# Patient Record
Sex: FEMALE | Race: WHITE
[De-identification: names, ages, dates, MRNs, and addresses within clinical notes are randomized per-mention and may not be internally consistent; named-entity substitution may affect disease eponyms.]

---

## 2020-02-04 ENCOUNTER — HOSPITAL ENCOUNTER (EMERGENCY)
Dept: HOSPITAL 61 - ER | Age: 75
Discharge: HOME | End: 2020-02-04
Payer: MEDICARE

## 2020-02-04 VITALS — BODY MASS INDEX: 47.91 KG/M2 | HEIGHT: 62 IN | WEIGHT: 260.37 LBS

## 2020-02-04 VITALS — DIASTOLIC BLOOD PRESSURE: 100 MMHG | SYSTOLIC BLOOD PRESSURE: 152 MMHG

## 2020-02-04 DIAGNOSIS — E11.9: ICD-10-CM
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
## 2020-02-04 DIAGNOSIS — I10: ICD-10-CM
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
## 2020-02-04 DIAGNOSIS — G89.11: ICD-10-CM
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
## 2020-02-04 DIAGNOSIS — Z90.49: ICD-10-CM

## 2020-02-04 DIAGNOSIS — M25.561: Primary | ICD-10-CM

## 2020-02-04 DIAGNOSIS — M25.511: ICD-10-CM
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
## 2020-02-04 PROCEDURE — 73564 X-RAY EXAM KNEE 4 OR MORE: CPT

## 2020-02-04 PROCEDURE — 99284 EMERGENCY DEPT VISIT MOD MDM: CPT

## 2020-02-04 PROCEDURE — 73030 X-RAY EXAM OF SHOULDER: CPT

## 2020-02-04 NOTE — PHYS DOC
Past Medical History


Past Medical History:  Arthritis, Diabetes-Type II, Hypertension


Past Surgical History:  Cholecystectomy, Tonsillectomy


Additional Past Surgical Histo:  BREAST CYSTS, CARPAL TUNNEL


Alcohol Use:  None


Drug Use:  None





Adult General


Chief Complaint


Chief Complaint:  KNEE INJURY





HPI


HPI





Patient is a 74  year old female who presents with states she is walking with 

her walker when her right knee gave out and she fell to the carpeted floor. She 

states she fell on her right knee and then her right shoulder went into the 

corner of the wall. She denies hitting her head, syncope, dizziness, visual 

changes, weakness in her extremities, fever, dysuria, back pain, neck pain, 

numbness or tingling, shortness of breath, chest pain. Patient does wear 3 L of 

oxygen normally at home due to COPD. Patient is on 3 L of oxygen here in the ED 

and she is 98%.





Review of Systems


Review of Systems








Musculoskeletal: Denies back pain. right knee and right shoulder joint pain []








All other systems were reviewed and found to be within normal limits, except as 

documented in this note.





Current Medications


Current Medications





Current Medications








 Medications


  (Trade)  Dose


 Ordered  Sig/Eva  Start Time


 Stop Time Status Last Admin


Dose Admin


 


 Acetaminophen/


 Hydrocodone Bitart


  (Lortab 5/325)  1 tab  1X  ONCE  20 17:15


 20 17:16 DC 20 17:55


1 TAB











Allergies


Allergies





Allergies








Coded Allergies Type Severity Reaction Last Updated Verified


 


  No Known Drug Allergies    17 No











Physical Exam


Physical Exam





Constitutional: Well developed, well nourished, no acute distress, non-toxic 

appearance. []


HENT: Normocephalic, atraumatic, bilateral external ears normal, oropharynx 

moist, no oral exudates, nose normal. []


Eyes: PERRLA, EOMI, conjunctiva normal, no discharge. [] 


Neck: Normal range of motion, no tenderness, supple, no stridor. [] 


Cardiovascular:Heart rate regular rhythm, no murmur []


Lungs & Thorax:  Bilateral breath sounds clear to auscultation []


Abdomen: Bowel sounds normal, soft, no tenderness, no masses, no pulsatile 

masses. [] 


Skin: Warm, dry, no erythema, no rash. [] 


Back: No tenderness, no CVA tenderness. [] 


Extremities: Right anterior knee and right lateral shoulder tenderness, no 

cyanosis, no clubbing, ROM intact, no edema. [] 


Neurologic: Alert and oriented X 3, normal motor function, normal sensory 

function, no focal deficits noted. []


Psychologic: Affect normal, judgement normal, mood normal. []





Current Patient Data


Vital Signs





                                   Vital Signs








  Date Time  Temp Pulse Resp B/P (MAP) Pulse Ox O2 Delivery O2 Flow Rate FiO2


 


20 17:55   22     


 


20 17:04 97.5 84  198/95 (129) 96 Room Air  





 97.5       











EKG


EKG


[]





Radiology/Procedures


Radiology/Procedures


[]


Impressions:


[facility]


                    [address] [place] [address]


                                 [phone]


                                        


                                 IMAGING REPORT





                                     Signed





PATIENT: NEENA DUTTON   ACCOUNT: [id number]     MRN#: [id number]


: 1945           LOCATION: ER              AGE: 74


SEX: F                    EXAM DT: 20         ACCESSION#: [id number]


STATUS: REG ER            ORD. PHYSICIAN: MADONNA GEORGE


REASON: Fall


PROCEDURE: SHOULDER 2+V RIGHT





Exam: Right shoulder 3 views


 


INDICATION: Fall


 


TECHNIQUE: Frontal view of the right shoulder with internal and external 


rotation and transscapular Y view.


 


Comparisons: 2018


 


FINDINGS:


Right shoulder arthroplasty changes are again noted no acute or healed 


fractures. Soft tissues are unremarkable. Joint spaces are 


well-maintained.


 


IMPRESSION:


Right shoulder arthroplasty without evidence of acute fracture.


 


Electronically signed by: Michael Payton MD (2020 5:52 PM) [facility]














DICTATED and SIGNED BY:     MICHAEL PAYTON MD


DATE:     20 1752





                            [facility]


                    [address] [place] [address]


                                 [phone]


                                        


                                 IMAGING REPORT





                                     Signed





PATIENT: NEENA DUTTON   ACCOUNT: [id number]     MRN#: [id number]


: 1945           LOCATION: ER              AGE: 74


SEX: F                    EXAM DT: 20         ACCESSION#: [id number]


STATUS: REG ER            ORD. PHYSICIAN: MADONNA GEORGE


REASON: Fall


PROCEDURE: KNEE RIGHT 4V





Exam: Right knee 3 views


 


INDICATION: Fall


 


TECHNIQUE: Frontal, lateral, oblique and sunrise views of the right knee


 


Comparisons: None


 


FINDINGS:


Right total knee arthroplasty changes are noted. No acute or healed 


fractures. Soft tissue swelling overlying the patella. No suprapatellar 


effusion.


 


IMPRESSION:


Right knee arthroplasty without evidence of acute osseous abnormality.


 


Electronically signed by: Michael Payton MD (2020 5:50 PM) [facility]














DICTATED and SIGNED BY:     MICHAEL PAYTON MD


DATE:     20 2330








Course & Med Decision Making


Course & Med Decision Making


She states she has no other complaints besides her knee hurting and her shoulder

 hurting. She's had a previous right knee replacement 2 years ago at [facility].

 She had a right shoulder surgery done by Dr. Garcia and 2018. Patient has full

 range of motion of her right knee. Tenderness to the anterior knee only. No 

bruising or deformity. Skin is pink warm and dry. No swelling in the knee. Pedal

 pulses strong and present. Cap refill less than 3 seconds. Lateral right 

shoulder tenderness with palpation. Patient has full range of motion of the 

right shoulder. Radial pulses are present. Skin pink warm and dry. There is no 

bruising, deformity or swelling to the right shoulder.





Patient is up and ambulating with a steady gait in the emergency room. She is 

discharged home.





Dragon Disclaimer


Dragon Disclaimer


This electronic medical record was generated, in whole or in part, using a voice

 recognition dictation system.





Departure


Departure


Impression:  


   Primary Impression:  


   Fall


   Additional Impressions:  


   Knee pain, right


   Shoulder pain, right


Disposition:  01 HOME, SELF-CARE


Condition:  STABLE


Referrals:  


JESÚS LYLES DO (PCP)








LOY GARCIA MD


Patient Instructions:  Fall Prevention and Home Safety





Additional Instructions:  


Follow up with Dr Garcia or your primary care provider. Take pain medication as

 prescribed.


Scripts


Hydrocodone/Apap 5-325 (NORCO 5-325 TABLET) 1 Each Tablet


1 TAB PO PRN Q6HRS PRN for PAIN, #8 TAB 0 Refills


   Prov: TYLERMADONNA M APRNADJA         20





Problem Qualifiers








   Primary Impression:  


   Fall


   Encounter type:  initial encounter  Qualified Codes:  W19.XXXA - Unspecified 

   fall, initial encounter


   Additional Impressions:  


   Knee pain, right


   Chronicity:  acute  Qualified Codes:  M25.561 - Pain in right knee


   Shoulder pain, right


   Chronicity:  acute  Qualified Codes:  M25.511 - Pain in right shoulder








MADONNA GEORGE             2020 17:17

## 2020-02-04 NOTE — RAD
Exam: Right shoulder 3 views

 

INDICATION: Fall

 

TECHNIQUE: Frontal view of the right shoulder with internal and external 

rotation and transscapular Y view.

 

Comparisons: 2/14/2018

 

FINDINGS:

Right shoulder arthroplasty changes are again noted no acute or healed 

fractures. Soft tissues are unremarkable. Joint spaces are 

well-maintained.

 

IMPRESSION:

Right shoulder arthroplasty without evidence of acute fracture.

 

Electronically signed by: Michael Cox MD (2/4/2020 5:52 PM) [facility]

## 2020-02-04 NOTE — RAD
Exam: Right knee 3 views

 

INDICATION: Fall

 

TECHNIQUE: Frontal, lateral, oblique and sunrise views of the right knee

 

Comparisons: None

 

FINDINGS:

Right total knee arthroplasty changes are noted. No acute or healed 

fractures. Soft tissue swelling overlying the patella. No suprapatellar 

effusion.

 

IMPRESSION:

Right knee arthroplasty without evidence of acute osseous abnormality.

 

Electronically signed by: Michael Cox MD (2/4/2020 5:50 PM) [facility]

## 2020-03-28 ENCOUNTER — HOSPITAL ENCOUNTER (INPATIENT)
Dept: HOSPITAL 61 - ER | Age: 75
LOS: 11 days | Discharge: NURSING, REHAB OR LONG-TERM CARE | DRG: 673 | End: 2020-04-08
Attending: FAMILY MEDICINE | Admitting: FAMILY MEDICINE
Payer: MEDICARE

## 2020-03-28 VITALS — SYSTOLIC BLOOD PRESSURE: 123 MMHG | DIASTOLIC BLOOD PRESSURE: 68 MMHG

## 2020-03-28 VITALS — SYSTOLIC BLOOD PRESSURE: 146 MMHG | DIASTOLIC BLOOD PRESSURE: 65 MMHG

## 2020-03-28 VITALS — HEIGHT: 63 IN | WEIGHT: 246.04 LBS | BODY MASS INDEX: 43.59 KG/M2

## 2020-03-28 DIAGNOSIS — J96.20: ICD-10-CM

## 2020-03-28 DIAGNOSIS — L97.819: ICD-10-CM

## 2020-03-28 DIAGNOSIS — E87.1: ICD-10-CM

## 2020-03-28 DIAGNOSIS — E11.22: ICD-10-CM

## 2020-03-28 DIAGNOSIS — I50.43: ICD-10-CM

## 2020-03-28 DIAGNOSIS — L97.829: ICD-10-CM

## 2020-03-28 DIAGNOSIS — D64.9: ICD-10-CM

## 2020-03-28 DIAGNOSIS — Z03.818: ICD-10-CM

## 2020-03-28 DIAGNOSIS — Z82.49: ICD-10-CM

## 2020-03-28 DIAGNOSIS — Z99.2: ICD-10-CM

## 2020-03-28 DIAGNOSIS — Z99.81: ICD-10-CM

## 2020-03-28 DIAGNOSIS — E78.5: ICD-10-CM

## 2020-03-28 DIAGNOSIS — I87.2: ICD-10-CM

## 2020-03-28 DIAGNOSIS — Z87.891: ICD-10-CM

## 2020-03-28 DIAGNOSIS — L03.116: ICD-10-CM

## 2020-03-28 DIAGNOSIS — M19.90: ICD-10-CM

## 2020-03-28 DIAGNOSIS — E66.01: ICD-10-CM

## 2020-03-28 DIAGNOSIS — E87.5: ICD-10-CM

## 2020-03-28 DIAGNOSIS — Z90.49: ICD-10-CM

## 2020-03-28 DIAGNOSIS — L03.115: ICD-10-CM

## 2020-03-28 DIAGNOSIS — N18.6: ICD-10-CM

## 2020-03-28 DIAGNOSIS — E11.51: ICD-10-CM

## 2020-03-28 DIAGNOSIS — N17.0: Primary | ICD-10-CM

## 2020-03-28 DIAGNOSIS — I87.8: ICD-10-CM

## 2020-03-28 DIAGNOSIS — Z96.659: ICD-10-CM

## 2020-03-28 DIAGNOSIS — E61.1: ICD-10-CM

## 2020-03-28 DIAGNOSIS — I95.9: ICD-10-CM

## 2020-03-28 DIAGNOSIS — I13.2: ICD-10-CM

## 2020-03-28 DIAGNOSIS — Z79.899: ICD-10-CM

## 2020-03-28 DIAGNOSIS — J44.1: ICD-10-CM

## 2020-03-28 DIAGNOSIS — Z87.81: ICD-10-CM

## 2020-03-28 LAB
ALBUMIN SERPL-MCNC: 2 G/DL (ref 3.4–5)
ALBUMIN/GLOB SERPL: 0.4 {RATIO} (ref 1–1.7)
ALP SERPL-CCNC: 354 U/L (ref 46–116)
ALT SERPL-CCNC: 54 U/L (ref 14–59)
ANION GAP SERPL CALC-SCNC: 7 MMOL/L (ref 6–14)
APTT BLD: 30 SEC (ref 24–38)
AST SERPL-CCNC: 48 U/L (ref 15–37)
BASOPHILS # BLD AUTO: 0.1 X10^3/UL (ref 0–0.2)
BASOPHILS NFR BLD: 1 % (ref 0–3)
BILIRUB SERPL-MCNC: 0.3 MG/DL (ref 0.2–1)
BUN SERPL-MCNC: 63 MG/DL (ref 7–20)
BUN/CREAT SERPL: 19 (ref 6–20)
CALCIUM SERPL-MCNC: 9.2 MG/DL (ref 8.5–10.1)
CHLORIDE SERPL-SCNC: 95 MMOL/L (ref 98–107)
CO2 SERPL-SCNC: 31 MMOL/L (ref 21–32)
CREAT SERPL-MCNC: 3.4 MG/DL (ref 0.6–1)
EOSINOPHIL NFR BLD: 0.1 X10^3/UL (ref 0–0.7)
EOSINOPHIL NFR BLD: 1 % (ref 0–3)
ERYTHROCYTE [DISTWIDTH] IN BLOOD BY AUTOMATED COUNT: 17.1 % (ref 11.5–14.5)
GFR SERPLBLD BASED ON 1.73 SQ M-ARVRAT: 13.1 ML/MIN
GLOBULIN SER-MCNC: 4.6 G/DL (ref 2.2–3.8)
GLUCOSE SERPL-MCNC: 97 MG/DL (ref 70–99)
HCT VFR BLD CALC: 33.6 % (ref 36–47)
HGB BLD-MCNC: 10.6 G/DL (ref 12–15.5)
LYMPHOCYTES # BLD: 0.9 X10^3/UL (ref 1–4.8)
LYMPHOCYTES NFR BLD AUTO: 11 % (ref 24–48)
MCH RBC QN AUTO: 27 PG (ref 25–35)
MCHC RBC AUTO-ENTMCNC: 31 G/DL (ref 31–37)
MCV RBC AUTO: 85 FL (ref 79–100)
MONO #: 0.5 X10^3/UL (ref 0–1.1)
MONOCYTES NFR BLD: 7 % (ref 0–9)
NEUT #: 6.8 X10^3/UL (ref 1.8–7.7)
NEUTROPHILS NFR BLD AUTO: 81 % (ref 31–73)
PLATELET # BLD AUTO: 372 X10^3/UL (ref 140–400)
POTASSIUM SERPL-SCNC: 5.8 MMOL/L (ref 3.5–5.1)
PROT SERPL-MCNC: 6.6 G/DL (ref 6.4–8.2)
PROTHROMBIN TIME: 13.8 SEC (ref 11.7–14)
RBC # BLD AUTO: 3.96 X10^6/UL (ref 3.5–5.4)
SODIUM SERPL-SCNC: 133 MMOL/L (ref 136–145)
WBC # BLD AUTO: 8.3 X10^3/UL (ref 4–11)

## 2020-03-28 PROCEDURE — 80053 COMPREHEN METABOLIC PANEL: CPT

## 2020-03-28 PROCEDURE — 71045 X-RAY EXAM CHEST 1 VIEW: CPT

## 2020-03-28 PROCEDURE — 81001 URINALYSIS AUTO W/SCOPE: CPT

## 2020-03-28 PROCEDURE — 85027 COMPLETE CBC AUTOMATED: CPT

## 2020-03-28 PROCEDURE — 96375 TX/PRO/DX INJ NEW DRUG ADDON: CPT

## 2020-03-28 PROCEDURE — 73630 X-RAY EXAM OF FOOT: CPT

## 2020-03-28 PROCEDURE — 84132 ASSAY OF SERUM POTASSIUM: CPT

## 2020-03-28 PROCEDURE — 76770 US EXAM ABDO BACK WALL COMP: CPT

## 2020-03-28 PROCEDURE — 84100 ASSAY OF PHOSPHORUS: CPT

## 2020-03-28 PROCEDURE — 86706 HEP B SURFACE ANTIBODY: CPT

## 2020-03-28 PROCEDURE — 96372 THER/PROPH/DIAG INJ SC/IM: CPT

## 2020-03-28 PROCEDURE — G0378 HOSPITAL OBSERVATION PER HR: HCPCS

## 2020-03-28 PROCEDURE — 86704 HEP B CORE ANTIBODY TOTAL: CPT

## 2020-03-28 PROCEDURE — 36581 REPLACE TUNNELED CV CATH: CPT

## 2020-03-28 PROCEDURE — 36556 INSERT NON-TUNNEL CV CATH: CPT

## 2020-03-28 PROCEDURE — 83880 ASSAY OF NATRIURETIC PEPTIDE: CPT

## 2020-03-28 PROCEDURE — 80048 BASIC METABOLIC PNL TOTAL CA: CPT

## 2020-03-28 PROCEDURE — 85730 THROMBOPLASTIN TIME PARTIAL: CPT

## 2020-03-28 PROCEDURE — 80069 RENAL FUNCTION PANEL: CPT

## 2020-03-28 PROCEDURE — 87340 HEPATITIS B SURFACE AG IA: CPT

## 2020-03-28 PROCEDURE — 99152 MOD SED SAME PHYS/QHP 5/>YRS: CPT

## 2020-03-28 PROCEDURE — P9046 ALBUMIN (HUMAN), 25%, 20 ML: HCPCS

## 2020-03-28 PROCEDURE — 82550 ASSAY OF CK (CPK): CPT

## 2020-03-28 PROCEDURE — 93005 ELECTROCARDIOGRAM TRACING: CPT

## 2020-03-28 PROCEDURE — 94640 AIRWAY INHALATION TREATMENT: CPT

## 2020-03-28 PROCEDURE — 85025 COMPLETE CBC W/AUTO DIFF WBC: CPT

## 2020-03-28 PROCEDURE — C1769 GUIDE WIRE: HCPCS

## 2020-03-28 PROCEDURE — 77001 FLUOROGUIDE FOR VEIN DEVICE: CPT

## 2020-03-28 PROCEDURE — 36415 COLL VENOUS BLD VENIPUNCTURE: CPT

## 2020-03-28 PROCEDURE — 94760 N-INVAS EAR/PLS OXIMETRY 1: CPT

## 2020-03-28 PROCEDURE — 83550 IRON BINDING TEST: CPT

## 2020-03-28 PROCEDURE — 76937 US GUIDE VASCULAR ACCESS: CPT

## 2020-03-28 PROCEDURE — C1750 CATH, HEMODIALYSIS,LONG-TERM: HCPCS

## 2020-03-28 PROCEDURE — 80061 LIPID PANEL: CPT

## 2020-03-28 PROCEDURE — C1892 INTRO/SHEATH,FIXED,PEEL-AWAY: HCPCS

## 2020-03-28 PROCEDURE — 94644 CONT INHLJ TX 1ST HOUR: CPT

## 2020-03-28 PROCEDURE — 83540 ASSAY OF IRON: CPT

## 2020-03-28 PROCEDURE — 83735 ASSAY OF MAGNESIUM: CPT

## 2020-03-28 PROCEDURE — 93306 TTE W/DOPPLER COMPLETE: CPT

## 2020-03-28 PROCEDURE — 82962 GLUCOSE BLOOD TEST: CPT

## 2020-03-28 PROCEDURE — 84484 ASSAY OF TROPONIN QUANT: CPT

## 2020-03-28 PROCEDURE — 96365 THER/PROPH/DIAG IV INF INIT: CPT

## 2020-03-28 PROCEDURE — 85610 PROTHROMBIN TIME: CPT

## 2020-03-28 NOTE — PHYS DOC
Past Medical History


Past Medical History:  Arthritis, COPD, Diabetes-Type II, Hypertension


Past Surgical History:  Cholecystectomy, Knee Replacement, Tonsillectomy


Additional Past Surgical Histo:  BREAST CYSTS, CARPAL TUNNEL


Smoking Status:  Former Smoker


Alcohol Use:  None


Drug Use:  None





Adult General


Chief Complaint


Chief Complaint:  WEAKNESS/GENERALIZED





HPI


HPI





74-year-old female past medical history of COPD on 2 L nasal cannula presents 

with the chief complaint of shortness of breath.  Patient states she has had 

shortness of breath for the last 3 weeks.  Patient states today shortness of 

breath had noticeably increased.  Shortness of breath is exacerbated with 

movement.  Patient denies any associated chest pain, new cough, and/or fevers or

chills.





On exam patient has bilateral lower extremity edema with weeping wounds.





Patient denies any chest pain.





Review of Systems


Review of Systems





Constitutional: Denies fever or chills []


Eyes: Denies change in visual acuity, redness, or eye pain []


HENT: Denies nasal congestion or sore throat []


Respiratory: Denies cough or shortness of breath []


Cardiovascular: No additional information not addressed in HPI []


GI: Denies abdominal pain, nausea, vomiting, bloody stools or diarrhea []


: Denies dysuria or hematuria []


Musculoskeletal: Denies back pain or joint pain []


Integument: Denies rash or skin lesions []


Neurologic: Denies headache, focal weakness or sensory changes []


Endocrine: Denies polyuria or polydipsia []





All other systems were reviewed and found to be within normal limits, except as 

documented in this note.





Current Medications


Current Medications





Current Medications








 Medications


  (Trade)  Dose


 Ordered  Sig/Eva  Start Time


 Stop Time Status Last Admin


Dose Admin


 


 Albuterol Sulfate


  (Ventolin Neb


 Soln)  10 mg  1X  ONCE  3/28/20 18:00


 3/28/20 18:01 DC  





 


 Calcium Gluconate


  (Calcium


 Gluconate)  1,000 mg  1X  ONCE  3/28/20 18:00


 3/28/20 18:01 DC  





 


 Furosemide


  (Lasix)  40 mg  1X  ONCE  3/28/20 18:30


 3/28/20 18:31 UNV  





 


 Sodium Chloride  1,000 ml @ 


 1,000 mls/hr  1X  ONCE  3/28/20 18:00


 3/28/20 18:59   





 


 Vancomycin HCl  250 ml @ 


 166.667


 mls/hr  1X  ONCE  3/28/20 17:45


 3/28/20 19:14   














Allergies


Allergies





Allergies








Coded Allergies Type Severity Reaction Last Updated Verified


 


  No Known Drug Allergies    11/28/17 No











Physical Exam


Physical Exam





Constitutional: Well developed, well nourished, no acute distress, non-toxic 

appearance. []


HENT: Normocephalic, atraumatic, bilateral external ears normal, oropharynx 

moist, no oral exudates, nose normal. []


Eyes: PERRLA, EOMI, conjunctiva normal, no discharge. [] 


Neck: Normal range of motion, no tenderness, supple, no stridor. [] 


Cardiovascular:Heart rate regular rhythm, no murmur []


Lungs & Thorax:  Bilateral breath sounds clear to auscultation []


Abdomen: Bowel sounds normal, soft, no tenderness, no masses, no pulsatile 

masses. [] 


Skin: Warm, dry, no erythema, no rash. [] 


Back: No tenderness, no CVA tenderness. [] 


Extremities: No tenderness, no cyanosis, no clubbing, ROM intact, no edema. [] 


Neurologic: Alert and oriented X 3, normal motor function, normal sensory 

function, no focal deficits noted. []


Psychologic: Affect normal, judgement normal, mood normal. []





Current Patient Data


Lab Values





                                Laboratory Tests








Test


 3/28/20


16:13


 


White Blood Count


 8.3 x10^3/uL


(4.0-11.0)


 


Red Blood Count


 3.96 x10^6/uL


(3.50-5.40)


 


Hemoglobin


 10.6 g/dL


(12.0-15.5)  L


 


Hematocrit


 33.6 %


(36.0-47.0)  L


 


Mean Corpuscular Volume


 85 fL ()





 


Mean Corpuscular Hemoglobin 27 pg (25-35)  


 


Mean Corpuscular Hemoglobin


Concent 31 g/dL


(31-37)


 


Red Cell Distribution Width


 17.1 %


(11.5-14.5)  H


 


Platelet Count


 372 x10^3/uL


(140-400)


 


Neutrophils (%) (Auto) 81 % (31-73)  H


 


Lymphocytes (%) (Auto) 11 % (24-48)  L


 


Monocytes (%) (Auto) 7 % (0-9)  


 


Eosinophils (%) (Auto) 1 % (0-3)  


 


Basophils (%) (Auto) 1 % (0-3)  


 


Neutrophils # (Auto)


 6.8 x10^3/uL


(1.8-7.7)


 


Lymphocytes # (Auto)


 0.9 x10^3/uL


(1.0-4.8)  L


 


Monocytes # (Auto)


 0.5 x10^3/uL


(0.0-1.1)


 


Eosinophils # (Auto)


 0.1 x10^3/uL


(0.0-0.7)


 


Basophils # (Auto)


 0.1 x10^3/uL


(0.0-0.2)


 


Prothrombin Time


 13.8 SEC


(11.7-14.0)


 


Prothrombin Time INR 1.1 (0.8-1.1)  


 


Activated Partial


Thromboplast Time 30 SEC (24-38)





 


Sodium Level


 133 mmol/L


(136-145)  L


 


Potassium Level


 5.8 mmol/L


(3.5-5.1)  H


 


Chloride Level


 95 mmol/L


()  L


 


Carbon Dioxide Level


 31 mmol/L


(21-32)


 


Anion Gap 7 (6-14)  


 


Blood Urea Nitrogen


 63 mg/dL


(7-20)  H


 


Creatinine


 3.4 mg/dL


(0.6-1.0)  H


 


Estimated GFR


(Cockcroft-Gault) 13.1  





 


BUN/Creatinine Ratio 19 (6-20)  


 


Glucose Level


 97 mg/dL


(70-99)


 


Calcium Level


 9.2 mg/dL


(8.5-10.1)


 


Total Bilirubin


 0.3 mg/dL


(0.2-1.0)


 


Aspartate Amino Transferase


(AST) 48 U/L (15-37)


H


 


Alanine Aminotransferase (ALT)


 54 U/L (14-59)





 


Alkaline Phosphatase


 354 U/L


()  H


 


Troponin I Quantitative


 0.324 ng/mL


(0.000-0.055)


 


NT-Pro-B-Type Natriuretic


Peptide 43298 pg/mL


(0-449)  H


 


Total Protein


 6.6 g/dL


(6.4-8.2)


 


Albumin


 2.0 g/dL


(3.4-5.0)  L


 


Albumin/Globulin Ratio


 0.4 (1.0-1.7)


L





                                Laboratory Tests


3/28/20 16:13








                                Laboratory Tests


3/28/20 16:13











EKG


EKG


1552hrs


Sinus rhythm rate of 77 no ST elevation no ST depression no acute MI []





Radiology/Procedures


Radiology/Procedures


[]





Course & Med Decision Making


Course & Med Decision Making


Pertinent Labs and Imaging studies reviewed. (See chart for details)





[]





Dragon Disclaimer


Dragon Disclaimer


This electronic medical record was generated, in whole or in part, using a voice

 recognition dictation system.





Departure


Departure


Impression:  


   Primary Impression:  


   Shortness of breath


   Additional Impressions:  


   Cellulitis


   CHF (congestive heart failure)


   Elevated troponin


[facility]


Referrals:  


JESÚS LYLES DO (PCP)





Problem Qualifiers











WILDER PATE DO            Mar 28, 2020 16:52

## 2020-03-28 NOTE — RAD
AP portable chest  radiograph 3/28/2020

 

Clinical History: Shortness of breath.

 

An AP erect portable digital radiograph of the chest was obtained.

 

Comparison study is dated 11/28/2017.

 

The patient is post right shoulder joint replacement. The cardiac 

silhouette is mildly enlarged. Atherosclerotic calcification thoracic 

aorta is seen. No area of consolidation is noted. No pneumothorax or 

pleural effusion is seen. Slight prominence of pulmonary vasculature is 

seen which could reflect mild CHF. Clinical correlation is recommended. 

Degenerative changes are seen involving the thoracic spine along with the 

left shoulder.

 

Impression: Question mild CHF.

 

Electronically signed by: Ruslan Jason MD (3/28/2020 8:21 PM) [id number]

## 2020-03-29 VITALS — SYSTOLIC BLOOD PRESSURE: 140 MMHG | DIASTOLIC BLOOD PRESSURE: 55 MMHG

## 2020-03-29 VITALS — SYSTOLIC BLOOD PRESSURE: 118 MMHG | DIASTOLIC BLOOD PRESSURE: 67 MMHG

## 2020-03-29 VITALS — SYSTOLIC BLOOD PRESSURE: 120 MMHG | DIASTOLIC BLOOD PRESSURE: 58 MMHG

## 2020-03-29 VITALS — DIASTOLIC BLOOD PRESSURE: 60 MMHG | SYSTOLIC BLOOD PRESSURE: 106 MMHG

## 2020-03-29 VITALS — SYSTOLIC BLOOD PRESSURE: 106 MMHG | DIASTOLIC BLOOD PRESSURE: 63 MMHG

## 2020-03-29 VITALS — DIASTOLIC BLOOD PRESSURE: 49 MMHG | SYSTOLIC BLOOD PRESSURE: 107 MMHG

## 2020-03-29 LAB
ANION GAP SERPL CALC-SCNC: 4 MMOL/L (ref 6–14)
BUN SERPL-MCNC: 68 MG/DL (ref 7–20)
CALCIUM SERPL-MCNC: 8.6 MG/DL (ref 8.5–10.1)
CHLORIDE SERPL-SCNC: 99 MMOL/L (ref 98–107)
CO2 SERPL-SCNC: 32 MMOL/L (ref 21–32)
CREAT SERPL-MCNC: 3.7 MG/DL (ref 0.6–1)
GFR SERPLBLD BASED ON 1.73 SQ M-ARVRAT: 11.9 ML/MIN
GLUCOSE SERPL-MCNC: 35 MG/DL (ref 70–99)
POTASSIUM SERPL-SCNC: 6.2 MMOL/L (ref 3.5–5.1)
SODIUM SERPL-SCNC: 135 MMOL/L (ref 136–145)

## 2020-03-29 RX ADMIN — DEXTROSE MONOHYDRATE PRN GM: 25 INJECTION, SOLUTION INTRAVENOUS at 21:24

## 2020-03-29 RX ADMIN — PIPERACILLIN SODIUM AND TAZOBACTAM SODIUM SCH MLS/HR: 2; .25 INJECTION, POWDER, LYOPHILIZED, FOR SOLUTION INTRAVENOUS at 23:18

## 2020-03-29 RX ADMIN — PIPERACILLIN SODIUM AND TAZOBACTAM SODIUM SCH MLS/HR: 2; .25 INJECTION, POWDER, LYOPHILIZED, FOR SOLUTION INTRAVENOUS at 16:59

## 2020-03-29 NOTE — NUR
notified of hypoglycemic episode tonight for the second time today. Pt was given 
an amp of Dextrose and her sugar improved from 43 to 100 afterwards. Pt was slightly sleepy, 
but still easy to wake during hypoglycemic episode. More alert post dextrose admin. 
Physician states continue to monitor sugar and administer more dextrose prn hypoglycemia in 
the future.

## 2020-03-29 NOTE — HP
ADMIT DATE:  03/29/2020



CHIEF COMPLAINT:  Weakness.



HISTORY OF PRESENT ILLNESS:  The patient is a pleasant 74-year-old female who

has multiple comorbidities.  She is quite large.  She has chronic COPD.  She is

on 2 liters at home.  Basically, she presented with shortness of breath.  She

also has chronic cellulitis on both lower extremities.  She is quite edematous. 

While in the ER, we also noted that she has acute kidney injury and her

creatinine is up to 2.  I believe she was started on Lasix and ACE inhibitor

within the past few weeks.  She also appears to be in acute on chronic systolic

and diastolic heart failure.  We are going to admit the patient and consult

Cardiology and Nephrology.



PAST MEDICAL HISTORY:  COPD, arthritis, diabetes, hypertension, obesity,

cholecystectomy, knee replacement, tonsillectomy, peripheral vascular disease,

chronic lower extremity wounds, breast cyst, carpal tunnel surgery, previous

tobacco abuse, probable CHF.



ALLERGIES:  None.



FAMILY HISTORY:  Coronary artery disease.



SOCIAL HISTORY:  She quit smoking, no drinking or drugs.



MEDICATIONS:  Reviewed, please refer to the MRAD.



REVIEW OF SYSTEMS:

GENERAL:  She complains of weakness.

SKIN:  No bruising, hair changes or rashes.

EYES:  No blurred, double or loss of vision.

NOSE AND THROAT:  No history of nosebleeds, hoarseness or sore throat.

HEART:  No history of palpitations, chest pain or shortness of breath on

exertion.

PULMONARY:  She complains of shortness of breath.

GASTROINTESTINAL:  Denies changes in appetite, nausea, vomiting, diarrhea or

constipation.

GENITOURINARY:  No history of frequency, urgency, hesitancy or nocturia.

NEUROLOGIC:  Denies history of numbness, tingling, tremor or weakness.

PSYCHIATRIC:  No history of panic, anxiety or depression.

ENDOCRINE:  No history of heat or cold intolerance, polyuria or polydipsia.

EXTREMITIES:  She complains of lower extremity swelling and pain and erythema.



PHYSICAL EXAMINATION:

VITALS:  Within normal limits and are stable.

GENERAL:  She is very large.

HEENT:  Normal cephalic atraumatic, external auditory canals are patent.  Eyes: 

Extraocular muscles are intact, pupils are equally round and reactive to light

and accommodation.

MUSKULOSKELETAL:  Well developed, well nourished, good range of motion.

ENDOCRINE:  No thyromegaly was palpated.

LYMPHATICS:  No cervical chain or axillary nodes were noted.

HEMATOPOIETIC:  No bruising.

NECK:  Supple, no JVD, no thyromegaly was noted.

PULMONARY:  She has decreased breath sounds.

HEART:  RRR, S1, S2 present.  Peripheral pulses intact, no obvious murmurs were

noted.

ABDOMEN:  Soft, nontender.  Positive bowel sounds no organomegaly, normal bowel

sounds.

EXTREMITIES:  She has bilateral lower extremity cellulitis with wounds that have

been wrapped with clean, dry and intact dressing.

NEUROLOGIC:  Normal speech, normal tone.  A & O x 3, moves all extremities, no

obvious focal deficits.

PSYCHIATRIC:  She is anxious.

SKIN:  No ulcerations or rashes, good skin turgor, no jaundice.

VASCULAR:  Good capillary refill, neurovascular bundle appears to be intact.



LABORATORY DATA:  Potassium is 6.2, BUN 68, creatinine 3.7, hemoglobin is 10.6,

INR is 1.1.  BNP is 30,161.  Troponin 0.27.



ASSESSMENT AND PLAN:  Acute on chronic kidney injury, hyperkalemia, hyponatremia

at 135, azotemia, acute on chronic systolic and diastolic heart failure,

elevated troponin which I suspect is secondary to her elevated creatinine,

anemia and lower extremity wounds.  The patient is being admitted.  We will

consult Nephrology and Cardiology.  Hold her ACE inhibitor.  Hold her diuretic. 

IV antibiotics.  PT, OT.  Wound care nurse to evaluate and treat.  We will

continue other home meds, DVT prophylaxis.  Full code.  Prognosis in long-term

is extremely guarded.



TOTAL TIME:  33 minutes.

 



______________________________

ELMO STEARNS DO



DR:  YAMINI/uvaldo  JOB#:  [id number] / [id number]

DD:  03/29/2020 12:53  DT:  03/29/2020 13:23

## 2020-03-29 NOTE — PDOC2
CONSULT


Date of Consult


Date of Consult


DATE: 3/29/20 


TIME: 10:37





Reason for Consult


Reason for Consult:


Shortness of breath.





Referring Physician


Referring Physician:


Dr. Santos





Identification/Chief Complaint


Chief Complaint


Shortness of breath





Source


Source:  Chart review, Patient





History of Present Illness


Reason for Visit:


The patient is a 74-year-old female who presented to the emergency room with 2-3

weeks of increasing shortness of breath. Her shortness of breath had 

significantly increased the day of admission. Initial evaluation the emergency 

room showed a blood potassium elevated at 5.8 with a creatinine of 3.4, chest x-

ray with probable mild heart failure and an EKG with no acute ischemic changes. 

Patient has a history of COPD, diabetes as well as hypertension but no 

documented history of coronary disease. She was treated with pulmonary 

medications and initially was some Lasix although this has been held at this t

arlin due to her elevated creatinine. She is feeling better in the morning but 

still reports shortness of breath. Additionally she has bilateral lower 

extremity edema with findings of probable cellulitis.





Past Medical History


Cardiovascular:  HTN, Hyperlipidemia


Pulmonary:  COPD


Musculoskeletal:  Osteoarthritis


Endocrine:  Diabetes





Past Surgical History


Past Surgical History:  Cholecystectomy, Total knee replacement, Tonsillectomy





Family History


Family History:  Hypertension





Social History


Quit


ALCOHOL:  none


Drugs:  None





Current Problem List


Problem List


Problems


Medical Problems:


(1) Cellulitis


Status: Acute  





(2) CHF (congestive heart failure)


Status: Acute  





(3) Elevated troponin


Status: Acute  





(4) Shortness of breath


Status: Acute  











Current Medications


Current Medications





Current Medications


Vancomycin HCl 250 ml @  166.667 mls/hr 1X  ONCE IV  Last administered on 

3/28/20at 18:24;  Start 3/28/20 at 17:45;  Stop 3/28/20 at 19:14;  Status DC


Sodium Chloride 1,000 ml @  1,000 mls/hr 1X  ONCE IV  Last administered on 

3/28/20at 18:23;  Start 3/28/20 at 18:00;  Stop 3/28/20 at 18:59;  Status DC


Calcium Gluconate (Calcium Gluconate) 1,000 mg 1X  ONCE IVP  Last administered 

on 3/28/20at 18:24;  Start 3/28/20 at 18:00;  Stop 3/28/20 at 18:01;  Status DC


Albuterol Sulfate (Ventolin Neb Soln) 10 mg 1X  ONCE CONT NEB  Last administered

on 3/28/20at 18:00;  Start 3/28/20 at 18:00;  Stop 3/28/20 at 18:01;  Status DC


Furosemide (Lasix) 40 mg 1X  ONCE PO ;  Start 3/28/20 at 18:30;  Stop 3/28/20 at

18:31;  Status DC


Enoxaparin Sodium (Lovenox 120mg Syringe) 120 mg 1X  ONCE SQ  Last administered 

on 3/28/20at 20:05;  Start 3/28/20 at 19:30;  Stop 3/28/20 at 19:31;  Status DC


Furosemide (Lasix) 40 mg 1X  ONCE IVP  Last administered on 3/28/20at 20:05;  

Start 3/28/20 at 19:30;  Stop 3/28/20 at 19:31;  Status DC


Dextrose (Dextrose 50%-Water Syringe) 25 gm STK-MED ONCE IV ;  Start 3/29/20 at 

08:11;  Stop 3/29/20 at 08:11;  Status DC


Sodium Polystyrene Sulfonate (Kayexalate) 30 gm 1X  ONCE RC ;  Start 3/29/20 at 

09:00;  Stop 3/29/20 at 08:38;  Status DC


Sodium Polystyrene Sulfonate (Kayexalate) 15 gm 1X  ONCE PO  Last administered 

on 3/29/20at 09:24;  Start 3/29/20 at 09:00;  Stop 3/29/20 at 09:01;  Status DC





Active Scripts


Active


Norco 5-325 Tablet (Acetaminophen/Hydrocodone Bitart) 1 Each Tablet 1 Tab PO PRN

Q6HRS PRN


Oxycodone-Acetaminophen 5-325 (Oxycodone Hcl/Acetaminophen) 1 Each Tablet 1 Tab 

PO PRN Q4HRS PRN


Reported


Triamcinolone Acetonide 0.1% Cream (Triamcinolone Acetonide) 15 Gm Cream..g. 1 

Ashley TP BID


Tramadol Hcl 50 Mg Tablet 50 Mg PO DAILY PRN


Flonase Allergy Relief (Fluticasone Propionate) 9.9 Ml Spray.susp 2 Sprays NS 

DAILY


Metformin Hcl 1,000 Mg Tablet 1,000 Mg PO BIDWMEALS


Lisinopril 40 Mg Tablet 1 Tab PO DAILY


Novolin N (Nph, Human Insulin Isophane) 100 Unit/1 Ml Vial 40 Unit SQ DAILY


Carvedilol ** (Carvedilol) 6.25 Mg Tablet 6.25 Mg PO BIDWMEALS


Escitalopram Oxalate 10 Mg Tablet 1 Tab PO DAILY


Simvastatin 40 Mg Tablet 1 Tab PO QHS


Glipizide 10 Mg Tablet 10 Mg PO BID


Furosemide 20 Mg Tablet 1 Tab PO DAILY





Allergies


Allergies:  


Coded Allergies:  


     No Known Drug Allergies (Unverified , 11/28/17)





ROS


General:  YES: Fatigue


Respiratory:  YES: Shortness of breath, SOB with excertion





Physical Exam


General:  mild distress


Lungs:  Other (decreased breath sounds bilaterally)


Heart:  Regular rate


Abdomen:  Normal bowel sounds





Vitals


VITALS





Vital Signs








  Date Time  Temp Pulse Resp B/P (MAP) Pulse Ox O2 Delivery O2 Flow Rate FiO2


 


3/29/20 07:00 97.7 63 20 107/49 (68) 95 Nasal Cannula 3.0 





 97.7       











Labs


Labs





Laboratory Tests








Test


 3/28/20


16:13 3/29/20


00:30 3/29/20


06:30 3/29/20


08:38


 


White Blood Count


 8.3 x10^3/uL


(4.0-11.0) 


 


 





 


Red Blood Count


 3.96 x10^6/uL


(3.50-5.40) 


 


 





 


Hemoglobin


 10.6 g/dL


(12.0-15.5) 


 


 





 


Hematocrit


 33.6 %


(36.0-47.0) 


 


 





 


Mean Corpuscular Volume 85 fL ()    


 


Mean Corpuscular Hemoglobin 27 pg (25-35)    


 


Mean Corpuscular Hemoglobin


Concent 31 g/dL


(31-37) 


 


 





 


Red Cell Distribution Width


 17.1 %


(11.5-14.5) 


 


 





 


Platelet Count


 372 x10^3/uL


(140-400) 


 


 





 


Neutrophils (%) (Auto) 81 % (31-73)    


 


Lymphocytes (%) (Auto) 11 % (24-48)    


 


Monocytes (%) (Auto) 7 % (0-9)    


 


Eosinophils (%) (Auto) 1 % (0-3)    


 


Basophils (%) (Auto) 1 % (0-3)    


 


Neutrophils # (Auto)


 6.8 x10^3/uL


(1.8-7.7) 


 


 





 


Lymphocytes # (Auto)


 0.9 x10^3/uL


(1.0-4.8) 


 


 





 


Monocytes # (Auto)


 0.5 x10^3/uL


(0.0-1.1) 


 


 





 


Eosinophils # (Auto)


 0.1 x10^3/uL


(0.0-0.7) 


 


 





 


Basophils # (Auto)


 0.1 x10^3/uL


(0.0-0.2) 


 


 





 


Prothrombin Time


 13.8 SEC


(11.7-14.0) 


 


 





 


Prothromb Time International


Ratio 1.1 (0.8-1.1) 


 


 


 





 


Activated Partial


Thromboplast Time 30 SEC (24-38) 


 


 


 





 


Sodium Level


 133 mmol/L


(136-145) 


 135 mmol/L


(136-145) 





 


Potassium Level


 5.8 mmol/L


(3.5-5.1) 


 6.2 mmol/L


(3.5-5.1) 





 


Chloride Level


 95 mmol/L


() 


 99 mmol/L


() 





 


Carbon Dioxide Level


 31 mmol/L


(21-32) 


 32 mmol/L


(21-32) 





 


Anion Gap 7 (6-14)   4 (6-14)  


 


Blood Urea Nitrogen


 63 mg/dL


(7-20) 


 68 mg/dL


(7-20) 





 


Creatinine


 3.4 mg/dL


(0.6-1.0) 


 3.7 mg/dL


(0.6-1.0) 





 


Estimated GFR


(Cockcroft-Gault) 13.1 


 


 11.9 


 





 


BUN/Creatinine Ratio 19 (6-20)    


 


Glucose Level


 97 mg/dL


(70-99) 


 35 mg/dL


(70-99) 





 


Calcium Level


 9.2 mg/dL


(8.5-10.1) 


 8.6 mg/dL


(8.5-10.1) 





 


Total Bilirubin


 0.3 mg/dL


(0.2-1.0) 


 


 





 


Aspartate Amino Transf


(AST/SGOT) 48 U/L (15-37) 


 


 


 





 


Alanine Aminotransferase


(ALT/SGPT) 54 U/L (14-59) 


 


 


 





 


Alkaline Phosphatase


 354 U/L


() 


 


 





 


Troponin I Quantitative


 0.324 ng/mL


(0.000-0.055) 0.278 ng/mL


(0.000-0.055) 0.273 ng/mL


(0.000-0.055) 





 


NT-Pro-B-Type Natriuretic


Peptide 12168 pg/mL


(0-449) 


 


 





 


Total Protein


 6.6 g/dL


(6.4-8.2) 


 


 





 


Albumin


 2.0 g/dL


(3.4-5.0) 


 


 





 


Albumin/Globulin Ratio 0.4 (1.0-1.7)    


 


Glucose (Fingerstick)


 


 


 


 69 mg/dL


(70-99)








Laboratory Tests








Test


 3/28/20


16:13 3/29/20


00:30 3/29/20


06:30 3/29/20


08:38


 


White Blood Count


 8.3 x10^3/uL


(4.0-11.0) 


 


 





 


Red Blood Count


 3.96 x10^6/uL


(3.50-5.40) 


 


 





 


Hemoglobin


 10.6 g/dL


(12.0-15.5) 


 


 





 


Hematocrit


 33.6 %


(36.0-47.0) 


 


 





 


Mean Corpuscular Volume 85 fL ()    


 


Mean Corpuscular Hemoglobin 27 pg (25-35)    


 


Mean Corpuscular Hemoglobin


Concent 31 g/dL


(31-37) 


 


 





 


Red Cell Distribution Width


 17.1 %


(11.5-14.5) 


 


 





 


Platelet Count


 372 x10^3/uL


(140-400) 


 


 





 


Neutrophils (%) (Auto) 81 % (31-73)    


 


Lymphocytes (%) (Auto) 11 % (24-48)    


 


Monocytes (%) (Auto) 7 % (0-9)    


 


Eosinophils (%) (Auto) 1 % (0-3)    


 


Basophils (%) (Auto) 1 % (0-3)    


 


Neutrophils # (Auto)


 6.8 x10^3/uL


(1.8-7.7) 


 


 





 


Lymphocytes # (Auto)


 0.9 x10^3/uL


(1.0-4.8) 


 


 





 


Monocytes # (Auto)


 0.5 x10^3/uL


(0.0-1.1) 


 


 





 


Eosinophils # (Auto)


 0.1 x10^3/uL


(0.0-0.7) 


 


 





 


Basophils # (Auto)


 0.1 x10^3/uL


(0.0-0.2) 


 


 





 


Prothrombin Time


 13.8 SEC


(11.7-14.0) 


 


 





 


Prothromb Time International


Ratio 1.1 (0.8-1.1) 


 


 


 





 


Activated Partial


Thromboplast Time 30 SEC (24-38) 


 


 


 





 


Sodium Level


 133 mmol/L


(136-145) 


 135 mmol/L


(136-145) 





 


Potassium Level


 5.8 mmol/L


(3.5-5.1) 


 6.2 mmol/L


(3.5-5.1) 





 


Chloride Level


 95 mmol/L


() 


 99 mmol/L


() 





 


Carbon Dioxide Level


 31 mmol/L


(21-32) 


 32 mmol/L


(21-32) 





 


Anion Gap 7 (6-14)   4 (6-14)  


 


Blood Urea Nitrogen


 63 mg/dL


(7-20) 


 68 mg/dL


(7-20) 





 


Creatinine


 3.4 mg/dL


(0.6-1.0) 


 3.7 mg/dL


(0.6-1.0) 





 


Estimated GFR


(Cockcroft-Gault) 13.1 


 


 11.9 


 





 


BUN/Creatinine Ratio 19 (6-20)    


 


Glucose Level


 97 mg/dL


(70-99) 


 35 mg/dL


(70-99) 





 


Calcium Level


 9.2 mg/dL


(8.5-10.1) 


 8.6 mg/dL


(8.5-10.1) 





 


Total Bilirubin


 0.3 mg/dL


(0.2-1.0) 


 


 





 


Aspartate Amino Transf


(AST/SGOT) 48 U/L (15-37) 


 


 


 





 


Alanine Aminotransferase


(ALT/SGPT) 54 U/L (14-59) 


 


 


 





 


Alkaline Phosphatase


 354 U/L


() 


 


 





 


Troponin I Quantitative


 0.324 ng/mL


(0.000-0.055) 0.278 ng/mL


(0.000-0.055) 0.273 ng/mL


(0.000-0.055) 





 


NT-Pro-B-Type Natriuretic


Peptide 12127 pg/mL


(0-449) 


 


 





 


Total Protein


 6.6 g/dL


(6.4-8.2) 


 


 





 


Albumin


 2.0 g/dL


(3.4-5.0) 


 


 





 


Albumin/Globulin Ratio 0.4 (1.0-1.7)    


 


Glucose (Fingerstick)


 


 


 


 69 mg/dL


(70-99)











Images


Images


Chest x-ray with findings of mild heart failure





Assessment/Plan


Assessment/Plan


1. Respiratory failure with elements of COPD, heart failure and DIETER. Patient is 

feeling better this morning. Would continue present treatments. Will hold Lasix 

at this time until the patient is evaluated by the renal service for her morning

creatinine of 3.7. We'll check an echocardiogram to evaluate LV function.





2. Probable acute heart failure. Patient denies a history of heart failure. 

Holding diuresis until evaluated by the renal service. We'll check an 

echocardiogram for LV function as above





3. DANNY. Elevated potassium and creatinine of 3.7. Would suggest a renal 

evaluation.





4. Lower extremity cellulitis. Antibiotics as per the primary service.





5. Hypertension. Reasonable control. Will continue to monitor. Continue present 

medications.





6. Diabetes mellitus. As per the primary service.





Thank you for allowing us to participate in the care of your patient.











STACEY GRAVES MD            Mar 29, 2020 10:43

## 2020-03-29 NOTE — RAD
RENAL COMPLETE BILATERAL

 

History: Acute kidney injury.

 

Comparison: None.

 

Procedure: Transabdominal ultrasound images are obtained of the kidneys 

and bladder.

 

Findings:

Right kidney: measures 11.4 x 5.8 x 5.7 cm. Degraded evaluation due to 

patient body habitus. No hydronephrosis.

 

Left kidney: measures 11.1 x 5.9 x 5.8 cm. Degraded evaluation due to 

patient body habitus. No hydronephrosis.

 

Urinary bladder: Decompressed with Venegas catheter in place. 

 

Aorta and IVC not well seen due to patient body habitus and patient's 

decreased mobility.

 

IMPRESSION: 

1.  Degraded evaluation due to patient body habitus and decreased 

mobility.

2.  No hydronephrosis.

 

Electronically signed by: Chaz Duke DO (3/29/2020 2:59 PM) [id number]

## 2020-03-29 NOTE — PDOC2
CONSULT


Date of Consult


Date of Consult


DATE: 3/29/20 


TIME: 12:28





Reason for Consult


Reason for Consult:


DANNY





Source


Source:  Chart review, Patient





History of Present Illness


Reason for Visit:


 patient is a 74-year-old female  history of COPD, diabetes , hypertension   who

presented to the emergency room with 2-3 weeks of increasing shortness of 

breath. Her shortness of breath had significantly increased the day of 

admission. Initial evaluation the emergency room showed a blood potassium el

evated at 5.8 with a creatinine of 3.4, chest x-ray with probable mild heart 

failure and an EKG with no acute ischemic changes. 


  She is feeling better in the morning but still reports shortness of breath. 

Additionally she has bilateral lower extremity edema with findings of probable 

cellulitis.


Denies any urinary complaints  .No UA or imaging done in ER





Past Medical History


Cardiovascular:  HTN, Hyperlipidemia


Pulmonary:  COPD


Musculoskeletal:  Osteoarthritis


Endocrine:  Diabetes





Past Surgical History


Past Surgical History:  Cholecystectomy, Total knee replacement, Tonsillectomy





Family History


Family History:  Hypertension





Social History


Quit


ALCOHOL:  none


Drugs:  None





Current Problem List


Problem List


Problems


Medical Problems:


(1) Cellulitis


Status: Acute  





(2) CHF (congestive heart failure)


Status: Acute  





(3) Elevated troponin


Status: Acute  





(4) Shortness of breath


Status: Acute  











Current Medications


Current Medications





Current Medications


Vancomycin HCl 250 ml @  166.667 mls/hr 1X  ONCE IV  Last administered on 

3/28/20at 18:24;  Start 3/28/20 at 17:45;  Stop 3/28/20 at 19:14;  Status DC


Sodium Chloride 1,000 ml @  1,000 mls/hr 1X  ONCE IV  Last administered on 

3/28/20at 18:23;  Start 3/28/20 at 18:00;  Stop 3/28/20 at 18:59;  Status DC


Calcium Gluconate (Calcium Gluconate) 1,000 mg 1X  ONCE IVP  Last administered 

on 3/28/20at 18:24;  Start 3/28/20 at 18:00;  Stop 3/28/20 at 18:01;  Status DC


Albuterol Sulfate (Ventolin Neb Soln) 10 mg 1X  ONCE CONT NEB  Last administered

on 3/28/20at 18:00;  Start 3/28/20 at 18:00;  Stop 3/28/20 at 18:01;  Status DC


Furosemide (Lasix) 40 mg 1X  ONCE PO ;  Start 3/28/20 at 18:30;  Stop 3/28/20 at

18:31;  Status DC


Enoxaparin Sodium (Lovenox 120mg Syringe) 120 mg 1X  ONCE SQ  Last administered 

on 3/28/20at 20:05;  Start 3/28/20 at 19:30;  Stop 3/28/20 at 19:31;  Status DC


Furosemide (Lasix) 40 mg 1X  ONCE IVP  Last administered on 3/28/20at 20:05;  

Start 3/28/20 at 19:30;  Stop 3/28/20 at 19:31;  Status DC


Dextrose (Dextrose 50%-Water Syringe) 25 gm STK-MED ONCE IV ;  Start 3/29/20 at 

08:11;  Stop 3/29/20 at 08:11;  Status DC


Sodium Polystyrene Sulfonate (Kayexalate) 30 gm 1X  ONCE RC ;  Start 3/29/20 at 

09:00;  Stop 3/29/20 at 08:38;  Status DC


Sodium Polystyrene Sulfonate (Kayexalate) 15 gm 1X  ONCE PO  Last administered 

on 3/29/20at 09:24;  Start 3/29/20 at 09:00;  Stop 3/29/20 at 09:01;  Status DC





Active Scripts


Active


Norco 5-325 Tablet (Acetaminophen/Hydrocodone Bitart) 1 Each Tablet 1 Tab PO PRN

Q6HRS PRN


Oxycodone-Acetaminophen 5-325 (Oxycodone Hcl/Acetaminophen) 1 Each Tablet 1 Tab 

PO PRN Q4HRS PRN


Reported


Triamcinolone Acetonide 0.1% Cream (Triamcinolone Acetonide) 15 Gm Cream..g. 1 

Ashley TP BID


Tramadol Hcl 50 Mg Tablet 50 Mg PO DAILY PRN


Flonase Allergy Relief (Fluticasone Propionate) 9.9 Ml Spray.susp 2 Sprays NS 

DAILY


Metformin Hcl 1,000 Mg Tablet 1,000 Mg PO BIDWMEALS


Lisinopril 40 Mg Tablet 1 Tab PO DAILY


Novolin N (Nph, Human Insulin Isophane) 100 Unit/1 Ml Vial 40 Unit SQ DAILY


Carvedilol ** (Carvedilol) 6.25 Mg Tablet 6.25 Mg PO BIDWMEALS


Escitalopram Oxalate 10 Mg Tablet 1 Tab PO DAILY


Simvastatin 40 Mg Tablet 1 Tab PO QHS


Glipizide 10 Mg Tablet 10 Mg PO BID


Furosemide 20 Mg Tablet 1 Tab PO DAILY





Allergies


Allergies:  


Coded Allergies:  


     No Known Drug Allergies (Unverified , 11/28/17)





ROS


Review of System


   Per HPI





Physical Exam


Physical Exam


GEN:   NAD  


HEEN:  om moist  


NECK: supple  


CVS:    S1S2,  RRR


RESP: CTA, Non labored  


GI:        BS + ve, NO Bruit, Non Tender, Non Distended


:      No  CVA tenderness, No Suprapubic Tenderness


NEURO- Grossly normal


DERM- no Rash  


EXT


Vital Signs





Vital Signs








  Date Time  Temp Pulse Resp B/P (MAP) Pulse Ox O2 Delivery O2 Flow Rate FiO2


 


3/29/20 11:00 98.0 64 22 118/67 (84) 95 Nasal Cannula 3.0 





 98.0       








Assessment & Plan


DANNY - Pre-renal  / 


ACE-I and  lasix both started approx 3 weeks back per Pt by APN  


She takes Ibuprofen 1 Tab QD- 2-3 weeks  


Check UA, Renal US with PVR  


Supportive care , strict I/O , avoid nephrotoxins, daily standing wt,  Daily BMP

 





Hyperkalemia at presentation- temporizing measures in ER 


 Repeat K ordered, Pending  . No EKG changes per ER report 


Suspect sec to DANNY and ACE-I  





HypoNatremia- mild  





HTN- On Coreg,Lisinopril and Lasix at home  


Holding, BP low  





Respiratory failure - On Home O2 2-3 Lts 


Per primary/cardiology  





 Lower extremity cellulitis. Antibiotics as per the primary service.





Diabetes mellitus. As per the primary service.





Labs


Labs





Laboratory Tests








Test


 3/28/20


16:13 3/29/20


00:30 3/29/20


06:30 3/29/20


08:38


 


White Blood Count


 8.3 x10^3/uL


(4.0-11.0) 


 


 





 


Red Blood Count


 3.96 x10^6/uL


(3.50-5.40) 


 


 





 


Hemoglobin


 10.6 g/dL


(12.0-15.5) 


 


 





 


Hematocrit


 33.6 %


(36.0-47.0) 


 


 





 


Mean Corpuscular Volume 85 fL ()    


 


Mean Corpuscular Hemoglobin 27 pg (25-35)    


 


Mean Corpuscular Hemoglobin


Concent 31 g/dL


(31-37) 


 


 





 


Red Cell Distribution Width


 17.1 %


(11.5-14.5) 


 


 





 


Platelet Count


 372 x10^3/uL


(140-400) 


 


 





 


Neutrophils (%) (Auto) 81 % (31-73)    


 


Lymphocytes (%) (Auto) 11 % (24-48)    


 


Monocytes (%) (Auto) 7 % (0-9)    


 


Eosinophils (%) (Auto) 1 % (0-3)    


 


Basophils (%) (Auto) 1 % (0-3)    


 


Neutrophils # (Auto)


 6.8 x10^3/uL


(1.8-7.7) 


 


 





 


Lymphocytes # (Auto)


 0.9 x10^3/uL


(1.0-4.8) 


 


 





 


Monocytes # (Auto)


 0.5 x10^3/uL


(0.0-1.1) 


 


 





 


Eosinophils # (Auto)


 0.1 x10^3/uL


(0.0-0.7) 


 


 





 


Basophils # (Auto)


 0.1 x10^3/uL


(0.0-0.2) 


 


 





 


Prothrombin Time


 13.8 SEC


(11.7-14.0) 


 


 





 


Prothromb Time International


Ratio 1.1 (0.8-1.1) 


 


 


 





 


Activated Partial


Thromboplast Time 30 SEC (24-38) 


 


 


 





 


Sodium Level


 133 mmol/L


(136-145) 


 135 mmol/L


(136-145) 





 


Potassium Level


 5.8 mmol/L


(3.5-5.1) 


 6.2 mmol/L


(3.5-5.1) 





 


Chloride Level


 95 mmol/L


() 


 99 mmol/L


() 





 


Carbon Dioxide Level


 31 mmol/L


(21-32) 


 32 mmol/L


(21-32) 





 


Anion Gap 7 (6-14)   4 (6-14)  


 


Blood Urea Nitrogen


 63 mg/dL


(7-20) 


 68 mg/dL


(7-20) 





 


Creatinine


 3.4 mg/dL


(0.6-1.0) 


 3.7 mg/dL


(0.6-1.0) 





 


Estimated GFR


(Cockcroft-Gault) 13.1 


 


 11.9 


 





 


BUN/Creatinine Ratio 19 (6-20)    


 


Glucose Level


 97 mg/dL


(70-99) 


 35 mg/dL


(70-99) 





 


Calcium Level


 9.2 mg/dL


(8.5-10.1) 


 8.6 mg/dL


(8.5-10.1) 





 


Total Bilirubin


 0.3 mg/dL


(0.2-1.0) 


 


 





 


Aspartate Amino Transf


(AST/SGOT) 48 U/L (15-37) 


 


 


 





 


Alanine Aminotransferase


(ALT/SGPT) 54 U/L (14-59) 


 


 


 





 


Alkaline Phosphatase


 354 U/L


() 


 


 





 


Troponin I Quantitative


 0.324 ng/mL


(0.000-0.055) 0.278 ng/mL


(0.000-0.055) 0.273 ng/mL


(0.000-0.055) 





 


NT-Pro-B-Type Natriuretic


Peptide 71563 pg/mL


(0-449) 


 


 





 


Total Protein


 6.6 g/dL


(6.4-8.2) 


 


 





 


Albumin


 2.0 g/dL


(3.4-5.0) 


 


 





 


Albumin/Globulin Ratio 0.4 (1.0-1.7)    


 


Glucose (Fingerstick)


 


 


 


 69 mg/dL


(70-99)


 


Test


 3/29/20


11:49 


 


 





 


Glucose (Fingerstick)


 85 mg/dL


(70-99) 


 


 











Laboratory Tests








Test


 3/28/20


16:13 3/29/20


00:30 3/29/20


06:30 3/29/20


08:38


 


White Blood Count


 8.3 x10^3/uL


(4.0-11.0) 


 


 





 


Red Blood Count


 3.96 x10^6/uL


(3.50-5.40) 


 


 





 


Hemoglobin


 10.6 g/dL


(12.0-15.5) 


 


 





 


Hematocrit


 33.6 %


(36.0-47.0) 


 


 





 


Mean Corpuscular Volume 85 fL ()    


 


Mean Corpuscular Hemoglobin 27 pg (25-35)    


 


Mean Corpuscular Hemoglobin


Concent 31 g/dL


(31-37) 


 


 





 


Red Cell Distribution Width


 17.1 %


(11.5-14.5) 


 


 





 


Platelet Count


 372 x10^3/uL


(140-400) 


 


 





 


Neutrophils (%) (Auto) 81 % (31-73)    


 


Lymphocytes (%) (Auto) 11 % (24-48)    


 


Monocytes (%) (Auto) 7 % (0-9)    


 


Eosinophils (%) (Auto) 1 % (0-3)    


 


Basophils (%) (Auto) 1 % (0-3)    


 


Neutrophils # (Auto)


 6.8 x10^3/uL


(1.8-7.7) 


 


 





 


Lymphocytes # (Auto)


 0.9 x10^3/uL


(1.0-4.8) 


 


 





 


Monocytes # (Auto)


 0.5 x10^3/uL


(0.0-1.1) 


 


 





 


Eosinophils # (Auto)


 0.1 x10^3/uL


(0.0-0.7) 


 


 





 


Basophils # (Auto)


 0.1 x10^3/uL


(0.0-0.2) 


 


 





 


Prothrombin Time


 13.8 SEC


(11.7-14.0) 


 


 





 


Prothromb Time International


Ratio 1.1 (0.8-1.1) 


 


 


 





 


Activated Partial


Thromboplast Time 30 SEC (24-38) 


 


 


 





 


Sodium Level


 133 mmol/L


(136-145) 


 135 mmol/L


(136-145) 





 


Potassium Level


 5.8 mmol/L


(3.5-5.1) 


 6.2 mmol/L


(3.5-5.1) 





 


Chloride Level


 95 mmol/L


() 


 99 mmol/L


() 





 


Carbon Dioxide Level


 31 mmol/L


(21-32) 


 32 mmol/L


(21-32) 





 


Anion Gap 7 (6-14)   4 (6-14)  


 


Blood Urea Nitrogen


 63 mg/dL


(7-20) 


 68 mg/dL


(7-20) 





 


Creatinine


 3.4 mg/dL


(0.6-1.0) 


 3.7 mg/dL


(0.6-1.0) 





 


Estimated GFR


(Cockcroft-Gault) 13.1 


 


 11.9 


 





 


BUN/Creatinine Ratio 19 (6-20)    


 


Glucose Level


 97 mg/dL


(70-99) 


 35 mg/dL


(70-99) 





 


Calcium Level


 9.2 mg/dL


(8.5-10.1) 


 8.6 mg/dL


(8.5-10.1) 





 


Total Bilirubin


 0.3 mg/dL


(0.2-1.0) 


 


 





 


Aspartate Amino Transf


(AST/SGOT) 48 U/L (15-37) 


 


 


 





 


Alanine Aminotransferase


(ALT/SGPT) 54 U/L (14-59) 


 


 


 





 


Alkaline Phosphatase


 354 U/L


() 


 


 





 


Troponin I Quantitative


 0.324 ng/mL


(0.000-0.055) 0.278 ng/mL


(0.000-0.055) 0.273 ng/mL


(0.000-0.055) 





 


NT-Pro-B-Type Natriuretic


Peptide 42891 pg/mL


(0-449) 


 


 





 


Total Protein


 6.6 g/dL


(6.4-8.2) 


 


 





 


Albumin


 2.0 g/dL


(3.4-5.0) 


 


 





 


Albumin/Globulin Ratio 0.4 (1.0-1.7)    


 


Glucose (Fingerstick)


 


 


 


 69 mg/dL


(70-99)


 


Test


 3/29/20


11:49 


 


 





 


Glucose (Fingerstick)


 85 mg/dL


(70-99) 


 


 











Review


All relevant outside records, renal labs, imaging studies, telemetry/EKG's were 

reviewed.





Images


Images


CxR mild  CHF











RONIT CHARLES MD                Mar 29, 2020 12:41

## 2020-03-30 VITALS — SYSTOLIC BLOOD PRESSURE: 111 MMHG | DIASTOLIC BLOOD PRESSURE: 55 MMHG

## 2020-03-30 VITALS — SYSTOLIC BLOOD PRESSURE: 89 MMHG | DIASTOLIC BLOOD PRESSURE: 53 MMHG

## 2020-03-30 VITALS — DIASTOLIC BLOOD PRESSURE: 71 MMHG | SYSTOLIC BLOOD PRESSURE: 109 MMHG

## 2020-03-30 VITALS — SYSTOLIC BLOOD PRESSURE: 118 MMHG | DIASTOLIC BLOOD PRESSURE: 65 MMHG

## 2020-03-30 VITALS — DIASTOLIC BLOOD PRESSURE: 53 MMHG | SYSTOLIC BLOOD PRESSURE: 110 MMHG

## 2020-03-30 LAB
AMORPH SED URNS QL MICRO: PRESENT /HPF
ANION GAP SERPL CALC-SCNC: 8 MMOL/L (ref 6–14)
APTT PPP: (no result) S
BACTERIA #/AREA URNS HPF: 0 /HPF
BASOPHILS # BLD AUTO: 0 X10^3/UL (ref 0–0.2)
BASOPHILS NFR BLD: 1 % (ref 0–3)
BILIRUB UR QL STRIP: NEGATIVE
BUN SERPL-MCNC: 72 MG/DL (ref 7–20)
CALCIUM SERPL-MCNC: 8.4 MG/DL (ref 8.5–10.1)
CHLORIDE SERPL-SCNC: 98 MMOL/L (ref 98–107)
CHOLEST SERPL-MCNC: 103 MG/DL (ref 0–200)
CHOLEST/HDLC SERPL: 2.2 {RATIO}
CK SERPL-CCNC: 45 U/L (ref 26–192)
CO2 SERPL-SCNC: 30 MMOL/L (ref 21–32)
CREAT SERPL-MCNC: 4.6 MG/DL (ref 0.6–1)
EOSINOPHIL NFR BLD: 0 X10^3/UL (ref 0–0.7)
EOSINOPHIL NFR BLD: 1 % (ref 0–3)
ERYTHROCYTE [DISTWIDTH] IN BLOOD BY AUTOMATED COUNT: 17.2 % (ref 11.5–14.5)
FIBRINOGEN PPP-MCNC: (no result) MG/DL
GFR SERPLBLD BASED ON 1.73 SQ M-ARVRAT: 9.2 ML/MIN
GLUCOSE SERPL-MCNC: 43 MG/DL (ref 70–99)
HCT VFR BLD CALC: 31.4 % (ref 36–47)
HDLC SERPL-MCNC: 47 MG/DL (ref 40–60)
HGB BLD-MCNC: 9.9 G/DL (ref 12–15.5)
LDLC: 37 MG/DL (ref 0–100)
LYMPHOCYTES # BLD: 1 X10^3/UL (ref 1–4.8)
LYMPHOCYTES NFR BLD AUTO: 13 % (ref 24–48)
MAGNESIUM SERPL-MCNC: 1.6 MG/DL (ref 1.8–2.4)
MCH RBC QN AUTO: 26 PG (ref 25–35)
MCHC RBC AUTO-ENTMCNC: 31 G/DL (ref 31–37)
MCV RBC AUTO: 84 FL (ref 79–100)
MONO #: 0.7 X10^3/UL (ref 0–1.1)
MONOCYTES NFR BLD: 9 % (ref 0–9)
NEUT #: 6.1 X10^3/UL (ref 1.8–7.7)
NEUTROPHILS NFR BLD AUTO: 78 % (ref 31–73)
NITRITE UR QL STRIP: NEGATIVE
PH UR STRIP: 5 [PH]
PLATELET # BLD AUTO: 376 X10^3/UL (ref 140–400)
POTASSIUM SERPL-SCNC: 5.3 MMOL/L (ref 3.5–5.1)
PROT UR STRIP-MCNC: 100 MG/DL
RBC # BLD AUTO: 3.73 X10^6/UL (ref 3.5–5.4)
RBC #/AREA URNS HPF: 0 /HPF (ref 0–2)
SODIUM SERPL-SCNC: 136 MMOL/L (ref 136–145)
SQUAMOUS #/AREA URNS LPF: (no result) /LPF
TRIGL SERPL-MCNC: 94 MG/DL (ref 0–150)
UROBILINOGEN UR-MCNC: 0.2 MG/DL
VLDLC: 19 MG/DL (ref 0–40)
WBC # BLD AUTO: 7.9 X10^3/UL (ref 4–11)
WBC #/AREA URNS HPF: 0 /HPF (ref 0–4)

## 2020-03-30 PROCEDURE — 02H633Z INSERTION OF INFUSION DEVICE INTO RIGHT ATRIUM, PERCUTANEOUS APPROACH: ICD-10-PCS | Performed by: RADIOLOGY

## 2020-03-30 PROCEDURE — B548ZZA ULTRASONOGRAPHY OF SUPERIOR VENA CAVA, GUIDANCE: ICD-10-PCS | Performed by: RADIOLOGY

## 2020-03-30 PROCEDURE — B5181ZA FLUOROSCOPY OF SUPERIOR VENA CAVA USING LOW OSMOLAR CONTRAST, GUIDANCE: ICD-10-PCS | Performed by: RADIOLOGY

## 2020-03-30 PROCEDURE — 5A1D70Z PERFORMANCE OF URINARY FILTRATION, INTERMITTENT, LESS THAN 6 HOURS PER DAY: ICD-10-PCS | Performed by: INTERNAL MEDICINE

## 2020-03-30 RX ADMIN — HYDROCODONE BITARTRATE AND ACETAMINOPHEN PRN TAB: 5; 325 TABLET ORAL at 22:32

## 2020-03-30 RX ADMIN — FUROSEMIDE SCH MG: 10 INJECTION, SOLUTION INTRAMUSCULAR; INTRAVENOUS at 12:30

## 2020-03-30 RX ADMIN — PIPERACILLIN SODIUM AND TAZOBACTAM SODIUM SCH MLS/HR: 2; .25 INJECTION, POWDER, LYOPHILIZED, FOR SOLUTION INTRAVENOUS at 22:39

## 2020-03-30 RX ADMIN — DEXTROSE MONOHYDRATE PRN GM: 25 INJECTION, SOLUTION INTRAVENOUS at 07:56

## 2020-03-30 RX ADMIN — PIPERACILLIN SODIUM AND TAZOBACTAM SODIUM SCH MLS/HR: 2; .25 INJECTION, POWDER, LYOPHILIZED, FOR SOLUTION INTRAVENOUS at 17:00

## 2020-03-30 RX ADMIN — PIPERACILLIN SODIUM AND TAZOBACTAM SODIUM SCH MLS/HR: 2; .25 INJECTION, POWDER, LYOPHILIZED, FOR SOLUTION INTRAVENOUS at 05:32

## 2020-03-30 NOTE — PDOC
Renal-Progress Notes


Subjective Notes


Notes


SOB





History of Present Illness


Hx of present illness


NOT ANY BETTER, NOW NOT MAKING ANY URINE





Vitals


Vitals





Vital Signs








  Date Time  Temp Pulse Resp B/P (MAP) Pulse Ox O2 Delivery O2 Flow Rate FiO2


 


3/30/20 11:00 97.5 91 22 118/65 (82) 92 Nasal Cannula 4.0 





 97.5       








Weight


Weight [ ]





I.O.


Intake and Output











Intake and Output 


 


 3/30/20





 07:00


 


Intake Total 1270 ml


 


Balance 1270 ml


 


 


 


Intake Oral 1270 ml


 


# Bowel Movements 5











Labs


Labs





Laboratory Tests








Test


 3/29/20


11:49 3/29/20


12:20 3/29/20


16:51 3/29/20


21:09


 


Glucose (Fingerstick)


 85 mg/dL


(70-99) 


 73 mg/dL


(70-99) 40 mg/dL


(70-99)


 


Potassium Level


 


 5.6 mmol/L


(3.5-5.1) 


 





 


Troponin I Quantitative


 


 0.217 ng/mL


(0.000-0.055) 


 





 


Test


 3/29/20


21:23 3/29/20


22:00 3/30/20


03:40 3/30/20


07:35


 


Glucose (Fingerstick)


 43 mg/dL


(70-99) 100 mg/dL


(70-99) 


 41 mg/dL


(70-99)


 


White Blood Count


 


 


 7.9 x10^3/uL


(4.0-11.0) 





 


Red Blood Count


 


 


 3.73 x10^6/uL


(3.50-5.40) 





 


Hemoglobin


 


 


 9.9 g/dL


(12.0-15.5) 





 


Hematocrit


 


 


 31.4 %


(36.0-47.0) 





 


Mean Corpuscular Volume   84 fL ()  


 


Mean Corpuscular Hemoglobin   26 pg (25-35)  


 


Mean Corpuscular Hemoglobin


Concent 


 


 31 g/dL


(31-37) 





 


Red Cell Distribution Width


 


 


 17.2 %


(11.5-14.5) 





 


Platelet Count


 


 


 376 x10^3/uL


(140-400) 





 


Neutrophils (%) (Auto)   78 % (31-73)  


 


Lymphocytes (%) (Auto)   13 % (24-48)  


 


Monocytes (%) (Auto)   9 % (0-9)  


 


Eosinophils (%) (Auto)   1 % (0-3)  


 


Basophils (%) (Auto)   1 % (0-3)  


 


Neutrophils # (Auto)


 


 


 6.1 x10^3/uL


(1.8-7.7) 





 


Lymphocytes # (Auto)


 


 


 1.0 x10^3/uL


(1.0-4.8) 





 


Monocytes # (Auto)


 


 


 0.7 x10^3/uL


(0.0-1.1) 





 


Eosinophils # (Auto)


 


 


 0.0 x10^3/uL


(0.0-0.7) 





 


Basophils # (Auto)


 


 


 0.0 x10^3/uL


(0.0-0.2) 





 


Sodium Level


 


 


 136 mmol/L


(136-145) 





 


Potassium Level


 


 


 5.3 mmol/L


(3.5-5.1) 





 


Chloride Level


 


 


 98 mmol/L


() 





 


Carbon Dioxide Level


 


 


 30 mmol/L


(21-32) 





 


Anion Gap   8 (6-14)  


 


Blood Urea Nitrogen


 


 


 72 mg/dL


(7-20) 





 


Creatinine


 


 


 4.6 mg/dL


(0.6-1.0) 





 


Estimated GFR


(Cockcroft-Gault) 


 


 9.2 


 





 


Glucose Level


 


 


 43 mg/dL


(70-99) 





 


Calcium Level


 


 


 8.4 mg/dL


(8.5-10.1) 





 


Magnesium Level


 


 


 1.6 mg/dL


(1.8-2.4) 





 


Creatine Kinase


 


 


 45 U/L


() 





 


Triglycerides Level


 


 


 94 mg/dL


(0-150) 





 


Cholesterol Level


 


 


 103 mg/dL


(0-200) 





 


LDL Cholesterol, Calculated


 


 


 37 mg/dL


(0-100) 





 


VLDL Cholesterol, Calculated


 


 


 19 mg/dL


(0-40) 





 


Non-HDL Cholesterol Calculated


 


 


 56 mg/dL


(0-129) 





 


HDL Cholesterol


 


 


 47 mg/dL


(40-60) 





 


Cholesterol/HDL Ratio   2.2  


 


Test


 3/30/20


07:45 3/30/20


08:54 


 





 


Urine Collection Type Unknown    


 


Urine Color Anastasia    


 


Urine Clarity Cloudy    


 


Urine pH 5.0 (<5.0-8.0)    


 


Urine Specific Gravity


 1.020


(1.000-1.030) 


 


 





 


Urine Protein


 100 mg/dL


(NEG-TRACE) 


 


 





 


Urine Glucose (UA)


 Negative mg/dL


(NEG) 


 


 





 


Urine Ketones (Stick)


 Negative mg/dL


(NEG) 


 


 





 


Urine Blood Trace (NEG)    


 


Urine Nitrite Negative (NEG)    


 


Urine Bilirubin Negative (NEG)    


 


Urine Urobilinogen Dipstick


 0.2 mg/dL (0.2


mg/dL) 


 


 





 


Urine Leukocyte Esterase Negative (NEG)    


 


Urine RBC 0 /HPF (0-2)    


 


Urine WBC 0 /HPF (0-4)    


 


Urine Squamous Epithelial


Cells Few /LPF 


 


 


 





 


Urine Amorphous Sediment Present /HPF    


 


Urine Bacteria 0 /HPF (0-FEW)    


 


Glucose (Fingerstick)


 


 175 mg/dL


(70-99) 


 














Review of Systems


Constitutional:  yes: alert, oriented


Eyes:  Yes: no symptom reported


Pulmonary:  Yes dyspnea


Cardiovascular:  Yes no symptom reported


Gastrointestional:  Yes: no symptom reported


Genitourinary:  Yes: no symptom reported


Musculoskeletal:  Yes: no symptom reported


Skin:  Yes no symptom reported


Psychiatric/Neurological:  Yes: no symptom reported


Endocrine:  Yes: no symptom reported


Hematologic/Lymphatic:  Yes: no symptom reported





Physical Exam


General Appearance:  no apparent distress


Skin:  warm


Respiratory:  decreased breath sounds


Heart:  S1S2


Abdomen:  soft, bowel sounds present


Genitourinary:  bladder flat


Extremities:  pulses present


Neurology:  alert





Assessment


Assessment


IMP





DANNY - ATN - ANURIA


CKD SUSPECT NEAR END STAGE-CR OF 1.7 IN 2017 BUT NEVER FOLLOWED UP


ACUTE ON CHRONIC HYPOXIC RESP FAILURE-ON HOME O2 2-3 L


LE CELLULITIS


MILD HYPOTENSION


DM II


HTN


ANEMIA 





PLAN





ANTIBIOTICS


CHECK IRON STORES


CHECK PO4


HD RECOMMENDED TO PT TODAY


SHE DOES NOT WANT TO DO THIS


EXPLAINED TO HER THAT SHE MAY DIE


SHE WILL D/W HER FAMILY AND GET BACK TO ME


CONT TO HOLD ANTIHYPERTENSIVES


D/W STAFF


IV LASIX FOR MARCO ANTONIO SANDRA MD                 Mar 30, 2020 11:43

## 2020-03-30 NOTE — PDOC
PROGRESS NOTES


History of Present Illness


History of Present Illness





ASSESSMENT AND PLAN:  





Acute on chronic kidney injury, 


hyperkalemia, 


hyponatremia at 135, 


azotemia, 


acute on chronic systolic and diastolic heart failure,


elevated troponin 


anemia and lower extremity wounds.  // Lower extremity cellulitis. 





admitted. 





consult Nephrology


consult Cardiology.  


Hold ACE inhibitor.  


IV antibiotics.  


PT, OT.  


Wound care nurse to evaluate and treat.  


DVT prophylaxis.  


Full code.  


Prognosis //guarded.


temp dialysis cath placement  3/30








38 min pt exam, chart review, > 50% of time spent with exam, chart review, pt 

care coordination





Vitals


Vitals





Vital Signs








  Date Time  Temp Pulse Resp B/P (MAP) Pulse Ox O2 Delivery O2 Flow Rate FiO2


 


3/30/20 07:00 97.5 90 22 89/53 (65) 96 Nasal Cannula 4.0 





 97.5       











Physical Exam


Physical Exam


and accommodation.


MUSKULOSKELETAL:  Well developed, well nourished, good range of motion.


ENDOCRINE:  No thyromegaly was palpated.


LYMPHATICS:  No cervical chain or axillary nodes were noted.


HEMATOPOIETIC:  No bruising.


NECK:  Supple, no JVD, no thyromegaly was noted.


PULMONARY:  She has decreased breath sounds.


HEART:  RRR, S1, S2 present.  Peripheral pulses intact, no obvious murmurs were


noted.


ABDOMEN:  Soft, nontender.  Positive bowel sounds no organomegaly, normal bowel


sounds.


EXTREMITIES:  She has bilateral lower extremity cellulitis with wounds that have


been wrapped with clean, dry and intact dressing.


NEUROLOGIC:  Normal speech, normal tone.  A & O x 3, moves all extremities, no


obvious focal deficits.


PSYCHIATRIC:  She is anxious.


SKIN:  No ulcerations or rashes, good skin turgor, no jaundice.


VASCULAR:  Good capillary refill, neurovascular bundle appears to be intact.


General:  Alert, Cooperative, mild distress


Heart:  Regular rate


Lungs:  Clear


Abdomen:  Normal bowel sounds, No tenderness


Extremities:  No cyanosis





Labs


LABS





Mitral Valve


MV E Velocity   70.8cm/s   MV DECEL TIME   187ms


MV A Velocity   92.3cm/s   E/A  Ratio   0.8





TDI


Lateral E' P. V   7.34cm/s   Medial E' P. V   4.63cm/s


E/Lateral E'   9.6      E/Medial E'   15.3





Tricuspid Valve


TR P. Velocity   310cm/s   RAP ESTIMATE   15mmHg


TR Peak Gr.   39mmHg   RVSP      54mmHg





 LEFT VENTRICLE 


The left ventricle is normal size. There is mild concentric left ventricular 

hypertrophy. The left ventricular systolic function is low normal. EF 50% Septal

motion suggestive of prior CABG and conduction defect. Transmitral Doppler flow 

pattern is Grade I-abnormal relaxation pattern.





 RIGHT VENTRICLE 


The right ventricle is moderately dilated. RV Systolic function is mildly 

reduced.





 ATRIA 


The left atrium is mildly dilated. The right atrium is mildly dilated. The 

interatrial septum is intact with no evidence for an atrial septal defect or 

patent foramen ovale as noted on 2-D or Doppler imaging.





 AORTIC VALVE 


The aortic valve is calcified but opens well. Doppler and Color Flow revealed no

significant aortic regurgitation. There is no significant aortic valvular 

stenosis.





 MITRAL VALVE 


The mitral valve is normal in structure and function. Mitral annular 

calcification is mild. There is no evidence of mitral valve prolapse. There is 

no mitral valve stenosis. Doppler and Color Flow revealed no mitral valve 

regurgitation noted.





 TRICUSPID VALVE 


The tricuspid valve is normal in structure and function. Doppler and Color Flow 

revealed mild tricuspid regurgitation. There is moderate pulmonary hypertension.

The PA pressure was estimated at 54 mmHg. There is no tricuspid valve stenosis.





 PULMONIC VALVE 


Doppler and Color Flow revealed mild pulmonic valvular regurgitation. There is 

no pulmonic valvular stenosis.





 GREAT VESSELS 


The aortic root is normal in size. The ascending aorta is normal in size. There 

is mild pulmonary artery dilatation. The IVC is dilated and collapses <50% with 

inspiration.





 PERICARDIAL EFFUSION 


There is no evidence of significant pericardial effusion.





Critical Notification


Critical Value: No





<Conclusion>


The left ventricular systolic function is low normal. EF 50%


Septal motion suggestive of prior CABG and conduction defect.


The right ventricle is moderately dilated.


RV Systolic function is mildly reduced.


Doppler and Color Flow revealed mild tricuspid regurgitation. There is moderate 

pulmonary hypertension. The PA pressure was estimated at 54 mmHg.





Signed by : Sami Wilkinson, 


Electronically Approved : 03/30/2020 11:19:56














DICTATED and SIGNED BY:     SAMI WILKINSON MD


DATE:     03/30/20 1102





Laboratory Tests








Test


 3/29/20


11:49 3/29/20


12:20 3/29/20


16:51 3/29/20


21:09


 


Glucose (Fingerstick)


 85 mg/dL


(70-99) 


 73 mg/dL


(70-99) 40 mg/dL


(70-99)


 


Potassium Level


 


 5.6 mmol/L


(3.5-5.1) 


 





 


Troponin I Quantitative


 


 0.217 ng/mL


(0.000-0.055) 


 





 


Test


 3/29/20


21:23 3/29/20


22:00 3/30/20


03:40 3/30/20


07:35


 


Glucose (Fingerstick)


 43 mg/dL


(70-99) 100 mg/dL


(70-99) 


 41 mg/dL


(70-99)


 


White Blood Count


 


 


 7.9 x10^3/uL


(4.0-11.0) 





 


Red Blood Count


 


 


 3.73 x10^6/uL


(3.50-5.40) 





 


Hemoglobin


 


 


 9.9 g/dL


(12.0-15.5) 





 


Hematocrit


 


 


 31.4 %


(36.0-47.0) 





 


Mean Corpuscular Volume   84 fL ()  


 


Mean Corpuscular Hemoglobin   26 pg (25-35)  


 


Mean Corpuscular Hemoglobin


Concent 


 


 31 g/dL


(31-37) 





 


Red Cell Distribution Width


 


 


 17.2 %


(11.5-14.5) 





 


Platelet Count


 


 


 376 x10^3/uL


(140-400) 





 


Neutrophils (%) (Auto)   78 % (31-73)  


 


Lymphocytes (%) (Auto)   13 % (24-48)  


 


Monocytes (%) (Auto)   9 % (0-9)  


 


Eosinophils (%) (Auto)   1 % (0-3)  


 


Basophils (%) (Auto)   1 % (0-3)  


 


Neutrophils # (Auto)


 


 


 6.1 x10^3/uL


(1.8-7.7) 





 


Lymphocytes # (Auto)


 


 


 1.0 x10^3/uL


(1.0-4.8) 





 


Monocytes # (Auto)


 


 


 0.7 x10^3/uL


(0.0-1.1) 





 


Eosinophils # (Auto)


 


 


 0.0 x10^3/uL


(0.0-0.7) 





 


Basophils # (Auto)


 


 


 0.0 x10^3/uL


(0.0-0.2) 





 


Sodium Level


 


 


 136 mmol/L


(136-145) 





 


Potassium Level


 


 


 5.3 mmol/L


(3.5-5.1) 





 


Chloride Level


 


 


 98 mmol/L


() 





 


Carbon Dioxide Level


 


 


 30 mmol/L


(21-32) 





 


Anion Gap   8 (6-14)  


 


Blood Urea Nitrogen


 


 


 72 mg/dL


(7-20) 





 


Creatinine


 


 


 4.6 mg/dL


(0.6-1.0) 





 


Estimated GFR


(Cockcroft-Gault) 


 


 9.2 


 





 


Glucose Level


 


 


 43 mg/dL


(70-99) 





 


Calcium Level


 


 


 8.4 mg/dL


(8.5-10.1) 





 


Magnesium Level


 


 


 1.6 mg/dL


(1.8-2.4) 





 


Creatine Kinase


 


 


 45 U/L


() 





 


Triglycerides Level


 


 


 94 mg/dL


(0-150) 





 


Cholesterol Level


 


 


 103 mg/dL


(0-200) 





 


LDL Cholesterol, Calculated


 


 


 37 mg/dL


(0-100) 





 


VLDL Cholesterol, Calculated


 


 


 19 mg/dL


(0-40) 





 


Non-HDL Cholesterol Calculated


 


 


 56 mg/dL


(0-129) 





 


HDL Cholesterol


 


 


 47 mg/dL


(40-60) 





 


Cholesterol/HDL Ratio   2.2  


 


Test


 3/30/20


07:45 3/30/20


08:54 


 





 


Urine Collection Type Unknown    


 


Urine Color Anastasia    


 


Urine Clarity Cloudy    


 


Urine pH 5.0 (<5.0-8.0)    


 


Urine Specific Gravity


 1.020


(1.000-1.030) 


 


 





 


Urine Protein


 100 mg/dL


(NEG-TRACE) 


 


 





 


Urine Glucose (UA)


 Negative mg/dL


(NEG) 


 


 





 


Urine Ketones (Stick)


 Negative mg/dL


(NEG) 


 


 





 


Urine Blood Trace (NEG)    


 


Urine Nitrite Negative (NEG)    


 


Urine Bilirubin Negative (NEG)    


 


Urine Urobilinogen Dipstick


 0.2 mg/dL (0.2


mg/dL) 


 


 





 


Urine Leukocyte Esterase Negative (NEG)    


 


Urine RBC 0 /HPF (0-2)    


 


Urine WBC 0 /HPF (0-4)    


 


Urine Squamous Epithelial


Cells Few /LPF 


 


 


 





 


Urine Amorphous Sediment Present /HPF    


 


Urine Bacteria 0 /HPF (0-FEW)    


 


Glucose (Fingerstick)


 


 175 mg/dL


(70-99) 


 














Assessment and Plan


Assessmemt and Plan


Problems


Medical Problems:


(1) Cellulitis


Status: Acute  





(2) CHF (congestive heart failure)


Status: Acute  





(3) Elevated troponin


Status: Acute  





(4) Shortness of breath


Status: Acute  











Comment


Review of Relevant


I have reviewed the following items rizwan (where applicable) has been applied.


Labs





Laboratory Tests








Test


 3/28/20


16:13 3/29/20


00:30 3/29/20


06:30 3/29/20


08:38


 


White Blood Count


 8.3 x10^3/uL


(4.0-11.0) 


 


 





 


Red Blood Count


 3.96 x10^6/uL


(3.50-5.40) 


 


 





 


Hemoglobin


 10.6 g/dL


(12.0-15.5) 


 


 





 


Hematocrit


 33.6 %


(36.0-47.0) 


 


 





 


Mean Corpuscular Volume 85 fL ()    


 


Mean Corpuscular Hemoglobin 27 pg (25-35)    


 


Mean Corpuscular Hemoglobin


Concent 31 g/dL


(31-37) 


 


 





 


Red Cell Distribution Width


 17.1 %


(11.5-14.5) 


 


 





 


Platelet Count


 372 x10^3/uL


(140-400) 


 


 





 


Neutrophils (%) (Auto) 81 % (31-73)    


 


Lymphocytes (%) (Auto) 11 % (24-48)    


 


Monocytes (%) (Auto) 7 % (0-9)    


 


Eosinophils (%) (Auto) 1 % (0-3)    


 


Basophils (%) (Auto) 1 % (0-3)    


 


Neutrophils # (Auto)


 6.8 x10^3/uL


(1.8-7.7) 


 


 





 


Lymphocytes # (Auto)


 0.9 x10^3/uL


(1.0-4.8) 


 


 





 


Monocytes # (Auto)


 0.5 x10^3/uL


(0.0-1.1) 


 


 





 


Eosinophils # (Auto)


 0.1 x10^3/uL


(0.0-0.7) 


 


 





 


Basophils # (Auto)


 0.1 x10^3/uL


(0.0-0.2) 


 


 





 


Prothrombin Time


 13.8 SEC


(11.7-14.0) 


 


 





 


Prothromb Time International


Ratio 1.1 (0.8-1.1) 


 


 


 





 


Activated Partial


Thromboplast Time 30 SEC (24-38) 


 


 


 





 


Sodium Level


 133 mmol/L


(136-145) 


 135 mmol/L


(136-145) 





 


Potassium Level


 5.8 mmol/L


(3.5-5.1) 


 6.2 mmol/L


(3.5-5.1) 





 


Chloride Level


 95 mmol/L


() 


 99 mmol/L


() 





 


Carbon Dioxide Level


 31 mmol/L


(21-32) 


 32 mmol/L


(21-32) 





 


Anion Gap 7 (6-14)   4 (6-14)  


 


Blood Urea Nitrogen


 63 mg/dL


(7-20) 


 68 mg/dL


(7-20) 





 


Creatinine


 3.4 mg/dL


(0.6-1.0) 


 3.7 mg/dL


(0.6-1.0) 





 


Estimated GFR


(Cockcroft-Gault) 13.1 


 


 11.9 


 





 


BUN/Creatinine Ratio 19 (6-20)    


 


Glucose Level


 97 mg/dL


(70-99) 


 35 mg/dL


(70-99) 





 


Calcium Level


 9.2 mg/dL


(8.5-10.1) 


 8.6 mg/dL


(8.5-10.1) 





 


Total Bilirubin


 0.3 mg/dL


(0.2-1.0) 


 


 





 


Aspartate Amino Transf


(AST/SGOT) 48 U/L (15-37) 


 


 


 





 


Alanine Aminotransferase


(ALT/SGPT) 54 U/L (14-59) 


 


 


 





 


Alkaline Phosphatase


 354 U/L


() 


 


 





 


Troponin I Quantitative


 0.324 ng/mL


(0.000-0.055) 0.278 ng/mL


(0.000-0.055) 0.273 ng/mL


(0.000-0.055) 





 


NT-Pro-B-Type Natriuretic


Peptide 85883 pg/mL


(0-449) 


 


 





 


Total Protein


 6.6 g/dL


(6.4-8.2) 


 


 





 


Albumin


 2.0 g/dL


(3.4-5.0) 


 


 





 


Albumin/Globulin Ratio 0.4 (1.0-1.7)    


 


Glucose (Fingerstick)


 


 


 


 69 mg/dL


(70-99)


 


Test


 3/29/20


11:49 3/29/20


12:20 3/29/20


16:51 3/29/20


21:09


 


Glucose (Fingerstick)


 85 mg/dL


(70-99) 


 73 mg/dL


(70-99) 40 mg/dL


(70-99)


 


Potassium Level


 


 5.6 mmol/L


(3.5-5.1) 


 





 


Troponin I Quantitative


 


 0.217 ng/mL


(0.000-0.055) 


 





 


Test


 3/29/20


21:23 3/29/20


22:00 3/30/20


03:40 3/30/20


07:35


 


Glucose (Fingerstick)


 43 mg/dL


(70-99) 100 mg/dL


(70-99) 


 41 mg/dL


(70-99)


 


White Blood Count


 


 


 7.9 x10^3/uL


(4.0-11.0) 





 


Red Blood Count


 


 


 3.73 x10^6/uL


(3.50-5.40) 





 


Hemoglobin


 


 


 9.9 g/dL


(12.0-15.5) 





 


Hematocrit


 


 


 31.4 %


(36.0-47.0) 





 


Mean Corpuscular Volume   84 fL ()  


 


Mean Corpuscular Hemoglobin   26 pg (25-35)  


 


Mean Corpuscular Hemoglobin


Concent 


 


 31 g/dL


(31-37) 





 


Red Cell Distribution Width


 


 


 17.2 %


(11.5-14.5) 





 


Platelet Count


 


 


 376 x10^3/uL


(140-400) 





 


Neutrophils (%) (Auto)   78 % (31-73)  


 


Lymphocytes (%) (Auto)   13 % (24-48)  


 


Monocytes (%) (Auto)   9 % (0-9)  


 


Eosinophils (%) (Auto)   1 % (0-3)  


 


Basophils (%) (Auto)   1 % (0-3)  


 


Neutrophils # (Auto)


 


 


 6.1 x10^3/uL


(1.8-7.7) 





 


Lymphocytes # (Auto)


 


 


 1.0 x10^3/uL


(1.0-4.8) 





 


Monocytes # (Auto)


 


 


 0.7 x10^3/uL


(0.0-1.1) 





 


Eosinophils # (Auto)


 


 


 0.0 x10^3/uL


(0.0-0.7) 





 


Basophils # (Auto)


 


 


 0.0 x10^3/uL


(0.0-0.2) 





 


Sodium Level


 


 


 136 mmol/L


(136-145) 





 


Potassium Level


 


 


 5.3 mmol/L


(3.5-5.1) 





 


Chloride Level


 


 


 98 mmol/L


() 





 


Carbon Dioxide Level


 


 


 30 mmol/L


(21-32) 





 


Anion Gap   8 (6-14)  


 


Blood Urea Nitrogen


 


 


 72 mg/dL


(7-20) 





 


Creatinine


 


 


 4.6 mg/dL


(0.6-1.0) 





 


Estimated GFR


(Cockcroft-Gault) 


 


 9.2 


 





 


Glucose Level


 


 


 43 mg/dL


(70-99) 





 


Calcium Level


 


 


 8.4 mg/dL


(8.5-10.1) 





 


Magnesium Level


 


 


 1.6 mg/dL


(1.8-2.4) 





 


Creatine Kinase


 


 


 45 U/L


() 





 


Triglycerides Level


 


 


 94 mg/dL


(0-150) 





 


Cholesterol Level


 


 


 103 mg/dL


(0-200) 





 


LDL Cholesterol, Calculated


 


 


 37 mg/dL


(0-100) 





 


VLDL Cholesterol, Calculated


 


 


 19 mg/dL


(0-40) 





 


Non-HDL Cholesterol Calculated


 


 


 56 mg/dL


(0-129) 





 


HDL Cholesterol


 


 


 47 mg/dL


(40-60) 





 


Cholesterol/HDL Ratio   2.2  


 


Test


 3/30/20


07:45 3/30/20


08:54 


 





 


Urine Collection Type Unknown    


 


Urine Color Anastasia    


 


Urine Clarity Cloudy    


 


Urine pH 5.0 (<5.0-8.0)    


 


Urine Specific Gravity


 1.020


(1.000-1.030) 


 


 





 


Urine Protein


 100 mg/dL


(NEG-TRACE) 


 


 





 


Urine Glucose (UA)


 Negative mg/dL


(NEG) 


 


 





 


Urine Ketones (Stick)


 Negative mg/dL


(NEG) 


 


 





 


Urine Blood Trace (NEG)    


 


Urine Nitrite Negative (NEG)    


 


Urine Bilirubin Negative (NEG)    


 


Urine Urobilinogen Dipstick


 0.2 mg/dL (0.2


mg/dL) 


 


 





 


Urine Leukocyte Esterase Negative (NEG)    


 


Urine RBC 0 /HPF (0-2)    


 


Urine WBC 0 /HPF (0-4)    


 


Urine Squamous Epithelial


Cells Few /LPF 


 


 


 





 


Urine Amorphous Sediment Present /HPF    


 


Urine Bacteria 0 /HPF (0-FEW)    


 


Glucose (Fingerstick)


 


 175 mg/dL


(70-99) 


 











Laboratory Tests








Test


 3/29/20


11:49 3/29/20


12:20 3/29/20


16:51 3/29/20


21:09


 


Glucose (Fingerstick)


 85 mg/dL


(70-99) 


 73 mg/dL


(70-99) 40 mg/dL


(70-99)


 


Potassium Level


 


 5.6 mmol/L


(3.5-5.1) 


 





 


Troponin I Quantitative


 


 0.217 ng/mL


(0.000-0.055) 


 





 


Test


 3/29/20


21:23 3/29/20


22:00 3/30/20


03:40 3/30/20


07:35


 


Glucose (Fingerstick)


 43 mg/dL


(70-99) 100 mg/dL


(70-99) 


 41 mg/dL


(70-99)


 


White Blood Count


 


 


 7.9 x10^3/uL


(4.0-11.0) 





 


Red Blood Count


 


 


 3.73 x10^6/uL


(3.50-5.40) 





 


Hemoglobin


 


 


 9.9 g/dL


(12.0-15.5) 





 


Hematocrit


 


 


 31.4 %


(36.0-47.0) 





 


Mean Corpuscular Volume   84 fL ()  


 


Mean Corpuscular Hemoglobin   26 pg (25-35)  


 


Mean Corpuscular Hemoglobin


Concent 


 


 31 g/dL


(31-37) 





 


Red Cell Distribution Width


 


 


 17.2 %


(11.5-14.5) 





 


Platelet Count


 


 


 376 x10^3/uL


(140-400) 





 


Neutrophils (%) (Auto)   78 % (31-73)  


 


Lymphocytes (%) (Auto)   13 % (24-48)  


 


Monocytes (%) (Auto)   9 % (0-9)  


 


Eosinophils (%) (Auto)   1 % (0-3)  


 


Basophils (%) (Auto)   1 % (0-3)  


 


Neutrophils # (Auto)


 


 


 6.1 x10^3/uL


(1.8-7.7) 





 


Lymphocytes # (Auto)


 


 


 1.0 x10^3/uL


(1.0-4.8) 





 


Monocytes # (Auto)


 


 


 0.7 x10^3/uL


(0.0-1.1) 





 


Eosinophils # (Auto)


 


 


 0.0 x10^3/uL


(0.0-0.7) 





 


Basophils # (Auto)


 


 


 0.0 x10^3/uL


(0.0-0.2) 





 


Sodium Level


 


 


 136 mmol/L


(136-145) 





 


Potassium Level


 


 


 5.3 mmol/L


(3.5-5.1) 





 


Chloride Level


 


 


 98 mmol/L


() 





 


Carbon Dioxide Level


 


 


 30 mmol/L


(21-32) 





 


Anion Gap   8 (6-14)  


 


Blood Urea Nitrogen


 


 


 72 mg/dL


(7-20) 





 


Creatinine


 


 


 4.6 mg/dL


(0.6-1.0) 





 


Estimated GFR


(Cockcroft-Gault) 


 


 9.2 


 





 


Glucose Level


 


 


 43 mg/dL


(70-99) 





 


Calcium Level


 


 


 8.4 mg/dL


(8.5-10.1) 





 


Magnesium Level


 


 


 1.6 mg/dL


(1.8-2.4) 





 


Creatine Kinase


 


 


 45 U/L


() 





 


Triglycerides Level


 


 


 94 mg/dL


(0-150) 





 


Cholesterol Level


 


 


 103 mg/dL


(0-200) 





 


LDL Cholesterol, Calculated


 


 


 37 mg/dL


(0-100) 





 


VLDL Cholesterol, Calculated


 


 


 19 mg/dL


(0-40) 





 


Non-HDL Cholesterol Calculated


 


 


 56 mg/dL


(0-129) 





 


HDL Cholesterol


 


 


 47 mg/dL


(40-60) 





 


Cholesterol/HDL Ratio   2.2  


 


Test


 3/30/20


07:45 3/30/20


08:54 


 





 


Urine Collection Type Unknown    


 


Urine Color Anastasia    


 


Urine Clarity Cloudy    


 


Urine pH 5.0 (<5.0-8.0)    


 


Urine Specific Gravity


 1.020


(1.000-1.030) 


 


 





 


Urine Protein


 100 mg/dL


(NEG-TRACE) 


 


 





 


Urine Glucose (UA)


 Negative mg/dL


(NEG) 


 


 





 


Urine Ketones (Stick)


 Negative mg/dL


(NEG) 


 


 





 


Urine Blood Trace (NEG)    


 


Urine Nitrite Negative (NEG)    


 


Urine Bilirubin Negative (NEG)    


 


Urine Urobilinogen Dipstick


 0.2 mg/dL (0.2


mg/dL) 


 


 





 


Urine Leukocyte Esterase Negative (NEG)    


 


Urine RBC 0 /HPF (0-2)    


 


Urine WBC 0 /HPF (0-4)    


 


Urine Squamous Epithelial


Cells Few /LPF 


 


 


 





 


Urine Amorphous Sediment Present /HPF    


 


Urine Bacteria 0 /HPF (0-FEW)    


 


Glucose (Fingerstick)


 


 175 mg/dL


(70-99) 


 











Medications





Current Medications


Vancomycin HCl 250 ml @  166.667 mls/hr 1X  ONCE IV  Last administered on 

3/28/20at 18:24;  Start 3/28/20 at 17:45;  Stop 3/28/20 at 19:14;  Status DC


Sodium Chloride 1,000 ml @  1,000 mls/hr 1X  ONCE IV  Last administered on 

3/28/20at 18:23;  Start 3/28/20 at 18:00;  Stop 3/28/20 at 18:59;  Status DC


Calcium Gluconate (Calcium Gluconate) 1,000 mg 1X  ONCE IVP  Last administered 

on 3/28/20at 18:24;  Start 3/28/20 at 18:00;  Stop 3/28/20 at 18:01;  Status DC


Albuterol Sulfate (Ventolin Neb Soln) 10 mg 1X  ONCE CONT NEB  Last administered

on 3/28/20at 18:00;  Start 3/28/20 at 18:00;  Stop 3/28/20 at 18:01;  Status DC


Furosemide (Lasix) 40 mg 1X  ONCE PO ;  Start 3/28/20 at 18:30;  Stop 3/28/20 at

18:31;  Status DC


Enoxaparin Sodium (Lovenox 120mg Syringe) 120 mg 1X  ONCE SQ  Last administered 

on 3/28/20at 20:05;  Start 3/28/20 at 19:30;  Stop 3/28/20 at 19:31;  Status DC


Furosemide (Lasix) 40 mg 1X  ONCE IVP  Last administered on 3/28/20at 20:05;  

Start 3/28/20 at 19:30;  Stop 3/28/20 at 19:31;  Status DC


Dextrose (Dextrose 50%-Water Syringe) 25 gm STK-MED ONCE IV ;  Start 3/29/20 at 

08:11;  Stop 3/29/20 at 08:11;  Status DC


Sodium Polystyrene Sulfonate (Kayexalate) 30 gm 1X  ONCE RC ;  Start 3/29/20 at 

09:00;  Stop 3/29/20 at 08:38;  Status DC


Sodium Polystyrene Sulfonate (Kayexalate) 15 gm 1X  ONCE PO  Last administered 

on 3/29/20at 09:24;  Start 3/29/20 at 09:00;  Stop 3/29/20 at 09:01;  Status DC


Piperacillin Sod/ Tazobactam Sod (Zosyn Per Pharmacy) 1 each PRN DAILY  PRN [facility] 

SEE COMMENTS;  Start 3/29/20 at 13:00


Piperacillin Sod/ Tazobactam Sod 2.25 gm/Sodium Chloride 50 ml @  100 mls/hr 

Q6HRS IV  Last administered on 3/30/20at 05:32;  Start 3/29/20 at 14:00


Dextrose (Dextrose 50%-Water Syringe) 12.5 gm PRN Q15MIN  PRN IV SEE COMMENTS 

Last administered on 3/30/20at 07:56;  Start 3/29/20 at 21:15


Dextrose (Iv Dextrose 5%) 250 ml PRN Q15MIN  PRN IV SEE COMMENTS;  Start 3/29/20

at 21:15


Dextrose (Dextrose 50%-Water Syringe) 25 gm STK-MED ONCE IV ;  Start 3/29/20 at 

21:19;  Stop 3/29/20 at 21:19;  Status DC





Active Scripts


Active


Norco 5-325 Tablet (Acetaminophen/Hydrocodone Bitart) 1 Each Tablet 1 Tab PO PRN

Q6HRS PRN


Oxycodone-Acetaminophen 5-325 (Oxycodone Hcl/Acetaminophen) 1 Each Tablet 1 Tab 

PO PRN Q4HRS PRN


Reported


Triamcinolone Acetonide 0.1% Cream (Triamcinolone Acetonide) 15 Gm Cream..g. 1 

Ashley TP BID


Tramadol Hcl 50 Mg Tablet 50 Mg PO DAILY PRN


Flonase Allergy Relief (Fluticasone Propionate) 9.9 Ml Spray.susp 2 Sprays NS 

DAILY


Metformin Hcl 1,000 Mg Tablet 1,000 Mg PO BIDWMEALS


Lisinopril 40 Mg Tablet 1 Tab PO DAILY


Novolin N (Nph, Human Insulin Isophane) 100 Unit/1 Ml Vial 40 Unit SQ DAILY


Carvedilol ** (Carvedilol) 6.25 Mg Tablet 6.25 Mg PO BIDWMEALS


Escitalopram Oxalate 10 Mg Tablet 1 Tab PO DAILY


Simvastatin 40 Mg Tablet 1 Tab PO QHS


Glipizide 10 Mg Tablet 10 Mg PO BID


Furosemide 20 Mg Tablet 1 Tab PO DAILY


Vitals/I & O





Vital Sign - Last 24 Hours








 3/29/20 3/29/20 3/29/20 3/29/20





 15:00 19:00 20:00 23:00


 


Temp 98.7 97.9  98.6





 98.7 97.9  98.6


 


Pulse 75 74  58


 


Resp 20 20  20


 


B/P (MAP) 120/58 (78) 106/63 (77)  106/60 (75)


 


Pulse Ox 94 93  92


 


O2 Delivery Nasal Cannula Nasal Cannula Nasal Cannula Nasal Cannula


 


O2 Flow Rate 3.0 3.0 3.0 3.0


 


    





    





 3/30/20 3/30/20  





 03:00 07:00  


 


Temp 97.9 97.5  





 97.9 97.5  


 


Pulse 55 90  


 


Resp 20 22  


 


B/P (MAP) 110/53 (72) 89/53 (65)  


 


Pulse Ox 94 96  


 


O2 Delivery Nasal Cannula Nasal Cannula  


 


O2 Flow Rate 3.0 4.0  














Intake and Output   


 


 3/29/20 3/29/20 3/30/20





 14:59 22:59 06:59


 


Intake Total 480 ml 440 ml 350 ml


 


Balance 480 ml 440 ml 350 ml

















RUFINO CRUZ MD          Mar 30, 2020 11:23

## 2020-03-30 NOTE — PDOC
CARDIO Progress Notes


Date and Time


Date of Service


3/30/20


Time of Evaluation


1210





Subjective


Subjective:  No Chest Pain, No Palpitations, No Dizziness, Other (still SOA)





Vitals


Vitals





Vital Signs








  Date Time  Temp Pulse Resp B/P (MAP) Pulse Ox O2 Delivery O2 Flow Rate FiO2


 


3/30/20 11:00 97.5 91 22 118/65 (82) 92 Nasal Cannula 4.0 





 97.5       








Weight


Weight [ ]





Input and Output


Intake and Output











Intake and Output 


 


 3/30/20





 07:00


 


Intake Total 1270 ml


 


Balance 1270 ml


 


 


 


Intake Oral 1270 ml


 


# Bowel Movements 5











Laboratory


Labs





Laboratory Tests








Test


 3/29/20


16:51 3/29/20


21:09 3/29/20


21:23 3/29/20


22:00


 


Glucose (Fingerstick)


 73 mg/dL


(70-99) 40 mg/dL


(70-99) 43 mg/dL


(70-99) 100 mg/dL


(70-99)


 


Test


 3/30/20


03:40 3/30/20


07:35 3/30/20


07:45 3/30/20


08:54


 


White Blood Count


 7.9 x10^3/uL


(4.0-11.0) 


 


 





 


Red Blood Count


 3.73 x10^6/uL


(3.50-5.40) 


 


 





 


Hemoglobin


 9.9 g/dL


(12.0-15.5) 


 


 





 


Hematocrit


 31.4 %


(36.0-47.0) 


 


 





 


Mean Corpuscular Volume 84 fL ()    


 


Mean Corpuscular Hemoglobin 26 pg (25-35)    


 


Mean Corpuscular Hemoglobin


Concent 31 g/dL


(31-37) 


 


 





 


Red Cell Distribution Width


 17.2 %


(11.5-14.5) 


 


 





 


Platelet Count


 376 x10^3/uL


(140-400) 


 


 





 


Neutrophils (%) (Auto) 78 % (31-73)    


 


Lymphocytes (%) (Auto) 13 % (24-48)    


 


Monocytes (%) (Auto) 9 % (0-9)    


 


Eosinophils (%) (Auto) 1 % (0-3)    


 


Basophils (%) (Auto) 1 % (0-3)    


 


Neutrophils # (Auto)


 6.1 x10^3/uL


(1.8-7.7) 


 


 





 


Lymphocytes # (Auto)


 1.0 x10^3/uL


(1.0-4.8) 


 


 





 


Monocytes # (Auto)


 0.7 x10^3/uL


(0.0-1.1) 


 


 





 


Eosinophils # (Auto)


 0.0 x10^3/uL


(0.0-0.7) 


 


 





 


Basophils # (Auto)


 0.0 x10^3/uL


(0.0-0.2) 


 


 





 


Sodium Level


 136 mmol/L


(136-145) 


 


 





 


Potassium Level


 5.3 mmol/L


(3.5-5.1) 


 


 





 


Chloride Level


 98 mmol/L


() 


 


 





 


Carbon Dioxide Level


 30 mmol/L


(21-32) 


 


 





 


Anion Gap 8 (6-14)    


 


Blood Urea Nitrogen


 72 mg/dL


(7-20) 


 


 





 


Creatinine


 4.6 mg/dL


(0.6-1.0) 


 


 





 


Estimated GFR


(Cockcroft-Gault) 9.2 


 


 


 





 


Glucose Level


 43 mg/dL


(70-99) 


 


 





 


Calcium Level


 8.4 mg/dL


(8.5-10.1) 


 


 





 


Magnesium Level


 1.6 mg/dL


(1.8-2.4) 


 


 





 


Creatine Kinase


 45 U/L


() 


 


 





 


Triglycerides Level


 94 mg/dL


(0-150) 


 


 





 


Cholesterol Level


 103 mg/dL


(0-200) 


 


 





 


LDL Cholesterol, Calculated


 37 mg/dL


(0-100) 


 


 





 


VLDL Cholesterol, Calculated


 19 mg/dL


(0-40) 


 


 





 


Non-HDL Cholesterol Calculated


 56 mg/dL


(0-129) 


 


 





 


HDL Cholesterol


 47 mg/dL


(40-60) 


 


 





 


Cholesterol/HDL Ratio 2.2    


 


Glucose (Fingerstick)


 


 41 mg/dL


(70-99) 


 175 mg/dL


(70-99)


 


Urine Collection Type   Unknown  


 


Urine Color   Anastasia  


 


Urine Clarity   Cloudy  


 


Urine pH   5.0 (<5.0-8.0)  


 


Urine Specific Gravity


 


 


 1.020


(1.000-1.030) 





 


Urine Protein


 


 


 100 mg/dL


(NEG-TRACE) 





 


Urine Glucose (UA)


 


 


 Negative mg/dL


(NEG) 





 


Urine Ketones (Stick)


 


 


 Negative mg/dL


(NEG) 





 


Urine Blood   Trace (NEG)  


 


Urine Nitrite   Negative (NEG)  


 


Urine Bilirubin   Negative (NEG)  


 


Urine Urobilinogen Dipstick


 


 


 0.2 mg/dL (0.2


mg/dL) 





 


Urine Leukocyte Esterase   Negative (NEG)  


 


Urine RBC   0 /HPF (0-2)  


 


Urine WBC   0 /HPF (0-4)  


 


Urine Squamous Epithelial


Cells 


 


 Few /LPF 


 





 


Urine Amorphous Sediment   Present /HPF  


 


Urine Bacteria   0 /HPF (0-FEW)  


 


Test


 3/30/20


12:16 


 


 





 


Glucose (Fingerstick)


 76 mg/dL


(70-99) 


 


 














Review of Systems


Constitutional:  yes: alert, oriented


Eyes:  Yes: no symptom reported


Pulmonary:  Yes dyspnea


Cardiovascular:  Yes no symptom reported


Gastrointestional:  Yes: no symptom reported


Genitourinary:  Yes: no symptom reported


Musculoskeletal:  Yes: no symptom reported


Skin:  Yes no symptom reported


Psychiatric/Neurological:  Yes: no symptom reported


Endocrine:  Yes: no symptom reported


Hematologic/Lymphatic:  Yes: no symptom reported





Physical Exam


HEENT:  Neck Supple W Full Motion


Chest:  Symmetric


LUNGS:  Other (diminished )


Heart:  RRR, murmurs (2/6 systolic murmur )


Extremities:  Other (2+ bilateral LE edema, erythema )


Neurology:  alert, oriented, follow commands





Assessment


Assessment


1.  Acute on chronic respiratory failure with a/c diastolic CHF, AE COPD. Renal 

disease also contributing factor as she is not producing urine 


2.  Acute on chronic diastolic CHF; Echo with preserved LV systolic function


3.  DANNY on CKD; Cr ^ 4.6. Anuric despite IV Lasix. Hesitant to start HD, but has

agreed to proceed. As per renal


4.  Mild troponin elevation; peak 0.3. Most probably type II, demand ischemia in

setting of above. CP free. 


5.  LE cellulitis. Antibiotics as per IM.


6.  Hypertension; BP lower end 


7.  Diabetes, II


8.  Hypomagnesemia 





Recommendations





Hold coreg with hypotension


Hold lisinopril with DANNY


Add ASA


Replace Mg


Fluid offloading via HD


Ongoing antibiotics for LE cellulitis


Supportive care


Consider outpatient ischemic evaluation given risk factors











GRACE RIVERS           Mar 30, 2020 12:27

## 2020-03-30 NOTE — NUR
SS following for discharge planning. SS reviewed pt chart and discussed with pt RN and Dr. Ewing. Pt is from home and is currently requiring oxygen. Per Dr. Ewing pt needing outpatient 
dialysis set up. Pt now agreeable to temporary dialysis. SS will continue to follow for 
discharge planning.

## 2020-03-30 NOTE — NUR
SS following up with discharge planning. SS phoned and faxed referral to [facility], 
[phone]; fax [phone]. SS currently awaiting IR report for tunneled catheter and 
first flow sheets. SS will continue to follow for discharge planning.

## 2020-03-30 NOTE — EKG
[facility]

              [address] [place] [address]

Test Date:    2020               Test Time:    15:52:32

Pat Name:     NEENA DUTTON             Department:   

Patient ID:   [id number]           Room:         210 1

Gender:                                Technician:   

:          1945               Requested By: RUFINO CRUZ

Order Number: [id number]           Reading MD:   Mo Bejarano MD

                                 Measurements

Intervals                              Axis          

Rate:                                  P:            

LA:                                    QRS:          

QRSD:                                  T:            

QT:                                                  

QTc:                                                 

                           Interpretive Statements

SR

1ST DEGREE AVB



Electronically Signed On 3- 12:00:19 CDT by Mo Bejarano MD

## 2020-03-30 NOTE — CARD
MR#: [id number]

Account#: [id number]

Accession#: [id number]

Date of Study: 03/30/2020

Ordering Physician: STACEY GRAVES, 

Referring Physician: STACEY GRAVES, 

Tech: Latha Figueroa [facility]





--------------- APPROVED REPORT --------------





EXAM: Two-dimensional and M-mode echocardiogram with Doppler and color Doppler.



Other Information 

Quality : Fair

Technically limited study due to  body habitus.



INDICATION

Congestive Heart Failure 

Morbid Obesity



2D DIMENSIONS 

RVDd3.9 (2.9-3.5cm)Left Atrium(2D)4.0 (1.6-4.0cm)

IVSd1.3 (0.7-1.1cm)Aortic Root(2D)2.8 (2.0-3.7cm)

LVDd4.0 (3.9-5.9cm)LVOT Diameter2.2 (1.8-2.4cm)

PWd1.2 (0.7-1.1cm)LVDs2.8 (2.5-4.0cm)

FS (%) 29.8 %SV41.0 ml

LVEF(%)57.4 (>50%)



Aortic Valve

AoV Peak Luther.145.9cm/sAoV VTI27.2cm

AO Peak GR.8.5mmHgLVOT  VTI 22.06cm

AO Mean GR.6mmHgAVA   (VTI)3.10cm2



Mitral Valve

MV E Bvnmmwtf37.8cm/sMV DECEL [id number]

MV A Odrqwnlh15.3cm/sE/A  Ratio0.8



TDI

Lateral E' P. V7.34cm/sMedial E' P. V4.63cm/s

E/Lateral E'9.6E/Medial E'15.3



Tricuspid Valve

TR P. Racxtkgz719nm/sRAP BUQBIENV88ihPf

TR Peak Gr.36ngDmPUYE75xpZp



 LEFT VENTRICLE 

The left ventricle is normal size. There is mild concentric left ventricular hypertrophy. The left ve
ntricular systolic function is low normal. EF 50% Septal motion suggestive of prior CABG and conducti
on defect. Transmitral Doppler flow pattern is Grade I-abnormal relaxation pattern.



 RIGHT VENTRICLE 

The right ventricle is moderately dilated. RV Systolic function is mildly reduced.



 ATRIA 

The left atrium is mildly dilated. The right atrium is mildly dilated. The interatrial septum is inta
ct with no evidence for an atrial septal defect or patent foramen ovale as noted on 2-D or Doppler im
aging.



 AORTIC VALVE 

The aortic valve is calcified but opens well. Doppler and Color Flow revealed no significant aortic r
egurgitation. There is no significant aortic valvular stenosis.



 MITRAL VALVE 

The mitral valve is normal in structure and function. Mitral annular calcification is mild. There is 
no evidence of mitral valve prolapse. There is no mitral valve stenosis. Doppler and Color Flow revea
led no mitral valve regurgitation noted.



 TRICUSPID VALVE 

The tricuspid valve is normal in structure and function. Doppler and Color Flow revealed mild tricusp
id regurgitation. There is moderate pulmonary hypertension. The PA pressure was estimated at 54 mmHg.
 There is no tricuspid valve stenosis.



 PULMONIC VALVE 

Doppler and Color Flow revealed mild pulmonic valvular regurgitation. There is no pulmonic valvular s
tenosis.



 GREAT VESSELS 

The aortic root is normal in size. The ascending aorta is normal in size. There is mild pulmonary art
timoteo dilatation. The IVC is dilated and collapses <50% with inspiration.



 PERICARDIAL EFFUSION 

There is no evidence of significant pericardial effusion.



Critical Notification

Critical Value: No



<Conclusion>

The left ventricular systolic function is low normal. EF 50%

Septal motion suggestive of prior CABG and conduction defect.

The right ventricle is moderately dilated.

RV Systolic function is mildly reduced.

Doppler and Color Flow revealed mild tricuspid regurgitation. There is moderate pulmonary hypertensio
n. The PA pressure was estimated at 54 mmHg.



Signed by : Mo Bejarano, 

Electronically Approved : 03/30/2020 11:19:56

## 2020-03-30 NOTE — NUR
notified at this time that patient has not had any urine output overnight. I also 
reported that her creatinine increased to 4.6 today and she is in respiratory distress with 
oxygen desaturations into low 80% when she is laying flat for turns or other activity. 
Currently she is resting with head of bed elevated. Order for dunaway catheter and keep NPO 
for now. We discussed renal sono results and other labs as well.

## 2020-03-31 VITALS — SYSTOLIC BLOOD PRESSURE: 118 MMHG | DIASTOLIC BLOOD PRESSURE: 53 MMHG

## 2020-03-31 VITALS — SYSTOLIC BLOOD PRESSURE: 122 MMHG | DIASTOLIC BLOOD PRESSURE: 69 MMHG

## 2020-03-31 VITALS — DIASTOLIC BLOOD PRESSURE: 59 MMHG | SYSTOLIC BLOOD PRESSURE: 104 MMHG

## 2020-03-31 VITALS — SYSTOLIC BLOOD PRESSURE: 142 MMHG | DIASTOLIC BLOOD PRESSURE: 65 MMHG

## 2020-03-31 VITALS — SYSTOLIC BLOOD PRESSURE: 114 MMHG | DIASTOLIC BLOOD PRESSURE: 58 MMHG

## 2020-03-31 VITALS — DIASTOLIC BLOOD PRESSURE: 59 MMHG | SYSTOLIC BLOOD PRESSURE: 119 MMHG

## 2020-03-31 VITALS — DIASTOLIC BLOOD PRESSURE: 52 MMHG | SYSTOLIC BLOOD PRESSURE: 104 MMHG

## 2020-03-31 LAB
ANION GAP SERPL CALC-SCNC: 7 MMOL/L (ref 6–14)
BUN SERPL-MCNC: 50 MG/DL (ref 7–20)
CALCIUM SERPL-MCNC: 8.1 MG/DL (ref 8.5–10.1)
CHLORIDE SERPL-SCNC: 97 MMOL/L (ref 98–107)
CO2 SERPL-SCNC: 29 MMOL/L (ref 21–32)
CREAT SERPL-MCNC: 4.1 MG/DL (ref 0.6–1)
ERYTHROCYTE [DISTWIDTH] IN BLOOD BY AUTOMATED COUNT: 17.7 % (ref 11.5–14.5)
GFR SERPLBLD BASED ON 1.73 SQ M-ARVRAT: 10.6 ML/MIN
GLUCOSE SERPL-MCNC: 148 MG/DL (ref 70–99)
HCT VFR BLD CALC: 32.1 % (ref 36–47)
HGB BLD-MCNC: 9.9 G/DL (ref 12–15.5)
MCH RBC QN AUTO: 26 PG (ref 25–35)
MCHC RBC AUTO-ENTMCNC: 31 G/DL (ref 31–37)
MCV RBC AUTO: 85 FL (ref 79–100)
PHOSPHATE SERPL-MCNC: 5.2 MG/DL (ref 2.6–4.7)
PLATELET # BLD AUTO: 289 X10^3/UL (ref 140–400)
POTASSIUM SERPL-SCNC: 4.9 MMOL/L (ref 3.5–5.1)
RBC # BLD AUTO: 3.76 X10^6/UL (ref 3.5–5.4)
SODIUM SERPL-SCNC: 133 MMOL/L (ref 136–145)
WBC # BLD AUTO: 8.1 X10^3/UL (ref 4–11)

## 2020-03-31 PROCEDURE — B5181ZA FLUOROSCOPY OF SUPERIOR VENA CAVA USING LOW OSMOLAR CONTRAST, GUIDANCE: ICD-10-PCS | Performed by: RADIOLOGY

## 2020-03-31 PROCEDURE — 02PAX3Z REMOVAL OF INFUSION DEVICE FROM HEART, EXTERNAL APPROACH: ICD-10-PCS | Performed by: RADIOLOGY

## 2020-03-31 PROCEDURE — 0JH63XZ INSERTION OF TUNNELED VASCULAR ACCESS DEVICE INTO CHEST SUBCUTANEOUS TISSUE AND FASCIA, PERCUTANEOUS APPROACH: ICD-10-PCS | Performed by: RADIOLOGY

## 2020-03-31 PROCEDURE — 02H633Z INSERTION OF INFUSION DEVICE INTO RIGHT ATRIUM, PERCUTANEOUS APPROACH: ICD-10-PCS | Performed by: RADIOLOGY

## 2020-03-31 PROCEDURE — 5A1D70Z PERFORMANCE OF URINARY FILTRATION, INTERMITTENT, LESS THAN 6 HOURS PER DAY: ICD-10-PCS | Performed by: INTERNAL MEDICINE

## 2020-03-31 RX ADMIN — PIPERACILLIN SODIUM AND TAZOBACTAM SODIUM SCH MLS/HR: 2; .25 INJECTION, POWDER, LYOPHILIZED, FOR SOLUTION INTRAVENOUS at 17:28

## 2020-03-31 RX ADMIN — HYDROCODONE BITARTRATE AND ACETAMINOPHEN PRN TAB: 5; 325 TABLET ORAL at 05:13

## 2020-03-31 RX ADMIN — ASPIRIN SCH MG: 81 TABLET, COATED ORAL at 09:31

## 2020-03-31 RX ADMIN — PIPERACILLIN SODIUM AND TAZOBACTAM SODIUM SCH MLS/HR: 2; .25 INJECTION, POWDER, LYOPHILIZED, FOR SOLUTION INTRAVENOUS at 06:14

## 2020-03-31 RX ADMIN — FUROSEMIDE SCH MG: 10 INJECTION, SOLUTION INTRAMUSCULAR; INTRAVENOUS at 09:29

## 2020-03-31 RX ADMIN — HYDROCODONE BITARTRATE AND ACETAMINOPHEN PRN TAB: 5; 325 TABLET ORAL at 23:31

## 2020-03-31 RX ADMIN — Medication SCH CAP: at 21:30

## 2020-03-31 RX ADMIN — HYDROCODONE BITARTRATE AND ACETAMINOPHEN PRN TAB: 5; 325 TABLET ORAL at 17:35

## 2020-03-31 RX ADMIN — PIPERACILLIN SODIUM AND TAZOBACTAM SODIUM SCH MLS/HR: 2; .25 INJECTION, POWDER, LYOPHILIZED, FOR SOLUTION INTRAVENOUS at 22:45

## 2020-03-31 NOTE — NUR
VANDANA following. Discussed with RN. Discharge planner, Cinda faxed additional clinicals to 
Brenda. Brenda contacted VANDANA with various questions. VANDANA passed on questions to Cinda Landa.

## 2020-03-31 NOTE — PDOC
GRACE RIVERS APRN 3/31/20 1021:


CARDIO Progress Notes


Date and Time


Date of Service


3/31/20


Time of Evaluation


1020





Subjective


Subjective:  No Chest Pain, No Palpitations, No Dizziness, Other (still SOA, 

cough. Seen in HD)





Vitals


Vitals





Vital Signs








  Date Time  Temp Pulse Resp B/P (MAP) Pulse Ox O2 Delivery O2 Flow Rate FiO2


 


3/31/20 07:00 96.5 68 24 104/59 (74) 99 Nasal Cannula 4.0 





 96.5       








Weight


Weight [ ]





Input and Output


Intake and Output











Intake and Output 


 


 3/31/20





 07:00


 


Intake Total 1100 ml


 


Output Total 1 ml


 


Balance 1099 ml


 


 


 


Intake Oral 1100 ml


 


Output Stool Total 1 ml


 


# Voids 3


 


# Bowel Movements 1











Laboratory


Labs





Laboratory Tests








Test


 3/30/20


12:16 3/30/20


14:45 3/30/20


15:50 3/30/20


21:34


 


Glucose (Fingerstick)


 76 mg/dL


(70-99) 


 60 mg/dL


(70-99) 148 mg/dL


(70-99)


 


Hepatitis B Surface Antibody,


Quant 


 <3.1 mIU/mL


(Immunity>9.9) 


 





 


Test


 3/31/20


04:10 3/31/20


07:59 


 





 


White Blood Count


 8.1 x10^3/uL


(4.0-11.0) 


 


 





 


Red Blood Count


 3.76 x10^6/uL


(3.50-5.40) 


 


 





 


Hemoglobin


 9.9 g/dL


(12.0-15.5) 


 


 





 


Hematocrit


 32.1 %


(36.0-47.0) 


 


 





 


Mean Corpuscular Volume 85 fL ()    


 


Mean Corpuscular Hemoglobin 26 pg (25-35)    


 


Mean Corpuscular Hemoglobin


Concent 31 g/dL


(31-37) 


 


 





 


Red Cell Distribution Width


 17.7 %


(11.5-14.5) 


 


 





 


Platelet Count


 289 x10^3/uL


(140-400) 


 


 





 


Sodium Level


 133 mmol/L


(136-145) 


 


 





 


Potassium Level


 4.9 mmol/L


(3.5-5.1) 


 


 





 


Chloride Level


 97 mmol/L


() 


 


 





 


Carbon Dioxide Level


 29 mmol/L


(21-32) 


 


 





 


Anion Gap 7 (6-14)    


 


Blood Urea Nitrogen


 50 mg/dL


(7-20) 


 


 





 


Creatinine


 4.1 mg/dL


(0.6-1.0) 


 


 





 


Estimated GFR


(Cockcroft-Gault) 10.6 


 


 


 





 


Glucose Level


 148 mg/dL


(70-99) 


 


 





 


Calcium Level


 8.1 mg/dL


(8.5-10.1) 


 


 





 


Phosphorus Level


 5.2 mg/dL


(2.6-4.7) 


 


 





 


Iron Level


 17 ug/dL


() 


 


 





 


Total Iron Binding Capacity


 237 ug/dL


(250-450) 


 


 





 


Iron Saturation 7 % (15-34)    


 


Glucose (Fingerstick)


 


 129 mg/dL


(70-99) 


 














Review of Systems


Constitutional:  yes: alert, oriented


Eyes:  Yes: no symptom reported


Pulmonary:  Yes dyspnea


Cardiovascular:  Yes no symptom reported


Gastrointestional:  Yes: no symptom reported


Genitourinary:  Yes: no symptom reported


Musculoskeletal:  Yes: no symptom reported


Skin:  Yes no symptom reported


Psychiatric/Neurological:  Yes: no symptom reported


Endocrine:  Yes: no symptom reported


Hematologic/Lymphatic:  Yes: no symptom reported





Physical Exam


HEENT:  Neck Supple W Full Motion


Chest:  Symmetric


LUNGS:  Other (diminished )


Heart:  RRR, murmurs (2/6 systolic murmur )


Abdomen:  Soft N/T, Other (obese )


Extremities:  Other (1+ bilateral LE edema, erythema bilateral LE dressings 

intact)


Neurology:  alert, oriented, follow commands, other (drowsy )





Assessment


Assessment


1.  Acute on chronic respiratory failure with a/c diastolic CHF, AE COPD. Renal 

disease also contributing factor as she is not producing urine 


2.  Acute on chronic diastolic CHF; Echo with preserved LV systolic function


3.  DANNY on CKD; uremic. HD initiated 3/30. As per renal


4.  Mild troponin elevation; peak 0.3. Most probably type II, demand ischemia in

setting of above. CP free. 


5.  LE cellulitis. Antibiotics as per IM.


6.  Hypertension; BP low end 


7.  Diabetes, II








Recommendations





repeat CXR


Hold coreg with hypotension


ACE discontinued with DANNY


ASA therapy 


Fluid offloading via HD


Ongoing antibiotics for LE cellulitis


Supportive care


Consider outpatient ischemic evaluation given risk factors





SAMI WILKINSON MD 3/31/20 1525:


CARDIO Progress Notes


Plan


Plan


Patient seen and examined. Agree with above nurse practitioner note.


She's currently on dialysis. She has tolerated fluid removal.


Her echocardiogram demonstrates biventricular failure likely due to morbid obe

sity and underlying kidney disease. She will need outpatient ischemic testing.


Supportive care for now. Continue diuresis with hemodialysis.











GRACE RIVERS           Mar 31, 2020 10:21


SAMI WILKINSON MD       Mar 31, 2020 15:25

## 2020-03-31 NOTE — RAD
CHEST AP ONLY

 

History: Shortness of breath. Cough.

 

Comparison: March 28, 2020

 

Findings:

Decreased volumes compared to prior. Interstitial and patchy bibasilar 

opacities. Small left pleural effusion, unchanged. No pneumothorax. 

Interval placement right IJ central line with tip projecting over the 

right atrium, unchanged. Unchanged heart size. Postoperative changes right

reverse total shoulder arthroplasty.

 

Impression: 

1.  Diffuse interstitial thickening with patchy bibasilar opacities, 

similar compared to prior. Findings may represent pulmonary edema or 

atypical infection including viral pneumonia.

2.  Small left pleural effusion, unchanged.

 

Electronically signed by: Chaz Duke DO (3/31/2020 1:39 PM) [id number]

## 2020-03-31 NOTE — PDOC
Renal-Progress Notes


Subjective Notes


Notes


LESS SOB AFTER HD YESTERDAY





History of Present Illness


Hx of present illness


NO CHANGES





Vitals


Vitals





Vital Signs








  Date Time  Temp Pulse Resp B/P (MAP) Pulse Ox O2 Delivery O2 Flow Rate FiO2


 


3/31/20 07:00 96.5 68 24 104/59 (74) 99 Nasal Cannula 4.0 





 96.5       








Weight


Weight [ ]





I.O.


Intake and Output











Intake and Output 


 


 3/31/20





 07:00


 


Intake Total 1100 ml


 


Output Total 1 ml


 


Balance 1099 ml


 


 


 


Intake Oral 1100 ml


 


Output Stool Total 1 ml


 


# Voids 3


 


# Bowel Movements 1











Labs


Labs





Laboratory Tests








Test


 3/30/20


12:16 3/30/20


14:45 3/30/20


15:50 3/30/20


21:34


 


Glucose (Fingerstick)


 76 mg/dL


(70-99) 


 60 mg/dL


(70-99) 148 mg/dL


(70-99)


 


Hepatitis B Surface Antibody,


Quant 


 <3.1 mIU/mL


(Immunity>9.9) 


 





 


Test


 3/31/20


04:10 3/31/20


07:59 


 





 


White Blood Count


 8.1 x10^3/uL


(4.0-11.0) 


 


 





 


Red Blood Count


 3.76 x10^6/uL


(3.50-5.40) 


 


 





 


Hemoglobin


 9.9 g/dL


(12.0-15.5) 


 


 





 


Hematocrit


 32.1 %


(36.0-47.0) 


 


 





 


Mean Corpuscular Volume 85 fL ()    


 


Mean Corpuscular Hemoglobin 26 pg (25-35)    


 


Mean Corpuscular Hemoglobin


Concent 31 g/dL


(31-37) 


 


 





 


Red Cell Distribution Width


 17.7 %


(11.5-14.5) 


 


 





 


Platelet Count


 289 x10^3/uL


(140-400) 


 


 





 


Sodium Level


 133 mmol/L


(136-145) 


 


 





 


Potassium Level


 4.9 mmol/L


(3.5-5.1) 


 


 





 


Chloride Level


 97 mmol/L


() 


 


 





 


Carbon Dioxide Level


 29 mmol/L


(21-32) 


 


 





 


Anion Gap 7 (6-14)    


 


Blood Urea Nitrogen


 50 mg/dL


(7-20) 


 


 





 


Creatinine


 4.1 mg/dL


(0.6-1.0) 


 


 





 


Estimated GFR


(Cockcroft-Gault) 10.6 


 


 


 





 


Glucose Level


 148 mg/dL


(70-99) 


 


 





 


Calcium Level


 8.1 mg/dL


(8.5-10.1) 


 


 





 


Phosphorus Level


 5.2 mg/dL


(2.6-4.7) 


 


 





 


Magnesium Level


 2.0 mg/dL


(1.8-2.4) 


 


 





 


Iron Level


 17 ug/dL


() 


 


 





 


Total Iron Binding Capacity


 237 ug/dL


(250-450) 


 


 





 


Iron Saturation 7 % (15-34)    


 


Glucose (Fingerstick)


 


 129 mg/dL


(70-99) 


 














Review of Systems


Constitutional:  yes: alert, oriented


Eyes:  Yes: no symptom reported


Pulmonary:  Yes dyspnea


Cardiovascular:  Yes no symptom reported


Gastrointestional:  Yes: no symptom reported


Genitourinary:  Yes: no symptom reported


Musculoskeletal:  Yes: no symptom reported


Skin:  Yes no symptom reported


Psychiatric/Neurological:  Yes: no symptom reported


Endocrine:  Yes: no symptom reported


Hematologic/Lymphatic:  Yes: no symptom reported





Physical Exam


General Appearance:  no apparent distress


Skin:  warm


Respiratory:  decreased breath sounds


Heart:  S1S2


Abdomen:  soft, bowel sounds present


Genitourinary:  bladder flat


Extremities:  pulses present


Neurology:  alert, oriented, follow commands





Assessment


Assessment


IMP





DANNY - ATN - ANURIA-SUSPECT ESRD-RENAL SONO NEG, UA NEG


CKD SUSPECT NEAR END STAGE-CR OF 1.7 IN 2017 BUT NEVER FOLLOWED UP


ACUTE ON CHRONIC HYPOXIC RESP FAILURE-ON HOME O2 2-3 L


LE CELLULITIS


MILD HYPOTENSION


DM II


HTN


ANEMIA 


IRON DEFICIENCY





PLAN





ANTIBIOTICS


IV VENOFER


MAY NEED JIMY


PT DECIDED TO PURSURE HD


HD TODAY


UF ABOUT 3.0 LITERS AS TOLERATED


CONT TO HOLD ANTIHYPERTENSIVES


D/W STAFF


IV LASIX FOR NOW


WILL PROB NEED TDC AND OP HD SET UP


D/W MARCO ANTONIO MONGE MD                 Mar 31, 2020 12:11

## 2020-03-31 NOTE — RAD
Procedure: Temporary hemodialysis catheter placement under fluoroscopy



Clinical Indication: Renal failure

 

Total fluoroscopy time:: 0.2 min

Dose area product: 3 Gycm2



Sterility: All elements of maximal sterile barrier technique including the use

of a cap, mask, sterile gown, sterile gloves, large sterile sheet, appropriate

hand hygiene, and 2% chlorhexidine for cutaneous antisepsis (or acceptable

alternative antiseptic per current guidelines) were followed for this

procedure.



Consent: The procedure was explained in its entirety to the patient or the

patients designated representative by a member of the treatment team,

including a discussion of the risks, benefits and commonly accepted

alternatives to the procedure, as well as the expected consequences of no

therapy whatsoever.   Discussion of the risks included, but was not limited

to, those that are most frequent and those that are rare but possibly severe

or life-threatening, as well as the possibility of unforeseen complications.



Technique and Findings: Following informed consent, the patient was prepped

and draped in the usual sterile fashion.  Ultrasound interrogation of the

right neck revealed patency and compressibility of the right internal jugular

vein.  A 21-gauge micropuncture needle was used to gain access to this vein

after 1% Lidocaine was used to achieve local anesthesia.  A hardcopy

ultrasound image was recorded.  The needle was exchanged over a wire for

serial dilators followed by a 20 cm temporary hemodialysis catheter which was

deployed under fluoroscopic guidance such that the distal tip resided in the

mid right atrium.  The catheter flow rates were assessed manually and found to

be excellent.  The catheter was then flushed, packed with Heparin, capped, and

sutured to the skin.  



Complications: No immediate



Impression:

1. Ultrasound and fluoroscopic guided placement of right internal jugular

temporary dialysis catheter

## 2020-03-31 NOTE — PDOC
PROGRESS NOTES


History of Present Illness


History of Present Illness





ASSESSMENT AND PLAN:  





Acute on chronic kidney injury, 


hyperkalemia, 


hyponatremia at 135, 


azotemia, 


acute on chronic systolic and diastolic heart failure,


elevated troponin 


anemia and lower extremity wounds.  // Lower extremity cellulitis. 





admitted. 





consult Nephrology


consult Cardiology.  


Hold ACE inhibitor.  


IV antibiotics.  


PT, OT.  


Wound care nurse to evaluate and treat.  


DVT prophylaxis.  


Full code.  


Prognosis //guarded.


temp dialysis cath placement  3/30


Ultrasound and fluoroscopic guided placement of right internal jugular temporary

dialysis catheter








28 min pt exam, chart review, > 50% of time spent with exam, chart review, pt 

care coordination





Vitals


Vitals





Vital Signs








  Date Time  Temp Pulse Resp B/P (MAP) Pulse Ox O2 Delivery O2 Flow Rate FiO2


 


3/31/20 07:00 96.5 68 24 104/59 (74) 99 Nasal Cannula 4.0 





 96.5       











Physical Exam


Physical Exam


and accommodation.


MUSKULOSKELETAL:  Well developed, well nourished, good range of motion.


ENDOCRINE:  No thyromegaly was palpated.


LYMPHATICS:  No cervical chain or axillary nodes were noted.


HEMATOPOIETIC:  No bruising.


NECK:  Supple, no JVD, no thyromegaly was noted.


PULMONARY:  She has decreased breath sounds.


HEART:  RRR, S1, S2 present.  Peripheral pulses intact, no obvious murmurs were


noted.


ABDOMEN:  Soft, nontender.  Positive bowel sounds no organomegaly, normal bowel


sounds.


EXTREMITIES:  She has bilateral lower extremity cellulitis with wounds that have


been wrapped with clean, dry and intact dressing.


NEUROLOGIC:  Normal speech, normal tone.  A & O x 3, moves all extremities, no


obvious focal deficits.


PSYCHIATRIC:  She is anxious.


SKIN:  No ulcerations or rashes, good skin turgor, no jaundice.


VASCULAR:  Good capillary refill, neurovascular bundle appears to be intact.


General:  Alert, Cooperative, No acute distress, mild distress


Heart:  Regular rate


Lungs:  Clear


Abdomen:  Normal bowel sounds, No tenderness


Extremities:  No clubbing, No cyanosis





Labs


LABS





Procedure: Temporary hemodialysis catheter placement under fluoroscopy





Clinical Indication: Renal failure


 


Total fluoroscopy time:: 0.2 min


Dose area product: 3 Gycm2





Sterility: All elements of maximal sterile barrier technique including the use


of a cap, mask, sterile gown, sterile gloves, large sterile sheet, appropriate


hand hygiene, and 2% chlorhexidine for cutaneous antisepsis (or acceptable


alternative antiseptic per current guidelines) were followed for this


procedure.





Consent: The procedure was explained in its entirety to the patient or the


patients designated representative by a member of the treatment team,


including a discussion of the risks, benefits and commonly accepted


alternatives to the procedure, as well as the expected consequences of no


therapy whatsoever.   Discussion of the risks included, but was not limited


to, those that are most frequent and those that are rare but possibly severe


or life-threatening, as well as the possibility of unforeseen complications.





Technique and Findings: Following informed consent, the patient was prepped


and draped in the usual sterile fashion.  Ultrasound interrogation of the


right neck revealed patency and compressibility of the right internal jugular


vein.  A 21-gauge micropuncture needle was used to gain access to this vein


after 1% Lidocaine was used to achieve local anesthesia.  A hardcopy


ultrasound image was recorded.  The needle was exchanged over a wire for


serial dilators followed by a 20 cm temporary hemodialysis catheter which was


deployed under fluoroscopic guidance such that the distal tip resided in the


mid right atrium.  The catheter flow rates were assessed manually and found to


be excellent.  The catheter was then flushed, packed with Heparin, capped, and


sutured to the skin.  





Complications: No immediate





Impression:


1. Ultrasound and fluoroscopic guided placement of right internal jugular


temporary dialysis catheter














DICTATED and SIGNED BY:     LAY RIGGINS MD


DATE:     03/31/20 1045





Laboratory Tests








Test


 3/30/20


12:16 3/30/20


14:45 3/30/20


15:50 3/30/20


21:34


 


Glucose (Fingerstick)


 76 mg/dL


(70-99) 


 60 mg/dL


(70-99) 148 mg/dL


(70-99)


 


Hepatitis B Surface Antibody,


Quant 


 <3.1 mIU/mL


(Immunity>9.9) 


 





 


Test


 3/31/20


04:10 3/31/20


07:59 


 





 


White Blood Count


 8.1 x10^3/uL


(4.0-11.0) 


 


 





 


Red Blood Count


 3.76 x10^6/uL


(3.50-5.40) 


 


 





 


Hemoglobin


 9.9 g/dL


(12.0-15.5) 


 


 





 


Hematocrit


 32.1 %


(36.0-47.0) 


 


 





 


Mean Corpuscular Volume 85 fL ()    


 


Mean Corpuscular Hemoglobin 26 pg (25-35)    


 


Mean Corpuscular Hemoglobin


Concent 31 g/dL


(31-37) 


 


 





 


Red Cell Distribution Width


 17.7 %


(11.5-14.5) 


 


 





 


Platelet Count


 289 x10^3/uL


(140-400) 


 


 





 


Sodium Level


 133 mmol/L


(136-145) 


 


 





 


Potassium Level


 4.9 mmol/L


(3.5-5.1) 


 


 





 


Chloride Level


 97 mmol/L


() 


 


 





 


Carbon Dioxide Level


 29 mmol/L


(21-32) 


 


 





 


Anion Gap 7 (6-14)    


 


Blood Urea Nitrogen


 50 mg/dL


(7-20) 


 


 





 


Creatinine


 4.1 mg/dL


(0.6-1.0) 


 


 





 


Estimated GFR


(Cockcroft-Gault) 10.6 


 


 


 





 


Glucose Level


 148 mg/dL


(70-99) 


 


 





 


Calcium Level


 8.1 mg/dL


(8.5-10.1) 


 


 





 


Phosphorus Level


 5.2 mg/dL


(2.6-4.7) 


 


 





 


Iron Level


 17 ug/dL


() 


 


 





 


Total Iron Binding Capacity


 237 ug/dL


(250-450) 


 


 





 


Iron Saturation 7 % (15-34)    


 


Glucose (Fingerstick)


 


 129 mg/dL


(70-99) 


 














Assessment and Plan


Assessmemt and Plan


Problems


Medical Problems:


(1) Cellulitis


Status: Acute  





(2) CHF (congestive heart failure)


Status: Acute  





(3) Elevated troponin


Status: Acute  





(4) Shortness of breath


Status: Acute  











Comment


Review of Relevant


I have reviewed the following items rizwan (where applicable) has been applied.


Labs





Laboratory Tests








Test


 3/29/20


11:49 3/29/20


12:20 3/29/20


16:51 3/29/20


21:09


 


Glucose (Fingerstick)


 85 mg/dL


(70-99) 


 73 mg/dL


(70-99) 40 mg/dL


(70-99)


 


Potassium Level


 


 5.6 mmol/L


(3.5-5.1) 


 





 


Troponin I Quantitative


 


 0.217 ng/mL


(0.000-0.055) 


 





 


Test


 3/29/20


21:23 3/29/20


22:00 3/30/20


03:40 3/30/20


07:35


 


Glucose (Fingerstick)


 43 mg/dL


(70-99) 100 mg/dL


(70-99) 


 41 mg/dL


(70-99)


 


White Blood Count


 


 


 7.9 x10^3/uL


(4.0-11.0) 





 


Red Blood Count


 


 


 3.73 x10^6/uL


(3.50-5.40) 





 


Hemoglobin


 


 


 9.9 g/dL


(12.0-15.5) 





 


Hematocrit


 


 


 31.4 %


(36.0-47.0) 





 


Mean Corpuscular Volume   84 fL ()  


 


Mean Corpuscular Hemoglobin   26 pg (25-35)  


 


Mean Corpuscular Hemoglobin


Concent 


 


 31 g/dL


(31-37) 





 


Red Cell Distribution Width


 


 


 17.2 %


(11.5-14.5) 





 


Platelet Count


 


 


 376 x10^3/uL


(140-400) 





 


Neutrophils (%) (Auto)   78 % (31-73)  


 


Lymphocytes (%) (Auto)   13 % (24-48)  


 


Monocytes (%) (Auto)   9 % (0-9)  


 


Eosinophils (%) (Auto)   1 % (0-3)  


 


Basophils (%) (Auto)   1 % (0-3)  


 


Neutrophils # (Auto)


 


 


 6.1 x10^3/uL


(1.8-7.7) 





 


Lymphocytes # (Auto)


 


 


 1.0 x10^3/uL


(1.0-4.8) 





 


Monocytes # (Auto)


 


 


 0.7 x10^3/uL


(0.0-1.1) 





 


Eosinophils # (Auto)


 


 


 0.0 x10^3/uL


(0.0-0.7) 





 


Basophils # (Auto)


 


 


 0.0 x10^3/uL


(0.0-0.2) 





 


Sodium Level


 


 


 136 mmol/L


(136-145) 





 


Potassium Level


 


 


 5.3 mmol/L


(3.5-5.1) 





 


Chloride Level


 


 


 98 mmol/L


() 





 


Carbon Dioxide Level


 


 


 30 mmol/L


(21-32) 





 


Anion Gap   8 (6-14)  


 


Blood Urea Nitrogen


 


 


 72 mg/dL


(7-20) 





 


Creatinine


 


 


 4.6 mg/dL


(0.6-1.0) 





 


Estimated GFR


(Cockcroft-Gault) 


 


 9.2 


 





 


Glucose Level


 


 


 43 mg/dL


(70-99) 





 


Calcium Level


 


 


 8.4 mg/dL


(8.5-10.1) 





 


Magnesium Level


 


 


 1.6 mg/dL


(1.8-2.4) 





 


Creatine Kinase


 


 


 45 U/L


() 





 


Triglycerides Level


 


 


 94 mg/dL


(0-150) 





 


Cholesterol Level


 


 


 103 mg/dL


(0-200) 





 


LDL Cholesterol, Calculated


 


 


 37 mg/dL


(0-100) 





 


VLDL Cholesterol, Calculated


 


 


 19 mg/dL


(0-40) 





 


Non-HDL Cholesterol Calculated


 


 


 56 mg/dL


(0-129) 





 


HDL Cholesterol


 


 


 47 mg/dL


(40-60) 





 


Cholesterol/HDL Ratio   2.2  


 


Hepatitis B Surface Antigen


 


 


 Nonreactive


(Nonreactive) 





 


Hepatitis B Surface Antibody   Nonreactive  


 


Hepatitis B Core Total


Antibody 


 


 Nonreactive


(Nonreactive) 





 


Test


 3/30/20


07:45 3/30/20


08:54 3/30/20


12:16 3/30/20


14:45


 


Urine Collection Type Unknown    


 


Urine Color Anastasia    


 


Urine Clarity Cloudy    


 


Urine pH 5.0 (<5.0-8.0)    


 


Urine Specific Gravity


 1.020


(1.000-1.030) 


 


 





 


Urine Protein


 100 mg/dL


(NEG-TRACE) 


 


 





 


Urine Glucose (UA)


 Negative mg/dL


(NEG) 


 


 





 


Urine Ketones (Stick)


 Negative mg/dL


(NEG) 


 


 





 


Urine Blood Trace (NEG)    


 


Urine Nitrite Negative (NEG)    


 


Urine Bilirubin Negative (NEG)    


 


Urine Urobilinogen Dipstick


 0.2 mg/dL (0.2


mg/dL) 


 


 





 


Urine Leukocyte Esterase Negative (NEG)    


 


Urine RBC 0 /HPF (0-2)    


 


Urine WBC 0 /HPF (0-4)    


 


Urine Squamous Epithelial


Cells Few /LPF 


 


 


 





 


Urine Amorphous Sediment Present /HPF    


 


Urine Bacteria 0 /HPF (0-FEW)    


 


Glucose (Fingerstick)


 


 175 mg/dL


(70-99) 76 mg/dL


(70-99) 





 


Hepatitis B Surface Antibody,


Quant 


 


 


 <3.1 mIU/mL


(Immunity>9.9)


 


Test


 3/30/20


15:50 3/30/20


21:34 3/31/20


04:10 3/31/20


07:59


 


Glucose (Fingerstick)


 60 mg/dL


(70-99) 148 mg/dL


(70-99) 


 129 mg/dL


(70-99)


 


White Blood Count


 


 


 8.1 x10^3/uL


(4.0-11.0) 





 


Red Blood Count


 


 


 3.76 x10^6/uL


(3.50-5.40) 





 


Hemoglobin


 


 


 9.9 g/dL


(12.0-15.5) 





 


Hematocrit


 


 


 32.1 %


(36.0-47.0) 





 


Mean Corpuscular Volume   85 fL ()  


 


Mean Corpuscular Hemoglobin   26 pg (25-35)  


 


Mean Corpuscular Hemoglobin


Concent 


 


 31 g/dL


(31-37) 





 


Red Cell Distribution Width


 


 


 17.7 %


(11.5-14.5) 





 


Platelet Count


 


 


 289 x10^3/uL


(140-400) 





 


Sodium Level


 


 


 133 mmol/L


(136-145) 





 


Potassium Level


 


 


 4.9 mmol/L


(3.5-5.1) 





 


Chloride Level


 


 


 97 mmol/L


() 





 


Carbon Dioxide Level


 


 


 29 mmol/L


(21-32) 





 


Anion Gap   7 (6-14)  


 


Blood Urea Nitrogen


 


 


 50 mg/dL


(7-20) 





 


Creatinine


 


 


 4.1 mg/dL


(0.6-1.0) 





 


Estimated GFR


(Cockcroft-Gault) 


 


 10.6 


 





 


Glucose Level


 


 


 148 mg/dL


(70-99) 





 


Calcium Level


 


 


 8.1 mg/dL


(8.5-10.1) 





 


Phosphorus Level


 


 


 5.2 mg/dL


(2.6-4.7) 





 


Iron Level


 


 


 17 ug/dL


() 





 


Total Iron Binding Capacity


 


 


 237 ug/dL


(250-450) 





 


Iron Saturation   7 % (15-34)  








Laboratory Tests








Test


 3/30/20


12:16 3/30/20


14:45 3/30/20


15:50 3/30/20


21:34


 


Glucose (Fingerstick)


 76 mg/dL


(70-99) 


 60 mg/dL


(70-99) 148 mg/dL


(70-99)


 


Hepatitis B Surface Antibody,


Quant 


 <3.1 mIU/mL


(Immunity>9.9) 


 





 


Test


 3/31/20


04:10 3/31/20


07:59 


 





 


White Blood Count


 8.1 x10^3/uL


(4.0-11.0) 


 


 





 


Red Blood Count


 3.76 x10^6/uL


(3.50-5.40) 


 


 





 


Hemoglobin


 9.9 g/dL


(12.0-15.5) 


 


 





 


Hematocrit


 32.1 %


(36.0-47.0) 


 


 





 


Mean Corpuscular Volume 85 fL ()    


 


Mean Corpuscular Hemoglobin 26 pg (25-35)    


 


Mean Corpuscular Hemoglobin


Concent 31 g/dL


(31-37) 


 


 





 


Red Cell Distribution Width


 17.7 %


(11.5-14.5) 


 


 





 


Platelet Count


 289 x10^3/uL


(140-400) 


 


 





 


Sodium Level


 133 mmol/L


(136-145) 


 


 





 


Potassium Level


 4.9 mmol/L


(3.5-5.1) 


 


 





 


Chloride Level


 97 mmol/L


() 


 


 





 


Carbon Dioxide Level


 29 mmol/L


(21-32) 


 


 





 


Anion Gap 7 (6-14)    


 


Blood Urea Nitrogen


 50 mg/dL


(7-20) 


 


 





 


Creatinine


 4.1 mg/dL


(0.6-1.0) 


 


 





 


Estimated GFR


(Cockcroft-Gault) 10.6 


 


 


 





 


Glucose Level


 148 mg/dL


(70-99) 


 


 





 


Calcium Level


 8.1 mg/dL


(8.5-10.1) 


 


 





 


Phosphorus Level


 5.2 mg/dL


(2.6-4.7) 


 


 





 


Iron Level


 17 ug/dL


() 


 


 





 


Total Iron Binding Capacity


 237 ug/dL


(250-450) 


 


 





 


Iron Saturation 7 % (15-34)    


 


Glucose (Fingerstick)


 


 129 mg/dL


(70-99) 


 











Medications





Current Medications


Vancomycin HCl 250 ml @  166.667 mls/hr 1X  ONCE IV  Last administered on 

3/28/20at 18:24;  Start 3/28/20 at 17:45;  Stop 3/28/20 at 19:14;  Status DC


Sodium Chloride 1,000 ml @  1,000 mls/hr 1X  ONCE IV  Last administered on 

3/28/20at 18:23;  Start 3/28/20 at 18:00;  Stop 3/28/20 at 18:59;  Status DC


Calcium Gluconate (Calcium Gluconate) 1,000 mg 1X  ONCE IVP  Last administered 

on 3/28/20at 18:24;  Start 3/28/20 at 18:00;  Stop 3/28/20 at 18:01;  Status DC


Albuterol Sulfate (Ventolin Neb Soln) 10 mg 1X  ONCE CONT NEB  Last administered

on 3/28/20at 18:00;  Start 3/28/20 at 18:00;  Stop 3/28/20 at 18:01;  Status DC


Furosemide (Lasix) 40 mg 1X  ONCE PO ;  Start 3/28/20 at 18:30;  Stop 3/28/20 at

18:31;  Status DC


Enoxaparin Sodium (Lovenox 120mg Syringe) 120 mg 1X  ONCE SQ  Last administered 

on 3/28/20at 20:05;  Start 3/28/20 at 19:30;  Stop 3/28/20 at 19:31;  Status DC


Furosemide (Lasix) 40 mg 1X  ONCE IVP  Last administered on 3/28/20at 20:05;  

Start 3/28/20 at 19:30;  Stop 3/28/20 at 19:31;  Status DC


Dextrose (Dextrose 50%-Water Syringe) 25 gm STK-MED ONCE IV ;  Start 3/29/20 at 

08:11;  Stop 3/29/20 at 08:11;  Status DC


Sodium Polystyrene Sulfonate (Kayexalate) 30 gm 1X  ONCE RC ;  Start 3/29/20 at 

09:00;  Stop 3/29/20 at 08:38;  Status DC


Sodium Polystyrene Sulfonate (Kayexalate) 15 gm 1X  ONCE PO  Last administered 

on 3/29/20at 09:24;  Start 3/29/20 at 09:00;  Stop 3/29/20 at 09:01;  Status DC


Piperacillin Sod/ Tazobactam Sod (Zosyn Per Pharmacy) 1 each PRN DAILY  PRN MC 

SEE COMMENTS;  Start 3/29/20 at 13:00


Piperacillin Sod/ Tazobactam Sod 2.25 gm/Sodium Chloride 50 ml @  100 mls/hr 

Q6HRS IV  Last administered on 3/30/20at 05:32;  Start 3/29/20 at 14:00;  Stop 

3/30/20 at 16:05;  Status DC


Dextrose (Dextrose 50%-Water Syringe) 12.5 gm PRN Q15MIN  PRN IV SEE COMMENTS 

Last administered on 3/30/20at 07:56;  Start 3/29/20 at 21:15


Dextrose (Iv Dextrose 5%) 250 ml PRN Q15MIN  PRN IV SEE COMMENTS;  Start 3/29/20

at 21:15


Dextrose (Dextrose 50%-Water Syringe) 25 gm STK-MED ONCE IV ;  Start 3/29/20 at 

21:19;  Stop 3/29/20 at 21:19;  Status DC


Furosemide (Lasix) 60 mg DAILY IVP  Last administered on 3/31/20at 09:29;  Start

3/30/20 at 12:30


Lidocaine HCl (Buffered Lidocaine 1%) 3 ml STK-MED ONCE .ROUTE ;  Start 3/30/20 

at 12:36;  Stop 3/30/20 at 12:36;  Status DC


Lidocaine HCl (Buffered Lidocaine 1%) 6 ml 1X  ONCE INJ  Last administered on 

3/30/20at 12:56;  Start 3/30/20 at 12:45;  Stop 3/30/20 at 12:46;  Status DC


Sodium Chloride 1,000 ml @  1,000 mls/hr Q1H PRN IV hypotension;  Start 3/30/20 

at 13:11;  Stop 3/30/20 at 19:10;  Status DC


Albumin Human 200 ml @  200 mls/hr 1X PRN  PRN IV Hypotension;  Start 3/30/20 at

13:15;  Stop 3/30/20 at 19:14;  Status DC


Diphenhydramine HCl (Benadryl) 25 mg 1X PRN  PRN IV ITCHING;  Start 3/30/20 at 

13:15;  Stop 3/31/20 at 13:14


Diphenhydramine HCl (Benadryl) 25 mg 1X PRN  PRN IV ITCHING;  Start 3/30/20 at 

13:15;  Stop 3/31/20 at 13:14


Sodium Chloride 1,000 ml @  400 mls/hr Q2H30M PRN IV PATENCY;  Start 3/30/20 at 

13:11;  Stop 3/31/20 at 01:10;  Status DC


Info (PHARMACY MONITORING -- do not chart) 1 each PRN DAILY  PRN MC SEE 

COMMENTS;  Start 3/30/20 at 13:15


Magnesium Sulfate 50 ml @ 25 mls/hr 1X  ONCE IV  Last administered on 3/30/20at 

19:21;  Start 3/30/20 at 15:45;  Stop 3/30/20 at 17:44;  Status DC


Aspirin (Ecotrin) 81 mg DAILYWBKFT PO  Last administered on 3/31/20at 09:31;  

Start 3/31/20 at 08:00


Piperacillin Sod/ Tazobactam Sod 2.25 gm/Sodium Chloride 50 ml @  100 mls/hr 

Q8HRS IV  Last administered on 3/31/20at 06:14;  Start 3/30/20 at 17:00


Acetaminophen/ Hydrocodone Bitart (Lortab 5/325) 1 tab PRN Q6HRS  PRN PO 

MODERATE PAIN 4-6 Last administered on 3/31/20at 05:13;  Start 3/30/20 at 22:00


Sodium Chloride 1,000 ml @  1,000 mls/hr Q1H PRN IV hypotension;  Start 3/31/20 

at 09:55;  Stop 3/31/20 at 15:54


Albumin Human 200 ml @  200 mls/hr 1X PRN  PRN IV Hypotension;  Start 3/31/20 at

10:00;  Stop 3/31/20 at 15:59


Sodium Chloride 1,000 ml @  400 mls/hr Q2H30M PRN IV PATENCY;  Start 3/31/20 at 

09:55;  Stop 3/31/20 at 21:54


Info (PHARMACY MONITORING -- do not chart) 1 each PRN DAILY  PRN MC SEE 

COMMENTS;  Start 3/31/20 at 10:00;  Status UNV


Info (PHARMACY MONITORING -- do not chart) 1 each PRN DAILY  PRN MC SEE 

COMMENTS;  Start 3/31/20 at 10:00;  Status UNV





Active Scripts


Active


Norco 5-325 Tablet (Acetaminophen/Hydrocodone Bitart) 1 Each Tablet 1 Tab PO PRN

Q6HRS PRN


Oxycodone-Acetaminophen 5-325 (Oxycodone Hcl/Acetaminophen) 1 Each Tablet 1 Tab 

PO PRN Q4HRS PRN


Reported


Triamcinolone Acetonide 0.1% Cream (Triamcinolone Acetonide) 15 Gm Cream..g. 1 

Ashley TP BID


Tramadol Hcl 50 Mg Tablet 50 Mg PO DAILY PRN


Flonase Allergy Relief (Fluticasone Propionate) 9.9 Ml Spray.susp 2 Sprays NS 

DAILY


Metformin Hcl 1,000 Mg Tablet 1,000 Mg PO BIDWMEALS


Lisinopril 40 Mg Tablet 1 Tab PO DAILY


Novolin N (Nph, Human Insulin Isophane) 100 Unit/1 Ml Vial 40 Unit SQ DAILY


Carvedilol ** (Carvedilol) 6.25 Mg Tablet 6.25 Mg PO BIDWMEALS


Escitalopram Oxalate 10 Mg Tablet 1 Tab PO DAILY


Simvastatin 40 Mg Tablet 1 Tab PO QHS


Glipizide 10 Mg Tablet 10 Mg PO BID


Furosemide 20 Mg Tablet 1 Tab PO DAILY


Vitals/I & O





Vital Sign - Last 24 Hours








 3/30/20 3/30/20 3/30/20 3/30/20





 11:00 19:49 22:32 22:54


 


Temp 97.5 97.5  98.0





 97.5 97.5  98.0


 


Pulse 91 90  90


 


Resp 22  22 


 


B/P (MAP) 118/65 (82) 109/71 (84)  111/55 (73)


 


Pulse Ox 92 92 98 97


 


O2 Delivery Nasal Cannula Nasal Cannula Nasal Cannula Nasal Cannula


 


O2 Flow Rate 4.0 4.0 4.0 4.0


 


    





    





 3/30/20 3/31/20 3/31/20 3/31/20





 23:35 02:17 05:13 06:17


 


Temp  98.3  





  98.3  


 


Pulse  88  


 


Resp 20 20 24 22


 


B/P (MAP)  104/52 (69)  


 


Pulse Ox  98  


 


O2 Delivery  Nasal Cannula Nasal Cannula Nasal Cannula


 


O2 Flow Rate  4.0 4.0 


 


    





    





 3/31/20   





 07:00   


 


Temp 96.5   





 96.5   


 


Pulse 68   


 


Resp 24   


 


B/P (MAP) 104/59 (74)   


 


Pulse Ox 99   


 


O2 Delivery Nasal Cannula   


 


O2 Flow Rate 4.0   














Intake and Output   


 


 3/30/20 3/30/20 3/31/20





 15:00 23:00 07:00


 


Intake Total 0 ml 800 ml 300 ml


 


Output Total   1 ml


 


Balance 0 ml 800 ml 299 ml

















RUFINO CRUZ MD          Mar 31, 2020 10:26

## 2020-03-31 NOTE — NUR
Wound Care



Wound Type/Assessment:  BLE stasis ulcers, L plantar heel DFU, bilateral buttocks & coccyx 
Stage III pressure ulcers



Treatment Recommendations/Plan:  

BLE-Thorough cleaning and removal of dead skin in the periwound, lotion to intact skin, 
Xeroform gauze, ABDs and kerlix, Medigrip compression (when pt can tolerate), change 
dressings every other day.

L plantar foot/heel-Iodoflex and foam dressing, after imaging completed, change every 3-4 
days.

Buttocks/Coccyx-Calazime cream TID and PRN, as pt is having incontinent episodes. Once that 
resolves, recommend Hydrocolloid and foam dressings every 3-4 days.



Education provided: Pt educated on turning Q2 hours from side to side d/t P.U. on backside, 
and also instructed on allowing staff to clean and change her dressings on her legs every 
other day, pt v/u.



POC discussed with YS Smith.



Offloading surface/device: P500 low air loss bed



Recommended Referrals/Tests: Soft tissue x-ray of L plantar foot/heel to evaluate for 
foreign body. Also would like BLE Arterial & Venous U/S, but understand this may have to be 
done on outpt basis.



Discharge Recommendations for dressings: 

BLE-Thorough cleaning and removal of dead skin in the periwound, lotion to intact skin, 
Xeroform gauze, ABDs and kerlix, Medigrip compression (when pt can tolerate), change 
dressings every other day.

L plantar foot/heel-Iodoflex and foam dressing, change every 3-4 days.

Buttocks/Coccyx-Calazime cream TID and PRN, as pt is having incontinent episodes. Once that 
resolves, recommend Hydrocolloid and foam dressings every 3-4 days.

## 2020-04-01 VITALS — SYSTOLIC BLOOD PRESSURE: 140 MMHG | DIASTOLIC BLOOD PRESSURE: 88 MMHG

## 2020-04-01 VITALS — DIASTOLIC BLOOD PRESSURE: 61 MMHG | SYSTOLIC BLOOD PRESSURE: 153 MMHG

## 2020-04-01 VITALS — SYSTOLIC BLOOD PRESSURE: 96 MMHG | DIASTOLIC BLOOD PRESSURE: 54 MMHG

## 2020-04-01 VITALS — SYSTOLIC BLOOD PRESSURE: 140 MMHG | DIASTOLIC BLOOD PRESSURE: 82 MMHG

## 2020-04-01 VITALS — SYSTOLIC BLOOD PRESSURE: 148 MMHG | DIASTOLIC BLOOD PRESSURE: 72 MMHG

## 2020-04-01 LAB
ANION GAP SERPL CALC-SCNC: 9 MMOL/L (ref 6–14)
BUN SERPL-MCNC: 42 MG/DL (ref 7–20)
CALCIUM SERPL-MCNC: 7.9 MG/DL (ref 8.5–10.1)
CHLORIDE SERPL-SCNC: 96 MMOL/L (ref 98–107)
CO2 SERPL-SCNC: 28 MMOL/L (ref 21–32)
CREAT SERPL-MCNC: 4.1 MG/DL (ref 0.6–1)
ERYTHROCYTE [DISTWIDTH] IN BLOOD BY AUTOMATED COUNT: 18.1 % (ref 11.5–14.5)
GFR SERPLBLD BASED ON 1.73 SQ M-ARVRAT: 10.6 ML/MIN
GLUCOSE SERPL-MCNC: 201 MG/DL (ref 70–99)
HCT VFR BLD CALC: 32 % (ref 36–47)
HGB BLD-MCNC: 9.8 G/DL (ref 12–15.5)
MCH RBC QN AUTO: 26 PG (ref 25–35)
MCHC RBC AUTO-ENTMCNC: 31 G/DL (ref 31–37)
MCV RBC AUTO: 86 FL (ref 79–100)
PLATELET # BLD AUTO: 280 X10^3/UL (ref 140–400)
POTASSIUM SERPL-SCNC: 4.9 MMOL/L (ref 3.5–5.1)
RBC # BLD AUTO: 3.72 X10^6/UL (ref 3.5–5.4)
SODIUM SERPL-SCNC: 133 MMOL/L (ref 136–145)
WBC # BLD AUTO: 7.1 X10^3/UL (ref 4–11)

## 2020-04-01 PROCEDURE — 5A1D70Z PERFORMANCE OF URINARY FILTRATION, INTERMITTENT, LESS THAN 6 HOURS PER DAY: ICD-10-PCS | Performed by: INTERNAL MEDICINE

## 2020-04-01 RX ADMIN — ALBUTEROL SULFATE SCH MG: 108 AEROSOL, METERED RESPIRATORY (INHALATION) at 11:55

## 2020-04-01 RX ADMIN — ALBUTEROL SULFATE SCH MG: 108 AEROSOL, METERED RESPIRATORY (INHALATION) at 19:49

## 2020-04-01 RX ADMIN — Medication SCH CAP: at 08:29

## 2020-04-01 RX ADMIN — HYDROCODONE BITARTRATE AND ACETAMINOPHEN PRN TAB: 5; 325 TABLET ORAL at 08:30

## 2020-04-01 RX ADMIN — ASPIRIN SCH MG: 81 TABLET, COATED ORAL at 08:29

## 2020-04-01 RX ADMIN — PIPERACILLIN SODIUM AND TAZOBACTAM SODIUM SCH MLS/HR: 2; .25 INJECTION, POWDER, LYOPHILIZED, FOR SOLUTION INTRAVENOUS at 05:44

## 2020-04-01 RX ADMIN — ALBUTEROL SULFATE SCH MG: 108 AEROSOL, METERED RESPIRATORY (INHALATION) at 16:37

## 2020-04-01 RX ADMIN — PIPERACILLIN SODIUM AND TAZOBACTAM SODIUM SCH MLS/HR: 2; .25 INJECTION, POWDER, LYOPHILIZED, FOR SOLUTION INTRAVENOUS at 18:39

## 2020-04-01 RX ADMIN — FUROSEMIDE SCH MG: 10 INJECTION, SOLUTION INTRAMUSCULAR; INTRAVENOUS at 08:30

## 2020-04-01 RX ADMIN — Medication SCH CAP: at 22:01

## 2020-04-01 NOTE — PDOC
GRACE RIVERS APRN 4/1/20 1134:


CARDIO Progress Notes


Date and Time


Date of Service


4/1/20


Time of Evaluation


0940





Subjective


Subjective:  No Chest Pain, No Palpitations, No Dizziness, Other (SOA and LE 

edema improved )





Vitals


Vitals





Vital Signs








  Date Time  Temp Pulse Resp B/P (MAP) Pulse Ox O2 Delivery O2 Flow Rate FiO2


 


4/1/20 09:30     91 Nasal Cannula 5.0 


 


4/1/20 07:00 97.4 69 24 148/72 (97)    





 97.4       








Weight


Weight [ ]





Input and Output


Intake and Output











Intake and Output 


 


 4/1/20





 07:00


 


Intake Total 940 ml


 


Output Total 400 ml


 


Balance 540 ml


 


 


 


Intake Oral 940 ml


 


Output Urine Total 400 ml


 


# Bowel Movements 3











Laboratory


Labs





Laboratory Tests








Test


 3/31/20


17:09 3/31/20


20:25 4/1/20


03:15 4/1/20


07:13


 


Glucose (Fingerstick)


 181 mg/dL


(70-99) 237 mg/dL


(70-99) 


 273 mg/dL


(70-99)


 


White Blood Count


 


 


 7.1 x10^3/uL


(4.0-11.0) 





 


Red Blood Count


 


 


 3.72 x10^6/uL


(3.50-5.40) 





 


Hemoglobin


 


 


 9.8 g/dL


(12.0-15.5) 





 


Hematocrit


 


 


 32.0 %


(36.0-47.0) 





 


Mean Corpuscular Volume   86 fL ()  


 


Mean Corpuscular Hemoglobin   26 pg (25-35)  


 


Mean Corpuscular Hemoglobin


Concent 


 


 31 g/dL


(31-37) 





 


Red Cell Distribution Width


 


 


 18.1 %


(11.5-14.5) 





 


Platelet Count


 


 


 280 x10^3/uL


(140-400) 





 


Sodium Level


 


 


 133 mmol/L


(136-145) 





 


Potassium Level


 


 


 4.9 mmol/L


(3.5-5.1) 





 


Chloride Level


 


 


 96 mmol/L


() 





 


Carbon Dioxide Level


 


 


 28 mmol/L


(21-32) 





 


Anion Gap   9 (6-14)  


 


Blood Urea Nitrogen


 


 


 42 mg/dL


(7-20) 





 


Creatinine


 


 


 4.1 mg/dL


(0.6-1.0) 





 


Estimated GFR


(Cockcroft-Gault) 


 


 10.6 


 





 


Glucose Level


 


 


 201 mg/dL


(70-99) 





 


Calcium Level


 


 


 7.9 mg/dL


(8.5-10.1) 














Review of Systems


Constitutional:  yes: alert, oriented


Eyes:  Yes: no symptom reported


Pulmonary:  Yes dyspnea


Cardiovascular:  Yes no symptom reported


Gastrointestional:  Yes: no symptom reported


Genitourinary:  Yes: no symptom reported


Musculoskeletal:  Yes: no symptom reported


Skin:  Yes no symptom reported


Psychiatric/Neurological:  Yes: no symptom reported


Endocrine:  Yes: no symptom reported


Hematologic/Lymphatic:  Yes: no symptom reported





Physical Exam


HEENT:  Neck Supple W Full Motion


Chest:  Symmetric


LUNGS:  Other (diminished )


Heart:  RRR, murmurs (2/6 systolic murmur )


Abdomen:  Soft N/T, Other (obese )


Extremities:  Other (1+ bilateral LE edema,  bilateral LE dressings intact)


Neurology:  alert, oriented, follow commands, other (drowsy )





Assessment


Assessment


1.  Acute on chronic respiratory failure with a/c diastolic CHF, AE COPD. 

improved 


2.  Acute on chronic diastolic CHF; Echo with preserved LV systolic function


3.  DANNY on CKD; uremic. HD initiated 3/30. 


4.  Mild troponin elevation; peak 0.3. Most probably type II, demand ischemia in

setting of above. CP free. 


5.  LE cellulitis. Antibiotics as per IM.


6.  Hypertension; BP low end 


7.  Diabetes, II








Recommendations





Resume low-dose coreg as BP allows


No ACEi with DANNY


ASA therapy 


Fluid offloading via HD


Supportive care


Will plan for outpatient ischemic evaluation  


Follow up in our office with Dr. Barrera as scheduled.





SAMI WILKINSON MD 4/1/20 1341:


CARDIO Progress Notes


Plan


Plan


Pt. seen and examined. Agree with above NP note. 


Supportive care. Continue HD treatments.











GRACE RIVERS            Apr 1, 2020 11:34


SAMI WILKINSON MD        Apr 1, 2020 13:41

## 2020-04-01 NOTE — RAD
Conversion of right internal jugular temporary dialysis catheter to a tunneled

hemodialysis catheter  



Indication: Longer term dialysis access needed

 

Procedure:   

 

 The procedure was explained in its entirety to the patient or the patients

designated representative by a member of the treatment team, including a

discussion of the risks, benefits and commonly accepted alternatives to the

procedure, as well as the expected consequences of no therapy whatsoever.  

Discussion of the risks included, but was not limited to, those that are most

frequent and those that are rare but possibly severe or life-threatening, as

well as the possibility of unforeseen complications.



  All elements of maximal sterile barrier technique including the use of a

cap, mask, sterile gown, sterile gloves, large sterile sheet, appropriate hand

hygiene, and 2% chlorhexidine for cutaneous antisepsis (or acceptable

alternative antiseptic per current guidelines) were followed for this

procedure.



The pre-existing catheter was evaluated under fluoroscopy and found to be

normal in position. A guidewire was advanced into the IVC. A 23 cm tip to cuff

tunneled hemodialysis catheter was advanced from small dermatotomy, several

centimeters inferior to the pre-existing catheter entry site, to the venotomy

site. The pre-existing catheter was removed over the guidewire and a peel-away

sheath placed. The new tunneled catheter was advanced through the peel-away

sheath such that its tip was positioned in the proximal right atrium with the

patient supine. The sheath was removed. The new catheter was found to flush

and aspirate normally. Catheter was flushed, and secured in place. Sterile

dressings were applied. No immediate complications were identified.

 



Total fluoroscopy time: 0.6 min

Dose area product: 4 Gycm2



The procedure was performed under conscious sedation including continuous

cardiopulmonary monitoring via dedicated sedation nurse. 

Face-to-face sedation time: 20 minutes



Impression: Conversion of a right internal jugular temporary dialysis catheter

to a tunneled dialysis catheter

## 2020-04-01 NOTE — RAD
FOOT LEFT 3V

 

History: Diabetic foot ulcer.

 

Technique: 3 views left foot.

 

Comparison: None.

 

Findings:

Normal alignment. No fracture. No radiopaque foreign body. Mild first MTP 

DJD.

 

Left fifth proximal phalanx periosteal reaction

 

Impression: 

1.  Left fifth proximal phalanx periosteal reaction, may relate to healing

fracture. If concern for infection this region, recommend MRI to rule out 

osteomyelitis.

2.  No radiopaque foreign body.

 

Electronically signed by: Chaz Duke DO (4/1/2020 2:54 PM) [id number]

## 2020-04-01 NOTE — PDOC
Renal-Progress Notes


Subjective Notes


Notes


NO NEW COMPLAINTS





History of Present Illness


Hx of present illness


STABLE





Vitals


Vitals





Vital Signs








  Date Time  Temp Pulse Resp B/P (MAP) Pulse Ox O2 Delivery O2 Flow Rate FiO2


 


4/1/20 09:30     91 Nasal Cannula 5.0 


 


4/1/20 07:00 97.4 69 24 148/72 (97)    





 97.4       








Weight


Weight [ ]





I.O.


Intake and Output











Intake and Output 


 


 4/1/20





 07:00


 


Intake Total 940 ml


 


Output Total 400 ml


 


Balance 540 ml


 


 


 


Intake Oral 940 ml


 


Output Urine Total 400 ml


 


# Bowel Movements 3











Labs


Labs





Laboratory Tests








Test


 3/31/20


17:09 3/31/20


20:25 4/1/20


03:15 4/1/20


07:13


 


Glucose (Fingerstick)


 181 mg/dL


(70-99) 237 mg/dL


(70-99) 


 273 mg/dL


(70-99)


 


White Blood Count


 


 


 7.1 x10^3/uL


(4.0-11.0) 





 


Red Blood Count


 


 


 3.72 x10^6/uL


(3.50-5.40) 





 


Hemoglobin


 


 


 9.8 g/dL


(12.0-15.5) 





 


Hematocrit


 


 


 32.0 %


(36.0-47.0) 





 


Mean Corpuscular Volume   86 fL ()  


 


Mean Corpuscular Hemoglobin   26 pg (25-35)  


 


Mean Corpuscular Hemoglobin


Concent 


 


 31 g/dL


(31-37) 





 


Red Cell Distribution Width


 


 


 18.1 %


(11.5-14.5) 





 


Platelet Count


 


 


 280 x10^3/uL


(140-400) 





 


Sodium Level


 


 


 133 mmol/L


(136-145) 





 


Potassium Level


 


 


 4.9 mmol/L


(3.5-5.1) 





 


Chloride Level


 


 


 96 mmol/L


() 





 


Carbon Dioxide Level


 


 


 28 mmol/L


(21-32) 





 


Anion Gap   9 (6-14)  


 


Blood Urea Nitrogen


 


 


 42 mg/dL


(7-20) 





 


Creatinine


 


 


 4.1 mg/dL


(0.6-1.0) 





 


Estimated GFR


(Cockcroft-Gault) 


 


 10.6 


 





 


Glucose Level


 


 


 201 mg/dL


(70-99) 





 


Calcium Level


 


 


 7.9 mg/dL


(8.5-10.1) 














Review of Systems


Constitutional:  yes: alert, oriented


Eyes:  Yes: no symptom reported


Pulmonary:  Yes dyspnea


Cardiovascular:  Yes no symptom reported


Gastrointestional:  Yes: no symptom reported


Genitourinary:  Yes: no symptom reported


Musculoskeletal:  Yes: no symptom reported


Skin:  Yes no symptom reported


Psychiatric/Neurological:  Yes: no symptom reported


Endocrine:  Yes: no symptom reported


Hematologic/Lymphatic:  Yes: no symptom reported





Physical Exam


General Appearance:  no apparent distress


Skin:  warm


Respiratory:  decreased breath sounds


Heart:  S1S2


Abdomen:  soft, bowel sounds present


Genitourinary:  bladder flat


Extremities:  pulses present


Neurology:  alert, oriented, follow commands, other (drowsy )





Assessment


Assessment


IMP





DANNY - ATN - ANURIA-SUSPECT ESRD-RENAL SONO NEG, UA NEG


CKD SUSPECT NEAR END STAGE-CR OF 1.7 IN 2017 BUT NEVER FOLLOWED UP


ACUTE ON CHRONIC HYPOXIC RESP FAILURE-ON HOME O2 2-3 L


O2 DEPENDENT COPD


LE CELLULITIS


MILD HYPOTENSION


DM II


HTN


ANEMIA 


IRON DEFICIENCY


S/P TUNNELED HD CATHETER





PLAN





ANTIBIOTICS


IV VENOFER


ARANESP


OP HD BEING SET UP


SUPPLEMENTAL O2


HD TODAY UF TO [facility] PHILLIPS MD                  Apr 1, 2020 11:40

## 2020-04-01 NOTE — PDOC
PROGRESS NOTES


History of Present Illness


History of Present Illness





ASSESSMENT AND PLAN:  





Acute on chronic kidney injury, 


hyperkalemia, 


hyponatremia at 135, 


azotemia, 


acute on chronic systolic and diastolic heart failure,


elevated troponin 


anemia and lower extremity wounds.  // Lower extremity cellulitis. 





admitted. 





consult Nephrology


consult Cardiology.  


Hold ACE inhibitor.  


IV antibiotics.  


PT, OT.  


Wound care nurse to evaluate and treat.  


DVT prophylaxis.  


Full code.  


Prognosis //guarded.


temp dialysis cath placement  3/30


Ultrasound and fluoroscopic guided placement of right internal jugular temporary

dialysis catheter





NEEDS TDC AND OP HD SET UP


4/1 dilysis today








29 min pt exam, chart review, > 50% of time spent with exam, chart review, pt 

care coordination





Vitals


Vitals





Vital Signs








  Date Time  Temp Pulse Resp B/P (MAP) Pulse Ox O2 Delivery O2 Flow Rate FiO2


 


4/1/20 09:30     91 Nasal Cannula 5.0 


 


4/1/20 07:00 97.4 69 24 148/72 (97)    





 97.4       











Physical Exam


Physical Exam


and accommodation.


MUSKULOSKELETAL:  Well developed, well nourished, good range of motion.


ENDOCRINE:  No thyromegaly was palpated.


LYMPHATICS:  No cervical chain or axillary nodes were noted.


HEMATOPOIETIC:  No bruising.


NECK:  Supple, no JVD, no thyromegaly was noted.


PULMONARY:  She has decreased breath sounds.


HEART:  RRR, S1, S2 present.  Peripheral pulses intact, no obvious murmurs were


noted.


ABDOMEN:  Soft, nontender.  Positive bowel sounds no organomegaly, normal bowel


sounds.


EXTREMITIES:  She has bilateral lower extremity cellulitis with wounds that have


been wrapped with clean, dry and intact dressing.


NEUROLOGIC:  Normal speech, normal tone.  A & O x 3, moves all extremities, no


obvious focal deficits.


PSYCHIATRIC:  She is anxious.


SKIN:  No ulcerations or rashes, good skin turgor, no jaundice.


VASCULAR:  Good capillary refill, neurovascular bundle appears to be intact.


General:  Alert, Oriented X3, Cooperative, No acute distress, mild distress


Heart:  Regular rate


Lungs:  Clear


Abdomen:  Normal bowel sounds, No tenderness


Extremities:  No clubbing, No cyanosis





Labs


LABS





Laboratory Tests








Test


 3/31/20


17:09 3/31/20


20:25 4/1/20


03:15 4/1/20


07:13


 


Glucose (Fingerstick)


 181 mg/dL


(70-99) 237 mg/dL


(70-99) 


 273 mg/dL


(70-99)


 


White Blood Count


 


 


 7.1 x10^3/uL


(4.0-11.0) 





 


Red Blood Count


 


 


 3.72 x10^6/uL


(3.50-5.40) 





 


Hemoglobin


 


 


 9.8 g/dL


(12.0-15.5) 





 


Hematocrit


 


 


 32.0 %


(36.0-47.0) 





 


Mean Corpuscular Volume   86 fL ()  


 


Mean Corpuscular Hemoglobin   26 pg (25-35)  


 


Mean Corpuscular Hemoglobin


Concent 


 


 31 g/dL


(31-37) 





 


Red Cell Distribution Width


 


 


 18.1 %


(11.5-14.5) 





 


Platelet Count


 


 


 280 x10^3/uL


(140-400) 





 


Sodium Level


 


 


 133 mmol/L


(136-145) 





 


Potassium Level


 


 


 4.9 mmol/L


(3.5-5.1) 





 


Chloride Level


 


 


 96 mmol/L


() 





 


Carbon Dioxide Level


 


 


 28 mmol/L


(21-32) 





 


Anion Gap   9 (6-14)  


 


Blood Urea Nitrogen


 


 


 42 mg/dL


(7-20) 





 


Creatinine


 


 


 4.1 mg/dL


(0.6-1.0) 





 


Estimated GFR


(Cockcroft-Gault) 


 


 10.6 


 





 


Glucose Level


 


 


 201 mg/dL


(70-99) 





 


Calcium Level


 


 


 7.9 mg/dL


(8.5-10.1) 














Assessment and Plan


Assessmemt and Plan


Problems


Medical Problems:


(1) Cellulitis


Status: Acute  





(2) CHF (congestive heart failure)


Status: Acute  





(3) Elevated troponin


Status: Acute  





(4) Shortness of breath


Status: Acute  











Comment


Review of Relevant


I have reviewed the following items rizwan (where applicable) has been applied.


Labs





Laboratory Tests








Test


 3/30/20


12:16 3/30/20


14:45 3/30/20


15:50 3/30/20


21:34


 


Glucose (Fingerstick)


 76 mg/dL


(70-99) 


 60 mg/dL


(70-99) 148 mg/dL


(70-99)


 


Hepatitis B Surface Antibody,


Quant 


 <3.1 mIU/mL


(Immunity>9.9) 


 





 


Test


 3/31/20


04:10 3/31/20


07:59 3/31/20


17:09 3/31/20


20:25


 


White Blood Count


 8.1 x10^3/uL


(4.0-11.0) 


 


 





 


Red Blood Count


 3.76 x10^6/uL


(3.50-5.40) 


 


 





 


Hemoglobin


 9.9 g/dL


(12.0-15.5) 


 


 





 


Hematocrit


 32.1 %


(36.0-47.0) 


 


 





 


Mean Corpuscular Volume 85 fL ()    


 


Mean Corpuscular Hemoglobin 26 pg (25-35)    


 


Mean Corpuscular Hemoglobin


Concent 31 g/dL


(31-37) 


 


 





 


Red Cell Distribution Width


 17.7 %


(11.5-14.5) 


 


 





 


Platelet Count


 289 x10^3/uL


(140-400) 


 


 





 


Sodium Level


 133 mmol/L


(136-145) 


 


 





 


Potassium Level


 4.9 mmol/L


(3.5-5.1) 


 


 





 


Chloride Level


 97 mmol/L


() 


 


 





 


Carbon Dioxide Level


 29 mmol/L


(21-32) 


 


 





 


Anion Gap 7 (6-14)    


 


Blood Urea Nitrogen


 50 mg/dL


(7-20) 


 


 





 


Creatinine


 4.1 mg/dL


(0.6-1.0) 


 


 





 


Estimated GFR


(Cockcroft-Gault) 10.6 


 


 


 





 


Glucose Level


 148 mg/dL


(70-99) 


 


 





 


Calcium Level


 8.1 mg/dL


(8.5-10.1) 


 


 





 


Phosphorus Level


 5.2 mg/dL


(2.6-4.7) 


 


 





 


Magnesium Level


 2.0 mg/dL


(1.8-2.4) 


 


 





 


Iron Level


 17 ug/dL


() 


 


 





 


Total Iron Binding Capacity


 237 ug/dL


(250-450) 


 


 





 


Iron Saturation 7 % (15-34)    


 


Glucose (Fingerstick)


 


 129 mg/dL


(70-99) 181 mg/dL


(70-99) 237 mg/dL


(70-99)


 


Test


 4/1/20


03:15 4/1/20


07:13 


 





 


White Blood Count


 7.1 x10^3/uL


(4.0-11.0) 


 


 





 


Red Blood Count


 3.72 x10^6/uL


(3.50-5.40) 


 


 





 


Hemoglobin


 9.8 g/dL


(12.0-15.5) 


 


 





 


Hematocrit


 32.0 %


(36.0-47.0) 


 


 





 


Mean Corpuscular Volume 86 fL ()    


 


Mean Corpuscular Hemoglobin 26 pg (25-35)    


 


Mean Corpuscular Hemoglobin


Concent 31 g/dL


(31-37) 


 


 





 


Red Cell Distribution Width


 18.1 %


(11.5-14.5) 


 


 





 


Platelet Count


 280 x10^3/uL


(140-400) 


 


 





 


Sodium Level


 133 mmol/L


(136-145) 


 


 





 


Potassium Level


 4.9 mmol/L


(3.5-5.1) 


 


 





 


Chloride Level


 96 mmol/L


() 


 


 





 


Carbon Dioxide Level


 28 mmol/L


(21-32) 


 


 





 


Anion Gap 9 (6-14)    


 


Blood Urea Nitrogen


 42 mg/dL


(7-20) 


 


 





 


Creatinine


 4.1 mg/dL


(0.6-1.0) 


 


 





 


Estimated GFR


(Cockcroft-Gault) 10.6 


 


 


 





 


Glucose Level


 201 mg/dL


(70-99) 


 


 





 


Calcium Level


 7.9 mg/dL


(8.5-10.1) 


 


 





 


Glucose (Fingerstick)


 


 273 mg/dL


(70-99) 


 











Laboratory Tests








Test


 3/31/20


17:09 3/31/20


20:25 4/1/20


03:15 4/1/20


07:13


 


Glucose (Fingerstick)


 181 mg/dL


(70-99) 237 mg/dL


(70-99) 


 273 mg/dL


(70-99)


 


White Blood Count


 


 


 7.1 x10^3/uL


(4.0-11.0) 





 


Red Blood Count


 


 


 3.72 x10^6/uL


(3.50-5.40) 





 


Hemoglobin


 


 


 9.8 g/dL


(12.0-15.5) 





 


Hematocrit


 


 


 32.0 %


(36.0-47.0) 





 


Mean Corpuscular Volume   86 fL ()  


 


Mean Corpuscular Hemoglobin   26 pg (25-35)  


 


Mean Corpuscular Hemoglobin


Concent 


 


 31 g/dL


(31-37) 





 


Red Cell Distribution Width


 


 


 18.1 %


(11.5-14.5) 





 


Platelet Count


 


 


 280 x10^3/uL


(140-400) 





 


Sodium Level


 


 


 133 mmol/L


(136-145) 





 


Potassium Level


 


 


 4.9 mmol/L


(3.5-5.1) 





 


Chloride Level


 


 


 96 mmol/L


() 





 


Carbon Dioxide Level


 


 


 28 mmol/L


(21-32) 





 


Anion Gap   9 (6-14)  


 


Blood Urea Nitrogen


 


 


 42 mg/dL


(7-20) 





 


Creatinine


 


 


 4.1 mg/dL


(0.6-1.0) 





 


Estimated GFR


(Cockcroft-Gault) 


 


 10.6 


 





 


Glucose Level


 


 


 201 mg/dL


(70-99) 





 


Calcium Level


 


 


 7.9 mg/dL


(8.5-10.1) 











Medications





Current Medications


Vancomycin HCl 250 ml @  166.667 mls/hr 1X  ONCE IV  Last administered on 

3/28/20at 18:24;  Start 3/28/20 at 17:45;  Stop 3/28/20 at 19:14;  Status DC


Sodium Chloride 1,000 ml @  1,000 mls/hr 1X  ONCE IV  Last administered on 

3/28/20at 18:23;  Start 3/28/20 at 18:00;  Stop 3/28/20 at 18:59;  Status DC


Calcium Gluconate (Calcium Gluconate) 1,000 mg 1X  ONCE IVP  Last administered 

on 3/28/20at 18:24;  Start 3/28/20 at 18:00;  Stop 3/28/20 at 18:01;  Status DC


Albuterol Sulfate (Ventolin Neb Soln) 10 mg 1X  ONCE CONT NEB  Last administered

on 3/28/20at 18:00;  Start 3/28/20 at 18:00;  Stop 3/28/20 at 18:01;  Status DC


Furosemide (Lasix) 40 mg 1X  ONCE PO ;  Start 3/28/20 at 18:30;  Stop 3/28/20 at

18:31;  Status DC


Enoxaparin Sodium (Lovenox 120mg Syringe) 120 mg 1X  ONCE SQ  Last administered 

on 3/28/20at 20:05;  Start 3/28/20 at 19:30;  Stop 3/28/20 at 19:31;  Status DC


Furosemide (Lasix) 40 mg 1X  ONCE IVP  Last administered on 3/28/20at 20:05;  

Start 3/28/20 at 19:30;  Stop 3/28/20 at 19:31;  Status DC


Dextrose (Dextrose 50%-Water Syringe) 25 gm STK-MED ONCE IV ;  Start 3/29/20 at 

08:11;  Stop 3/29/20 at 08:11;  Status DC


Sodium Polystyrene Sulfonate (Kayexalate) 30 gm 1X  ONCE RC ;  Start 3/29/20 at 

09:00;  Stop 3/29/20 at 08:38;  Status DC


Sodium Polystyrene Sulfonate (Kayexalate) 15 gm 1X  ONCE PO  Last administered 

on 3/29/20at 09:24;  Start 3/29/20 at 09:00;  Stop 3/29/20 at 09:01;  Status DC


Piperacillin Sod/ Tazobactam Sod (Zosyn Per Pharmacy) 1 each PRN DAILY  PRN MC 

SEE COMMENTS;  Start 3/29/20 at 13:00


Piperacillin Sod/ Tazobactam Sod 2.25 gm/Sodium Chloride 50 ml @  100 mls/hr 

Q6HRS IV  Last administered on 3/30/20at 05:32;  Start 3/29/20 at 14:00;  Stop 

3/30/20 at 16:05;  Status DC


Dextrose (Dextrose 50%-Water Syringe) 12.5 gm PRN Q15MIN  PRN IV SEE COMMENTS 

Last administered on 3/30/20at 07:56;  Start 3/29/20 at 21:15


Dextrose (Iv Dextrose 5%) 250 ml PRN Q15MIN  PRN IV SEE COMMENTS;  Start 3/29/20

at 21:15


Dextrose (Dextrose 50%-Water Syringe) 25 gm STK-MED ONCE IV ;  Start 3/29/20 at 

21:19;  Stop 3/29/20 at 21:19;  Status DC


Furosemide (Lasix) 60 mg DAILY IVP  Last administered on 4/1/20at 08:30;  Start 

3/30/20 at 12:30


Lidocaine HCl (Buffered Lidocaine 1%) 3 ml STK-MED ONCE .ROUTE ;  Start 3/30/20 

at 12:36;  Stop 3/30/20 at 12:36;  Status DC


Lidocaine HCl (Buffered Lidocaine 1%) 6 ml 1X  ONCE INJ  Last administered on 

3/30/20at 12:56;  Start 3/30/20 at 12:45;  Stop 3/30/20 at 12:46;  Status DC


Sodium Chloride 1,000 ml @  1,000 mls/hr Q1H PRN IV hypotension;  Start 3/30/20 

at 13:11;  Stop 3/30/20 at 19:10;  Status DC


Albumin Human 200 ml @  200 mls/hr 1X PRN  PRN IV Hypotension;  Start 3/30/20 at

13:15;  Stop 3/30/20 at 19:14;  Status DC


Diphenhydramine HCl (Benadryl) 25 mg 1X PRN  PRN IV ITCHING;  Start 3/30/20 at 

13:15;  Stop 3/31/20 at 13:14;  Status DC


Diphenhydramine HCl (Benadryl) 25 mg 1X PRN  PRN IV ITCHING;  Start 3/30/20 at 

13:15;  Stop 3/31/20 at 13:14;  Status DC


Sodium Chloride 1,000 ml @  400 mls/hr Q2H30M PRN IV PATENCY;  Start 3/30/20 at 

13:11;  Stop 3/31/20 at 01:10;  Status DC


Info (PHARMACY MONITORING -- do not chart) 1 each PRN DAILY  PRN MC SEE 

COMMENTS;  Start 3/30/20 at 13:15


Magnesium Sulfate 50 ml @ 25 mls/hr 1X  ONCE IV  Last administered on 3/30/20at 

19:21;  Start 3/30/20 at 15:45;  Stop 3/30/20 at 17:44;  Status DC


Aspirin (Ecotrin) 81 mg DAILYWBKFT PO  Last administered on 4/1/20at 08:29;  

Start 3/31/20 at 08:00


Piperacillin Sod/ Tazobactam Sod 2.25 gm/Sodium Chloride 50 ml @  100 mls/hr 

Q8HRS IV  Last administered on 4/1/20at 05:44;  Start 3/30/20 at 17:00


Acetaminophen/ Hydrocodone Bitart (Lortab 5/325) 1 tab PRN Q6HRS  PRN PO 

MODERATE PAIN 4-6 Last administered on 4/1/20at 08:30;  Start 3/30/20 at 22:00


Sodium Chloride 1,000 ml @  1,000 mls/hr Q1H PRN IV hypotension;  Start 3/31/20 

at 09:55;  Stop 3/31/20 at 15:54;  Status DC


Albumin Human 200 ml @  200 mls/hr 1X PRN  PRN IV Hypotension Last administered 

on 3/31/20at 10:48;  Start 3/31/20 at 10:00;  Stop 3/31/20 at 15:59;  Status DC


Sodium Chloride 1,000 ml @  400 mls/hr Q2H30M PRN IV PATENCY;  Start 3/31/20 at 

09:55;  Stop 3/31/20 at 21:54;  Status DC


Info (PHARMACY MONITORING -- do not chart) 1 each PRN DAILY  PRN MC SEE 

COMMENTS;  Start 3/31/20 at 10:00;  Status UNV


Info (PHARMACY MONITORING -- do not chart) 1 each PRN DAILY  PRN MC SEE 

COMMENTS;  Start 3/31/20 at 10:00;  Status UNV


Iron Sucrose 500 mg/Sodium Chloride 275 ml @  78.571 mls/ hr 1X  ONCE IV  Last 

administered on 3/31/20at 16:08;  Start 3/31/20 at 13:00;  Stop 3/31/20 at 

16:29;  Status DC


Lidocaine/ Epinephrine (LIDOCAINE 1%-EPI 1:100,000 Multi-Dose) 20 ml STK-MED 

ONCE .ROUTE ;  Start 3/31/20 at 12:29;  Stop 3/31/20 at 12:30;  Status DC


Midazolam HCl (Versed) 2 mg STK-MED ONCE .ROUTE ;  Start 3/31/20 at 12:34;  Stop

3/31/20 at 12:34;  Status DC


Fentanyl Citrate (Fentanyl 2ml Vial) 100 mcg STK-MED ONCE .ROUTE ;  Start 3/

31/20 at 12:34;  Stop 3/31/20 at 12:34;  Status DC


Midazolam HCl (Versed) 2 mg 1X  ONCE IV  Last administered on 3/31/20at 13:08;  

Start 3/31/20 at 12:45;  Stop 3/31/20 at 12:47;  Status DC


Fentanyl Citrate (Fentanyl 2ml Vial) 100 mcg 1X  ONCE IV  Last administered on 

3/31/20at 13:08;  Start 3/31/20 at 12:45;  Stop 3/31/20 at 12:47;  Status DC


Lidocaine/ Epinephrine (LIDOCAINE 1%-EPI 1:100,000 Multi-Dose) 20 ml 1X  ONCE SQ

 Last administered on 3/31/20at 13:10;  Start 3/31/20 at 12:45;  Stop 3/31/20 at

12:47;  Status DC


Lactobacillus Rhamnosus (Culturelle) 1 cap BID PO  Last administered on 4/1/20at

08:29;  Start 3/31/20 at 21:00





Active Scripts


Active


Norco 5-325 Tablet (Acetaminophen/Hydrocodone Bitart) 1 Each Tablet 1 Tab PO PRN

Q6HRS PRN


Oxycodone-Acetaminophen 5-325 (Oxycodone Hcl/Acetaminophen) 1 Each Tablet 1 Tab 

PO PRN Q4HRS PRN


Reported


Triamcinolone Acetonide 0.1% Cream (Triamcinolone Acetonide) 15 Gm Cream..g. 1 

Ashley TP BID


Tramadol Hcl 50 Mg Tablet 50 Mg PO DAILY PRN


Flonase Allergy Relief (Fluticasone Propionate) 9.9 Ml Spray.susp 2 Sprays NS 

DAILY


Metformin Hcl 1,000 Mg Tablet 1,000 Mg PO BIDWMEALS


Lisinopril 40 Mg Tablet 1 Tab PO DAILY


Novolin N (Nph, Human Insulin Isophane) 100 Unit/1 Ml Vial 40 Unit SQ DAILY


Carvedilol ** (Carvedilol) 6.25 Mg Tablet 6.25 Mg PO BIDWMEALS


Escitalopram Oxalate 10 Mg Tablet 1 Tab PO DAILY


Simvastatin 40 Mg Tablet 1 Tab PO QHS


Glipizide 10 Mg Tablet 10 Mg PO BID


Furosemide 20 Mg Tablet 1 Tab PO DAILY


Vitals/I & O





Vital Sign - Last 24 Hours








 3/31/20 3/31/20 3/31/20 3/31/20





 12:15 13:08 13:25 15:00


 


Temp    97.3





    97.3


 


Pulse   65 73


 


Resp  22 24 24


 


B/P (MAP) 119/59 (79)   114/58 (76)


 


Pulse Ox   100 93


 


O2 Delivery   Nasal Cannula Nasal Cannula


 


O2 Flow Rate   4.0 4.0


 


    





    





 3/31/20 3/31/20 3/31/20 3/31/20





 17:35 18:35 19:16 20:00


 


Temp   97.5 





   97.5 


 


Pulse   73 


 


Resp   24 


 


B/P (MAP)   118/53 (74) 


 


Pulse Ox 93 93 95 


 


O2 Delivery Nasal Cannula Nasal Cannula Nasal Cannula Nasal Cannula


 


O2 Flow Rate 4.0 4.0 4.0 4.0


 


    





    





 3/31/20 3/31/20 4/1/20 4/1/20





 22:48 23:31 00:31 03:13


 


Temp 97.5   97.5





 97.5   97.5


 


Pulse 73   87


 


Resp 24 20 20 24


 


B/P (MAP) 122/69 (86)   96/54 (68)


 


Pulse Ox 95 95 95 95


 


O2 Delivery Nasal Cannula Nasal Cannula Nasal Cannula Nasal Cannula


 


O2 Flow Rate 4.0 4.0 4.0 4.0


 


    





    





 4/1/20 4/1/20 4/1/20 4/1/20





 07:00 08:00 08:30 09:30


 


Temp 97.4   





 97.4   


 


Pulse 69   


 


Resp 24   


 


B/P (MAP) 148/72 (97)   


 


Pulse Ox 91  91 91


 


O2 Delivery Nasal Cannula Nasal Cannula Nasal Cannula Nasal Cannula


 


O2 Flow Rate 4.0 4.0 5.0 5.0














Intake and Output   


 


 3/31/20 3/31/20 4/1/20





 15:00 23:00 07:00


 


Intake Total 180 ml 340 ml 420 ml


 


Output Total  200 ml 200 ml


 


Balance 180 ml 140 ml 220 ml

















RUFINO CRUZ MD           Apr 1, 2020 10:45

## 2020-04-01 NOTE — NUR
SS following up with discharge planning. SS notified that pt has outpatient dialysis chair 
time at [facility], [phone]; fax [phone], Monday, Wednesday, and 
Friday, at 0630. SS faxed IR report for tunneled catheter and recent flow sheets to [facility] 
Admissions, fax [phone]. SS received phone contact from pt's niece, Nury, 
[phone], expressing concern that pt cannot return to home and is unsafe to go home. 
Pt's niece reported that nephew cannot take care of pt due to pt's weakness. Pt's niece 
requesting PT/OT evaluations. Pt's niece reported that there is no Power of  and pt 
is own person. Pt's niece reported that pt was DNR at home and would look in pt's home for 
copy. Pt's niece reporting that pt and nephew wanting pt to return to home. SS discussed 
with pt's RN. PT/OT ordered. SS will discuss recommendations of PT/OT with pt. SS will 
continue to follow for discharge planning.

## 2020-04-02 VITALS — SYSTOLIC BLOOD PRESSURE: 155 MMHG | DIASTOLIC BLOOD PRESSURE: 79 MMHG

## 2020-04-02 VITALS — SYSTOLIC BLOOD PRESSURE: 177 MMHG | DIASTOLIC BLOOD PRESSURE: 86 MMHG

## 2020-04-02 VITALS — DIASTOLIC BLOOD PRESSURE: 79 MMHG | SYSTOLIC BLOOD PRESSURE: 179 MMHG

## 2020-04-02 VITALS — SYSTOLIC BLOOD PRESSURE: 164 MMHG | DIASTOLIC BLOOD PRESSURE: 65 MMHG

## 2020-04-02 VITALS — SYSTOLIC BLOOD PRESSURE: 138 MMHG | DIASTOLIC BLOOD PRESSURE: 57 MMHG

## 2020-04-02 VITALS — DIASTOLIC BLOOD PRESSURE: 66 MMHG | SYSTOLIC BLOOD PRESSURE: 116 MMHG

## 2020-04-02 LAB
ALBUMIN SERPL-MCNC: 2.3 G/DL (ref 3.4–5)
ALBUMIN/GLOB SERPL: 0.5 {RATIO} (ref 1–1.7)
ALP SERPL-CCNC: 452 U/L (ref 46–116)
ALT SERPL-CCNC: 33 U/L (ref 14–59)
ANION GAP SERPL CALC-SCNC: 9 MMOL/L (ref 6–14)
AST SERPL-CCNC: 20 U/L (ref 15–37)
BASOPHILS # BLD AUTO: 0.1 X10^3/UL (ref 0–0.2)
BASOPHILS NFR BLD: 1 % (ref 0–3)
BILIRUB SERPL-MCNC: 0.6 MG/DL (ref 0.2–1)
BUN SERPL-MCNC: 29 MG/DL (ref 7–20)
BUN/CREAT SERPL: 9 (ref 6–20)
CALCIUM SERPL-MCNC: 8.5 MG/DL (ref 8.5–10.1)
CHLORIDE SERPL-SCNC: 99 MMOL/L (ref 98–107)
CO2 SERPL-SCNC: 29 MMOL/L (ref 21–32)
CREAT SERPL-MCNC: 3.4 MG/DL (ref 0.6–1)
EOSINOPHIL NFR BLD: 0.1 X10^3/UL (ref 0–0.7)
EOSINOPHIL NFR BLD: 2 % (ref 0–3)
ERYTHROCYTE [DISTWIDTH] IN BLOOD BY AUTOMATED COUNT: 18 % (ref 11.5–14.5)
GFR SERPLBLD BASED ON 1.73 SQ M-ARVRAT: 13.1 ML/MIN
GLOBULIN SER-MCNC: 4.6 G/DL (ref 2.2–3.8)
GLUCOSE SERPL-MCNC: 200 MG/DL (ref 70–99)
HCT VFR BLD CALC: 33.1 % (ref 36–47)
HGB BLD-MCNC: 10.3 G/DL (ref 12–15.5)
LYMPHOCYTES # BLD: 0.7 X10^3/UL (ref 1–4.8)
LYMPHOCYTES NFR BLD AUTO: 10 % (ref 24–48)
MCH RBC QN AUTO: 27 PG (ref 25–35)
MCHC RBC AUTO-ENTMCNC: 31 G/DL (ref 31–37)
MCV RBC AUTO: 86 FL (ref 79–100)
MONO #: 0.6 X10^3/UL (ref 0–1.1)
MONOCYTES NFR BLD: 9 % (ref 0–9)
NEUT #: 5.3 X10^3/UL (ref 1.8–7.7)
NEUTROPHILS NFR BLD AUTO: 78 % (ref 31–73)
PLATELET # BLD AUTO: 313 X10^3/UL (ref 140–400)
POTASSIUM SERPL-SCNC: 4.5 MMOL/L (ref 3.5–5.1)
PROT SERPL-MCNC: 6.9 G/DL (ref 6.4–8.2)
RBC # BLD AUTO: 3.87 X10^6/UL (ref 3.5–5.4)
SODIUM SERPL-SCNC: 137 MMOL/L (ref 136–145)
WBC # BLD AUTO: 6.7 X10^3/UL (ref 4–11)

## 2020-04-02 RX ADMIN — ALBUTEROL SULFATE SCH MG: 108 AEROSOL, METERED RESPIRATORY (INHALATION) at 11:46

## 2020-04-02 RX ADMIN — PIPERACILLIN SODIUM AND TAZOBACTAM SODIUM SCH MLS/HR: 2; .25 INJECTION, POWDER, LYOPHILIZED, FOR SOLUTION INTRAVENOUS at 15:40

## 2020-04-02 RX ADMIN — ASPIRIN SCH MG: 81 TABLET, COATED ORAL at 09:39

## 2020-04-02 RX ADMIN — ALBUTEROL SULFATE SCH MG: 108 AEROSOL, METERED RESPIRATORY (INHALATION) at 07:51

## 2020-04-02 RX ADMIN — PIPERACILLIN SODIUM AND TAZOBACTAM SODIUM SCH MLS/HR: 2; .25 INJECTION, POWDER, LYOPHILIZED, FOR SOLUTION INTRAVENOUS at 06:16

## 2020-04-02 RX ADMIN — ALBUTEROL SULFATE SCH MG: 108 AEROSOL, METERED RESPIRATORY (INHALATION) at 20:01

## 2020-04-02 RX ADMIN — MULTIPLE VITAMINS W/ MINERALS TAB SCH TAB: TAB at 09:39

## 2020-04-02 RX ADMIN — PIPERACILLIN SODIUM AND TAZOBACTAM SODIUM SCH MLS/HR: 2; .25 INJECTION, POWDER, LYOPHILIZED, FOR SOLUTION INTRAVENOUS at 21:43

## 2020-04-02 RX ADMIN — OXYCODONE HYDROCHLORIDE AND ACETAMINOPHEN SCH MG: 500 TABLET ORAL at 09:39

## 2020-04-02 RX ADMIN — Medication SCH CAP: at 21:43

## 2020-04-02 RX ADMIN — Medication SCH CAP: at 09:39

## 2020-04-02 RX ADMIN — ALBUTEROL SULFATE SCH MG: 108 AEROSOL, METERED RESPIRATORY (INHALATION) at 16:05

## 2020-04-02 RX ADMIN — FUROSEMIDE SCH MG: 10 INJECTION, SOLUTION INTRAMUSCULAR; INTRAVENOUS at 09:47

## 2020-04-02 RX ADMIN — PIPERACILLIN SODIUM AND TAZOBACTAM SODIUM SCH MLS/HR: 2; .25 INJECTION, POWDER, LYOPHILIZED, FOR SOLUTION INTRAVENOUS at 00:28

## 2020-04-02 NOTE — PDOC
Renal-Progress Notes


Subjective Notes


Notes


NO NEW COMPLAINTS





History of Present Illness


Hx of present illness


STABLE





Vitals


Vitals





Vital Signs








  Date Time  Temp Pulse Resp B/P (MAP) Pulse Ox O2 Delivery O2 Flow Rate FiO2


 


4/2/20 07:53     92 Venturi Mask 12.0 


 


4/2/20 07:00 98.6 82 20 164/65 (98)    





 98.6       








Weight


Weight [ ]





I.O.


Intake and Output











Intake and Output 


 


 4/2/20





 07:00


 


Intake Total 1250 ml


 


Output Total 550 ml


 


Balance 700 ml


 


 


 


Intake Oral 1200 ml


 


IV Total 50 ml


 


Output Urine Total 550 ml


 


# Bowel Movements 2











Labs


Labs





Laboratory Tests








Test


 4/1/20


12:15 4/1/20


18:32 4/1/20


21:10 4/2/20


06:40


 


Glucose (Fingerstick)


 178 mg/dL


(70-99) 125 mg/dL


(70-99) 243 mg/dL


(70-99) 





 


White Blood Count


 


 


 


 6.7 x10^3/uL


(4.0-11.0)


 


Red Blood Count


 


 


 


 3.87 x10^6/uL


(3.50-5.40)


 


Hemoglobin


 


 


 


 10.3 g/dL


(12.0-15.5)


 


Hematocrit


 


 


 


 33.1 %


(36.0-47.0)


 


Mean Corpuscular Volume    86 fL () 


 


Mean Corpuscular Hemoglobin    27 pg (25-35) 


 


Mean Corpuscular Hemoglobin


Concent 


 


 


 31 g/dL


(31-37)


 


Red Cell Distribution Width


 


 


 


 18.0 %


(11.5-14.5)


 


Platelet Count


 


 


 


 313 x10^3/uL


(140-400)


 


Neutrophils (%) (Auto)    78 % (31-73) 


 


Lymphocytes (%) (Auto)    10 % (24-48) 


 


Monocytes (%) (Auto)    9 % (0-9) 


 


Eosinophils (%) (Auto)    2 % (0-3) 


 


Basophils (%) (Auto)    1 % (0-3) 


 


Neutrophils # (Auto)


 


 


 


 5.3 x10^3/uL


(1.8-7.7)


 


Lymphocytes # (Auto)


 


 


 


 0.7 x10^3/uL


(1.0-4.8)


 


Monocytes # (Auto)


 


 


 


 0.6 x10^3/uL


(0.0-1.1)


 


Eosinophils # (Auto)


 


 


 


 0.1 x10^3/uL


(0.0-0.7)


 


Basophils # (Auto)


 


 


 


 0.1 x10^3/uL


(0.0-0.2)


 


Sodium Level


 


 


 


 137 mmol/L


(136-145)


 


Potassium Level


 


 


 


 4.5 mmol/L


(3.5-5.1)


 


Chloride Level


 


 


 


 99 mmol/L


()


 


Carbon Dioxide Level


 


 


 


 29 mmol/L


(21-32)


 


Anion Gap    9 (6-14) 


 


Blood Urea Nitrogen


 


 


 


 29 mg/dL


(7-20)


 


Creatinine


 


 


 


 3.4 mg/dL


(0.6-1.0)


 


Estimated GFR


(Cockcroft-Gault) 


 


 


 13.1 





 


BUN/Creatinine Ratio    9 (6-20) 


 


Glucose Level


 


 


 


 200 mg/dL


(70-99)


 


Calcium Level


 


 


 


 8.5 mg/dL


(8.5-10.1)


 


Total Bilirubin


 


 


 


 0.6 mg/dL


(0.2-1.0)


 


Aspartate Amino Transf


(AST/SGOT) 


 


 


 20 U/L (15-37) 





 


Alanine Aminotransferase


(ALT/SGPT) 


 


 


 33 U/L (14-59) 





 


Alkaline Phosphatase


 


 


 


 452 U/L


()


 


Total Protein


 


 


 


 6.9 g/dL


(6.4-8.2)


 


Albumin


 


 


 


 2.3 g/dL


(3.4-5.0)


 


Albumin/Globulin Ratio    0.5 (1.0-1.7) 


 


Test


 4/2/20


08:05 


 


 





 


Glucose (Fingerstick)


 190 mg/dL


(70-99) 


 


 














Review of Systems


Constitutional:  yes: alert, oriented


Eyes:  Yes: no symptom reported


Pulmonary:  Yes dyspnea


Cardiovascular:  Yes no symptom reported


Gastrointestional:  Yes: no symptom reported


Genitourinary:  Yes: no symptom reported


Musculoskeletal:  Yes: no symptom reported


Skin:  Yes no symptom reported


Psychiatric/Neurological:  Yes: no symptom reported


Endocrine:  Yes: no symptom reported


Hematologic/Lymphatic:  Yes: no symptom reported





Physical Exam


General Appearance:  no apparent distress


Skin:  warm


Respiratory:  decreased breath sounds


Heart:  S1S2


Abdomen:  soft, bowel sounds present


Genitourinary:  bladder flat


Extremities:  pulses present


Neurology:  alert, oriented, follow commands, other (drowsy )





Assessment


Assessment


IMP





ESRD-RENAL SONO NEG, UA NEG


CKD SUSPECT NEAR END STAGE-CR OF 1.7 IN 2017 BUT NEVER FOLLOWED UP


ACUTE ON CHRONIC HYPOXIC RESP FAILURE-ON HOME O2 2-3 L


O2 DEPENDENT COPD


LE CELLULITIS


MILD HYPOTENSION


DM II


HTN


ANEMIA 


IRON DEFICIENCY


S/P TUNNELED HD CATHETER





PLAN





ANTIBIOTICS


ARANESP


OP HD SET UP FOR MWF


SUPPLEMENTAL O2


HD TOMORROW











MARCO ANTONIO CAMPOS MD                  Apr 2, 2020 11:12

## 2020-04-02 NOTE — CONS
DATE OF CONSULTATION:  04/02/2020



REQUESTING PHYSICIAN:  Dr. Santos.



REASON FOR CONSULTATION:  Cellulitis of the leg and question osteomyelitis of

the foot.



HISTORY OF PRESENT ILLNESS:  This is a 75-year-old  female with history

of multiple medical problems who came in with weakness.  The patient had lower

extremity swelling.  The patient was found to have acute kidney injury on top of

chronic renal insufficiency, hyperkalemia, hyponatremia, azotemia.  The patient

also has congestive heart failure, feet pains, more so on the left than right

and the redness and blistering of both legs.  The focus was on the kidney and

electrolytes which appears to have improved.  The new dialysis catheter was

placed and dialysis has been started in last 3 days.  The patient is still

requiring Ventimask with more oxygen than her usual at home and not able to do

anything.  The patient has been put on Zosyn for bilateral lower extremity

cellulitis and x-ray of the foot showed questionable old fracture versus osteo,

hence the consultation.  The patient denies any nausea, vomiting, diarrhea. 

Denies any chest pains.  Denied any trauma to the feet.  Denied any abdominal

pain, urinary symptoms, headache or visual symptoms.  The patient's oxygen is

not great and requiring Ventimask.



PAST MEDICAL HISTORY:  Positive for COPD, congestive heart failure, diabetes,

obesity, arthritis.  Has had knee replacement, cholecystectomy, venous

insufficiency.



SOCIAL HISTORY:  The patient quit smoking, no drinking or drug use.



ALLERGIES:  No known drug allergies.



CURRENT MEDICATIONS:  Reviewed.  The patient is on Zosyn.



REVIEW OF SYSTEMS:  As per HPI, all other systems reviewed are negative.



PHYSICAL EXAMINATION:

GENERAL:  Alert, oriented female, not in distress.

VITAL SIGNS:  Stable, afebrile.

HEENT:  NAD.

NECK:  Supple, no JVP, no lymphadenopathy.

LUNGS:  Clear.

HEART:  S1, S2 regular.

ABDOMEN:  Soft and nontender.  No organomegaly.

EXTREMITIES:  Bilateral lower extremity pitting edema present.  The patient does

have excoriation of both the legs with superficial ulcerations.  Foot has no

wound at the fifth metatarsal or phalangeal area bilaterally.  The patient

appears to have puncture wound with slight dry crackly skin into the actually

calcaneal area.  Dorsalis pedis is palpable bilaterally.

NEUROLOGIC:  The patient is alert, awake and appropriate.  No focal neurologic

deficit.



LABORATORY DATA:  White count is normal, hemoglobin 10.3, platelets are normal. 

BUN and creatinine are 29 and 3.4.  Urinalysis unremarkable.  Hepatitis screen

is negative.  Foot x-ray showed left fifth proximal phalanx periosteal reaction,

it read as may relate to healing fracture.  There is no wound there, so there is

no concern for osteomyelitis.  Chest x-ray showed diffuse interstitial

thickening with patchy bibasilar opacity, pulmonary edema versus infection,

small left pleural effusion.



IMPRESSION:

1.  Bilateral lower extremity venous insufficiency with superficial ulcerations

and possible secondary infection.

2.  Left fifth phalangeal changes on x-ray, likely secondary to the trauma and

healing fracture, not infection as there is no open wound there.  Likely have a

puncture wound into the calcaneal area with pain.

3.  Congestive heart failure.

4.  Acute on chronic renal insufficiency, on hemodialysis now.

5.  Diabetes.

6.  Hypertension.

7.  Obesity.



RECOMMENDATIONS:

1.  The patient needs leg elevation to be able to heal those superficial ulcers.

2.  Continue Zosyn for the time being.

3.  Supportive care.  Oxygenation has not improved.  The patient has not been

able to ambulate yet.  The patient needs PT, OT and we will continue to follow.



Thank you very much, Dr. Santos, for giving me the opportunity to participate

in this patient's care.

 



______________________________

RAMBO JENKINS MD



DR:  HOOD/uvaldo  JOB#:  [id number] / [id number]

DD:  04/02/2020 09:10  DT:  04/02/2020 09:50

## 2020-04-02 NOTE — PDOC
PROGRESS NOTES


History of Present Illness


History of Present Illness





ASSESSMENT AND PLAN:  





Acute on chronic kidney injury, 


hyperkalemia, 


hyponatremia at 135, 


azotemia, 


acute on chronic systolic and diastolic heart failure,


elevated troponin 


anemia and lower extremity wounds.  // Lower extremity cellulitis. 





admitted. 





consult Nephrology


consult Cardiology.  


Hold ACE inhibitor.  


IV antibiotics.  


PT, OT.  


Wound care nurse to evaluate and treat.  


DVT prophylaxis.  


Full code.  


Prognosis //guarded.


temp dialysis cath placement  3/30


OP HD SET UP FOR MWF


Ultrasound and fluoroscopic guided placement of right internal jugular temporary

dialysis catheter





NEEDS TDC AND OP HD SET UP


4/2 dilysis tomorrow 








27 min pt exam, chart review, > 50% of time spent with exam, chart review, pt 

care coordination





Vitals


Vitals





Vital Signs








  Date Time  Temp Pulse Resp B/P (MAP) Pulse Ox O2 Delivery O2 Flow Rate FiO2


 


4/2/20 07:53     92 Venturi Mask 12.0 


 


4/2/20 07:00 98.6 82 20 164/65 (98)    





 98.6       











Physical Exam


Physical Exam


and accommodation.


MUSKULOSKELETAL:  Well developed, well nourished, good range of motion.


ENDOCRINE:  No thyromegaly was palpated.


LYMPHATICS:  No cervical chain or axillary nodes were noted.


HEMATOPOIETIC:  No bruising.


NECK:  Supple, no JVD, no thyromegaly was noted.


PULMONARY:  She has decreased breath sounds.


HEART:  RRR, S1, S2 present.  Peripheral pulses intact, no obvious murmurs were


noted.


ABDOMEN:  Soft, nontender.  Positive bowel sounds no organomegaly, normal bowel


sounds.


EXTREMITIES:  She has bilateral lower extremity cellulitis with wounds that have


been wrapped with clean, dry and intact dressing.


NEUROLOGIC:  Normal speech, normal tone.  A & O x 3, moves all extremities, no


obvious focal deficits.


PSYCHIATRIC:  She is anxious.


SKIN:  No ulcerations or rashes, good skin turgor, no jaundice.


VASCULAR:  Good capillary refill, neurovascular bundle appears to be intact.


General:  Alert, Oriented X3, Cooperative, No acute distress, mild distress


Heart:  Regular rate


Lungs:  Clear


Abdomen:  Normal bowel sounds, No tenderness


Extremities:  No clubbing, No cyanosis





Labs


LABS





Laboratory Tests








Test


 4/1/20


12:15 4/1/20


18:32 4/1/20


21:10 4/2/20


06:40


 


Glucose (Fingerstick)


 178 mg/dL


(70-99) 125 mg/dL


(70-99) 243 mg/dL


(70-99) 





 


White Blood Count


 


 


 


 6.7 x10^3/uL


(4.0-11.0)


 


Red Blood Count


 


 


 


 3.87 x10^6/uL


(3.50-5.40)


 


Hemoglobin


 


 


 


 10.3 g/dL


(12.0-15.5)


 


Hematocrit


 


 


 


 33.1 %


(36.0-47.0)


 


Mean Corpuscular Volume    86 fL () 


 


Mean Corpuscular Hemoglobin    27 pg (25-35) 


 


Mean Corpuscular Hemoglobin


Concent 


 


 


 31 g/dL


(31-37)


 


Red Cell Distribution Width


 


 


 


 18.0 %


(11.5-14.5)


 


Platelet Count


 


 


 


 313 x10^3/uL


(140-400)


 


Neutrophils (%) (Auto)    78 % (31-73) 


 


Lymphocytes (%) (Auto)    10 % (24-48) 


 


Monocytes (%) (Auto)    9 % (0-9) 


 


Eosinophils (%) (Auto)    2 % (0-3) 


 


Basophils (%) (Auto)    1 % (0-3) 


 


Neutrophils # (Auto)


 


 


 


 5.3 x10^3/uL


(1.8-7.7)


 


Lymphocytes # (Auto)


 


 


 


 0.7 x10^3/uL


(1.0-4.8)


 


Monocytes # (Auto)


 


 


 


 0.6 x10^3/uL


(0.0-1.1)


 


Eosinophils # (Auto)


 


 


 


 0.1 x10^3/uL


(0.0-0.7)


 


Basophils # (Auto)


 


 


 


 0.1 x10^3/uL


(0.0-0.2)


 


Sodium Level


 


 


 


 137 mmol/L


(136-145)


 


Potassium Level


 


 


 


 4.5 mmol/L


(3.5-5.1)


 


Chloride Level


 


 


 


 99 mmol/L


()


 


Carbon Dioxide Level


 


 


 


 29 mmol/L


(21-32)


 


Anion Gap    9 (6-14) 


 


Blood Urea Nitrogen


 


 


 


 29 mg/dL


(7-20)


 


Creatinine


 


 


 


 3.4 mg/dL


(0.6-1.0)


 


Estimated GFR


(Cockcroft-Gault) 


 


 


 13.1 





 


BUN/Creatinine Ratio    9 (6-20) 


 


Glucose Level


 


 


 


 200 mg/dL


(70-99)


 


Calcium Level


 


 


 


 8.5 mg/dL


(8.5-10.1)


 


Total Bilirubin


 


 


 


 0.6 mg/dL


(0.2-1.0)


 


Aspartate Amino Transf


(AST/SGOT) 


 


 


 20 U/L (15-37) 





 


Alanine Aminotransferase


(ALT/SGPT) 


 


 


 33 U/L (14-59) 





 


Alkaline Phosphatase


 


 


 


 452 U/L


()


 


Total Protein


 


 


 


 6.9 g/dL


(6.4-8.2)


 


Albumin


 


 


 


 2.3 g/dL


(3.4-5.0)


 


Albumin/Globulin Ratio    0.5 (1.0-1.7) 


 


Test


 4/2/20


08:05 


 


 





 


Glucose (Fingerstick)


 190 mg/dL


(70-99) 


 


 














Assessment and Plan


Assessmemt and Plan


Problems


Medical Problems:


(1) Cellulitis


Status: Acute  





(2) CHF (congestive heart failure)


Status: Acute  





(3) Elevated troponin


Status: Acute  





(4) Shortness of breath


Status: Acute  











Comment


Review of Relevant


I have reviewed the following items rizwan (where applicable) has been applied.


Labs





Laboratory Tests








Test


 3/31/20


17:09 3/31/20


20:25 4/1/20


03:15 4/1/20


07:13


 


Glucose (Fingerstick)


 181 mg/dL


(70-99) 237 mg/dL


(70-99) 


 273 mg/dL


(70-99)


 


White Blood Count


 


 


 7.1 x10^3/uL


(4.0-11.0) 





 


Red Blood Count


 


 


 3.72 x10^6/uL


(3.50-5.40) 





 


Hemoglobin


 


 


 9.8 g/dL


(12.0-15.5) 





 


Hematocrit


 


 


 32.0 %


(36.0-47.0) 





 


Mean Corpuscular Volume   86 fL ()  


 


Mean Corpuscular Hemoglobin   26 pg (25-35)  


 


Mean Corpuscular Hemoglobin


Concent 


 


 31 g/dL


(31-37) 





 


Red Cell Distribution Width


 


 


 18.1 %


(11.5-14.5) 





 


Platelet Count


 


 


 280 x10^3/uL


(140-400) 





 


Sodium Level


 


 


 133 mmol/L


(136-145) 





 


Potassium Level


 


 


 4.9 mmol/L


(3.5-5.1) 





 


Chloride Level


 


 


 96 mmol/L


() 





 


Carbon Dioxide Level


 


 


 28 mmol/L


(21-32) 





 


Anion Gap   9 (6-14)  


 


Blood Urea Nitrogen


 


 


 42 mg/dL


(7-20) 





 


Creatinine


 


 


 4.1 mg/dL


(0.6-1.0) 





 


Estimated GFR


(Cockcroft-Gault) 


 


 10.6 


 





 


Glucose Level


 


 


 201 mg/dL


(70-99) 





 


Calcium Level


 


 


 7.9 mg/dL


(8.5-10.1) 





 


Test


 4/1/20


12:15 4/1/20


18:32 4/1/20


21:10 4/2/20


06:40


 


Glucose (Fingerstick)


 178 mg/dL


(70-99) 125 mg/dL


(70-99) 243 mg/dL


(70-99) 





 


White Blood Count


 


 


 


 6.7 x10^3/uL


(4.0-11.0)


 


Red Blood Count


 


 


 


 3.87 x10^6/uL


(3.50-5.40)


 


Hemoglobin


 


 


 


 10.3 g/dL


(12.0-15.5)


 


Hematocrit


 


 


 


 33.1 %


(36.0-47.0)


 


Mean Corpuscular Volume    86 fL () 


 


Mean Corpuscular Hemoglobin    27 pg (25-35) 


 


Mean Corpuscular Hemoglobin


Concent 


 


 


 31 g/dL


(31-37)


 


Red Cell Distribution Width


 


 


 


 18.0 %


(11.5-14.5)


 


Platelet Count


 


 


 


 313 x10^3/uL


(140-400)


 


Neutrophils (%) (Auto)    78 % (31-73) 


 


Lymphocytes (%) (Auto)    10 % (24-48) 


 


Monocytes (%) (Auto)    9 % (0-9) 


 


Eosinophils (%) (Auto)    2 % (0-3) 


 


Basophils (%) (Auto)    1 % (0-3) 


 


Neutrophils # (Auto)


 


 


 


 5.3 x10^3/uL


(1.8-7.7)


 


Lymphocytes # (Auto)


 


 


 


 0.7 x10^3/uL


(1.0-4.8)


 


Monocytes # (Auto)


 


 


 


 0.6 x10^3/uL


(0.0-1.1)


 


Eosinophils # (Auto)


 


 


 


 0.1 x10^3/uL


(0.0-0.7)


 


Basophils # (Auto)


 


 


 


 0.1 x10^3/uL


(0.0-0.2)


 


Sodium Level


 


 


 


 137 mmol/L


(136-145)


 


Potassium Level


 


 


 


 4.5 mmol/L


(3.5-5.1)


 


Chloride Level


 


 


 


 99 mmol/L


()


 


Carbon Dioxide Level


 


 


 


 29 mmol/L


(21-32)


 


Anion Gap    9 (6-14) 


 


Blood Urea Nitrogen


 


 


 


 29 mg/dL


(7-20)


 


Creatinine


 


 


 


 3.4 mg/dL


(0.6-1.0)


 


Estimated GFR


(Cockcroft-Gault) 


 


 


 13.1 





 


BUN/Creatinine Ratio    9 (6-20) 


 


Glucose Level


 


 


 


 200 mg/dL


(70-99)


 


Calcium Level


 


 


 


 8.5 mg/dL


(8.5-10.1)


 


Total Bilirubin


 


 


 


 0.6 mg/dL


(0.2-1.0)


 


Aspartate Amino Transf


(AST/SGOT) 


 


 


 20 U/L (15-37) 





 


Alanine Aminotransferase


(ALT/SGPT) 


 


 


 33 U/L (14-59) 





 


Alkaline Phosphatase


 


 


 


 452 U/L


()


 


Total Protein


 


 


 


 6.9 g/dL


(6.4-8.2)


 


Albumin


 


 


 


 2.3 g/dL


(3.4-5.0)


 


Albumin/Globulin Ratio    0.5 (1.0-1.7) 


 


Test


 4/2/20


08:05 


 


 





 


Glucose (Fingerstick)


 190 mg/dL


(70-99) 


 


 











Laboratory Tests








Test


 4/1/20


12:15 4/1/20


18:32 4/1/20


21:10 4/2/20


06:40


 


Glucose (Fingerstick)


 178 mg/dL


(70-99) 125 mg/dL


(70-99) 243 mg/dL


(70-99) 





 


White Blood Count


 


 


 


 6.7 x10^3/uL


(4.0-11.0)


 


Red Blood Count


 


 


 


 3.87 x10^6/uL


(3.50-5.40)


 


Hemoglobin


 


 


 


 10.3 g/dL


(12.0-15.5)


 


Hematocrit


 


 


 


 33.1 %


(36.0-47.0)


 


Mean Corpuscular Volume    86 fL () 


 


Mean Corpuscular Hemoglobin    27 pg (25-35) 


 


Mean Corpuscular Hemoglobin


Concent 


 


 


 31 g/dL


(31-37)


 


Red Cell Distribution Width


 


 


 


 18.0 %


(11.5-14.5)


 


Platelet Count


 


 


 


 313 x10^3/uL


(140-400)


 


Neutrophils (%) (Auto)    78 % (31-73) 


 


Lymphocytes (%) (Auto)    10 % (24-48) 


 


Monocytes (%) (Auto)    9 % (0-9) 


 


Eosinophils (%) (Auto)    2 % (0-3) 


 


Basophils (%) (Auto)    1 % (0-3) 


 


Neutrophils # (Auto)


 


 


 


 5.3 x10^3/uL


(1.8-7.7)


 


Lymphocytes # (Auto)


 


 


 


 0.7 x10^3/uL


(1.0-4.8)


 


Monocytes # (Auto)


 


 


 


 0.6 x10^3/uL


(0.0-1.1)


 


Eosinophils # (Auto)


 


 


 


 0.1 x10^3/uL


(0.0-0.7)


 


Basophils # (Auto)


 


 


 


 0.1 x10^3/uL


(0.0-0.2)


 


Sodium Level


 


 


 


 137 mmol/L


(136-145)


 


Potassium Level


 


 


 


 4.5 mmol/L


(3.5-5.1)


 


Chloride Level


 


 


 


 99 mmol/L


()


 


Carbon Dioxide Level


 


 


 


 29 mmol/L


(21-32)


 


Anion Gap    9 (6-14) 


 


Blood Urea Nitrogen


 


 


 


 29 mg/dL


(7-20)


 


Creatinine


 


 


 


 3.4 mg/dL


(0.6-1.0)


 


Estimated GFR


(Cockcroft-Gault) 


 


 


 13.1 





 


BUN/Creatinine Ratio    9 (6-20) 


 


Glucose Level


 


 


 


 200 mg/dL


(70-99)


 


Calcium Level


 


 


 


 8.5 mg/dL


(8.5-10.1)


 


Total Bilirubin


 


 


 


 0.6 mg/dL


(0.2-1.0)


 


Aspartate Amino Transf


(AST/SGOT) 


 


 


 20 U/L (15-37) 





 


Alanine Aminotransferase


(ALT/SGPT) 


 


 


 33 U/L (14-59) 





 


Alkaline Phosphatase


 


 


 


 452 U/L


()


 


Total Protein


 


 


 


 6.9 g/dL


(6.4-8.2)


 


Albumin


 


 


 


 2.3 g/dL


(3.4-5.0)


 


Albumin/Globulin Ratio    0.5 (1.0-1.7) 


 


Test


 4/2/20


08:05 


 


 





 


Glucose (Fingerstick)


 190 mg/dL


(70-99) 


 


 











Medications





Current Medications


Vancomycin HCl 250 ml @  166.667 mls/hr 1X  ONCE IV  Last administered on 

3/28/20at 18:24;  Start 3/28/20 at 17:45;  Stop 3/28/20 at 19:14;  Status DC


Sodium Chloride 1,000 ml @  1,000 mls/hr 1X  ONCE IV  Last administered on 

3/28/20at 18:23;  Start 3/28/20 at 18:00;  Stop 3/28/20 at 18:59;  Status DC


Calcium Gluconate (Calcium Gluconate) 1,000 mg 1X  ONCE IVP  Last administered 

on 3/28/20at 18:24;  Start 3/28/20 at 18:00;  Stop 3/28/20 at 18:01;  Status DC


Albuterol Sulfate (Ventolin Neb Soln) 10 mg 1X  ONCE CONT NEB  Last administered

on 3/28/20at 18:00;  Start 3/28/20 at 18:00;  Stop 3/28/20 at 18:01;  Status DC


Furosemide (Lasix) 40 mg 1X  ONCE PO ;  Start 3/28/20 at 18:30;  Stop 3/28/20 at

18:31;  Status DC


Enoxaparin Sodium (Lovenox 120mg Syringe) 120 mg 1X  ONCE SQ  Last administered 

on 3/28/20at 20:05;  Start 3/28/20 at 19:30;  Stop 3/28/20 at 19:31;  Status DC


Furosemide (Lasix) 40 mg 1X  ONCE IVP  Last administered on 3/28/20at 20:05;  

Start 3/28/20 at 19:30;  Stop 3/28/20 at 19:31;  Status DC


Dextrose (Dextrose 50%-Water Syringe) 25 gm STK-MED ONCE IV ;  Start 3/29/20 at 

08:11;  Stop 3/29/20 at 08:11;  Status DC


Sodium Polystyrene Sulfonate (Kayexalate) 30 gm 1X  ONCE RC ;  Start 3/29/20 at 

09:00;  Stop 3/29/20 at 08:38;  Status DC


Sodium Polystyrene Sulfonate (Kayexalate) 15 gm 1X  ONCE PO  Last administered 

on 3/29/20at 09:24;  Start 3/29/20 at 09:00;  Stop 3/29/20 at 09:01;  Status DC


Piperacillin Sod/ Tazobactam Sod (Zosyn Per Pharmacy) 1 each PRN DAILY  PRN MC 

SEE COMMENTS;  Start 3/29/20 at 13:00


Piperacillin Sod/ Tazobactam Sod 2.25 gm/Sodium Chloride 50 ml @  100 mls/hr 

Q6HRS IV  Last administered on 3/30/20at 05:32;  Start 3/29/20 at 14:00;  Stop 

3/30/20 at 16:05;  Status DC


Dextrose (Dextrose 50%-Water Syringe) 12.5 gm PRN Q15MIN  PRN IV SEE COMMENTS 

Last administered on 3/30/20at 07:56;  Start 3/29/20 at 21:15


Dextrose (Iv Dextrose 5%) 250 ml PRN Q15MIN  PRN IV SEE COMMENTS;  Start 3/29/20

at 21:15


Dextrose (Dextrose 50%-Water Syringe) 25 gm STK-MED ONCE IV ;  Start 3/29/20 at 

21:19;  Stop 3/29/20 at 21:19;  Status DC


Furosemide (Lasix) 60 mg DAILY IVP  Last administered on 4/2/20at 09:47;  Start 

3/30/20 at 12:30


Lidocaine HCl (Buffered Lidocaine 1%) 3 ml STK-MED ONCE .ROUTE ;  Start 3/30/20 

at 12:36;  Stop 3/30/20 at 12:36;  Status DC


Lidocaine HCl (Buffered Lidocaine 1%) 6 ml 1X  ONCE INJ  Last administered on 

3/30/20at 12:56;  Start 3/30/20 at 12:45;  Stop 3/30/20 at 12:46;  Status DC


Sodium Chloride 1,000 ml @  1,000 mls/hr Q1H PRN IV hypotension;  Start 3/30/20 

at 13:11;  Stop 3/30/20 at 19:10;  Status DC


Albumin Human 200 ml @  200 mls/hr 1X PRN  PRN IV Hypotension;  Start 3/30/20 at

13:15;  Stop 3/30/20 at 19:14;  Status DC


Diphenhydramine HCl (Benadryl) 25 mg 1X PRN  PRN IV ITCHING;  Start 3/30/20 at 

13:15;  Stop 3/31/20 at 13:14;  Status DC


Diphenhydramine HCl (Benadryl) 25 mg 1X PRN  PRN IV ITCHING;  Start 3/30/20 at 

13:15;  Stop 3/31/20 at 13:14;  Status DC


Sodium Chloride 1,000 ml @  400 mls/hr Q2H30M PRN IV PATENCY;  Start 3/30/20 at 

13:11;  Stop 3/31/20 at 01:10;  Status DC


Info (PHARMACY MONITORING -- do not chart) 1 each PRN DAILY  PRN MC SEE 

COMMENTS;  Start 3/30/20 at 13:15


Magnesium Sulfate 50 ml @ 25 mls/hr 1X  ONCE IV  Last administered on 3/30/20at 

19:21;  Start 3/30/20 at 15:45;  Stop 3/30/20 at 17:44;  Status DC


Aspirin (Ecotrin) 81 mg DAILYWBKFT PO  Last administered on 4/2/20at 09:39;  

Start 3/31/20 at 08:00


Piperacillin Sod/ Tazobactam Sod 2.25 gm/Sodium Chloride 50 ml @  100 mls/hr 

Q8HRS IV  Last administered on 4/2/20at 06:16;  Start 3/30/20 at 17:00


Acetaminophen/ Hydrocodone Bitart (Lortab 5/325) 1 tab PRN Q6HRS  PRN PO 

MODERATE PAIN 4-6 Last administered on 4/1/20at 08:30;  Start 3/30/20 at 22:00


Sodium Chloride 1,000 ml @  1,000 mls/hr Q1H PRN IV hypotension;  Start 3/31/20 

at 09:55;  Stop 3/31/20 at 15:54;  Status DC


Albumin Human 200 ml @  200 mls/hr 1X PRN  PRN IV Hypotension Last administered 

on 3/31/20at 10:48;  Start 3/31/20 at 10:00;  Stop 3/31/20 at 15:59;  Status DC


Sodium Chloride 1,000 ml @  400 mls/hr Q2H30M PRN IV PATENCY;  Start 3/31/20 at 

09:55;  Stop 3/31/20 at 21:54;  Status DC


Info (PHARMACY MONITORING -- do not chart) 1 each PRN DAILY  PRN MC SEE 

COMMENTS;  Start 3/31/20 at 10:00;  Status UNV


Info (PHARMACY MONITORING -- do not chart) 1 each PRN DAILY  PRN MC SEE 

COMMENTS;  Start 3/31/20 at 10:00;  Status UNV


Iron Sucrose 500 mg/Sodium Chloride 275 ml @  78.571 mls/ hr 1X  ONCE IV  Last 

administered on 3/31/20at 16:08;  Start 3/31/20 at 13:00;  Stop 3/31/20 at 

16:29;  Status DC


Lidocaine/ Epinephrine (LIDOCAINE 1%-EPI 1:100,000 Multi-Dose) 20 ml STK-MED 

ONCE .ROUTE ;  Start 3/31/20 at 12:29;  Stop 3/31/20 at 12:30;  Status DC


Midazolam HCl (Versed) 2 mg STK-MED ONCE .ROUTE ;  Start 3/31/20 at 12:34;  Stop

3/31/20 at 12:34;  Status DC


Fentanyl Citrate (Fentanyl 2ml Vial) 100 mcg STK-MED ONCE .ROUTE ;  Start 

3/31/20 at 12:34;  Stop 3/31/20 at 12:34;  Status DC


Midazolam HCl (Versed) 2 mg 1X  ONCE IV  Last administered on 3/31/20at 13:08;  

Start 3/31/20 at 12:45;  Stop 3/31/20 at 12:47;  Status DC


Fentanyl Citrate (Fentanyl 2ml Vial) 100 mcg 1X  ONCE IV  Last administered on 

3/31/20at 13:08;  Start 3/31/20 at 12:45;  Stop 3/31/20 at 12:47;  Status DC


Lidocaine/ Epinephrine (LIDOCAINE 1%-EPI 1:100,000 Multi-Dose) 20 ml 1X  ONCE SQ

 Last administered on 3/31/20at 13:10;  Start 3/31/20 at 12:45;  Stop 3/31/20 at

12:47;  Status DC


Lactobacillus Rhamnosus (Culturelle) 1 cap BID PO  Last administered on 4/2/20at

09:39;  Start 3/31/20 at 21:00


Albuterol Sulfate (Ventolin Neb Soln) 2.5 mg RTQID NEB  Last administered on 

4/2/20at 07:51;  Start 4/1/20 at 12:00


Darbepoetin Castro (ARANESP for DIALYSIS PTS) 60 mcg WEEKLYHS SQ  Last 

administered on 4/1/20at 22:02;  Start 4/1/20 at 21:00


Sodium Chloride 1,000 ml @  1,000 mls/hr Q1H PRN IV hypotension;  Start 4/1/20 

at 14:02;  Stop 4/1/20 at 20:01;  Status DC


Sodium Chloride (Normal Saline Flush) 10 ml 1X PRN  PRN IV AP catheter pack;  

Start 4/1/20 at 14:15;  Stop 4/2/20 at 14:14


Sodium Chloride (Normal Saline Flush) 10 ml 1X PRN  PRN IV  catheter pack;  

Start 4/1/20 at 14:15;  Stop 4/2/20 at 14:14


Info (PHARMACY MONITORING -- do not chart) 1 each PRN DAILY  PRN MC SEE 

COMMENTS;  Start 4/1/20 at 14:15;  Status UNV


Info (PHARMACY MONITORING -- do not chart) 1 each PRN DAILY  PRN MC SEE 

COMMENTS;  Start 4/1/20 at 14:15


Multivitamins (Thera M Plus) 1 tab DAILY PO  Last administered on 4/2/20at 

09:39;  Start 4/2/20 at 09:00


Ascorbic Acid (Vitamin C) 500 mg DAILY PO  Last administered on 4/2/20at 09:39; 

Start 4/2/20 at 09:00





Active Scripts


Active


Norco 5-325 Tablet (Acetaminophen/Hydrocodone Bitart) 1 Each Tablet 1 Tab PO PRN

Q6HRS PRN


Oxycodone-Acetaminophen 5-325 (Oxycodone Hcl/Acetaminophen) 1 Each Tablet 1 Tab 

PO PRN Q4HRS PRN


Reported


Triamcinolone Acetonide 0.1% Cream (Triamcinolone Acetonide) 15 Gm Cream..g. 1 

Ashley TP BID


Tramadol Hcl 50 Mg Tablet 50 Mg PO DAILY PRN


Flonase Allergy Relief (Fluticasone Propionate) 9.9 Ml Spray.susp 2 Sprays NS 

DAILY


Metformin Hcl 1,000 Mg Tablet 1,000 Mg PO BIDWMEALS


Lisinopril 40 Mg Tablet 1 Tab PO DAILY


Novolin N (Nph, Human Insulin Isophane) 100 Unit/1 Ml Vial 40 Unit SQ DAILY


Carvedilol ** (Carvedilol) 6.25 Mg Tablet 6.25 Mg PO BIDWMEALS


Escitalopram Oxalate 10 Mg Tablet 1 Tab PO DAILY


Simvastatin 40 Mg Tablet 1 Tab PO QHS


Glipizide 10 Mg Tablet 10 Mg PO BID


Furosemide 20 Mg Tablet 1 Tab PO DAILY


Vitals/I & O





Vital Sign - Last 24 Hours








 4/1/20 4/1/20 4/1/20 4/1/20





 11:00 11:56 16:37 18:10


 


Temp 97.8   97.4





 97.8   97.4


 


Pulse 76   85


 


Resp 24   24


 


B/P (MAP) 140/82 (101)   140/88 (105)


 


Pulse Ox 93 98 95 92


 


O2 Delivery Nasal Cannula Simple Mask Venturi Mask Venturi Mask


 


O2 Flow Rate 4.0 5.0 12.0 12.0


 


    





    





 4/1/20 4/1/20 4/1/20 4/2/20





 19:57 20:00 23:33 02:25


 


Temp   97.6 97.5





   97.6 97.5


 


Pulse   85 85


 


Resp   24 24


 


B/P (MAP)   153/61 (91) 116/66 (83)


 


Pulse Ox 90  91 99


 


O2 Delivery Venturi Mask Venturi Mask Venturi Mask Venturi Mask


 


O2 Flow Rate 12.0 15.0 12.0 12.0


 


    





    





 4/2/20 4/2/20  





 07:00 07:53  


 


Temp 98.6   





 98.6   


 


Pulse 82   


 


Resp 20   


 


B/P (MAP) 164/65 (98)   


 


Pulse Ox 93 92  


 


O2 Delivery Venturi Mask Venturi Mask  


 


O2 Flow Rate 12.0 12.0  














Intake and Output   


 


 4/1/20 4/1/20 4/2/20





 15:00 23:00 07:00


 


Intake Total  750 ml 500 ml


 


Output Total  450 ml 100 ml


 


Balance  300 ml 400 ml

















RUFINO CRUZ MD           Apr 2, 2020 10:43

## 2020-04-02 NOTE — NUR
SS following up with discharge planning. PT/OT recommended skilled nursing unit at 
discharge. SS met with pt and discussed skilled nursing unit and discharge planning. SS also 
contacted pt's nephew per request. Pt and pt's nephew agreeable to skilled nursing unit and 
requested referral be sent to [facility], [phone]; fax [phone] with a second option 
of [facility], [phone]; fax [phone]. SS phoned and faxed 
referral to [facility]. SS will await acceptance decision and insurance determination and 
will proceed accordingly with discharge planning.

## 2020-04-02 NOTE — PDOC
Infectious Disease Note


Vital Sign


Vital Signs





Vital Signs








  Date Time  Temp Pulse Resp B/P (MAP) Pulse Ox O2 Delivery O2 Flow Rate FiO2


 


4/2/20 07:53     92 Venturi Mask 12.0 


 


4/2/20 07:00 98.6 82 20 164/65 (98)    





 98.6       











Physical Exam


PHYSICAL EXAM


and accommodation.


MUSKULOSKELETAL:  Well developed, well nourished, good range of motion.


ENDOCRINE:  No thyromegaly was palpated.


LYMPHATICS:  No cervical chain or axillary nodes were noted.


HEMATOPOIETIC:  No bruising.


NECK:  Supple, no JVD, no thyromegaly was noted.


PULMONARY:  She has decreased breath sounds.


HEART:  RRR, S1, S2 present.  Peripheral pulses intact, no obvious murmurs were


noted.


ABDOMEN:  Soft, nontender.  Positive bowel sounds no organomegaly, normal bowel


sounds.


EXTREMITIES:  She has bilateral lower extremity cellulitis with wounds that have


been wrapped with clean, dry and intact dressing.


NEUROLOGIC:  Normal speech, normal tone.  A & O x 3, moves all extremities, no


obvious focal deficits.


PSYCHIATRIC:  She is anxious.


SKIN:  No ulcerations or rashes, good skin turgor, no jaundice.


VASCULAR:  Good capillary refill, neurovascular bundle appears to be intact.





Labs


Lab





Laboratory Tests








Test


 4/1/20


12:15 4/1/20


18:32 4/1/20


21:10 4/2/20


06:40


 


Glucose (Fingerstick)


 178 mg/dL


(70-99) 125 mg/dL


(70-99) 243 mg/dL


(70-99) 





 


White Blood Count


 


 


 


 6.7 x10^3/uL


(4.0-11.0)


 


Red Blood Count


 


 


 


 3.87 x10^6/uL


(3.50-5.40)


 


Hemoglobin


 


 


 


 10.3 g/dL


(12.0-15.5)


 


Hematocrit


 


 


 


 33.1 %


(36.0-47.0)


 


Mean Corpuscular Volume    86 fL () 


 


Mean Corpuscular Hemoglobin    27 pg (25-35) 


 


Mean Corpuscular Hemoglobin


Concent 


 


 


 31 g/dL


(31-37)


 


Red Cell Distribution Width


 


 


 


 18.0 %


(11.5-14.5)


 


Platelet Count


 


 


 


 313 x10^3/uL


(140-400)


 


Neutrophils (%) (Auto)    78 % (31-73) 


 


Lymphocytes (%) (Auto)    10 % (24-48) 


 


Monocytes (%) (Auto)    9 % (0-9) 


 


Eosinophils (%) (Auto)    2 % (0-3) 


 


Basophils (%) (Auto)    1 % (0-3) 


 


Neutrophils # (Auto)


 


 


 


 5.3 x10^3/uL


(1.8-7.7)


 


Lymphocytes # (Auto)


 


 


 


 0.7 x10^3/uL


(1.0-4.8)


 


Monocytes # (Auto)


 


 


 


 0.6 x10^3/uL


(0.0-1.1)


 


Eosinophils # (Auto)


 


 


 


 0.1 x10^3/uL


(0.0-0.7)


 


Basophils # (Auto)


 


 


 


 0.1 x10^3/uL


(0.0-0.2)


 


Sodium Level


 


 


 


 137 mmol/L


(136-145)


 


Potassium Level


 


 


 


 4.5 mmol/L


(3.5-5.1)


 


Chloride Level


 


 


 


 99 mmol/L


()


 


Carbon Dioxide Level


 


 


 


 29 mmol/L


(21-32)


 


Anion Gap    9 (6-14) 


 


Blood Urea Nitrogen


 


 


 


 29 mg/dL


(7-20)


 


Creatinine


 


 


 


 3.4 mg/dL


(0.6-1.0)


 


Estimated GFR


(Cockcroft-Gault) 


 


 


 13.1 





 


BUN/Creatinine Ratio    9 (6-20) 


 


Glucose Level


 


 


 


 200 mg/dL


(70-99)


 


Calcium Level


 


 


 


 8.5 mg/dL


(8.5-10.1)


 


Total Bilirubin


 


 


 


 0.6 mg/dL


(0.2-1.0)


 


Aspartate Amino Transf


(AST/SGOT) 


 


 


 20 U/L (15-37) 





 


Alanine Aminotransferase


(ALT/SGPT) 


 


 


 33 U/L (14-59) 





 


Alkaline Phosphatase


 


 


 


 452 U/L


()


 


Total Protein


 


 


 


 6.9 g/dL


(6.4-8.2)


 


Albumin


 


 


 


 2.3 g/dL


(3.4-5.0)


 


Albumin/Globulin Ratio    0.5 (1.0-1.7) 


 


Test


 4/2/20


08:05 


 


 





 


Glucose (Fingerstick)


 190 mg/dL


(70-99) 


 


 














Objective


Assessment


pt seen, consult dictated





Plan


Plan of Care


//











RAMBO JENKINS MD                Apr 2, 2020 09:04

## 2020-04-03 VITALS — DIASTOLIC BLOOD PRESSURE: 65 MMHG | SYSTOLIC BLOOD PRESSURE: 142 MMHG

## 2020-04-03 VITALS — DIASTOLIC BLOOD PRESSURE: 86 MMHG | SYSTOLIC BLOOD PRESSURE: 162 MMHG

## 2020-04-03 VITALS — SYSTOLIC BLOOD PRESSURE: 149 MMHG | DIASTOLIC BLOOD PRESSURE: 62 MMHG

## 2020-04-03 VITALS — SYSTOLIC BLOOD PRESSURE: 127 MMHG | DIASTOLIC BLOOD PRESSURE: 70 MMHG

## 2020-04-03 VITALS — SYSTOLIC BLOOD PRESSURE: 148 MMHG | DIASTOLIC BLOOD PRESSURE: 58 MMHG

## 2020-04-03 VITALS — DIASTOLIC BLOOD PRESSURE: 51 MMHG | SYSTOLIC BLOOD PRESSURE: 108 MMHG

## 2020-04-03 PROCEDURE — 5A1D70Z PERFORMANCE OF URINARY FILTRATION, INTERMITTENT, LESS THAN 6 HOURS PER DAY: ICD-10-PCS | Performed by: INTERNAL MEDICINE

## 2020-04-03 RX ADMIN — ASPIRIN SCH MG: 81 TABLET, COATED ORAL at 08:00

## 2020-04-03 RX ADMIN — ALBUTEROL SULFATE SCH MG: 108 AEROSOL, METERED RESPIRATORY (INHALATION) at 20:15

## 2020-04-03 RX ADMIN — OXYCODONE HYDROCHLORIDE AND ACETAMINOPHEN SCH MG: 500 TABLET ORAL at 08:23

## 2020-04-03 RX ADMIN — FUROSEMIDE SCH MG: 10 INJECTION, SOLUTION INTRAMUSCULAR; INTRAVENOUS at 08:23

## 2020-04-03 RX ADMIN — Medication SCH CAP: at 20:47

## 2020-04-03 RX ADMIN — ALBUTEROL SULFATE SCH MG: 108 AEROSOL, METERED RESPIRATORY (INHALATION) at 07:17

## 2020-04-03 RX ADMIN — Medication SCH CAP: at 08:23

## 2020-04-03 RX ADMIN — PIPERACILLIN SODIUM AND TAZOBACTAM SODIUM SCH MLS/HR: 2; .25 INJECTION, POWDER, LYOPHILIZED, FOR SOLUTION INTRAVENOUS at 15:13

## 2020-04-03 RX ADMIN — PIPERACILLIN SODIUM AND TAZOBACTAM SODIUM SCH MLS/HR: 2; .25 INJECTION, POWDER, LYOPHILIZED, FOR SOLUTION INTRAVENOUS at 06:22

## 2020-04-03 RX ADMIN — PIPERACILLIN SODIUM AND TAZOBACTAM SODIUM SCH MLS/HR: 2; .25 INJECTION, POWDER, LYOPHILIZED, FOR SOLUTION INTRAVENOUS at 21:39

## 2020-04-03 RX ADMIN — MULTIPLE VITAMINS W/ MINERALS TAB SCH TAB: TAB at 08:23

## 2020-04-03 RX ADMIN — ALBUTEROL SULFATE SCH MG: 108 AEROSOL, METERED RESPIRATORY (INHALATION) at 12:00

## 2020-04-03 RX ADMIN — ALBUTEROL SULFATE SCH MG: 108 AEROSOL, METERED RESPIRATORY (INHALATION) at 15:31

## 2020-04-03 NOTE — PDOC
Infectious Disease Note


Subjective


Subjective


pt is feeling good





ROS


ROS


no n/v/d/sob





Vital Sign


Vital Signs





Vital Signs








  Date Time  Temp Pulse Resp B/P (MAP) Pulse Ox O2 Delivery O2 Flow Rate FiO2


 


4/3/20 07:17     96 Venturi Mask 12.0 


 


4/3/20 03:57 97.5 97 22 148/58 (88)    





 97.5       











Physical Exam


PHYSICAL EXAM


GENERAL:  Alert, oriented female, not in distress.


VITAL SIGNS:  Stable, afebrile.


HEENT:  NAD.


NECK:  Supple, no JVP, no lymphadenopathy.


LUNGS:  Clear.


HEART:  S1, S2 regular.


ABDOMEN:  Soft and nontender.  No organomegaly.


EXTREMITIES:  Bilateral lower extremity pitting edema present.  The patient does


have excoriation of both the legs with superficial ulcerations.  Foot has no


wound at the fifth metatarsal or phalangeal area bilaterally.  The patient


appears to have puncture wound with slight dry crackly skin into the actually


calcaneal area.  Dorsalis pedis is palpable bilaterally.


NEUROLOGIC:  The patient is alert, awake and appropriate.  No focal neurologic


deficit.





Labs


Lab





Laboratory Tests








Test


 4/2/20


12:30 4/2/20


17:09 4/2/20


21:08 4/3/20


07:46


 


Glucose (Fingerstick)


 213 mg/dL


(70-99) 201 mg/dL


(70-99) 257 mg/dL


(70-99) 204 mg/dL


(70-99)











Objective


Assessment


IMPRESSION:


1.  Bilateral lower extremity venous insufficiency with superficial ulcerations


and possible secondary infection.


2.  Left fifth phalangeal changes on x-ray, likely secondary to the trauma and


healing fracture, not infection as there is no open wound there.  Likely have a


puncture wound into the calcaneal area with pain.


3.  Congestive heart failure.


4.  Acute on chronic renal insufficiency, on hemodialysis now.


5.  Diabetes.


6.  Hypertension.


7.  Obesity.





Plan


Plan of Care


cont antibiotics


cont supportive care











RAMBO JENKINS MD                Apr 3, 2020 08:46

## 2020-04-03 NOTE — PDOC
PROGRESS NOTES


History of Present Illness


History of Present Illness





ASSESSMENT AND PLAN:  





Acute on chronic kidney injury, 


hyperkalemia, 


hyponatremia at 135, 


azotemia, 


acute on chronic systolic and diastolic heart failure,


elevated troponin 


anemia and lower extremity wounds.  // Lower extremity cellulitis. 


Left fifth phalangeal changes on x-ray, likely secondary to the trauma and 

healing fracture,





admitted. 





consult Nephrology


consult Cardiology.  


Hold ACE inhibitor.  


IV antibiotics.  


PT, OT.  


Wound care nurse to evaluate and treat.  


DVT prophylaxis.  


Full code.  


Prognosis //guarded.


temp dialysis cath placement  3/30


OP HD SET UP FOR MWF


Ultrasound and fluoroscopic guided placement of right internal jugular temporary

dialysis catheter





NEEDS TDC AND OP HD SET UP


4/2 dilysis tomorrow 








27 min pt exam, chart review, > 50% of time spent with exam, chart review, pt 

care coordination





Vitals


Vitals





Vital Signs








  Date Time  Temp Pulse Resp B/P (MAP) Pulse Ox O2 Delivery O2 Flow Rate FiO2


 


4/3/20 07:17     96 Venturi Mask 12.0 


 


4/3/20 07:00 96.5 78 26 142/65 (90)    





 96.5       











Physical Exam


Physical Exam


GENERAL:  Alert, oriented female, not in distress.


VITAL SIGNS:  Stable, afebrile.


HEENT:  NAD.


NECK:  Supple, no JVP, no lymphadenopathy.


LUNGS:  Clear.


HEART:  S1, S2 regular.


ABDOMEN:  Soft and nontender.  No organomegaly.


EXTREMITIES:  Bilateral lower extremity pitting edema present.  The patient does


have excoriation of both the legs with superficial ulcerations.  Foot has no


wound at the fifth metatarsal or phalangeal area bilaterally.  The patient


appears to have puncture wound with slight dry crackly skin into the actually


calcaneal area.  Dorsalis pedis is palpable bilaterally.


NEUROLOGIC:  The patient is alert, awake and appropriate.  No focal neurologic


deficit.


General:  Alert, Oriented X3, Cooperative, No acute distress, mild distress


Heart:  Regular rate


Lungs:  Clear


Abdomen:  Normal bowel sounds, No tenderness


Extremities:  No clubbing, No cyanosis





Labs


LABS


FOOT LEFT 3V


 


History: Diabetic foot ulcer.


 


Technique: 3 views left foot.


 


Comparison: None.


 


Findings:


Normal alignment. No fracture. No radiopaque foreign body. Mild first MTP 


DJD.


 


Left fifth proximal phalanx periosteal reaction


 


Impression: 


1.  Left fifth proximal phalanx periosteal reaction, may relate to healing


fracture. If concern for infection this region, recommend MRI to rule out 


osteomyelitis.


2.  No radiopaque foreign body.


 


Electronically signed by: Chaz Steve DO (4/1/2020 2:54 PM) [id number]














DICTATED and SIGNED BY:     CHAZ STEVE DO


DATE:     04/01/20 1454


Laboratory Tests








Test


 4/2/20


12:30 4/2/20


17:09 4/2/20


21:08 4/3/20


07:46


 


Glucose (Fingerstick)


 213 mg/dL


(70-99) 201 mg/dL


(70-99) 257 mg/dL


(70-99) 204 mg/dL


(70-99)











Assessment and Plan


Assessmemt and Plan


Problems


Medical Problems:


(1) Cellulitis


Status: Acute  





(2) CHF (congestive heart failure)


Status: Acute  





(3) Elevated troponin


Status: Acute  





(4) Shortness of breath


Status: Acute  











Comment


Review of Relevant


I have reviewed the following items rizwan (where applicable) has been applied.


Labs





Laboratory Tests








Test


 4/1/20


12:15 4/1/20


18:32 4/1/20


21:10 4/2/20


06:40


 


Glucose (Fingerstick)


 178 mg/dL


(70-99) 125 mg/dL


(70-99) 243 mg/dL


(70-99) 





 


White Blood Count


 


 


 


 6.7 x10^3/uL


(4.0-11.0)


 


Red Blood Count


 


 


 


 3.87 x10^6/uL


(3.50-5.40)


 


Hemoglobin


 


 


 


 10.3 g/dL


(12.0-15.5)


 


Hematocrit


 


 


 


 33.1 %


(36.0-47.0)


 


Mean Corpuscular Volume    86 fL () 


 


Mean Corpuscular Hemoglobin    27 pg (25-35) 


 


Mean Corpuscular Hemoglobin


Concent 


 


 


 31 g/dL


(31-37)


 


Red Cell Distribution Width


 


 


 


 18.0 %


(11.5-14.5)


 


Platelet Count


 


 


 


 313 x10^3/uL


(140-400)


 


Neutrophils (%) (Auto)    78 % (31-73) 


 


Lymphocytes (%) (Auto)    10 % (24-48) 


 


Monocytes (%) (Auto)    9 % (0-9) 


 


Eosinophils (%) (Auto)    2 % (0-3) 


 


Basophils (%) (Auto)    1 % (0-3) 


 


Neutrophils # (Auto)


 


 


 


 5.3 x10^3/uL


(1.8-7.7)


 


Lymphocytes # (Auto)


 


 


 


 0.7 x10^3/uL


(1.0-4.8)


 


Monocytes # (Auto)


 


 


 


 0.6 x10^3/uL


(0.0-1.1)


 


Eosinophils # (Auto)


 


 


 


 0.1 x10^3/uL


(0.0-0.7)


 


Basophils # (Auto)


 


 


 


 0.1 x10^3/uL


(0.0-0.2)


 


Sodium Level


 


 


 


 137 mmol/L


(136-145)


 


Potassium Level


 


 


 


 4.5 mmol/L


(3.5-5.1)


 


Chloride Level


 


 


 


 99 mmol/L


()


 


Carbon Dioxide Level


 


 


 


 29 mmol/L


(21-32)


 


Anion Gap    9 (6-14) 


 


Blood Urea Nitrogen


 


 


 


 29 mg/dL


(7-20)


 


Creatinine


 


 


 


 3.4 mg/dL


(0.6-1.0)


 


Estimated GFR


(Cockcroft-Gault) 


 


 


 13.1 





 


BUN/Creatinine Ratio    9 (6-20) 


 


Glucose Level


 


 


 


 200 mg/dL


(70-99)


 


Calcium Level


 


 


 


 8.5 mg/dL


(8.5-10.1)


 


Total Bilirubin


 


 


 


 0.6 mg/dL


(0.2-1.0)


 


Aspartate Amino Transf


(AST/SGOT) 


 


 


 20 U/L (15-37) 





 


Alanine Aminotransferase


(ALT/SGPT) 


 


 


 33 U/L (14-59) 





 


Alkaline Phosphatase


 


 


 


 452 U/L


()


 


Total Protein


 


 


 


 6.9 g/dL


(6.4-8.2)


 


Albumin


 


 


 


 2.3 g/dL


(3.4-5.0)


 


Albumin/Globulin Ratio    0.5 (1.0-1.7) 


 


Test


 4/2/20


08:05 4/2/20


12:30 4/2/20


17:09 4/2/20


21:08


 


Glucose (Fingerstick)


 190 mg/dL


(70-99) 213 mg/dL


(70-99) 201 mg/dL


(70-99) 257 mg/dL


(70-99)


 


Test


 4/3/20


07:46 


 


 





 


Glucose (Fingerstick)


 204 mg/dL


(70-99) 


 


 











Laboratory Tests








Test


 4/2/20


12:30 4/2/20


17:09 4/2/20


21:08 4/3/20


07:46


 


Glucose (Fingerstick)


 213 mg/dL


(70-99) 201 mg/dL


(70-99) 257 mg/dL


(70-99) 204 mg/dL


(70-99)








Medications





Current Medications


Vancomycin HCl 250 ml @  166.667 mls/hr 1X  ONCE IV  Last administered on 3/2

8/20at 18:24;  Start 3/28/20 at 17:45;  Stop 3/28/20 at 19:14;  Status DC


Sodium Chloride 1,000 ml @  1,000 mls/hr 1X  ONCE IV  Last administered on 

3/28/20at 18:23;  Start 3/28/20 at 18:00;  Stop 3/28/20 at 18:59;  Status DC


Calcium Gluconate (Calcium Gluconate) 1,000 mg 1X  ONCE IVP  Last administered 

on 3/28/20at 18:24;  Start 3/28/20 at 18:00;  Stop 3/28/20 at 18:01;  Status DC


Albuterol Sulfate (Ventolin Neb Soln) 10 mg 1X  ONCE CONT NEB  Last administered

on 3/28/20at 18:00;  Start 3/28/20 at 18:00;  Stop 3/28/20 at 18:01;  Status DC


Furosemide (Lasix) 40 mg 1X  ONCE PO ;  Start 3/28/20 at 18:30;  Stop 3/28/20 at

18:31;  Status DC


Enoxaparin Sodium (Lovenox 120mg Syringe) 120 mg 1X  ONCE SQ  Last administered 

on 3/28/20at 20:05;  Start 3/28/20 at 19:30;  Stop 3/28/20 at 19:31;  Status DC


Furosemide (Lasix) 40 mg 1X  ONCE IVP  Last administered on 3/28/20at 20:05;  

Start 3/28/20 at 19:30;  Stop 3/28/20 at 19:31;  Status DC


Dextrose (Dextrose 50%-Water Syringe) 25 gm STK-MED ONCE IV ;  Start 3/29/20 at 

08:11;  Stop 3/29/20 at 08:11;  Status DC


Sodium Polystyrene Sulfonate (Kayexalate) 30 gm 1X  ONCE RC ;  Start 3/29/20 at 

09:00;  Stop 3/29/20 at 08:38;  Status DC


Sodium Polystyrene Sulfonate (Kayexalate) 15 gm 1X  ONCE PO  Last administered 

on 3/29/20at 09:24;  Start 3/29/20 at 09:00;  Stop 3/29/20 at 09:01;  Status DC


Piperacillin Sod/ Tazobactam Sod (Zosyn Per Pharmacy) 1 each PRN DAILY  PRN MC 

SEE COMMENTS;  Start 3/29/20 at 13:00


Piperacillin Sod/ Tazobactam Sod 2.25 gm/Sodium Chloride 50 ml @  100 mls/hr 

Q6HRS IV  Last administered on 3/30/20at 05:32;  Start 3/29/20 at 14:00;  Stop 

3/30/20 at 16:05;  Status DC


Dextrose (Dextrose 50%-Water Syringe) 12.5 gm PRN Q15MIN  PRN IV SEE COMMENTS 

Last administered on 3/30/20at 07:56;  Start 3/29/20 at 21:15


Dextrose (Iv Dextrose 5%) 250 ml PRN Q15MIN  PRN IV SEE COMMENTS;  Start 3/29/20

at 21:15


Dextrose (Dextrose 50%-Water Syringe) 25 gm STK-MED ONCE IV ;  Start 3/29/20 at 

21:19;  Stop 3/29/20 at 21:19;  Status DC


Furosemide (Lasix) 60 mg DAILY IVP  Last administered on 4/2/20at 09:47;  Start 

3/30/20 at 12:30


Lidocaine HCl (Buffered Lidocaine 1%) 3 ml STK-MED ONCE .ROUTE ;  Start 3/30/20 

at 12:36;  Stop 3/30/20 at 12:36;  Status DC


Lidocaine HCl (Buffered Lidocaine 1%) 6 ml 1X  ONCE INJ  Last administered on 

3/30/20at 12:56;  Start 3/30/20 at 12:45;  Stop 3/30/20 at 12:46;  Status DC


Sodium Chloride 1,000 ml @  1,000 mls/hr Q1H PRN IV hypotension;  Start 3/30/20 

at 13:11;  Stop 3/30/20 at 19:10;  Status DC


Albumin Human 200 ml @  200 mls/hr 1X PRN  PRN IV Hypotension;  Start 3/30/20 at

13:15;  Stop 3/30/20 at 19:14;  Status DC


Diphenhydramine HCl (Benadryl) 25 mg 1X PRN  PRN IV ITCHING;  Start 3/30/20 at 

13:15;  Stop 3/31/20 at 13:14;  Status DC


Diphenhydramine HCl (Benadryl) 25 mg 1X PRN  PRN IV ITCHING;  Start 3/30/20 at 

13:15;  Stop 3/31/20 at 13:14;  Status DC


Sodium Chloride 1,000 ml @  400 mls/hr Q2H30M PRN IV PATENCY;  Start 3/30/20 at 

13:11;  Stop 3/31/20 at 01:10;  Status DC


Info (PHARMACY MONITORING -- do not chart) 1 each PRN DAILY  PRN MC SEE 

COMMENTS;  Start 3/30/20 at 13:15


Magnesium Sulfate 50 ml @ 25 mls/hr 1X  ONCE IV  Last administered on 3/30/20at 

19:21;  Start 3/30/20 at 15:45;  Stop 3/30/20 at 17:44;  Status DC


Aspirin (Ecotrin) 81 mg DAILYWBKFT PO  Last administered on 4/2/20at 09:39;  

Start 3/31/20 at 08:00


Piperacillin Sod/ Tazobactam Sod 2.25 gm/Sodium Chloride 50 ml @  100 mls/hr 

Q8HRS IV  Last administered on 4/3/20at 06:22;  Start 3/30/20 at 17:00


Acetaminophen/ Hydrocodone Bitart (Lortab 5/325) 1 tab PRN Q6HRS  PRN PO 

MODERATE PAIN 4-6 Last administered on 4/1/20at 08:30;  Start 3/30/20 at 22:00


Sodium Chloride 1,000 ml @  1,000 mls/hr Q1H PRN IV hypotension;  Start 3/31/20 

at 09:55;  Stop 3/31/20 at 15:54;  Status DC


Albumin Human 200 ml @  200 mls/hr 1X PRN  PRN IV Hypotension Last administered 

on 3/31/20at 10:48;  Start 3/31/20 at 10:00;  Stop 3/31/20 at 15:59;  Status DC


Sodium Chloride 1,000 ml @  400 mls/hr Q2H30M PRN IV PATENCY;  Start 3/31/20 at 

09:55;  Stop 3/31/20 at 21:54;  Status DC


Info (PHARMACY MONITORING -- do not chart) 1 each PRN DAILY  PRN MC SEE 

COMMENTS;  Start 3/31/20 at 10:00;  Status UNV


Info (PHARMACY MONITORING -- do not chart) 1 each PRN DAILY  PRN MC SEE 

COMMENTS;  Start 3/31/20 at 10:00;  Status UNV


Iron Sucrose 500 mg/Sodium Chloride 275 ml @  78.571 mls/ hr 1X  ONCE IV  Last 

administered on 3/31/20at 16:08;  Start 3/31/20 at 13:00;  Stop 3/31/20 at 

16:29;  Status DC


Lidocaine/ Epinephrine (LIDOCAINE 1%-EPI 1:100,000 Multi-Dose) 20 ml STK-MED 

ONCE .ROUTE ;  Start 3/31/20 at 12:29;  Stop 3/31/20 at 12:30;  Status DC


Midazolam HCl (Versed) 2 mg STK-MED ONCE .ROUTE ;  Start 3/31/20 at 12:34;  Stop

3/31/20 at 12:34;  Status DC


Fentanyl Citrate (Fentanyl 2ml Vial) 100 mcg STK-MED ONCE .ROUTE ;  Start 

3/31/20 at 12:34;  Stop 3/31/20 at 12:34;  Status DC


Midazolam HCl (Versed) 2 mg 1X  ONCE IV  Last administered on 3/31/20at 13:08;  

Start 3/31/20 at 12:45;  Stop 3/31/20 at 12:47;  Status DC


Fentanyl Citrate (Fentanyl 2ml Vial) 100 mcg 1X  ONCE IV  Last administered on 

3/31/20at 13:08;  Start 3/31/20 at 12:45;  Stop 3/31/20 at 12:47;  Status DC


Lidocaine/ Epinephrine (LIDOCAINE 1%-EPI 1:100,000 Multi-Dose) 20 ml 1X  ONCE SQ

 Last administered on 3/31/20at 13:10;  Start 3/31/20 at 12:45;  Stop 3/31/20 at

12:47;  Status DC


Lactobacillus Rhamnosus (Culturelle) 1 cap BID PO  Last administered on 4/2/20at

21:43;  Start 3/31/20 at 21:00


Albuterol Sulfate (Ventolin Neb Soln) 2.5 mg RTQID NEB  Last administered on 

4/3/20at 07:17;  Start 4/1/20 at 12:00


Darbepoetin Castro (ARANESP for DIALYSIS PTS) 60 mcg WEEKLYHS SQ  Last 

administered on 4/1/20at 22:02;  Start 4/1/20 at 21:00


Sodium Chloride 1,000 ml @  1,000 mls/hr Q1H PRN IV hypotension;  Start 4/1/20 

at 14:02;  Stop 4/1/20 at 20:01;  Status DC


Sodium Chloride (Normal Saline Flush) 10 ml 1X PRN  PRN IV AP catheter pack;  

Start 4/1/20 at 14:15;  Stop 4/2/20 at 14:14;  Status DC


Sodium Chloride (Normal Saline Flush) 10 ml 1X PRN  PRN IV  catheter pack;  

Start 4/1/20 at 14:15;  Stop 4/2/20 at 14:14;  Status DC


Info (PHARMACY MONITORING -- do not chart) 1 each PRN DAILY  PRN MC SEE DENZEL

TS;  Start 4/1/20 at 14:15;  Status UNV


Info (PHARMACY MONITORING -- do not chart) 1 each PRN DAILY  PRN MC SEE 

COMMENTS;  Start 4/1/20 at 14:15


Multivitamins (Thera M Plus) 1 tab DAILY PO  Last administered on 4/2/20at 

09:39;  Start 4/2/20 at 09:00


Ascorbic Acid (Vitamin C) 500 mg DAILY PO  Last administered on 4/2/20at 09:39; 

Start 4/2/20 at 09:00


Sodium Chloride 1,000 ml @  1,000 mls/hr Q1H PRN IV hypotension;  Start 4/3/20 

at 08:11;  Stop 4/3/20 at 14:10


Albumin Human 200 ml @  200 mls/hr 1X PRN  PRN IV Hypotension;  Start 4/3/20 at 

08:15;  Stop 4/3/20 at 14:14


Acetaminophen (Tylenol) 500 mg 1X PRN  PRN PO MILD PAIN / TEMP;  Start 4/3/20 at

08:15;  Stop 4/4/20 at 08:14


Diphenhydramine HCl (Benadryl) 25 mg 1X PRN  PRN IV ITCHING;  Start 4/3/20 at 

08:15;  Stop 4/4/20 at 08:14


Diphenhydramine HCl (Benadryl) 25 mg 1X PRN  PRN IV ITCHING;  Start 4/3/20 at 

08:15;  Stop 4/4/20 at 08:14


Sodium Chloride 1,000 ml @  400 mls/hr Q2H30M PRN IV PATENCY;  Start 4/3/20 at 

08:11;  Stop 4/3/20 at 20:10


Info (PHARMACY MONITORING -- do not chart) 1 each PRN DAILY  PRN MC SEE 

COMMENTS;  Start 4/3/20 at 08:15





Active Scripts


Active


Norco 5-325 Tablet (Acetaminophen/Hydrocodone Bitart) 1 Each Tablet 1 Tab PO PRN

Q6HRS PRN


Oxycodone-Acetaminophen 5-325 (Oxycodone Hcl/Acetaminophen) 1 Each Tablet 1 Tab 

PO PRN Q4HRS PRN


Reported


Triamcinolone Acetonide 0.1% Cream (Triamcinolone Acetonide) 15 Gm Cream..g. 1 

Ashley TP BID


Tramadol Hcl 50 Mg Tablet 50 Mg PO DAILY PRN


Flonase Allergy Relief (Fluticasone Propionate) 9.9 Ml Spray.susp 2 Sprays NS 

DAILY


Metformin Hcl 1,000 Mg Tablet 1,000 Mg PO BIDWMEALS


Lisinopril 40 Mg Tablet 1 Tab PO DAILY


Novolin N (Nph, Human Insulin Isophane) 100 Unit/1 Ml Vial 40 Unit SQ DAILY


Carvedilol ** (Carvedilol) 6.25 Mg Tablet 6.25 Mg PO BIDWMEALS


Escitalopram Oxalate 10 Mg Tablet 1 Tab PO DAILY


Simvastatin 40 Mg Tablet 1 Tab PO QHS


Glipizide 10 Mg Tablet 10 Mg PO BID


Furosemide 20 Mg Tablet 1 Tab PO DAILY


Vitals/I & O





Vital Sign - Last 24 Hours








 4/2/20 4/2/20 4/2/20 4/2/20





 11:00 15:00 16:06 19:36


 


Temp 97.9 97.4  98.1





 97.9 97.4  98.1


 


Pulse 87 99  81


 


Resp 20 20  20


 


B/P (MAP) 177/86 (116) 155/79 (104)  179/79 (112)


 


Pulse Ox 96 90 93 95


 


O2 Delivery Venturi Mask Venturi Mask Venturi Mask Venturi Mask


 


O2 Flow Rate 15.0 15.0 12.0 15.0


 


    





    





 4/2/20 4/2/20 4/2/20 4/3/20





 20:00 20:01 22:29 03:57


 


Temp   97.3 97.5





   97.3 97.5


 


Pulse   93 97


 


Resp   18 22


 


B/P (MAP)   138/57 (84) 148/58 (88)


 


Pulse Ox  93 94 94


 


O2 Delivery Venturi Mask Venturi Mask Venturi Mask Venturi Mask


 


O2 Flow Rate 15.0 12.0 15.0 15.0


 


    





    





 4/3/20 4/3/20  





 07:00 07:17  


 


Temp 96.5   





 96.5   


 


Pulse 78   


 


Resp 26   


 


B/P (MAP) 142/65 (90)   


 


Pulse Ox 94 96  


 


O2 Delivery Venturi Mask Venturi Mask  


 


O2 Flow Rate 15.0 12.0  














Intake and Output   


 


 4/2/20 4/2/20 4/3/20





 14:59 22:59 06:59


 


Intake Total 280 ml  50 ml


 


Output Total 125 ml  225 ml


 


Balance 155 ml  -175 ml

















RUFINO CRUZ MD           Apr 3, 2020 10:40

## 2020-04-03 NOTE — NUR
SS following up with discharge planning. [facility] notified SS that they are not taking 
admissions at this time. SS phoned and faxed referral to [facility], 
[phone]; fax [phone]. [facility] notified SS that they are 
not accepting admissions until Monday, 4/6/2020. SS contacted pt's nephew, Baltazar, and 
notified. Pt and pt's nephew declining [facility] due to previous experience. SS 
provided other options to pt and pt's nephew. Pt's nephew reporting that he is going to 
contact pt by phone to discuss and will contact SS once discussed. Per pt's RN, pt on 15 
liter venturi mask and not ready today. SS will continue to follow for discharge planning.

## 2020-04-03 NOTE — PDOC
Renal-Progress Notes


Subjective Notes


Notes


NO NEW COMPLAINTS





History of Present Illness


Hx of present illness


NO CHANGES





Vitals


Vitals





Vital Signs








  Date Time  Temp Pulse Resp B/P (MAP) Pulse Ox O2 Delivery O2 Flow Rate FiO2


 


4/3/20 07:17     96 Venturi Mask 12.0 


 


4/3/20 07:00 96.5 78 26 142/65 (90)    





 96.5       








Weight


Weight [ ]





I.O.


Intake and Output











Intake and Output 


 


 4/3/20





 07:00


 


Intake Total 330 ml


 


Output Total 350 ml


 


Balance -20 ml


 


 


 


Intake Oral 330 ml


 


Output Urine Total 350 ml


 


# Bowel Movements 2











Labs


Labs





Laboratory Tests








Test


 4/2/20


17:09 4/2/20


21:08 4/3/20


07:46


 


Glucose (Fingerstick)


 201 mg/dL


(70-99) 257 mg/dL


(70-99) 204 mg/dL


(70-99)











Review of Systems


Constitutional:  yes: alert, oriented


Eyes:  Yes: no symptom reported


Pulmonary:  Yes dyspnea


Cardiovascular:  Yes no symptom reported


Gastrointestional:  Yes: no symptom reported


Genitourinary:  Yes: no symptom reported


Musculoskeletal:  Yes: no symptom reported


Skin:  Yes no symptom reported


Psychiatric/Neurological:  Yes: no symptom reported


Endocrine:  Yes: no symptom reported


Hematologic/Lymphatic:  Yes: no symptom reported





Physical Exam


General Appearance:  no apparent distress


Skin:  warm


Respiratory:  decreased breath sounds


Heart:  S1S2


Abdomen:  soft, bowel sounds present


Genitourinary:  bladder flat


Extremities:  pulses present


Neurology:  alert, oriented, follow commands, other (drowsy )





Assessment


Assessment


IMP





ESRD-RENAL SONO NEG, UA NEG


CKD SUSPECT NEAR END STAGE-CR OF 1.7 IN 2017 BUT NEVER FOLLOWED UP


ACUTE ON CHRONIC HYPOXIC RESP FAILURE-ON HOME O2 2-3 L


O2 DEPENDENT COPD


LE CELLULITIS


DM II


HTN


ANEMIA 


IRON DEFICIENCY-S/P VENOFER


S/P TUNNELED HD CATHETER





PLAN





ANTIBIOTICS


ARANESP


OP HD SET UP FOR MWF


SUPPLEMENTAL O2


HD TODAY


UF OF 4.0 L AS TOLERATED. 


WILL NEED ADDITIONAL FLUID OFF LOADING


WILL THEREFORE PLAN HD AGAIN TOMORROW


D/C SHOULD BE POSTPONED











MARCO ANTONIO CAMPOS MD                  Apr 3, 2020 13:12

## 2020-04-04 VITALS — DIASTOLIC BLOOD PRESSURE: 53 MMHG | SYSTOLIC BLOOD PRESSURE: 123 MMHG

## 2020-04-04 VITALS — SYSTOLIC BLOOD PRESSURE: 120 MMHG | DIASTOLIC BLOOD PRESSURE: 88 MMHG

## 2020-04-04 VITALS — SYSTOLIC BLOOD PRESSURE: 163 MMHG | DIASTOLIC BLOOD PRESSURE: 70 MMHG

## 2020-04-04 VITALS — SYSTOLIC BLOOD PRESSURE: 118 MMHG | DIASTOLIC BLOOD PRESSURE: 54 MMHG

## 2020-04-04 VITALS — SYSTOLIC BLOOD PRESSURE: 157 MMHG | DIASTOLIC BLOOD PRESSURE: 79 MMHG

## 2020-04-04 LAB
ALBUMIN SERPL-MCNC: 2 G/DL (ref 3.4–5)
ANION GAP SERPL CALC-SCNC: 5 MMOL/L (ref 6–14)
BASOPHILS # BLD AUTO: 0.1 X10^3/UL (ref 0–0.2)
BASOPHILS NFR BLD: 1 % (ref 0–3)
BUN SERPL-MCNC: 10 MG/DL (ref 7–20)
CALCIUM SERPL-MCNC: 8 MG/DL (ref 8.5–10.1)
CHLORIDE SERPL-SCNC: 100 MMOL/L (ref 98–107)
CO2 SERPL-SCNC: 33 MMOL/L (ref 21–32)
CREAT SERPL-MCNC: 2 MG/DL (ref 0.6–1)
EOSINOPHIL NFR BLD: 0.1 X10^3/UL (ref 0–0.7)
EOSINOPHIL NFR BLD: 1 % (ref 0–3)
ERYTHROCYTE [DISTWIDTH] IN BLOOD BY AUTOMATED COUNT: 18.2 % (ref 11.5–14.5)
GFR SERPLBLD BASED ON 1.73 SQ M-ARVRAT: 24.2 ML/MIN
GLUCOSE SERPL-MCNC: 200 MG/DL (ref 70–99)
HCT VFR BLD CALC: 33.7 % (ref 36–47)
HGB BLD-MCNC: 10.4 G/DL (ref 12–15.5)
LYMPHOCYTES # BLD: 0.6 X10^3/UL (ref 1–4.8)
LYMPHOCYTES NFR BLD AUTO: 8 % (ref 24–48)
MCH RBC QN AUTO: 27 PG (ref 25–35)
MCHC RBC AUTO-ENTMCNC: 31 G/DL (ref 31–37)
MCV RBC AUTO: 87 FL (ref 79–100)
MONO #: 0.7 X10^3/UL (ref 0–1.1)
MONOCYTES NFR BLD: 9 % (ref 0–9)
NEUT #: 6.4 X10^3/UL (ref 1.8–7.7)
NEUTROPHILS NFR BLD AUTO: 81 % (ref 31–73)
PHOSPHATE SERPL-MCNC: 2.3 MG/DL (ref 2.6–4.7)
PLATELET # BLD AUTO: 287 X10^3/UL (ref 140–400)
POTASSIUM SERPL-SCNC: 3.9 MMOL/L (ref 3.5–5.1)
RBC # BLD AUTO: 3.89 X10^6/UL (ref 3.5–5.4)
SODIUM SERPL-SCNC: 138 MMOL/L (ref 136–145)
WBC # BLD AUTO: 7.9 X10^3/UL (ref 4–11)

## 2020-04-04 PROCEDURE — 5A1D70Z PERFORMANCE OF URINARY FILTRATION, INTERMITTENT, LESS THAN 6 HOURS PER DAY: ICD-10-PCS | Performed by: INTERNAL MEDICINE

## 2020-04-04 RX ADMIN — FUROSEMIDE SCH MG: 10 INJECTION, SOLUTION INTRAMUSCULAR; INTRAVENOUS at 13:47

## 2020-04-04 RX ADMIN — OXYCODONE HYDROCHLORIDE AND ACETAMINOPHEN SCH MG: 500 TABLET ORAL at 13:47

## 2020-04-04 RX ADMIN — ALBUTEROL SULFATE SCH MG: 108 AEROSOL, METERED RESPIRATORY (INHALATION) at 13:25

## 2020-04-04 RX ADMIN — PIPERACILLIN SODIUM AND TAZOBACTAM SODIUM SCH MLS/HR: 2; .25 INJECTION, POWDER, LYOPHILIZED, FOR SOLUTION INTRAVENOUS at 21:04

## 2020-04-04 RX ADMIN — Medication SCH CAP: at 21:00

## 2020-04-04 RX ADMIN — ASPIRIN SCH MG: 81 TABLET, COATED ORAL at 13:47

## 2020-04-04 RX ADMIN — ALBUTEROL SULFATE SCH MG: 108 AEROSOL, METERED RESPIRATORY (INHALATION) at 20:03

## 2020-04-04 RX ADMIN — ALBUTEROL SULFATE SCH MG: 108 AEROSOL, METERED RESPIRATORY (INHALATION) at 16:34

## 2020-04-04 RX ADMIN — PIPERACILLIN SODIUM AND TAZOBACTAM SODIUM SCH MLS/HR: 2; .25 INJECTION, POWDER, LYOPHILIZED, FOR SOLUTION INTRAVENOUS at 06:15

## 2020-04-04 RX ADMIN — INSULIN GLARGINE SCH UNIT: 100 INJECTION, SOLUTION SUBCUTANEOUS at 13:48

## 2020-04-04 RX ADMIN — PIPERACILLIN SODIUM AND TAZOBACTAM SODIUM SCH MLS/HR: 2; .25 INJECTION, POWDER, LYOPHILIZED, FOR SOLUTION INTRAVENOUS at 13:55

## 2020-04-04 RX ADMIN — ALBUTEROL SULFATE SCH MG: 108 AEROSOL, METERED RESPIRATORY (INHALATION) at 08:07

## 2020-04-04 RX ADMIN — MULTIPLE VITAMINS W/ MINERALS TAB SCH TAB: TAB at 13:47

## 2020-04-04 RX ADMIN — Medication SCH CAP: at 13:47

## 2020-04-04 NOTE — RAD
AP chest.

 

HISTORY: Persistent hypoxia

 

AP view was taken of the chest. Right dialysis catheter is unchanged. 

Patient has a reversed total shoulder prosthesis on the right. There is a 

left pleural effusion. There is left basilar atelectasis or infiltrate. 

Pattern is similar to the study from March 31. There is still mild 

vascular congestion.

 

IMPRESSION:

1. Little change from the prior study.

 

Electronically signed by: Som King MD (4/4/2020 6:43 PM) [id number]

## 2020-04-04 NOTE — PDOC
PROGRESS NOTES


Chief Complaint


Chief Complaint


Acute on chronic kidney injury, 


hyperkalemia, 


hyponatremia at 135, 


azotemia, 


acute on chronic systolic and diastolic heart failure,


elevated troponin 


anemia and lower extremity wounds.  // Lower extremity cellulitis. 


Left fifth phalangeal changes on x-ray, likely secondary to the trauma and 

healing fracture,





follow Nephrology consultant recommendations


follow Cardiology. recommendations


Hold ACE inhibitor.  


IV antibiotics.  


PT, OT.  


Wound care nurse to evaluate and treat.  


DVT prophylaxis.  


Full code.  


Prognosis //guarded.


temp dialysis cath placement  3/30


OP HD SET UP FOR MWF


Ultrasound and fluoroscopic guided placement of right internal jugular temporary

dialysis catheter





NEEDS TDC AND OP HD SET UP


4/2 dilysis tomorrow





History of Present Illness


History of Present Illness


No acute events reported overnight, case discussed with nursing staff patient in

no acute distress no complaints during my visit





Vitals


Vitals





Vital Signs








  Date Time  Temp Pulse Resp B/P (MAP) Pulse Ox O2 Delivery O2 Flow Rate FiO2


 


4/4/20 16:34     94 Venturi Mask 12.0 


 


4/4/20 15:00 98.6 73 16 123/53 (76)    





 98.6       











Physical Exam


Physical Exam


GENERAL:  Alert, oriented female, not in distress.


VITAL SIGNS:  Stable, afebrile.


HEENT:  NAD.


NECK:  Supple, no JVP, no lymphadenopathy.


LUNGS:  Clear.


HEART:  S1, S2 regular.


ABDOMEN:  Soft and nontender.  No organomegaly.


EXTREMITIES:  Bilateral lower extremity pitting edema present.  The patient does


have excoriation of both the legs with superficial ulcerations.  Foot has no


wound at the fifth metatarsal or phalangeal area bilaterally.  The patient


appears to have puncture wound with slight dry crackly skin into the actually


calcaneal area.  Dorsalis pedis is palpable bilaterally.


NEUROLOGIC:  The patient is alert, awake and appropriate.  No focal neurologic


deficit.


General:  Alert, Oriented X3, Cooperative, No acute distress, mild distress


Heart:  Regular rate


Lungs:  Clear


Abdomen:  Normal bowel sounds, No tenderness


Extremities:  No clubbing, No cyanosis





Labs


LABS





Laboratory Tests








Test


 4/3/20


20:33 4/4/20


08:02 4/4/20


14:35 4/4/20


17:31


 


Glucose (Fingerstick)


 228 mg/dL


(70-99) 182 mg/dL


(70-99) 


 182 mg/dL


(70-99)


 


White Blood Count


 


 


 7.9 x10^3/uL


(4.0-11.0) 





 


Red Blood Count


 


 


 3.89 x10^6/uL


(3.50-5.40) 





 


Hemoglobin


 


 


 10.4 g/dL


(12.0-15.5) 





 


Hematocrit


 


 


 33.7 %


(36.0-47.0) 





 


Mean Corpuscular Volume   87 fL ()  


 


Mean Corpuscular Hemoglobin   27 pg (25-35)  


 


Mean Corpuscular Hemoglobin


Concent 


 


 31 g/dL


(31-37) 





 


Red Cell Distribution Width


 


 


 18.2 %


(11.5-14.5) 





 


Platelet Count


 


 


 287 x10^3/uL


(140-400) 





 


Neutrophils (%) (Auto)   81 % (31-73)  


 


Lymphocytes (%) (Auto)   8 % (24-48)  


 


Monocytes (%) (Auto)   9 % (0-9)  


 


Eosinophils (%) (Auto)   1 % (0-3)  


 


Basophils (%) (Auto)   1 % (0-3)  


 


Neutrophils # (Auto)


 


 


 6.4 x10^3/uL


(1.8-7.7) 





 


Lymphocytes # (Auto)


 


 


 0.6 x10^3/uL


(1.0-4.8) 





 


Monocytes # (Auto)


 


 


 0.7 x10^3/uL


(0.0-1.1) 





 


Eosinophils # (Auto)


 


 


 0.1 x10^3/uL


(0.0-0.7) 





 


Basophils # (Auto)


 


 


 0.1 x10^3/uL


(0.0-0.2) 





 


Sodium Level


 


 


 138 mmol/L


(136-145) 





 


Potassium Level


 


 


 3.9 mmol/L


(3.5-5.1) 





 


Chloride Level


 


 


 100 mmol/L


() 





 


Carbon Dioxide Level


 


 


 33 mmol/L


(21-32) 





 


Anion Gap   5 (6-14)  


 


Blood Urea Nitrogen


 


 


 10 mg/dL


(7-20) 





 


Creatinine


 


 


 2.0 mg/dL


(0.6-1.0) 





 


Estimated GFR


(Cockcroft-Gault) 


 


 24.2 


 





 


Glucose Level


 


 


 200 mg/dL


(70-99) 





 


Calcium Level


 


 


 8.0 mg/dL


(8.5-10.1) 





 


Phosphorus Level


 


 


 2.3 mg/dL


(2.6-4.7) 





 


Albumin


 


 


 2.0 g/dL


(3.4-5.0) 














Assessment and Plan


Assessmemt and Plan


Problems


Medical Problems:


(1) Cellulitis


Status: Acute  





(2) CHF (congestive heart failure)


Status: Acute  





(3) Elevated troponin


Status: Acute  





(4) Shortness of breath


Status: Acute  











Comment


Review of Relevant


I have reviewed the following items rizwan (where applicable) has been applied.


Labs





Laboratory Tests








Test


 4/2/20


21:08 4/3/20


07:46 4/3/20


13:25 4/3/20


17:08


 


Glucose (Fingerstick)


 257 mg/dL


(70-99) 204 mg/dL


(70-99) 124 mg/dL


(70-99) 194 mg/dL


(70-99)


 


Test


 4/3/20


20:33 4/4/20


08:02 4/4/20


14:35 4/4/20


17:31


 


Glucose (Fingerstick)


 228 mg/dL


(70-99) 182 mg/dL


(70-99) 


 182 mg/dL


(70-99)


 


White Blood Count


 


 


 7.9 x10^3/uL


(4.0-11.0) 





 


Red Blood Count


 


 


 3.89 x10^6/uL


(3.50-5.40) 





 


Hemoglobin


 


 


 10.4 g/dL


(12.0-15.5) 





 


Hematocrit


 


 


 33.7 %


(36.0-47.0) 





 


Mean Corpuscular Volume   87 fL ()  


 


Mean Corpuscular Hemoglobin   27 pg (25-35)  


 


Mean Corpuscular Hemoglobin


Concent 


 


 31 g/dL


(31-37) 





 


Red Cell Distribution Width


 


 


 18.2 %


(11.5-14.5) 





 


Platelet Count


 


 


 287 x10^3/uL


(140-400) 





 


Neutrophils (%) (Auto)   81 % (31-73)  


 


Lymphocytes (%) (Auto)   8 % (24-48)  


 


Monocytes (%) (Auto)   9 % (0-9)  


 


Eosinophils (%) (Auto)   1 % (0-3)  


 


Basophils (%) (Auto)   1 % (0-3)  


 


Neutrophils # (Auto)


 


 


 6.4 x10^3/uL


(1.8-7.7) 





 


Lymphocytes # (Auto)


 


 


 0.6 x10^3/uL


(1.0-4.8) 





 


Monocytes # (Auto)


 


 


 0.7 x10^3/uL


(0.0-1.1) 





 


Eosinophils # (Auto)


 


 


 0.1 x10^3/uL


(0.0-0.7) 





 


Basophils # (Auto)


 


 


 0.1 x10^3/uL


(0.0-0.2) 





 


Sodium Level


 


 


 138 mmol/L


(136-145) 





 


Potassium Level


 


 


 3.9 mmol/L


(3.5-5.1) 





 


Chloride Level


 


 


 100 mmol/L


() 





 


Carbon Dioxide Level


 


 


 33 mmol/L


(21-32) 





 


Anion Gap   5 (6-14)  


 


Blood Urea Nitrogen


 


 


 10 mg/dL


(7-20) 





 


Creatinine


 


 


 2.0 mg/dL


(0.6-1.0) 





 


Estimated GFR


(Cockcroft-Gault) 


 


 24.2 


 





 


Glucose Level


 


 


 200 mg/dL


(70-99) 





 


Calcium Level


 


 


 8.0 mg/dL


(8.5-10.1) 





 


Phosphorus Level


 


 


 2.3 mg/dL


(2.6-4.7) 





 


Albumin


 


 


 2.0 g/dL


(3.4-5.0) 











Laboratory Tests








Test


 4/3/20


20:33 4/4/20


08:02 4/4/20


14:35 4/4/20


17:31


 


Glucose (Fingerstick)


 228 mg/dL


(70-99) 182 mg/dL


(70-99) 


 182 mg/dL


(70-99)


 


White Blood Count


 


 


 7.9 x10^3/uL


(4.0-11.0) 





 


Red Blood Count


 


 


 3.89 x10^6/uL


(3.50-5.40) 





 


Hemoglobin


 


 


 10.4 g/dL


(12.0-15.5) 





 


Hematocrit


 


 


 33.7 %


(36.0-47.0) 





 


Mean Corpuscular Volume   87 fL ()  


 


Mean Corpuscular Hemoglobin   27 pg (25-35)  


 


Mean Corpuscular Hemoglobin


Concent 


 


 31 g/dL


(31-37) 





 


Red Cell Distribution Width


 


 


 18.2 %


(11.5-14.5) 





 


Platelet Count


 


 


 287 x10^3/uL


(140-400) 





 


Neutrophils (%) (Auto)   81 % (31-73)  


 


Lymphocytes (%) (Auto)   8 % (24-48)  


 


Monocytes (%) (Auto)   9 % (0-9)  


 


Eosinophils (%) (Auto)   1 % (0-3)  


 


Basophils (%) (Auto)   1 % (0-3)  


 


Neutrophils # (Auto)


 


 


 6.4 x10^3/uL


(1.8-7.7) 





 


Lymphocytes # (Auto)


 


 


 0.6 x10^3/uL


(1.0-4.8) 





 


Monocytes # (Auto)


 


 


 0.7 x10^3/uL


(0.0-1.1) 





 


Eosinophils # (Auto)


 


 


 0.1 x10^3/uL


(0.0-0.7) 





 


Basophils # (Auto)


 


 


 0.1 x10^3/uL


(0.0-0.2) 





 


Sodium Level


 


 


 138 mmol/L


(136-145) 





 


Potassium Level


 


 


 3.9 mmol/L


(3.5-5.1) 





 


Chloride Level


 


 


 100 mmol/L


() 





 


Carbon Dioxide Level


 


 


 33 mmol/L


(21-32) 





 


Anion Gap   5 (6-14)  


 


Blood Urea Nitrogen


 


 


 10 mg/dL


(7-20) 





 


Creatinine


 


 


 2.0 mg/dL


(0.6-1.0) 





 


Estimated GFR


(Cockcroft-Gault) 


 


 24.2 


 





 


Glucose Level


 


 


 200 mg/dL


(70-99) 





 


Calcium Level


 


 


 8.0 mg/dL


(8.5-10.1) 





 


Phosphorus Level


 


 


 2.3 mg/dL


(2.6-4.7) 





 


Albumin


 


 


 2.0 g/dL


(3.4-5.0) 











Medications





Current Medications


Vancomycin HCl 250 ml @  166.667 mls/hr 1X  ONCE IV  Last administered on 

3/28/20at 18:24;  Start 3/28/20 at 17:45;  Stop 3/28/20 at 19:14;  Status DC


Sodium Chloride 1,000 ml @  1,000 mls/hr 1X  ONCE IV  Last administered on 

3/28/20at 18:23;  Start 3/28/20 at 18:00;  Stop 3/28/20 at 18:59;  Status DC


Calcium Gluconate (Calcium Gluconate) 1,000 mg 1X  ONCE IVP  Last administered 

on 3/28/20at 18:24;  Start 3/28/20 at 18:00;  Stop 3/28/20 at 18:01;  Status DC


Albuterol Sulfate (Ventolin Neb Soln) 10 mg 1X  ONCE CONT NEB  Last administered

on 3/28/20at 18:00;  Start 3/28/20 at 18:00;  Stop 3/28/20 at 18:01;  Status DC


Furosemide (Lasix) 40 mg 1X  ONCE PO ;  Start 3/28/20 at 18:30;  Stop 3/28/20 at

18:31;  Status DC


Enoxaparin Sodium (Lovenox 120mg Syringe) 120 mg 1X  ONCE SQ  Last administered 

on 3/28/20at 20:05;  Start 3/28/20 at 19:30;  Stop 3/28/20 at 19:31;  Status DC


Furosemide (Lasix) 40 mg 1X  ONCE IVP  Last administered on 3/28/20at 20:05;  

Start 3/28/20 at 19:30;  Stop 3/28/20 at 19:31;  Status DC


Dextrose (Dextrose 50%-Water Syringe) 25 gm STK-MED ONCE IV ;  Start 3/29/20 at 

08:11;  Stop 3/29/20 at 08:11;  Status DC


Sodium Polystyrene Sulfonate (Kayexalate) 30 gm 1X  ONCE RC ;  Start 3/29/20 at 

09:00;  Stop 3/29/20 at 08:38;  Status DC


Sodium Polystyrene Sulfonate (Kayexalate) 15 gm 1X  ONCE PO  Last administered 

on 3/29/20at 09:24;  Start 3/29/20 at 09:00;  Stop 3/29/20 at 09:01;  Status DC


Piperacillin Sod/ Tazobactam Sod (Zosyn Per Pharmacy) 1 each PRN DAILY  PRN MC 

SEE COMMENTS;  Start 3/29/20 at 13:00


Piperacillin Sod/ Tazobactam Sod 2.25 gm/Sodium Chloride 50 ml @  100 mls/hr 

Q6HRS IV  Last administered on 3/30/20at 05:32;  Start 3/29/20 at 14:00;  Stop 

3/30/20 at 16:05;  Status DC


Dextrose (Dextrose 50%-Water Syringe) 12.5 gm PRN Q15MIN  PRN IV SEE COMMENTS 

Last administered on 3/30/20at 07:56;  Start 3/29/20 at 21:15


Dextrose (Iv Dextrose 5%) 250 ml PRN Q15MIN  PRN IV SEE COMMENTS;  Start 3/29/20

at 21:15


Dextrose (Dextrose 50%-Water Syringe) 25 gm STK-MED ONCE IV ;  Start 3/29/20 at 

21:19;  Stop 3/29/20 at 21:19;  Status DC


Furosemide (Lasix) 60 mg DAILY IVP  Last administered on 4/4/20at 13:47;  Start 

3/30/20 at 12:30


Lidocaine HCl (Buffered Lidocaine 1%) 3 ml STK-MED ONCE .ROUTE ;  Start 3/30/20 

at 12:36;  Stop 3/30/20 at 12:36;  Status DC


Lidocaine HCl (Buffered Lidocaine 1%) 6 ml 1X  ONCE INJ  Last administered on 

3/30/20at 12:56;  Start 3/30/20 at 12:45;  Stop 3/30/20 at 12:46;  Status DC


Sodium Chloride 1,000 ml @  1,000 mls/hr Q1H PRN IV hypotension;  Start 3/30/20 

at 13:11;  Stop 3/30/20 at 19:10;  Status DC


Albumin Human 200 ml @  200 mls/hr 1X PRN  PRN IV Hypotension;  Start 3/30/20 at

13:15;  Stop 3/30/20 at 19:14;  Status DC


Diphenhydramine HCl (Benadryl) 25 mg 1X PRN  PRN IV ITCHING;  Start 3/30/20 at 

13:15;  Stop 3/31/20 at 13:14;  Status DC


Diphenhydramine HCl (Benadryl) 25 mg 1X PRN  PRN IV ITCHING;  Start 3/30/20 at 

13:15;  Stop 3/31/20 at 13:14;  Status DC


Sodium Chloride 1,000 ml @  400 mls/hr Q2H30M PRN IV PATENCY;  Start 3/30/20 at 

13:11;  Stop 3/31/20 at 01:10;  Status DC


Info (PHARMACY MONITORING -- do not chart) 1 each PRN DAILY  PRN MC SEE 

COMMENTS;  Start 3/30/20 at 13:15;  Stop 4/3/20 at 16:02;  Status DC


Magnesium Sulfate 50 ml @ 25 mls/hr 1X  ONCE IV  Last administered on 3/30/20at 

19:21;  Start 3/30/20 at 15:45;  Stop 3/30/20 at 17:44;  Status DC


Aspirin (Ecotrin) 81 mg DAILYWBKFT PO  Last administered on 4/4/20at 13:47;  

Start 3/31/20 at 08:00


Piperacillin Sod/ Tazobactam Sod 2.25 gm/Sodium Chloride 50 ml @  100 mls/hr 

Q8HRS IV  Last administered on 4/4/20at 13:55;  Start 3/30/20 at 17:00


Acetaminophen/ Hydrocodone Bitart (Lortab 5/325) 1 tab PRN Q6HRS  PRN PO 

MODERATE PAIN 4-6 Last administered on 4/1/20at 08:30;  Start 3/30/20 at 22:00


Sodium Chloride 1,000 ml @  1,000 mls/hr Q1H PRN IV hypotension;  Start 3/31/20 

at 09:55;  Stop 3/31/20 at 15:54;  Status DC


Albumin Human 200 ml @  200 mls/hr 1X PRN  PRN IV Hypotension Last administered 

on 3/31/20at 10:48;  Start 3/31/20 at 10:00;  Stop 3/31/20 at 15:59;  Status DC


Sodium Chloride 1,000 ml @  400 mls/hr Q2H30M PRN IV PATENCY;  Start 3/31/20 at 

09:55;  Stop 3/31/20 at 21:54;  Status DC


Info (PHARMACY MONITORING -- do not chart) 1 each PRN DAILY  PRN MC SEE 

COMMENTS;  Start 3/31/20 at 10:00;  Status UNV


Info (PHARMACY MONITORING -- do not chart) 1 each PRN DAILY  PRN MC SEE C

OMMENTS;  Start 3/31/20 at 10:00;  Status UNV


Iron Sucrose 500 mg/Sodium Chloride 275 ml @  78.571 mls/ hr 1X  ONCE IV  Last 

administered on 3/31/20at 16:08;  Start 3/31/20 at 13:00;  Stop 3/31/20 at 

16:29;  Status DC


Lidocaine/ Epinephrine (LIDOCAINE 1%-EPI 1:100,000 Multi-Dose) 20 ml STK-MED 

ONCE .ROUTE ;  Start 3/31/20 at 12:29;  Stop 3/31/20 at 12:30;  Status DC


Midazolam HCl (Versed) 2 mg STK-MED ONCE .ROUTE ;  Start 3/31/20 at 12:34;  Stop

3/31/20 at 12:34;  Status DC


Fentanyl Citrate (Fentanyl 2ml Vial) 100 mcg STK-MED ONCE .ROUTE ;  Start 

3/31/20 at 12:34;  Stop 3/31/20 at 12:34;  Status DC


Midazolam HCl (Versed) 2 mg 1X  ONCE IV  Last administered on 3/31/20at 13:08;  

Start 3/31/20 at 12:45;  Stop 3/31/20 at 12:47;  Status DC


Fentanyl Citrate (Fentanyl 2ml Vial) 100 mcg 1X  ONCE IV  Last administered on 

3/31/20at 13:08;  Start 3/31/20 at 12:45;  Stop 3/31/20 at 12:47;  Status DC


Lidocaine/ Epinephrine (LIDOCAINE 1%-EPI 1:100,000 Multi-Dose) 20 ml 1X  ONCE SQ

 Last administered on 3/31/20at 13:10;  Start 3/31/20 at 12:45;  Stop 3/31/20 at

12:47;  Status DC


Lactobacillus Rhamnosus (Culturelle) 1 cap BID PO  Last administered on 4/4/20at

13:47;  Start 3/31/20 at 21:00


Albuterol Sulfate (Ventolin Neb Soln) 2.5 mg RTQID NEB  Last administered on 

4/4/20at 16:34;  Start 4/1/20 at 12:00


Darbepoetin Castro (ARANESP for DIALYSIS PTS) 60 mcg WEEKLYHS SQ  Last 

administered on 4/1/20at 22:02;  Start 4/1/20 at 21:00


Sodium Chloride 1,000 ml @  1,000 mls/hr Q1H PRN IV hypotension;  Start 4/1/20 

at 14:02;  Stop 4/1/20 at 20:01;  Status DC


Sodium Chloride (Normal Saline Flush) 10 ml 1X PRN  PRN IV AP catheter pack;  

Start 4/1/20 at 14:15;  Stop 4/2/20 at 14:14;  Status DC


Sodium Chloride (Normal Saline Flush) 10 ml 1X PRN  PRN IV  catheter pack;  

Start 4/1/20 at 14:15;  Stop 4/2/20 at 14:14;  Status DC


Info (PHARMACY MONITORING -- do not chart) 1 each PRN DAILY  PRN MC SEE 

COMMENTS;  Start 4/1/20 at 14:15;  Status UNV


Info (PHARMACY MONITORING -- do not chart) 1 each PRN DAILY  PRN MC SEE 

COMMENTS;  Start 4/1/20 at 14:15;  Stop 4/3/20 at 16:03;  Status DC


Multivitamins (Thera M Plus) 1 tab DAILY PO  Last administered on 4/4/20at 

13:47;  Start 4/2/20 at 09:00


Ascorbic Acid (Vitamin C) 500 mg DAILY PO  Last administered on 4/4/20at 13:47; 

Start 4/2/20 at 09:00


Sodium Chloride 1,000 ml @  1,000 mls/hr Q1H PRN IV hypotension;  Start 4/3/20 

at 08:11;  Stop 4/3/20 at 14:10;  Status DC


Albumin Human 200 ml @  200 mls/hr 1X PRN  PRN IV Hypotension;  Start 4/3/20 at 

08:15;  Stop 4/3/20 at 14:14;  Status DC


Acetaminophen (Tylenol) 500 mg 1X PRN  PRN PO MILD PAIN / TEMP;  Start 4/3/20 at

08:15;  Stop 4/4/20 at 08:14;  Status DC


Diphenhydramine HCl (Benadryl) 25 mg 1X PRN  PRN IV ITCHING;  Start 4/3/20 at 

08:15;  Stop 4/4/20 at 08:14;  Status DC


Diphenhydramine HCl (Benadryl) 25 mg 1X PRN  PRN IV ITCHING;  Start 4/3/20 at 

08:15;  Stop 4/4/20 at 08:14;  Status DC


Sodium Chloride 1,000 ml @  400 mls/hr Q2H30M PRN IV PATENCY;  Start 4/3/20 at 

08:11;  Stop 4/3/20 at 20:10;  Status DC


Info (PHARMACY MONITORING -- do not chart) 1 each PRN DAILY  PRN MC SEE 

COMMENTS;  Start 4/3/20 at 08:15


Insulin Glargine (Lantus Syringe) 40 unit DAILY SQ  Last administered on 4/4/20

at 13:48;  Start 4/4/20 at 09:00


Sodium Chloride 1,000 ml @  1,000 mls/hr Q1H PRN IV hypotension;  Start 4/4/20 

at 07:40;  Stop 4/4/20 at 13:39;  Status DC


Albumin Human 200 ml @  200 mls/hr 1X PRN  PRN IV Hypotension;  Start 4/4/20 at 

07:45;  Stop 4/4/20 at 13:44;  Status DC


Acetaminophen (Tylenol) 500 mg 1X PRN  PRN PO MILD PAIN / TEMP;  Start 4/4/20 at

07:45;  Stop 4/5/20 at 07:44


Diphenhydramine HCl (Benadryl) 25 mg 1X PRN  PRN IV ITCHING;  Start 4/4/20 at 

07:45;  Stop 4/5/20 at 07:44


Diphenhydramine HCl (Benadryl) 25 mg 1X PRN  PRN IV ITCHING;  Start 4/4/20 at 

07:45;  Stop 4/5/20 at 07:44


Sodium Chloride (Normal Saline Flush) 10 ml 1X PRN  PRN IV AP catheter pack;  

Start 4/4/20 at 07:45;  Stop 4/5/20 at 07:44


Sodium Chloride (Normal Saline Flush) 10 ml 1X PRN  PRN IV  catheter pack;  

Start 4/4/20 at 07:45;  Stop 4/5/20 at 07:44


Sodium Chloride 1,000 ml @  400 mls/hr Q2H30M PRN IV PATENCY;  Start 4/4/20 at 

07:40;  Stop 4/4/20 at 19:39


Info (PHARMACY MONITORING -- do not chart) 1 each PRN DAILY  PRN MC SEE 

COMMENTS;  Start 4/4/20 at 07:45





Active Scripts


Active


Reported


Metformin Hcl Er (Metformin Hcl) 1,000 Mg Tab.er.24 1,000 Mg PO BIDWMEALS


Lisinopril-Hctz 20-12.5 Mg Tab (Lisinopril/Hydrochlorothiazide) 1 Each Tablet 1 

Tab PO DAILY


Glipizide Er (Glipizide) 10 Mg Tab.er.24 10 Mg PO BIDWMEALS


Xalatan (Latanoprost) 2.5 Ml Drops 1 Drop OP HS


Triamcinolone Acetonide 0.1% Cream (Triamcinolone Acetonide) 15 Gm Cream..g. 1 

Ashley TP BID


Tramadol Hcl 50 Mg Tablet 50 Mg PO DAILY PRN


Flonase Allergy Relief (Fluticasone Propionate) 9.9 Ml Spray.susp 2 Sprays NS 

DAILY


Novolin N (Nph, Human Insulin Isophane) 100 Unit/1 Ml Vial 40 Unit SQ DAILYWBKFT


Carvedilol ** (Carvedilol) 6.25 Mg Tablet 6.25 Mg PO BIDWMEALS


Simvastatin 40 Mg Tablet 1 Tab PO QHS


Furosemide 20 Mg Tablet 1 Tab PO DAILY


Vitals/I & O





Vital Sign - Last 24 Hours








 4/3/20 4/3/20 4/3/20 4/3/20





 19:43 19:54 20:16 23:18


 


Temp 97.8   97.9





 97.8   97.9


 


Pulse 107   107


 


Resp 22 21


 


B/P (MAP) 149/62 (91)   127/70 (89)


 


Pulse Ox 95  99 97


 


O2 Delivery Venturi Mask Venturi Mask Venturi Mask Venturi Mask


 


O2 Flow Rate  12.0 12.0 


 


    





    





 4/4/20 4/4/20 4/4/20 4/4/20





 03:13 07:00 08:00 08:08


 


Temp 97.7 98.1  





 97.7 98.1  


 


Pulse 87 99  


 


Resp 21 16  


 


B/P (MAP) 157/79 (105) 163/70 (101)  


 


Pulse Ox 97 95  97


 


O2 Delivery Venturi Mask Venturi Mask Venturi Mask Venturi Mask


 


O2 Flow Rate   12.0 12.0


 


    





    





 4/4/20 4/4/20 4/4/20 





 13:25 15:00 16:34 


 


Temp  98.6  





  98.6  


 


Pulse  73  


 


Resp  16  


 


B/P (MAP)  123/53 (76)  


 


Pulse Ox 93 97 94 


 


O2 Delivery Venturi Mask Venturi Mask Venturi Mask 


 


O2 Flow Rate 15.0  12.0 














Intake and Output   


 


 4/3/20 4/3/20 4/4/20





 14:59 22:59 06:59


 


Intake Total 0 ml 500 ml 260 ml


 


Output Total   150 ml


 


Balance 0 ml 500 ml 110 ml

















JEB ALVAREZ MD              Apr 4, 2020 18:14

## 2020-04-04 NOTE — PDOC
SUBJECTIVE


ROS


Follow-up for ESRD





She was dialyzed earlier today and tolerated well. She remains on a Venti-mask 

currently. Patient claims her breathing is more or less the same. This hasn't 

improved much. She does feel tired and somewhat dry





CVS:   no Orthopnea, no CP


RESP:   + SOB, ? HATCH (not ambulated much) denies phlegm sputum production


GI:   no Nausea, no Vomiting


:   no Dysuria, no Urgency





OBJECTIVE


Vital Signs





Vital Signs








  Date Time  Temp Pulse Resp B/P (MAP) Pulse Ox O2 Delivery O2 Flow Rate FiO2


 


4/4/20 13:25     93 Venturi Mask 15.0 


 


4/4/20 07:00 98.1 99 16 163/70 (101)    





 98.1       








I & 0











Intake and Output 


 


 4/4/20





 07:00


 


Intake Total 760 ml


 


Output Total 150 ml


 


Balance 610 ml


 


 


 


Intake Oral 760 ml


 


Output Urine Total 150 ml


 


# Bowel Movements 1











PHYSICAL EXAM


Physical Exam


GEN:    Awake, Oriented x 2-3 , In no visible resp distress


EYES:  Vision Unchanged, Conjunctiva Normal


EN:      No EN Drainage, Mucous Membranes dryish


NECK:  no JVD, min JVP, Supple, no Thyromegaly


CVS:    S1S2, ? Murmur, No Gallop, No Rub,tr Edema


RESP:  no Rales, no Rhonchi,no Acc. Muscle Use


GI:        BS + ve, NO Bruit, Non Tender, Non Distended


:      no CVA tenderness, no Suprapubic Tenderness





DIAGNOSIS/ASSESSMENT


Assessment & Plan


ESRD: Patient was dialyzed earlier today and tolerated well by her reports she 

does feel a little tired currently. On her ongoing respiratory difficulty, she 

may need extra dialysis tomorrow. We'll check chest x-ray





Room air hypoxemia: Patient remains on a Ventimask at this time. She does appear

to use 2-3 L nasal cannula as an outpatient as reported. We'll check chest x-ray

for fluid status/infiltrates. May need extra dialysis tomorrow





Possible pneumonia: Patient remains on antibiotics. We'll await ID 

recommendations with regards to transitioning to oral antibiotics versus need 

for near-term central access for the same





ANEMIAb (and deficiency as well as C KD associated); when necessary as ordered. 

Aranap as ordered,  Transfuse  with next HD as needed





HTN:   Current BP meds as reviewed.  See orders for changes.





BONE & MINERAL: Follow phosphorus levels and add/alter binder regimen if needed.





end point for discharge would probably be able off oxygen dependency





Discussed Plan of Care with patient and nurse at bedside





COMMENT/RELEVANT DATA


Meds





Current Medications








 Medications


  (Trade)  Dose


 Ordered  Sig/Eva  Start Time


 Stop Time Status Last Admin


Dose Admin


 


 Acetaminophen


  (Tylenol)  500 mg  1X PRN  PRN  4/4/20 07:45


 4/5/20 07:44   





 


 Acetaminophen/


 Hydrocodone Bitart


  (Lortab 5/325)  1 tab  PRN Q6HRS  PRN  3/30/20 22:00


    4/1/20 08:30


1 TAB


 


 Albumin Human  200 ml @ 


 200 mls/hr  1X PRN  PRN  4/4/20 07:45


 4/4/20 13:44 DC  





 


 Albuterol Sulfate


  (Ventolin Neb


 Soln)  2.5 mg  RTQID  4/1/20 12:00


    4/4/20 13:25


2.5 MG


 


 Ascorbic Acid


  (Vitamin C)  500 mg  DAILY  4/2/20 09:00


    4/2/20 09:39


500 MG


 


 Aspirin


  (Ecotrin)  81 mg  DAILYWBKFT  3/31/20 08:00


    4/2/20 09:39


81 MG


 


 Calcium Gluconate


  (Calcium


 Gluconate)  1,000 mg  1X  ONCE  3/28/20 18:00


 3/28/20 18:01 DC 3/28/20 18:24


1,000 MG


 


 Darbepoetin Castro


  (ARANESP for


 DIALYSIS PTS)  60 mcg  WEEKLYHS  4/1/20 21:00


    4/1/20 22:02


60 MCG


 


 Dextrose


  (Dextrose


 50%-Water Syringe)  25 gm  STK-MED ONCE  3/29/20 21:19


 3/29/20 21:19 DC  





 


 Dextrose


  (Iv Dextrose 5%)  250 ml  PRN Q15MIN  PRN  3/29/20 21:15


     





 


 Diphenhydramine


 HCl


  (Benadryl)  25 mg  1X PRN  PRN  4/4/20 07:45


 4/5/20 07:44   





 


 Enoxaparin Sodium


  (Lovenox 120mg


 Syringe)  120 mg  1X  ONCE  3/28/20 19:30


 3/28/20 19:31 DC 3/28/20 20:05


120 MG


 


 Fentanyl Citrate


  (Fentanyl 2ml


 Vial)  100 mcg  1X  ONCE  3/31/20 12:45


 3/31/20 12:47 DC 3/31/20 13:08


50 MCG


 


 Furosemide


  (Lasix)  60 mg  DAILY  3/30/20 12:30


    4/2/20 09:47


60 MG


 


 Info


  (PHARMACY


 MONITORING -- do


 not chart)  1 each  PRN DAILY  PRN  4/4/20 07:45


     





 


 Insulin Glargine


  (Lantus Syringe)  40 unit  DAILY  4/4/20 09:00


     





 


 Iron Sucrose 500


 mg/Sodium Chloride  275 ml @ 


 78.571 mls/


 hr  1X  ONCE  3/31/20 13:00


 3/31/20 16:29 DC 3/31/20 16:08


78.571 MLS/HR


 


 Lactobacillus


 Rhamnosus


  (Culturelle)  1 cap  BID  3/31/20 21:00


    4/3/20 20:47


1 CAP


 


 Lidocaine HCl


  (Buffered


 Lidocaine 1%)  6 ml  1X  ONCE  3/30/20 12:45


 3/30/20 12:46 DC 3/30/20 12:56


3 ML


 


 Lidocaine/


 Epinephrine


  (LIDOCAINE


 1%-EPI 1:100,000


 Multi-Dose)  20 ml  1X  ONCE  3/31/20 12:45


 3/31/20 12:47 DC 3/31/20 13:10


10 ML


 


 Magnesium Sulfate  50 ml @ 25


 mls/hr  1X  ONCE  3/30/20 15:45


 3/30/20 17:44 DC 3/30/20 19:21


25 MLS/HR


 


 Midazolam HCl


  (Versed)  2 mg  1X  ONCE  3/31/20 12:45


 3/31/20 12:47 DC 3/31/20 13:08


1 MG


 


 Multivitamins


  (Thera M Plus)  1 tab  DAILY  4/2/20 09:00


    4/2/20 09:39


1 TAB


 


 Piperacillin Sod/


 Tazobactam Sod


  (Zosyn Per


 Pharmacy)  1 each  PRN DAILY  PRN  3/29/20 13:00


     





 


 Piperacillin Sod/


 Tazobactam Sod


 2.25 gm/Sodium


 Chloride  50 ml @ 


 100 mls/hr  Q8HRS  3/30/20 17:00


    4/4/20 06:15


100 MLS/HR


 


 Sodium


 Polystyrene


 Sulfonate


  (Kayexalate)  15 gm  1X  ONCE  3/29/20 09:00


 3/29/20 09:01 DC 3/29/20 09:24


15 GM


 


 Sodium Chloride  1,000 ml @ 


 400 mls/hr  Q2H30M PRN  4/4/20 07:40


 4/4/20 19:39   





 


 Sodium Chloride


  (Normal Saline


 Flush)  10 ml  1X PRN  PRN  4/4/20 07:45


 4/5/20 07:44   





 


 Vancomycin HCl  250 ml @ 


 166.667


 mls/hr  1X  ONCE  3/28/20 17:45


 3/28/20 19:14 DC 3/28/20 18:24


166.667 MLS/HR








Lab





Laboratory Tests








Test


 4/3/20


17:08 4/3/20


20:33 4/4/20


08:02


 


Glucose (Fingerstick)


 194 mg/dL


(70-99) 228 mg/dL


(70-99) 182 mg/dL


(70-99)








Results


All relevant outside records, renal labs, imaging studies, telemetry/EKG's were 

reviewed.











TIAGO JENKINS MD                Apr 4, 2020 14:05

## 2020-04-04 NOTE — PDOC
Infectious Disease Note


Subjective:


Subjective


Pt says feels better


no f/c/n/v





Vital Signs:


Vital Signs





Vital Signs








  Date Time  Temp Pulse Resp B/P (MAP) Pulse Ox O2 Delivery O2 Flow Rate FiO2


 


4/4/20 13:25     93 Venturi Mask 15.0 


 


4/4/20 07:00 98.1 99 16 163/70 (101)    





 98.1       











Physical Exam:


PHYSICAL EXAM


GENERAL:  Alert, oriented female, not in distress.


VITAL SIGNS:  Stable, afebrile.


HEENT:  NAD.


NECK:  Supple, no JVP, no lymphadenopathy.


LUNGS:  Clear.


HEART:  S1, S2 regular.


ABDOMEN:  Soft and nontender.  No organomegaly.


EXTREMITIES:  Bilateral lower extremity pitting edema present.  The patient does


have excoriation of both the legs with superficial ulcerations.  Foot has no


wound at the fifth metatarsal or phalangeal area bilaterally.  The patient


appears to have puncture wound with slight dry crackly skin into the actually


calcaneal area.  Dorsalis pedis is palpable bilaterally.


NEUROLOGIC:  The patient is alert, awake and appropriate.  No focal neurologic


deficit.





Medications:


Inpatient Meds:





Current Medications








 Medications


  (Trade)  Dose


 Ordered  Sig/Eva  Start Time


 Stop Time Status Last Admin


Dose Admin


 


 Acetaminophen


  (Tylenol)  500 mg  1X PRN  PRN  4/4/20 07:45


 4/5/20 07:44   





 


 Acetaminophen/


 Hydrocodone Bitart


  (Lortab 5/325)  1 tab  PRN Q6HRS  PRN  3/30/20 22:00


    4/1/20 08:30


1 TAB


 


 Albumin Human  200 ml @ 


 200 mls/hr  1X PRN  PRN  4/4/20 07:45


 4/4/20 13:44 DC  





 


 Albuterol Sulfate


  (Ventolin Neb


 Soln)  2.5 mg  RTQID  4/1/20 12:00


    4/4/20 13:25


2.5 MG


 


 Ascorbic Acid


  (Vitamin C)  500 mg  DAILY  4/2/20 09:00


    4/4/20 13:47


500 MG


 


 Aspirin


  (Ecotrin)  81 mg  DAILYWBKFT  3/31/20 08:00


    4/4/20 13:47


81 MG


 


 Calcium Gluconate


  (Calcium


 Gluconate)  1,000 mg  1X  ONCE  3/28/20 18:00


 3/28/20 18:01 DC 3/28/20 18:24


1,000 MG


 


 Darbepoetin Castro


  (ARANESP for


 DIALYSIS PTS)  60 mcg  WEEKLYHS  4/1/20 21:00


    4/1/20 22:02


60 MCG


 


 Dextrose


  (Dextrose


 50%-Water Syringe)  25 gm  STK-MED ONCE  3/29/20 21:19


 3/29/20 21:19 DC  





 


 Dextrose


  (Iv Dextrose 5%)  250 ml  PRN Q15MIN  PRN  3/29/20 21:15


     





 


 Diphenhydramine


 HCl


  (Benadryl)  25 mg  1X PRN  PRN  4/4/20 07:45


 4/5/20 07:44   





 


 Enoxaparin Sodium


  (Lovenox 120mg


 Syringe)  120 mg  1X  ONCE  3/28/20 19:30


 3/28/20 19:31 DC 3/28/20 20:05


120 MG


 


 Fentanyl Citrate


  (Fentanyl 2ml


 Vial)  100 mcg  1X  ONCE  3/31/20 12:45


 3/31/20 12:47 DC 3/31/20 13:08


50 MCG


 


 Furosemide


  (Lasix)  60 mg  DAILY  3/30/20 12:30


    4/4/20 13:47


60 MG


 


 Info


  (PHARMACY


 MONITORING -- do


 not chart)  1 each  PRN DAILY  PRN  4/4/20 07:45


     





 


 Insulin Glargine


  (Lantus Syringe)  40 unit  DAILY  4/4/20 09:00


    4/4/20 13:48


40 UNIT


 


 Iron Sucrose 500


 mg/Sodium Chloride  275 ml @ 


 78.571 mls/


 hr  1X  ONCE  3/31/20 13:00


 3/31/20 16:29 DC 3/31/20 16:08


78.571 MLS/HR


 


 Lactobacillus


 Rhamnosus


  (Culturelle)  1 cap  BID  3/31/20 21:00


    4/4/20 13:47


1 CAP


 


 Lidocaine HCl


  (Buffered


 Lidocaine 1%)  6 ml  1X  ONCE  3/30/20 12:45


 3/30/20 12:46 DC 3/30/20 12:56


3 ML


 


 Lidocaine/


 Epinephrine


  (LIDOCAINE


 1%-EPI 1:100,000


 Multi-Dose)  20 ml  1X  ONCE  3/31/20 12:45


 3/31/20 12:47 DC 3/31/20 13:10


10 ML


 


 Magnesium Sulfate  50 ml @ 25


 mls/hr  1X  ONCE  3/30/20 15:45


 3/30/20 17:44 DC 3/30/20 19:21


25 MLS/HR


 


 Midazolam HCl


  (Versed)  2 mg  1X  ONCE  3/31/20 12:45


 3/31/20 12:47 DC 3/31/20 13:08


1 MG


 


 Multivitamins


  (Thera M Plus)  1 tab  DAILY  4/2/20 09:00


    4/4/20 13:47


1 TAB


 


 Piperacillin Sod/


 Tazobactam Sod


  (Zosyn Per


 Pharmacy)  1 each  PRN DAILY  PRN  3/29/20 13:00


     





 


 Piperacillin Sod/


 Tazobactam Sod


 2.25 gm/Sodium


 Chloride  50 ml @ 


 100 mls/hr  Q8HRS  3/30/20 17:00


    4/4/20 13:55


100 MLS/HR


 


 Sodium


 Polystyrene


 Sulfonate


  (Kayexalate)  15 gm  1X  ONCE  3/29/20 09:00


 3/29/20 09:01 DC 3/29/20 09:24


15 GM


 


 Sodium Chloride  1,000 ml @ 


 400 mls/hr  Q2H30M PRN  4/4/20 07:40


 4/4/20 19:39   





 


 Sodium Chloride


  (Normal Saline


 Flush)  10 ml  1X PRN  PRN  4/4/20 07:45


 4/5/20 07:44   





 


 Vancomycin HCl  250 ml @ 


 166.667


 mls/hr  1X  ONCE  3/28/20 17:45


 3/28/20 19:14 DC 3/28/20 18:24


166.667 MLS/HR











Labs:


Lab





Laboratory Tests








Test


 4/3/20


17:08 4/3/20


20:33 4/4/20


08:02 4/4/20


14:35


 


Glucose (Fingerstick)


 194 mg/dL


(70-99) 228 mg/dL


(70-99) 182 mg/dL


(70-99) 





 


White Blood Count


 


 


 


 7.9 x10^3/uL


(4.0-11.0)


 


Red Blood Count


 


 


 


 3.89 x10^6/uL


(3.50-5.40)


 


Hemoglobin


 


 


 


 10.4 g/dL


(12.0-15.5)


 


Hematocrit


 


 


 


 33.7 %


(36.0-47.0)


 


Mean Corpuscular Volume    87 fL () 


 


Mean Corpuscular Hemoglobin    27 pg (25-35) 


 


Mean Corpuscular Hemoglobin


Concent 


 


 


 31 g/dL


(31-37)


 


Red Cell Distribution Width


 


 


 


 18.2 %


(11.5-14.5)


 


Platelet Count


 


 


 


 287 x10^3/uL


(140-400)


 


Neutrophils (%) (Auto)    81 % (31-73) 


 


Lymphocytes (%) (Auto)    8 % (24-48) 


 


Monocytes (%) (Auto)    9 % (0-9) 


 


Eosinophils (%) (Auto)    1 % (0-3) 


 


Basophils (%) (Auto)    1 % (0-3) 


 


Neutrophils # (Auto)


 


 


 


 6.4 x10^3/uL


(1.8-7.7)


 


Lymphocytes # (Auto)


 


 


 


 0.6 x10^3/uL


(1.0-4.8)


 


Monocytes # (Auto)


 


 


 


 0.7 x10^3/uL


(0.0-1.1)


 


Eosinophils # (Auto)


 


 


 


 0.1 x10^3/uL


(0.0-0.7)


 


Basophils # (Auto)


 


 


 


 0.1 x10^3/uL


(0.0-0.2)


 


Sodium Level


 


 


 


 138 mmol/L


(136-145)


 


Potassium Level


 


 


 


 3.9 mmol/L


(3.5-5.1)


 


Chloride Level


 


 


 


 100 mmol/L


()


 


Carbon Dioxide Level


 


 


 


 33 mmol/L


(21-32)


 


Anion Gap    5 (6-14) 


 


Blood Urea Nitrogen


 


 


 


 10 mg/dL


(7-20)


 


Creatinine


 


 


 


 2.0 mg/dL


(0.6-1.0)


 


Estimated GFR


(Cockcroft-Gault) 


 


 


 24.2 





 


Glucose Level


 


 


 


 200 mg/dL


(70-99)


 


Calcium Level


 


 


 


 8.0 mg/dL


(8.5-10.1)


 


Phosphorus Level


 


 


 


 2.3 mg/dL


(2.6-4.7)


 


Albumin


 


 


 


 2.0 g/dL


(3.4-5.0)











Objective:


Assessment:


1.  Bilateral lower extremity venous insufficiency with superficial ulcerations


and possible secondary infection.


2.  Left fifth phalangeal changes on x-ray, likely secondary to the trauma and


healing fracture, not infection as there is no open wound there.  Likely have a


puncture wound into the calcaneal area with pain.


3.  Congestive heart failure.


4.  Acute on chronic renal insufficiency, on hemodialysis now.


5.  Diabetes.


6.  Hypertension.


7.  Obesity





Plan:


Plan of Care


cont zosyn


will deescalate soon to po abx


cont local wound care


cont supportive care











CROW JENKINS MD            Apr 4, 2020 15:46

## 2020-04-05 VITALS — SYSTOLIC BLOOD PRESSURE: 119 MMHG | DIASTOLIC BLOOD PRESSURE: 60 MMHG

## 2020-04-05 VITALS — DIASTOLIC BLOOD PRESSURE: 41 MMHG | SYSTOLIC BLOOD PRESSURE: 99 MMHG

## 2020-04-05 VITALS — SYSTOLIC BLOOD PRESSURE: 90 MMHG | DIASTOLIC BLOOD PRESSURE: 47 MMHG

## 2020-04-05 VITALS — DIASTOLIC BLOOD PRESSURE: 56 MMHG | SYSTOLIC BLOOD PRESSURE: 127 MMHG

## 2020-04-05 VITALS — DIASTOLIC BLOOD PRESSURE: 69 MMHG | SYSTOLIC BLOOD PRESSURE: 141 MMHG

## 2020-04-05 VITALS — SYSTOLIC BLOOD PRESSURE: 115 MMHG | DIASTOLIC BLOOD PRESSURE: 51 MMHG

## 2020-04-05 RX ADMIN — PIPERACILLIN SODIUM AND TAZOBACTAM SODIUM SCH MLS/HR: 2; .25 INJECTION, POWDER, LYOPHILIZED, FOR SOLUTION INTRAVENOUS at 15:15

## 2020-04-05 RX ADMIN — Medication SCH CAP: at 08:54

## 2020-04-05 RX ADMIN — HYDROCODONE BITARTRATE AND ACETAMINOPHEN PRN TAB: 5; 325 TABLET ORAL at 21:06

## 2020-04-05 RX ADMIN — PIPERACILLIN SODIUM AND TAZOBACTAM SODIUM SCH MLS/HR: 2; .25 INJECTION, POWDER, LYOPHILIZED, FOR SOLUTION INTRAVENOUS at 05:40

## 2020-04-05 RX ADMIN — ALBUTEROL SULFATE SCH MG: 108 AEROSOL, METERED RESPIRATORY (INHALATION) at 19:55

## 2020-04-05 RX ADMIN — INSULIN GLARGINE SCH UNIT: 100 INJECTION, SOLUTION SUBCUTANEOUS at 07:55

## 2020-04-05 RX ADMIN — ALBUTEROL SULFATE SCH MG: 108 AEROSOL, METERED RESPIRATORY (INHALATION) at 12:34

## 2020-04-05 RX ADMIN — Medication SCH CAP: at 21:06

## 2020-04-05 RX ADMIN — FUROSEMIDE SCH MG: 10 INJECTION, SOLUTION INTRAMUSCULAR; INTRAVENOUS at 08:55

## 2020-04-05 RX ADMIN — ASPIRIN SCH MG: 81 TABLET, COATED ORAL at 08:54

## 2020-04-05 RX ADMIN — HYDROCODONE BITARTRATE AND ACETAMINOPHEN PRN TAB: 5; 325 TABLET ORAL at 14:42

## 2020-04-05 RX ADMIN — ALBUTEROL SULFATE SCH MG: 108 AEROSOL, METERED RESPIRATORY (INHALATION) at 16:10

## 2020-04-05 RX ADMIN — MULTIPLE VITAMINS W/ MINERALS TAB SCH TAB: TAB at 08:54

## 2020-04-05 RX ADMIN — PIPERACILLIN SODIUM AND TAZOBACTAM SODIUM SCH MLS/HR: 2; .25 INJECTION, POWDER, LYOPHILIZED, FOR SOLUTION INTRAVENOUS at 21:06

## 2020-04-05 RX ADMIN — OXYCODONE HYDROCHLORIDE AND ACETAMINOPHEN SCH MG: 500 TABLET ORAL at 08:54

## 2020-04-05 RX ADMIN — ALBUTEROL SULFATE SCH MG: 108 AEROSOL, METERED RESPIRATORY (INHALATION) at 08:20

## 2020-04-05 NOTE — PDOC
Infectious Disease Note


Subjective


Subjective


Comfortable, denies pain


Some sinus drainage


On 4L O2


Loose stools 


Denies F/C/SOA/N/V





ROS


ROS


per HPI





Vital Sign


Vital Signs





Vital Signs








  Date Time  Temp Pulse Resp B/P (MAP) Pulse Ox O2 Delivery O2 Flow Rate FiO2


 


4/5/20 07:00 98.5 92 19 127/56 (79) 95 Ventilator  





 98.5       


 


4/4/20 20:03       12.0 











Physical Exam


PHYSICAL EXAM


GENERAL:  Propped up in bed, alert in NAD 


HEENT:  Oral cavity pink, no lesions 


NECK:  Supple


LUNGS:  Clear.


HEART:  S1, S2 regular.


ABDOMEN:  Obese, soft and nontender. 


: Venegas in place 


EXTREMITIES:  Bilateral lower extremity pitting edema and excoriation of both 

the legs with superficial ulcerations.  Foot has no


wound at the fifth metatarsal or phalangeal area bilaterally.  


The patient appears to have puncture wound with slight dry crackly skin into the

actually calcaneal area.  Dorsalis pedis is palpable bilaterally.


NEUROLOGIC: Alert, awake and appropriate.  No focal neurologic deficit.


Tunneled HD catheter (3/31) without signs of infection


PIV ok





Labs


Lab





Laboratory Tests








Test


 4/4/20


14:35 4/4/20


17:31 4/4/20


20:28 4/5/20


07:49


 


White Blood Count


 7.9 x10^3/uL


(4.0-11.0) 


 


 





 


Red Blood Count


 3.89 x10^6/uL


(3.50-5.40) 


 


 





 


Hemoglobin


 10.4 g/dL


(12.0-15.5) 


 


 





 


Hematocrit


 33.7 %


(36.0-47.0) 


 


 





 


Mean Corpuscular Volume 87 fL ()    


 


Mean Corpuscular Hemoglobin 27 pg (25-35)    


 


Mean Corpuscular Hemoglobin


Concent 31 g/dL


(31-37) 


 


 





 


Red Cell Distribution Width


 18.2 %


(11.5-14.5) 


 


 





 


Platelet Count


 287 x10^3/uL


(140-400) 


 


 





 


Neutrophils (%) (Auto) 81 % (31-73)    


 


Lymphocytes (%) (Auto) 8 % (24-48)    


 


Monocytes (%) (Auto) 9 % (0-9)    


 


Eosinophils (%) (Auto) 1 % (0-3)    


 


Basophils (%) (Auto) 1 % (0-3)    


 


Neutrophils # (Auto)


 6.4 x10^3/uL


(1.8-7.7) 


 


 





 


Lymphocytes # (Auto)


 0.6 x10^3/uL


(1.0-4.8) 


 


 





 


Monocytes # (Auto)


 0.7 x10^3/uL


(0.0-1.1) 


 


 





 


Eosinophils # (Auto)


 0.1 x10^3/uL


(0.0-0.7) 


 


 





 


Basophils # (Auto)


 0.1 x10^3/uL


(0.0-0.2) 


 


 





 


Sodium Level


 138 mmol/L


(136-145) 


 


 





 


Potassium Level


 3.9 mmol/L


(3.5-5.1) 


 


 





 


Chloride Level


 100 mmol/L


() 


 


 





 


Carbon Dioxide Level


 33 mmol/L


(21-32) 


 


 





 


Anion Gap 5 (6-14)    


 


Blood Urea Nitrogen


 10 mg/dL


(7-20) 


 


 





 


Creatinine


 2.0 mg/dL


(0.6-1.0) 


 


 





 


Estimated GFR


(Cockcroft-Gault) 24.2 


 


 


 





 


Glucose Level


 200 mg/dL


(70-99) 


 


 





 


Calcium Level


 8.0 mg/dL


(8.5-10.1) 


 


 





 


Phosphorus Level


 2.3 mg/dL


(2.6-4.7) 


 


 





 


Albumin


 2.0 g/dL


(3.4-5.0) 


 


 





 


Glucose (Fingerstick)


 


 182 mg/dL


(70-99) 184 mg/dL


(70-99) 68 mg/dL


(70-99)


 


Test


 4/5/20


08:24 


 


 





 


Glucose (Fingerstick)


 118 mg/dL


(70-99) 


 


 








CXR, 4/4


HISTORY: Persistent hypoxia


 


AP view was taken of the chest. Right dialysis catheter is unchanged. 


Patient has a reversed total shoulder prosthesis on the right. There is a 


left pleural effusion. There is left basilar atelectasis or infiltrate. 


Pattern is similar to the study from March 31. There is still mild 


vascular congestion.


 


IMPRESSION:


1. Little change from the prior study.





Objective


Assessment


Bilateral lower extremity venous insufficiency with superficial ulcerations and 

possible secondary infection.


Left fifth phalangeal changes on x-ray, likely secondary to the trauma and 

healing fracture, not infection as there is no open wound there.  Likely have a 

puncture wound into the calcaneal area with pain.


Congestive heart failure.


Acute on chronic renal insufficiency, on hemodialysis now via tunneled HD 

catheter 


Diabetes.


Hypertension.


Obesity





Plan


Plan of Care


cont zosyn for now, wean to po soon


Probiotics 


Local wound care


Supportive care





Attending Co-Sign


The patient was seen and interviewed as well as examined at the bedside. The 

art was reviewed. The case was discussed. Agree with the plan of care.











MARIANA MOJICA         Apr 5, 2020 09:24


CROW JENKINS MD            Apr 5, 2020 12:14

## 2020-04-05 NOTE — PDOC
PROGRESS NOTES


Chief Complaint


Chief Complaint


Acute on chronic kidney injury, 


hyperkalemia, resolved


hyponatremia resolved


azotemia, imrpoved 


acute on chronic systolic and diastolic heart failure,


elevated troponin 


anemia and lower extremity wounds.  // Lower extremity cellulitis. 


Left fifth phalangeal changes on x-ray, likely secondary to the trauma and 

healing fracture,





follow Nephrology consultant recommendations


follow Cardiology. recommendations


Hold ACE inhibitor.  


IV antibiotics.  


PT, OT.  


Wound care nurse to evaluate and treat.  


DVT prophylaxis.  


Full code.  


Prognosis //guarded.


temp dialysis cath placement  3/30


OP HD SET UP FOR MWF


Ultrasound and fluoroscopic guided placement of right internal jugular temporary

dialysis catheter


NEEDS TDC AND OP HD SET UP


Dialysis as per consultant





History of Present Illness


History of Present Illness


No acute events reported overnight, case discussed with nursing staff patient in

no acute distress no complaints during my visit most likely will require 

placement





Vitals


Vitals





Vital Signs








  Date Time  Temp Pulse Resp B/P (MAP) Pulse Ox O2 Delivery O2 Flow Rate FiO2


 


4/5/20 15:42     93 Nasal Cannula 5.0 


 


4/5/20 15:00 98.0 100 19 90/47 (61)    





 98.0       











Physical Exam


Physical Exam


GENERAL:  Propped up in bed, alert in NAD 


HEENT:  Oral cavity pink, no lesions 


NECK:  Supple


LUNGS:  Clear.


HEART:  S1, S2 regular.


ABDOMEN:  Obese, soft and nontender. 


: Venegas in place 


EXTREMITIES:  Bilateral lower extremity pitting edema and excoriation of both 

the legs with superficial ulcerations.  Foot has no


wound at the fifth metatarsal or phalangeal area bilaterally.  


The patient appears to have puncture wound with slight dry crackly skin into the

actually calcaneal area.  Dorsalis pedis is palpable bilaterally.


NEUROLOGIC: Alert, awake and appropriate.  No focal neurologic deficit.


Tunneled HD catheter (3/31) without signs of infection


PIV ok


General:  Alert, Oriented X3, Cooperative, No acute distress, mild distress


Heart:  Regular rate


Lungs:  Clear


Abdomen:  Normal bowel sounds, No tenderness


Extremities:  No clubbing, No cyanosis





Labs


LABS





Laboratory Tests








Test


 4/4/20


17:31 4/4/20


20:28 4/5/20


07:49 4/5/20


08:24


 


Glucose (Fingerstick)


 182 mg/dL


(70-99) 184 mg/dL


(70-99) 68 mg/dL


(70-99) 118 mg/dL


(70-99)


 


Test


 4/5/20


11:42 


 


 





 


Glucose (Fingerstick)


 119 mg/dL


(70-99) 


 


 














Assessment and Plan


Assessmemt and Plan


Problems


Medical Problems:


(1) Cellulitis


Status: Acute  





(2) CHF (congestive heart failure)


Status: Acute  





(3) Elevated troponin


Status: Acute  





(4) Shortness of breath


Status: Acute  











Comment


Review of Relevant


I have reviewed the following items rizwan (where applicable) has been applied.


Labs





Laboratory Tests








Test


 4/3/20


17:08 4/3/20


20:33 4/4/20


08:02 4/4/20


14:35


 


Glucose (Fingerstick)


 194 mg/dL


(70-99) 228 mg/dL


(70-99) 182 mg/dL


(70-99) 





 


White Blood Count


 


 


 


 7.9 x10^3/uL


(4.0-11.0)


 


Red Blood Count


 


 


 


 3.89 x10^6/uL


(3.50-5.40)


 


Hemoglobin


 


 


 


 10.4 g/dL


(12.0-15.5)


 


Hematocrit


 


 


 


 33.7 %


(36.0-47.0)


 


Mean Corpuscular Volume    87 fL () 


 


Mean Corpuscular Hemoglobin    27 pg (25-35) 


 


Mean Corpuscular Hemoglobin


Concent 


 


 


 31 g/dL


(31-37)


 


Red Cell Distribution Width


 


 


 


 18.2 %


(11.5-14.5)


 


Platelet Count


 


 


 


 287 x10^3/uL


(140-400)


 


Neutrophils (%) (Auto)    81 % (31-73) 


 


Lymphocytes (%) (Auto)    8 % (24-48) 


 


Monocytes (%) (Auto)    9 % (0-9) 


 


Eosinophils (%) (Auto)    1 % (0-3) 


 


Basophils (%) (Auto)    1 % (0-3) 


 


Neutrophils # (Auto)


 


 


 


 6.4 x10^3/uL


(1.8-7.7)


 


Lymphocytes # (Auto)


 


 


 


 0.6 x10^3/uL


(1.0-4.8)


 


Monocytes # (Auto)


 


 


 


 0.7 x10^3/uL


(0.0-1.1)


 


Eosinophils # (Auto)


 


 


 


 0.1 x10^3/uL


(0.0-0.7)


 


Basophils # (Auto)


 


 


 


 0.1 x10^3/uL


(0.0-0.2)


 


Sodium Level


 


 


 


 138 mmol/L


(136-145)


 


Potassium Level


 


 


 


 3.9 mmol/L


(3.5-5.1)


 


Chloride Level


 


 


 


 100 mmol/L


()


 


Carbon Dioxide Level


 


 


 


 33 mmol/L


(21-32)


 


Anion Gap    5 (6-14) 


 


Blood Urea Nitrogen


 


 


 


 10 mg/dL


(7-20)


 


Creatinine


 


 


 


 2.0 mg/dL


(0.6-1.0)


 


Estimated GFR


(Cockcroft-Gault) 


 


 


 24.2 





 


Glucose Level


 


 


 


 200 mg/dL


(70-99)


 


Calcium Level


 


 


 


 8.0 mg/dL


(8.5-10.1)


 


Phosphorus Level


 


 


 


 2.3 mg/dL


(2.6-4.7)


 


Albumin


 


 


 


 2.0 g/dL


(3.4-5.0)


 


Test


 4/4/20


17:31 4/4/20


20:28 4/5/20


07:49 4/5/20


08:24


 


Glucose (Fingerstick)


 182 mg/dL


(70-99) 184 mg/dL


(70-99) 68 mg/dL


(70-99) 118 mg/dL


(70-99)


 


Test


 4/5/20


11:42 


 


 





 


Glucose (Fingerstick)


 119 mg/dL


(70-99) 


 


 











Laboratory Tests








Test


 4/4/20


17:31 4/4/20


20:28 4/5/20


07:49 4/5/20


08:24


 


Glucose (Fingerstick)


 182 mg/dL


(70-99) 184 mg/dL


(70-99) 68 mg/dL


(70-99) 118 mg/dL


(70-99)


 


Test


 4/5/20


11:42 


 


 





 


Glucose (Fingerstick)


 119 mg/dL


(70-99) 


 


 











Medications





Current Medications


Vancomycin HCl 250 ml @  166.667 mls/hr 1X  ONCE IV  Last administered on 

3/28/20at 18:24;  Start 3/28/20 at 17:45;  Stop 3/28/20 at 19:14;  Status DC


Sodium Chloride 1,000 ml @  1,000 mls/hr 1X  ONCE IV  Last administered on 

3/28/20at 18:23;  Start 3/28/20 at 18:00;  Stop 3/28/20 at 18:59;  Status DC


Calcium Gluconate (Calcium Gluconate) 1,000 mg 1X  ONCE IVP  Last administered 

on 3/28/20at 18:24;  Start 3/28/20 at 18:00;  Stop 3/28/20 at 18:01;  Status DC


Albuterol Sulfate (Ventolin Neb Soln) 10 mg 1X  ONCE CONT NEB  Last administered

on 3/28/20at 18:00;  Start 3/28/20 at 18:00;  Stop 3/28/20 at 18:01;  Status DC


Furosemide (Lasix) 40 mg 1X  ONCE PO ;  Start 3/28/20 at 18:30;  Stop 3/28/20 at

18:31;  Status DC


Enoxaparin Sodium (Lovenox 120mg Syringe) 120 mg 1X  ONCE SQ  Last administered 

on 3/28/20at 20:05;  Start 3/28/20 at 19:30;  Stop 3/28/20 at 19:31;  Status DC


Furosemide (Lasix) 40 mg 1X  ONCE IVP  Last administered on 3/28/20at 20:05;  

Start 3/28/20 at 19:30;  Stop 3/28/20 at 19:31;  Status DC


Dextrose (Dextrose 50%-Water Syringe) 25 gm STK-MED ONCE IV ;  Start 3/29/20 at 

08:11;  Stop 3/29/20 at 08:11;  Status DC


Sodium Polystyrene Sulfonate (Kayexalate) 30 gm 1X  ONCE RC ;  Start 3/29/20 at 

09:00;  Stop 3/29/20 at 08:38;  Status DC


Sodium Polystyrene Sulfonate (Kayexalate) 15 gm 1X  ONCE PO  Last administered 

on 3/29/20at 09:24;  Start 3/29/20 at 09:00;  Stop 3/29/20 at 09:01;  Status DC


Piperacillin Sod/ Tazobactam Sod (Zosyn Per Pharmacy) 1 each PRN DAILY  PRN MC 

SEE COMMENTS;  Start 3/29/20 at 13:00


Piperacillin Sod/ Tazobactam Sod 2.25 gm/Sodium Chloride 50 ml @  100 mls/hr 

Q6HRS IV  Last administered on 3/30/20at 05:32;  Start 3/29/20 at 14:00;  Stop 

3/30/20 at 16:05;  Status DC


Dextrose (Dextrose 50%-Water Syringe) 12.5 gm PRN Q15MIN  PRN IV SEE COMMENTS 

Last administered on 3/30/20at 07:56;  Start 3/29/20 at 21:15


Dextrose (Iv Dextrose 5%) 250 ml PRN Q15MIN  PRN IV SEE COMMENTS;  Start 3/29/20

at 21:15


Dextrose (Dextrose 50%-Water Syringe) 25 gm STK-MED ONCE IV ;  Start 3/29/20 at 

21:19;  Stop 3/29/20 at 21:19;  Status DC


Furosemide (Lasix) 60 mg DAILY IVP  Last administered on 4/5/20at 08:55;  Start 

3/30/20 at 12:30


Lidocaine HCl (Buffered Lidocaine 1%) 3 ml STK-MED ONCE .ROUTE ;  Start 3/30/20 

at 12:36;  Stop 3/30/20 at 12:36;  Status DC


Lidocaine HCl (Buffered Lidocaine 1%) 6 ml 1X  ONCE INJ  Last administered on 

3/30/20at 12:56;  Start 3/30/20 at 12:45;  Stop 3/30/20 at 12:46;  Status DC


Sodium Chloride 1,000 ml @  1,000 mls/hr Q1H PRN IV hypotension;  Start 3/30/20 

at 13:11;  Stop 3/30/20 at 19:10;  Status DC


Albumin Human 200 ml @  200 mls/hr 1X PRN  PRN IV Hypotension;  Start 3/30/20 at

13:15;  Stop 3/30/20 at 19:14;  Status DC


Diphenhydramine HCl (Benadryl) 25 mg 1X PRN  PRN IV ITCHING;  Start 3/30/20 at 

13:15;  Stop 3/31/20 at 13:14;  Status DC


Diphenhydramine HCl (Benadryl) 25 mg 1X PRN  PRN IV ITCHING;  Start 3/30/20 at 

13:15;  Stop 3/31/20 at 13:14;  Status DC


Sodium Chloride 1,000 ml @  400 mls/hr Q2H30M PRN IV PATENCY;  Start 3/30/20 at 

13:11;  Stop 3/31/20 at 01:10;  Status DC


Info (PHARMACY MONITORING -- do not chart) 1 each PRN DAILY  PRN MC SEE COMMEN

TS;  Start 3/30/20 at 13:15;  Stop 4/3/20 at 16:02;  Status DC


Magnesium Sulfate 50 ml @ 25 mls/hr 1X  ONCE IV  Last administered on 3/30/20at 

19:21;  Start 3/30/20 at 15:45;  Stop 3/30/20 at 17:44;  Status DC


Aspirin (Ecotrin) 81 mg DAILYWBKFT PO  Last administered on 4/5/20at 08:54;  

Start 3/31/20 at 08:00


Piperacillin Sod/ Tazobactam Sod 2.25 gm/Sodium Chloride 50 ml @  100 mls/hr 

Q8HRS IV  Last administered on 4/5/20at 15:15;  Start 3/30/20 at 17:00


Acetaminophen/ Hydrocodone Bitart (Lortab 5/325) 1 tab PRN Q6HRS  PRN PO 

MODERATE PAIN 4-6 Last administered on 4/5/20at 14:42;  Start 3/30/20 at 22:00


Sodium Chloride 1,000 ml @  1,000 mls/hr Q1H PRN IV hypotension;  Start 3/31/20 

at 09:55;  Stop 3/31/20 at 15:54;  Status DC


Albumin Human 200 ml @  200 mls/hr 1X PRN  PRN IV Hypotension Last administered 

on 3/31/20at 10:48;  Start 3/31/20 at 10:00;  Stop 3/31/20 at 15:59;  Status DC


Sodium Chloride 1,000 ml @  400 mls/hr Q2H30M PRN IV PATENCY;  Start 3/31/20 at 

09:55;  Stop 3/31/20 at 21:54;  Status DC


Info (PHARMACY MONITORING -- do not chart) 1 each PRN DAILY  PRN MC SEE 

COMMENTS;  Start 3/31/20 at 10:00;  Status UNV


Info (PHARMACY MONITORING -- do not chart) 1 each PRN DAILY  PRN MC SEE 

COMMENTS;  Start 3/31/20 at 10:00;  Status UNV


Iron Sucrose 500 mg/Sodium Chloride 275 ml @  78.571 mls/ hr 1X  ONCE IV  Last 

administered on 3/31/20at 16:08;  Start 3/31/20 at 13:00;  Stop 3/31/20 at 

16:29;  Status DC


Lidocaine/ Epinephrine (LIDOCAINE 1%-EPI 1:100,000 Multi-Dose) 20 ml STK-MED 

ONCE .ROUTE ;  Start 3/31/20 at 12:29;  Stop 3/31/20 at 12:30;  Status DC


Midazolam HCl (Versed) 2 mg STK-MED ONCE .ROUTE ;  Start 3/31/20 at 12:34;  Stop

3/31/20 at 12:34;  Status DC


Fentanyl Citrate (Fentanyl 2ml Vial) 100 mcg STK-MED ONCE .ROUTE ;  Start 

3/31/20 at 12:34;  Stop 3/31/20 at 12:34;  Status DC


Midazolam HCl (Versed) 2 mg 1X  ONCE IV  Last administered on 3/31/20at 13:08;  

Start 3/31/20 at 12:45;  Stop 3/31/20 at 12:47;  Status DC


Fentanyl Citrate (Fentanyl 2ml Vial) 100 mcg 1X  ONCE IV  Last administered on 

3/31/20at 13:08;  Start 3/31/20 at 12:45;  Stop 3/31/20 at 12:47;  Status DC


Lidocaine/ Epinephrine (LIDOCAINE 1%-EPI 1:100,000 Multi-Dose) 20 ml 1X  ONCE SQ

 Last administered on 3/31/20at 13:10;  Start 3/31/20 at 12:45;  Stop 3/31/20 at

12:47;  Status DC


Lactobacillus Rhamnosus (Culturelle) 1 cap BID PO  Last administered on 4/5/20at

08:54;  Start 3/31/20 at 21:00


Albuterol Sulfate (Ventolin Neb Soln) 2.5 mg RTQID NEB  Last administered on 

4/5/20at 12:34;  Start 4/1/20 at 12:00


Darbepoetin Castro (ARANESP for DIALYSIS PTS) 60 mcg WEEKLYHS SQ  Last 

administered on 4/1/20at 22:02;  Start 4/1/20 at 21:00


Sodium Chloride 1,000 ml @  1,000 mls/hr Q1H PRN IV hypotension;  Start 4/1/20 

at 14:02;  Stop 4/1/20 at 20:01;  Status DC


Sodium Chloride (Normal Saline Flush) 10 ml 1X PRN  PRN IV AP catheter pack;  

Start 4/1/20 at 14:15;  Stop 4/2/20 at 14:14;  Status DC


Sodium Chloride (Normal Saline Flush) 10 ml 1X PRN  PRN IV  catheter pack;  

Start 4/1/20 at 14:15;  Stop 4/2/20 at 14:14;  Status DC


Info (PHARMACY MONITORING -- do not chart) 1 each PRN DAILY  PRN MC SEE 

COMMENTS;  Start 4/1/20 at 14:15;  Status UNV


Info (PHARMACY MONITORING -- do not chart) 1 each PRN DAILY  PRN MC SEE 

COMMENTS;  Start 4/1/20 at 14:15;  Stop 4/3/20 at 16:03;  Status DC


Multivitamins (Thera M Plus) 1 tab DAILY PO  Last administered on 4/5/20at 

08:54;  Start 4/2/20 at 09:00


Ascorbic Acid (Vitamin C) 500 mg DAILY PO  Last administered on 4/5/20at 08:54; 

Start 4/2/20 at 09:00


Sodium Chloride 1,000 ml @  1,000 mls/hr Q1H PRN IV hypotension;  Start 4/3/20 

at 08:11;  Stop 4/3/20 at 14:10;  Status DC


Albumin Human 200 ml @  200 mls/hr 1X PRN  PRN IV Hypotension;  Start 4/3/20 at 

08:15;  Stop 4/3/20 at 14:14;  Status DC


Acetaminophen (Tylenol) 500 mg 1X PRN  PRN PO MILD PAIN / TEMP;  Start 4/3/20 at

08:15;  Stop 4/4/20 at 08:14;  Status DC


Diphenhydramine HCl (Benadryl) 25 mg 1X PRN  PRN IV ITCHING;  Start 4/3/20 at 

08:15;  Stop 4/4/20 at 08:14;  Status DC


Diphenhydramine HCl (Benadryl) 25 mg 1X PRN  PRN IV ITCHING;  Start 4/3/20 at 

08:15;  Stop 4/4/20 at 08:14;  Status DC


Sodium Chloride 1,000 ml @  400 mls/hr Q2H30M PRN IV PATENCY;  Start 4/3/20 at 

08:11;  Stop 4/3/20 at 20:10;  Status DC


Info (PHARMACY MONITORING -- do not chart) 1 each PRN DAILY  PRN MC SEE 

COMMENTS;  Start 4/3/20 at 08:15


Insulin Glargine (Lantus Syringe) 40 unit DAILY SQ  Last administered on 

4/4/20at 13:48;  Start 4/4/20 at 09:00


Sodium Chloride 1,000 ml @  1,000 mls/hr Q1H PRN IV hypotension;  Start 4/4/20 

at 07:40;  Stop 4/4/20 at 13:39;  Status DC


Albumin Human 200 ml @  200 mls/hr 1X PRN  PRN IV Hypotension;  Start 4/4/20 at 

07:45;  Stop 4/4/20 at 13:44;  Status DC


Acetaminophen (Tylenol) 500 mg 1X PRN  PRN PO MILD PAIN / TEMP;  Start 4/4/20 at

07:45;  Stop 4/5/20 at 07:44;  Status DC


Diphenhydramine HCl (Benadryl) 25 mg 1X PRN  PRN IV ITCHING;  Start 4/4/20 at 

07:45;  Stop 4/5/20 at 07:44;  Status DC


Diphenhydramine HCl (Benadryl) 25 mg 1X PRN  PRN IV ITCHING;  Start 4/4/20 at 

07:45;  Stop 4/5/20 at 07:44;  Status DC


Sodium Chloride (Normal Saline Flush) 10 ml 1X PRN  PRN IV AP catheter pack;  

Start 4/4/20 at 07:45;  Stop 4/5/20 at 07:44;  Status DC


Sodium Chloride (Normal Saline Flush) 10 ml 1X PRN  PRN IV  catheter pack;  

Start 4/4/20 at 07:45;  Stop 4/5/20 at 07:44;  Status DC


Sodium Chloride 1,000 ml @  400 mls/hr Q2H30M PRN IV PATENCY;  Start 4/4/20 at 

07:40;  Stop 4/4/20 at 19:39;  Status DC


Info (PHARMACY MONITORING -- do not chart) 1 each PRN DAILY  PRN MC SEE 

COMMENTS;  Start 4/4/20 at 07:45





Active Scripts


Active


Reported


Metformin Hcl Er (Metformin Hcl) 1,000 Mg Tab.er.24 1,000 Mg PO BIDWMEALS


Lisinopril-Hctz 20-12.5 Mg Tab (Lisinopril/Hydrochlorothiazide) 1 Each Tablet 1 

Tab PO DAILY


Glipizide Er (Glipizide) 10 Mg Tab.er.24 10 Mg PO BIDWMEALS


Xalatan (Latanoprost) 2.5 Ml Drops 1 Drop OP HS


Triamcinolone Acetonide 0.1% Cream (Triamcinolone Acetonide) 15 Gm Cream..g. 1 

Ashley TP BID


Tramadol Hcl 50 Mg Tablet 50 Mg PO DAILY PRN


Flonase Allergy Relief (Fluticasone Propionate) 9.9 Ml Spray.susp 2 Sprays NS 

DAILY


Novolin N (Nph, Human Insulin Isophane) 100 Unit/1 Ml Vial 40 Unit SQ DAILYWBKFT


Carvedilol ** (Carvedilol) 6.25 Mg Tablet 6.25 Mg PO BIDWMEALS


Simvastatin 40 Mg Tablet 1 Tab PO QHS


Furosemide 20 Mg Tablet 1 Tab PO DAILY


Vitals/I & O





Vital Sign - Last 24 Hours








 4/4/20 4/4/20 4/4/20 4/4/20





 16:34 19:11 19:29 20:03


 


Temp   98.7 





   98.7 


 


Pulse   73 


 


Resp   16 


 


B/P (MAP)   120/88 (99) 


 


Pulse Ox 94  97 95


 


O2 Delivery Venturi Mask Venturi Mask Venturi Mask Venturi Mask


 


O2 Flow Rate 12.0 12.0  12.0


 


    





    





 4/4/20 4/5/20 4/5/20 4/5/20





 22:30 02:50 07:00 08:00


 


Temp 98.5 98.7 98.5 





 98.5 98.7 98.5 


 


Pulse 73 73 92 


 


Resp 16 16 19 


 


B/P (MAP) 118/54 (75) 141/69 (93) 127/56 (79) 


 


Pulse Ox 97 97 95 


 


O2 Delivery Venturi Mask Venturi Mask Ventilator Nasal Cannula


 


O2 Flow Rate    4.0


 


    





    





 4/5/20 4/5/20 4/5/20 4/5/20





 11:00 12:32 14:42 15:00


 


Temp 98.2   98.0





 98.2   98.0


 


Pulse 85   100


 


Resp 19  18 19


 


B/P (MAP) 99/41 (60)   90/47 (61)


 


Pulse Ox 93 93 93 93


 


O2 Delivery Nasal Cannula Nasal Cannula Nasal Cannula Nasal Cannula


 


O2 Flow Rate 4.0 5.0 5.0 5.0


 


    





    





 4/5/20   





 15:42   


 


Pulse Ox 93   


 


O2 Delivery Nasal Cannula   


 


O2 Flow Rate 5.0   














Intake and Output   


 


 4/4/20 4/4/20 4/5/20





 15:00 23:00 07:00


 


Intake Total  100 ml 50 ml


 


Output Total  200 ml 100 ml


 


Balance  -100 ml -50 ml

















JEB ALVAREZ MD              Apr 5, 2020 16:10

## 2020-04-05 NOTE — PDOC
SUBJECTIVE


ROS


New onset ESRD





Patient time she is feeling much better today. She was able to be weaned from a 

Ventimask to nasal cannula oxygen and appears to be oxygenating well with the 

same. She did have IV access issues yesterday this appears to have been 

addressed. She does not know when was the last time she walked independently on 

her own. She is reported to have significant weakness and is unable to stand for

a PA and lateral chest x-ray. Orthostasis is not documented





CVS:   no Orthopnea, no CP


RESP:   Improved SOB, no HATCH


GI:   no Nausea, no Vomiting


:   no Dysuria, no Urgency





OBJECTIVE


Vital Signs





Vital Signs








  Date Time  Temp Pulse Resp B/P (MAP) Pulse Ox O2 Delivery O2 Flow Rate FiO2


 


4/5/20 08:00      Nasal Cannula 4.0 


 


4/5/20 07:00 98.5 92 19 127/56 (79) 95   





 98.5       








I & 0











Intake and Output 


 


 4/5/20





 07:00


 


Intake Total 150 ml


 


Output Total 300 ml


 


Balance -150 ml


 


 


 


Intake Oral 150 ml


 


Output Urine Total 300 ml


 


# Bowel Movements 1











PHYSICAL EXAM


Physical Exam


GEN:    Awake, Oriented x 2-3 , In no visible resp distress, on nasal cannula 

oxygen


EYES:  Vision Unchanged, Conjunctiva Normal


EN:      No EN Drainage, Mucous Membranes moist


NECK:  no JVD, min JVP, Supple, no Thyromegaly


CVS:    S1S2, ? Murmur, No Gallop, No Rub,tr Edema


RESP:  no Rales, no Rhonchi,no Acc. Muscle Use


GI:        BS + ve, NO Bruit, Non Tender, Non Distended him a somewhat obese


:      no CVA tenderness, no Suprapubic Tenderness








DIAGNOSIS/ASSESSMENT


Assessment & Plan


ESRD:      Current fluid and E-lyte status does not necessitate emergent need 

for dialysis.  Will re-evaluate for dialysis in the am and continue on Monday Wednesday Friday schedule.





Room air hypoxemia: Patient remains on a nasal cannula oxygen at 4 L at this 

time. She does appear to use 2-3 L nasal cannula as an outpatient as reported.  

chest x-ray does not show any significant worsening for fluid 

status/infiltrates.





Possible pneumonia: Patient remains on antibiotics. We'll await ID 

recommendations with regards to transitioning to oral antibiotics versus need 

for near-term central access for the same





ANEMIA (iron deficiency as well as CKD associated); . Aranap as ordered,  

Transfuse  with next HD as needed





HTN:   Current BP meds as reviewed.  See orders for changes.





BONE & MINERAL: Follow phosphorus levels and add/alter binder regimen if needed.





Potentially nearing point of discharge





Discussed Plan of Care with patient and nurse at bedside





COMMENT/RELEVANT DATA


Meds





Current Medications








 Medications


  (Trade)  Dose


 Ordered  Sig/Eva  Start Time


 Stop Time Status Last Admin


Dose Admin


 


 Acetaminophen


  (Tylenol)  500 mg  1X PRN  PRN  4/4/20 07:45


 4/5/20 07:44 DC  





 


 Acetaminophen/


 Hydrocodone Bitart


  (Lortab 5/325)  1 tab  PRN Q6HRS  PRN  3/30/20 22:00


    4/1/20 08:30


1 TAB


 


 Albumin Human  200 ml @ 


 200 mls/hr  1X PRN  PRN  4/4/20 07:45


 4/4/20 13:44 DC  





 


 Albuterol Sulfate


  (Ventolin Neb


 Soln)  2.5 mg  RTQID  4/1/20 12:00


    4/5/20 08:20


2.5 MG


 


 Ascorbic Acid


  (Vitamin C)  500 mg  DAILY  4/2/20 09:00


    4/5/20 08:54


500 MG


 


 Aspirin


  (Ecotrin)  81 mg  DAILYWBKFT  3/31/20 08:00


    4/5/20 08:54


81 MG


 


 Calcium Gluconate


  (Calcium


 Gluconate)  1,000 mg  1X  ONCE  3/28/20 18:00


 3/28/20 18:01 DC 3/28/20 18:24


1,000 MG


 


 Darbepoetin Castro


  (ARANESP for


 DIALYSIS PTS)  60 mcg  WEEKLYHS  4/1/20 21:00


    4/1/20 22:02


60 MCG


 


 Dextrose


  (Dextrose


 50%-Water Syringe)  25 gm  STK-MED ONCE  3/29/20 21:19


 3/29/20 21:19 DC  





 


 Dextrose


  (Iv Dextrose 5%)  250 ml  PRN Q15MIN  PRN  3/29/20 21:15


     





 


 Diphenhydramine


 HCl


  (Benadryl)  25 mg  1X PRN  PRN  4/4/20 07:45


 4/5/20 07:44 DC  





 


 Enoxaparin Sodium


  (Lovenox 120mg


 Syringe)  120 mg  1X  ONCE  3/28/20 19:30


 3/28/20 19:31 DC 3/28/20 20:05


120 MG


 


 Fentanyl Citrate


  (Fentanyl 2ml


 Vial)  100 mcg  1X  ONCE  3/31/20 12:45


 3/31/20 12:47 DC 3/31/20 13:08


50 MCG


 


 Furosemide


  (Lasix)  60 mg  DAILY  3/30/20 12:30


    4/5/20 08:55


60 MG


 


 Info


  (PHARMACY


 MONITORING -- do


 not chart)  1 each  PRN DAILY  PRN  4/4/20 07:45


     





 


 Insulin Glargine


  (Lantus Syringe)  40 unit  DAILY  4/4/20 09:00


    4/4/20 13:48


40 UNIT


 


 Iron Sucrose 500


 mg/Sodium Chloride  275 ml @ 


 78.571 mls/


 hr  1X  ONCE  3/31/20 13:00


 3/31/20 16:29 DC 3/31/20 16:08


78.571 MLS/HR


 


 Lactobacillus


 Rhamnosus


  (Culturelle)  1 cap  BID  3/31/20 21:00


    4/5/20 08:54


1 CAP


 


 Lidocaine HCl


  (Buffered


 Lidocaine 1%)  6 ml  1X  ONCE  3/30/20 12:45


 3/30/20 12:46 DC 3/30/20 12:56


3 ML


 


 Lidocaine/


 Epinephrine


  (LIDOCAINE


 1%-EPI 1:100,000


 Multi-Dose)  20 ml  1X  ONCE  3/31/20 12:45


 3/31/20 12:47 DC 3/31/20 13:10


10 ML


 


 Magnesium Sulfate  50 ml @ 25


 mls/hr  1X  ONCE  3/30/20 15:45


 3/30/20 17:44 DC 3/30/20 19:21


25 MLS/HR


 


 Midazolam HCl


  (Versed)  2 mg  1X  ONCE  3/31/20 12:45


 3/31/20 12:47 DC 3/31/20 13:08


1 MG


 


 Multivitamins


  (Thera M Plus)  1 tab  DAILY  4/2/20 09:00


    4/5/20 08:54


1 TAB


 


 Piperacillin Sod/


 Tazobactam Sod


  (Zosyn Per


 Pharmacy)  1 each  PRN DAILY  PRN  3/29/20 13:00


     





 


 Piperacillin Sod/


 Tazobactam Sod


 2.25 gm/Sodium


 Chloride  50 ml @ 


 100 mls/hr  Q8HRS  3/30/20 17:00


    4/5/20 05:40


100 MLS/HR


 


 Sodium


 Polystyrene


 Sulfonate


  (Kayexalate)  15 gm  1X  ONCE  3/29/20 09:00


 3/29/20 09:01 DC 3/29/20 09:24


15 GM


 


 Sodium Chloride  1,000 ml @ 


 400 mls/hr  Q2H30M PRN  4/4/20 07:40


 4/4/20 19:39 DC  





 


 Sodium Chloride


  (Normal Saline


 Flush)  10 ml  1X PRN  PRN  4/4/20 07:45


 4/5/20 07:44 DC  





 


 Vancomycin HCl  250 ml @ 


 166.667


 mls/hr  1X  ONCE  3/28/20 17:45


 3/28/20 19:14 DC 3/28/20 18:24


166.667 MLS/HR








Lab





Laboratory Tests








Test


 4/4/20


14:35 4/4/20


17:31 4/4/20


20:28 4/5/20


07:49


 


White Blood Count


 7.9 x10^3/uL


(4.0-11.0) 


 


 





 


Red Blood Count


 3.89 x10^6/uL


(3.50-5.40) 


 


 





 


Hemoglobin


 10.4 g/dL


(12.0-15.5) 


 


 





 


Hematocrit


 33.7 %


(36.0-47.0) 


 


 





 


Mean Corpuscular Volume 87 fL ()    


 


Mean Corpuscular Hemoglobin 27 pg (25-35)    


 


Mean Corpuscular Hemoglobin


Concent 31 g/dL


(31-37) 


 


 





 


Red Cell Distribution Width


 18.2 %


(11.5-14.5) 


 


 





 


Platelet Count


 287 x10^3/uL


(140-400) 


 


 





 


Neutrophils (%) (Auto) 81 % (31-73)    


 


Lymphocytes (%) (Auto) 8 % (24-48)    


 


Monocytes (%) (Auto) 9 % (0-9)    


 


Eosinophils (%) (Auto) 1 % (0-3)    


 


Basophils (%) (Auto) 1 % (0-3)    


 


Neutrophils # (Auto)


 6.4 x10^3/uL


(1.8-7.7) 


 


 





 


Lymphocytes # (Auto)


 0.6 x10^3/uL


(1.0-4.8) 


 


 





 


Monocytes # (Auto)


 0.7 x10^3/uL


(0.0-1.1) 


 


 





 


Eosinophils # (Auto)


 0.1 x10^3/uL


(0.0-0.7) 


 


 





 


Basophils # (Auto)


 0.1 x10^3/uL


(0.0-0.2) 


 


 





 


Sodium Level


 138 mmol/L


(136-145) 


 


 





 


Potassium Level


 3.9 mmol/L


(3.5-5.1) 


 


 





 


Chloride Level


 100 mmol/L


() 


 


 





 


Carbon Dioxide Level


 33 mmol/L


(21-32) 


 


 





 


Anion Gap 5 (6-14)    


 


Blood Urea Nitrogen


 10 mg/dL


(7-20) 


 


 





 


Creatinine


 2.0 mg/dL


(0.6-1.0) 


 


 





 


Estimated GFR


(Cockcroft-Gault) 24.2 


 


 


 





 


Glucose Level


 200 mg/dL


(70-99) 


 


 





 


Calcium Level


 8.0 mg/dL


(8.5-10.1) 


 


 





 


Phosphorus Level


 2.3 mg/dL


(2.6-4.7) 


 


 





 


Albumin


 2.0 g/dL


(3.4-5.0) 


 


 





 


Glucose (Fingerstick)


 


 182 mg/dL


(70-99) 184 mg/dL


(70-99) 68 mg/dL


(70-99)


 


Test


 4/5/20


08:24 


 


 





 


Glucose (Fingerstick)


 118 mg/dL


(70-99) 


 


 











Results


All relevant outside records, renal labs, imaging studies, telemetry/EKG's were 

reviewed.


Other


Chest x-ray from 4/4/20 (post dialysis)





HISTORY: Persistent hypoxia


 


AP view was taken of the chest. Right dialysis catheter is unchanged. 


Patient has a reversed total shoulder prosthesis on the right. There is a 


left pleural effusion. There is left basilar atelectasis or infiltrate. 


Pattern is similar to the study from March 31. There is still mild 


vascular congestion.


 


IMPRESSION:


1. Little change from the prior study.











TIAGO JENKINS MD                Apr 5, 2020 10:20

## 2020-04-06 VITALS — DIASTOLIC BLOOD PRESSURE: 50 MMHG | SYSTOLIC BLOOD PRESSURE: 119 MMHG

## 2020-04-06 VITALS — DIASTOLIC BLOOD PRESSURE: 51 MMHG | SYSTOLIC BLOOD PRESSURE: 116 MMHG

## 2020-04-06 VITALS — SYSTOLIC BLOOD PRESSURE: 123 MMHG | DIASTOLIC BLOOD PRESSURE: 48 MMHG

## 2020-04-06 VITALS — SYSTOLIC BLOOD PRESSURE: 129 MMHG | DIASTOLIC BLOOD PRESSURE: 54 MMHG

## 2020-04-06 VITALS — SYSTOLIC BLOOD PRESSURE: 120 MMHG | DIASTOLIC BLOOD PRESSURE: 54 MMHG

## 2020-04-06 VITALS — DIASTOLIC BLOOD PRESSURE: 71 MMHG | SYSTOLIC BLOOD PRESSURE: 129 MMHG

## 2020-04-06 LAB
ANION GAP SERPL CALC-SCNC: 9 MMOL/L (ref 6–14)
BASOPHILS # BLD AUTO: 0.1 X10^3/UL (ref 0–0.2)
BASOPHILS NFR BLD: 1 % (ref 0–3)
BUN SERPL-MCNC: 22 MG/DL (ref 7–20)
CALCIUM SERPL-MCNC: 8.5 MG/DL (ref 8.5–10.1)
CHLORIDE SERPL-SCNC: 100 MMOL/L (ref 98–107)
CO2 SERPL-SCNC: 30 MMOL/L (ref 21–32)
CREAT SERPL-MCNC: 4.2 MG/DL (ref 0.6–1)
EOSINOPHIL NFR BLD: 0.2 X10^3/UL (ref 0–0.7)
EOSINOPHIL NFR BLD: 2 % (ref 0–3)
ERYTHROCYTE [DISTWIDTH] IN BLOOD BY AUTOMATED COUNT: 18.5 % (ref 11.5–14.5)
GFR SERPLBLD BASED ON 1.73 SQ M-ARVRAT: 10.3 ML/MIN
GLUCOSE SERPL-MCNC: 198 MG/DL (ref 70–99)
HCT VFR BLD CALC: 34.7 % (ref 36–47)
HGB BLD-MCNC: 10.5 G/DL (ref 12–15.5)
LYMPHOCYTES # BLD: 0.9 X10^3/UL (ref 1–4.8)
LYMPHOCYTES NFR BLD AUTO: 13 % (ref 24–48)
MCH RBC QN AUTO: 27 PG (ref 25–35)
MCHC RBC AUTO-ENTMCNC: 30 G/DL (ref 31–37)
MCV RBC AUTO: 88 FL (ref 79–100)
MONO #: 0.7 X10^3/UL (ref 0–1.1)
MONOCYTES NFR BLD: 10 % (ref 0–9)
NEUT #: 5.1 X10^3/UL (ref 1.8–7.7)
NEUTROPHILS NFR BLD AUTO: 73 % (ref 31–73)
PLATELET # BLD AUTO: 302 X10^3/UL (ref 140–400)
POTASSIUM SERPL-SCNC: 3.9 MMOL/L (ref 3.5–5.1)
RBC # BLD AUTO: 3.93 X10^6/UL (ref 3.5–5.4)
SODIUM SERPL-SCNC: 139 MMOL/L (ref 136–145)
WBC # BLD AUTO: 7 X10^3/UL (ref 4–11)

## 2020-04-06 PROCEDURE — 5A1D70Z PERFORMANCE OF URINARY FILTRATION, INTERMITTENT, LESS THAN 6 HOURS PER DAY: ICD-10-PCS | Performed by: INTERNAL MEDICINE

## 2020-04-06 RX ADMIN — Medication SCH CAP: at 20:39

## 2020-04-06 RX ADMIN — ALBUTEROL SULFATE SCH MG: 108 AEROSOL, METERED RESPIRATORY (INHALATION) at 16:55

## 2020-04-06 RX ADMIN — AMOXICILLIN AND CLAVULANATE POTASSIUM SCH TAB: 500; 125 TABLET, FILM COATED ORAL at 14:10

## 2020-04-06 RX ADMIN — ASPIRIN SCH MG: 81 TABLET, COATED ORAL at 14:10

## 2020-04-06 RX ADMIN — PIPERACILLIN SODIUM AND TAZOBACTAM SODIUM SCH MLS/HR: 2; .25 INJECTION, POWDER, LYOPHILIZED, FOR SOLUTION INTRAVENOUS at 04:59

## 2020-04-06 RX ADMIN — Medication SCH CAP: at 14:11

## 2020-04-06 RX ADMIN — ALBUTEROL SULFATE SCH MG: 108 AEROSOL, METERED RESPIRATORY (INHALATION) at 20:04

## 2020-04-06 RX ADMIN — ALBUTEROL SULFATE SCH MG: 108 AEROSOL, METERED RESPIRATORY (INHALATION) at 11:59

## 2020-04-06 RX ADMIN — AMOXICILLIN AND CLAVULANATE POTASSIUM SCH TAB: 500; 125 TABLET, FILM COATED ORAL at 20:39

## 2020-04-06 RX ADMIN — OXYCODONE HYDROCHLORIDE AND ACETAMINOPHEN SCH MG: 500 TABLET ORAL at 14:11

## 2020-04-06 RX ADMIN — MULTIPLE VITAMINS W/ MINERALS TAB SCH TAB: TAB at 14:14

## 2020-04-06 RX ADMIN — FUROSEMIDE SCH MG: 10 INJECTION, SOLUTION INTRAMUSCULAR; INTRAVENOUS at 14:10

## 2020-04-06 RX ADMIN — HYDROCODONE BITARTRATE AND ACETAMINOPHEN PRN TAB: 5; 325 TABLET ORAL at 20:40

## 2020-04-06 RX ADMIN — INSULIN GLARGINE SCH UNIT: 100 INJECTION, SOLUTION SUBCUTANEOUS at 09:00

## 2020-04-06 RX ADMIN — ALBUTEROL SULFATE SCH MG: 108 AEROSOL, METERED RESPIRATORY (INHALATION) at 07:14

## 2020-04-06 NOTE — PDOC
PROGRESS NOTES


Chief Complaint


Chief Complaint


Acute on chronic kidney injury, 


hyperkalemia, resolved


hyponatremia resolved


azotemia, imrpoved 


acute on chronic systolic and diastolic heart failure,


elevated troponin 


anemia and lower extremity wounds.  // Lower extremity cellulitis. 


Left fifth phalangeal changes on x-ray, likely secondary to the trauma and 

healing fracture,





follow Nephrology consultant recommendations


follow Cardiology. recommendations


Hold ACE inhibitor.  


IV antibiotics.  


PT, OT.  


Wound care nurse to evaluate and treat.  


DVT prophylaxis.  


Full code.  


Prognosis //guarded.


temp dialysis cath placement  3/30


OP HD SET UP FOR MWF


Ultrasound and fluoroscopic guided placement of right internal jugular temporary

dialysis catheter


NEEDS TDC AND OP HD SET UP


Dialysis as per consultant





History of Present Illness


History of Present Illness


No acute events reported overnight, case discussed with nursing staff patient in

no acute distress no complaints during my visit most likely will require 

placement





Vitals


Vitals





Vital Signs








  Date Time  Temp Pulse Resp B/P (MAP) Pulse Ox O2 Delivery O2 Flow Rate FiO2


 


4/6/20 07:15     93 Nasal Cannula 5.0 


 


4/6/20 07:00 98.3 98 18 129/71 (90)    





 98.3       











Physical Exam


Physical Exam


GENERAL:  Propped up in bed, alert in NAD 


HEENT:  Oral cavity pink, no lesions 


NECK:  Supple


LUNGS:  Clear.


HEART:  S1, S2 regular.


ABDOMEN:  Obese, soft and nontender. 


: Venegas in place 


EXTREMITIES:  Bilateral lower extremity pitting edema and excoriation of both 

the legs with superficial ulcerations.  Foot has no


wound at the fifth metatarsal or phalangeal area bilaterally.  


The patient appears to have puncture wound with slight dry crackly skin into the

actually calcaneal area.  Dorsalis pedis is palpable bilaterally.


NEUROLOGIC: Alert, awake and appropriate.  No focal neurologic deficit.


Tunneled HD catheter (3/31) without signs of infection


PIV ok


General:  Alert, Oriented X3, Cooperative, No acute distress, mild distress


Heart:  Regular rate


Lungs:  Clear


Abdomen:  Normal bowel sounds, No tenderness


Extremities:  No clubbing, No cyanosis





Labs


LABS





Laboratory Tests








Test


 4/5/20


11:42 4/5/20


16:35 4/5/20


20:46 4/6/20


06:00


 


Glucose (Fingerstick)


 119 mg/dL


(70-99) 268 mg/dL


(70-99) 271 mg/dL


(70-99) 





 


White Blood Count


 


 


 


 7.0 x10^3/uL


(4.0-11.0)


 


Red Blood Count


 


 


 


 3.93 x10^6/uL


(3.50-5.40)


 


Hemoglobin


 


 


 


 10.5 g/dL


(12.0-15.5)


 


Hematocrit


 


 


 


 34.7 %


(36.0-47.0)


 


Mean Corpuscular Volume    88 fL () 


 


Mean Corpuscular Hemoglobin    27 pg (25-35) 


 


Mean Corpuscular Hemoglobin


Concent 


 


 


 30 g/dL


(31-37)


 


Red Cell Distribution Width


 


 


 


 18.5 %


(11.5-14.5)


 


Platelet Count


 


 


 


 302 x10^3/uL


(140-400)


 


Neutrophils (%) (Auto)    73 % (31-73) 


 


Lymphocytes (%) (Auto)    13 % (24-48) 


 


Monocytes (%) (Auto)    10 % (0-9) 


 


Eosinophils (%) (Auto)    2 % (0-3) 


 


Basophils (%) (Auto)    1 % (0-3) 


 


Neutrophils # (Auto)


 


 


 


 5.1 x10^3/uL


(1.8-7.7)


 


Lymphocytes # (Auto)


 


 


 


 0.9 x10^3/uL


(1.0-4.8)


 


Monocytes # (Auto)


 


 


 


 0.7 x10^3/uL


(0.0-1.1)


 


Eosinophils # (Auto)


 


 


 


 0.2 x10^3/uL


(0.0-0.7)


 


Basophils # (Auto)


 


 


 


 0.1 x10^3/uL


(0.0-0.2)


 


Sodium Level


 


 


 


 139 mmol/L


(136-145)


 


Potassium Level


 


 


 


 3.9 mmol/L


(3.5-5.1)


 


Chloride Level


 


 


 


 100 mmol/L


()


 


Carbon Dioxide Level


 


 


 


 30 mmol/L


(21-32)


 


Anion Gap    9 (6-14) 


 


Blood Urea Nitrogen


 


 


 


 22 mg/dL


(7-20)


 


Creatinine


 


 


 


 4.2 mg/dL


(0.6-1.0)


 


Estimated GFR


(Cockcroft-Gault) 


 


 


 10.3 





 


Glucose Level


 


 


 


 198 mg/dL


(70-99)


 


Calcium Level


 


 


 


 8.5 mg/dL


(8.5-10.1)


 


Test


 4/6/20


07:26 


 


 





 


Glucose (Fingerstick)


 195 mg/dL


(70-99) 


 


 














Assessment and Plan


Assessmemt and Plan


Problems


Medical Problems:


(1) Cellulitis


Status: Acute  





(2) CHF (congestive heart failure)


Status: Acute  





(3) Elevated troponin


Status: Acute  





(4) Shortness of breath


Status: Acute  











Comment


Review of Relevant


I have reviewed the following items rizwan (where applicable) has been applied.


Labs





Laboratory Tests








Test


 4/4/20


14:35 4/4/20


17:31 4/4/20


20:28 4/5/20


07:49


 


White Blood Count


 7.9 x10^3/uL


(4.0-11.0) 


 


 





 


Red Blood Count


 3.89 x10^6/uL


(3.50-5.40) 


 


 





 


Hemoglobin


 10.4 g/dL


(12.0-15.5) 


 


 





 


Hematocrit


 33.7 %


(36.0-47.0) 


 


 





 


Mean Corpuscular Volume 87 fL ()    


 


Mean Corpuscular Hemoglobin 27 pg (25-35)    


 


Mean Corpuscular Hemoglobin


Concent 31 g/dL


(31-37) 


 


 





 


Red Cell Distribution Width


 18.2 %


(11.5-14.5) 


 


 





 


Platelet Count


 287 x10^3/uL


(140-400) 


 


 





 


Neutrophils (%) (Auto) 81 % (31-73)    


 


Lymphocytes (%) (Auto) 8 % (24-48)    


 


Monocytes (%) (Auto) 9 % (0-9)    


 


Eosinophils (%) (Auto) 1 % (0-3)    


 


Basophils (%) (Auto) 1 % (0-3)    


 


Neutrophils # (Auto)


 6.4 x10^3/uL


(1.8-7.7) 


 


 





 


Lymphocytes # (Auto)


 0.6 x10^3/uL


(1.0-4.8) 


 


 





 


Monocytes # (Auto)


 0.7 x10^3/uL


(0.0-1.1) 


 


 





 


Eosinophils # (Auto)


 0.1 x10^3/uL


(0.0-0.7) 


 


 





 


Basophils # (Auto)


 0.1 x10^3/uL


(0.0-0.2) 


 


 





 


Sodium Level


 138 mmol/L


(136-145) 


 


 





 


Potassium Level


 3.9 mmol/L


(3.5-5.1) 


 


 





 


Chloride Level


 100 mmol/L


() 


 


 





 


Carbon Dioxide Level


 33 mmol/L


(21-32) 


 


 





 


Anion Gap 5 (6-14)    


 


Blood Urea Nitrogen


 10 mg/dL


(7-20) 


 


 





 


Creatinine


 2.0 mg/dL


(0.6-1.0) 


 


 





 


Estimated GFR


(Cockcroft-Gault) 24.2 


 


 


 





 


Glucose Level


 200 mg/dL


(70-99) 


 


 





 


Calcium Level


 8.0 mg/dL


(8.5-10.1) 


 


 





 


Phosphorus Level


 2.3 mg/dL


(2.6-4.7) 


 


 





 


Albumin


 2.0 g/dL


(3.4-5.0) 


 


 





 


Glucose (Fingerstick)


 


 182 mg/dL


(70-99) 184 mg/dL


(70-99) 68 mg/dL


(70-99)


 


Test


 4/5/20


08:24 4/5/20


11:42 4/5/20


16:35 4/5/20


20:46


 


Glucose (Fingerstick)


 118 mg/dL


(70-99) 119 mg/dL


(70-99) 268 mg/dL


(70-99) 271 mg/dL


(70-99)


 


Test


 4/6/20


06:00 4/6/20


07:26 


 





 


White Blood Count


 7.0 x10^3/uL


(4.0-11.0) 


 


 





 


Red Blood Count


 3.93 x10^6/uL


(3.50-5.40) 


 


 





 


Hemoglobin


 10.5 g/dL


(12.0-15.5) 


 


 





 


Hematocrit


 34.7 %


(36.0-47.0) 


 


 





 


Mean Corpuscular Volume 88 fL ()    


 


Mean Corpuscular Hemoglobin 27 pg (25-35)    


 


Mean Corpuscular Hemoglobin


Concent 30 g/dL


(31-37) 


 


 





 


Red Cell Distribution Width


 18.5 %


(11.5-14.5) 


 


 





 


Platelet Count


 302 x10^3/uL


(140-400) 


 


 





 


Neutrophils (%) (Auto) 73 % (31-73)    


 


Lymphocytes (%) (Auto) 13 % (24-48)    


 


Monocytes (%) (Auto) 10 % (0-9)    


 


Eosinophils (%) (Auto) 2 % (0-3)    


 


Basophils (%) (Auto) 1 % (0-3)    


 


Neutrophils # (Auto)


 5.1 x10^3/uL


(1.8-7.7) 


 


 





 


Lymphocytes # (Auto)


 0.9 x10^3/uL


(1.0-4.8) 


 


 





 


Monocytes # (Auto)


 0.7 x10^3/uL


(0.0-1.1) 


 


 





 


Eosinophils # (Auto)


 0.2 x10^3/uL


(0.0-0.7) 


 


 





 


Basophils # (Auto)


 0.1 x10^3/uL


(0.0-0.2) 


 


 





 


Sodium Level


 139 mmol/L


(136-145) 


 


 





 


Potassium Level


 3.9 mmol/L


(3.5-5.1) 


 


 





 


Chloride Level


 100 mmol/L


() 


 


 





 


Carbon Dioxide Level


 30 mmol/L


(21-32) 


 


 





 


Anion Gap 9 (6-14)    


 


Blood Urea Nitrogen


 22 mg/dL


(7-20) 


 


 





 


Creatinine


 4.2 mg/dL


(0.6-1.0) 


 


 





 


Estimated GFR


(Cockcroft-Gault) 10.3 


 


 


 





 


Glucose Level


 198 mg/dL


(70-99) 


 


 





 


Calcium Level


 8.5 mg/dL


(8.5-10.1) 


 


 





 


Glucose (Fingerstick)


 


 195 mg/dL


(70-99) 


 











Laboratory Tests








Test


 4/5/20


11:42 4/5/20


16:35 4/5/20


20:46 4/6/20


06:00


 


Glucose (Fingerstick)


 119 mg/dL


(70-99) 268 mg/dL


(70-99) 271 mg/dL


(70-99) 





 


White Blood Count


 


 


 


 7.0 x10^3/uL


(4.0-11.0)


 


Red Blood Count


 


 


 


 3.93 x10^6/uL


(3.50-5.40)


 


Hemoglobin


 


 


 


 10.5 g/dL


(12.0-15.5)


 


Hematocrit


 


 


 


 34.7 %


(36.0-47.0)


 


Mean Corpuscular Volume    88 fL () 


 


Mean Corpuscular Hemoglobin    27 pg (25-35) 


 


Mean Corpuscular Hemoglobin


Concent 


 


 


 30 g/dL


(31-37)


 


Red Cell Distribution Width


 


 


 


 18.5 %


(11.5-14.5)


 


Platelet Count


 


 


 


 302 x10^3/uL


(140-400)


 


Neutrophils (%) (Auto)    73 % (31-73) 


 


Lymphocytes (%) (Auto)    13 % (24-48) 


 


Monocytes (%) (Auto)    10 % (0-9) 


 


Eosinophils (%) (Auto)    2 % (0-3) 


 


Basophils (%) (Auto)    1 % (0-3) 


 


Neutrophils # (Auto)


 


 


 


 5.1 x10^3/uL


(1.8-7.7)


 


Lymphocytes # (Auto)


 


 


 


 0.9 x10^3/uL


(1.0-4.8)


 


Monocytes # (Auto)


 


 


 


 0.7 x10^3/uL


(0.0-1.1)


 


Eosinophils # (Auto)


 


 


 


 0.2 x10^3/uL


(0.0-0.7)


 


Basophils # (Auto)


 


 


 


 0.1 x10^3/uL


(0.0-0.2)


 


Sodium Level


 


 


 


 139 mmol/L


(136-145)


 


Potassium Level


 


 


 


 3.9 mmol/L


(3.5-5.1)


 


Chloride Level


 


 


 


 100 mmol/L


()


 


Carbon Dioxide Level


 


 


 


 30 mmol/L


(21-32)


 


Anion Gap    9 (6-14) 


 


Blood Urea Nitrogen


 


 


 


 22 mg/dL


(7-20)


 


Creatinine


 


 


 


 4.2 mg/dL


(0.6-1.0)


 


Estimated GFR


(Cockcroft-Gault) 


 


 


 10.3 





 


Glucose Level


 


 


 


 198 mg/dL


(70-99)


 


Calcium Level


 


 


 


 8.5 mg/dL


(8.5-10.1)


 


Test


 4/6/20


07:26 


 


 





 


Glucose (Fingerstick)


 195 mg/dL


(70-99) 


 


 











Medications





Current Medications


Vancomycin HCl 250 ml @  166.667 mls/hr 1X  ONCE IV  Last administered on 

3/28/20at 18:24;  Start 3/28/20 at 17:45;  Stop 3/28/20 at 19:14;  Status DC


Sodium Chloride 1,000 ml @  1,000 mls/hr 1X  ONCE IV  Last administered on 

3/28/20at 18:23;  Start 3/28/20 at 18:00;  Stop 3/28/20 at 18:59;  Status DC


Calcium Gluconate (Calcium Gluconate) 1,000 mg 1X  ONCE IVP  Last administered 

on 3/28/20at 18:24;  Start 3/28/20 at 18:00;  Stop 3/28/20 at 18:01;  Status DC


Albuterol Sulfate (Ventolin Neb Soln) 10 mg 1X  ONCE CONT NEB  Last administered

on 3/28/20at 18:00;  Start 3/28/20 at 18:00;  Stop 3/28/20 at 18:01;  Status DC


Furosemide (Lasix) 40 mg 1X  ONCE PO ;  Start 3/28/20 at 18:30;  Stop 3/28/20 at

18:31;  Status DC


Enoxaparin Sodium (Lovenox 120mg Syringe) 120 mg 1X  ONCE SQ  Last administered 

on 3/28/20at 20:05;  Start 3/28/20 at 19:30;  Stop 3/28/20 at 19:31;  Status DC


Furosemide (Lasix) 40 mg 1X  ONCE IVP  Last administered on 3/28/20at 20:05;  

Start 3/28/20 at 19:30;  Stop 3/28/20 at 19:31;  Status DC


Dextrose (Dextrose 50%-Water Syringe) 25 gm STK-MED ONCE IV ;  Start 3/29/20 at 

08:11;  Stop 3/29/20 at 08:11;  Status DC


Sodium Polystyrene Sulfonate (Kayexalate) 30 gm 1X  ONCE RC ;  Start 3/29/20 at 

09:00;  Stop 3/29/20 at 08:38;  Status DC


Sodium Polystyrene Sulfonate (Kayexalate) 15 gm 1X  ONCE PO  Last administered 

on 3/29/20at 09:24;  Start 3/29/20 at 09:00;  Stop 3/29/20 at 09:01;  Status DC


Piperacillin Sod/ Tazobactam Sod (Zosyn Per Pharmacy) 1 each PRN DAILY  PRN MC 

SEE COMMENTS;  Start 3/29/20 at 13:00;  Stop 4/6/20 at 08:56;  Status DC


Piperacillin Sod/ Tazobactam Sod 2.25 gm/Sodium Chloride 50 ml @  100 mls/hr 

Q6HRS IV  Last administered on 3/30/20at 05:32;  Start 3/29/20 at 14:00;  Stop 

3/30/20 at 16:05;  Status DC


Dextrose (Dextrose 50%-Water Syringe) 12.5 gm PRN Q15MIN  PRN IV SEE COMMENTS 

Last administered on 3/30/20at 07:56;  Start 3/29/20 at 21:15


Dextrose (Iv Dextrose 5%) 250 ml PRN Q15MIN  PRN IV SEE COMMENTS;  Start 3/29/20

at 21:15


Dextrose (Dextrose 50%-Water Syringe) 25 gm STK-MED ONCE IV ;  Start 3/29/20 at 

21:19;  Stop 3/29/20 at 21:19;  Status DC


Furosemide (Lasix) 60 mg DAILY IVP  Last administered on 4/5/20at 08:55;  Start 

3/30/20 at 12:30


Lidocaine HCl (Buffered Lidocaine 1%) 3 ml STK-MED ONCE .ROUTE ;  Start 3/30/20 

at 12:36;  Stop 3/30/20 at 12:36;  Status DC


Lidocaine HCl (Buffered Lidocaine 1%) 6 ml 1X  ONCE INJ  Last administered on 

3/30/20at 12:56;  Start 3/30/20 at 12:45;  Stop 3/30/20 at 12:46;  Status DC


Sodium Chloride 1,000 ml @  1,000 mls/hr Q1H PRN IV hypotension;  Start 3/30/20 

at 13:11;  Stop 3/30/20 at 19:10;  Status DC


Albumin Human 200 ml @  200 mls/hr 1X PRN  PRN IV Hypotension;  Start 3/30/20 at

13:15;  Stop 3/30/20 at 19:14;  Status DC


Diphenhydramine HCl (Benadryl) 25 mg 1X PRN  PRN IV ITCHING;  Start 3/30/20 at 

13:15;  Stop 3/31/20 at 13:14;  Status DC


Diphenhydramine HCl (Benadryl) 25 mg 1X PRN  PRN IV ITCHING;  Start 3/30/20 at 

13:15;  Stop 3/31/20 at 13:14;  Status DC


Sodium Chloride 1,000 ml @  400 mls/hr Q2H30M PRN IV PATENCY;  Start 3/30/20 at 

13:11;  Stop 3/31/20 at 01:10;  Status DC


Info (PHARMACY MONITORING -- do not chart) 1 each PRN DAILY  PRN MC SEE 

COMMENTS;  Start 3/30/20 at 13:15;  Stop 4/3/20 at 16:02;  Status DC


Magnesium Sulfate 50 ml @ 25 mls/hr 1X  ONCE IV  Last administered on 3/30/20at 

19:21;  Start 3/30/20 at 15:45;  Stop 3/30/20 at 17:44;  Status DC


Aspirin (Ecotrin) 81 mg DAILYWBKFT PO  Last administered on 4/5/20at 08:54;  

Start 3/31/20 at 08:00


Piperacillin Sod/ Tazobactam Sod 2.25 gm/Sodium Chloride 50 ml @  100 mls/hr 

Q8HRS IV  Last administered on 4/6/20at 04:59;  Start 3/30/20 at 17:00;  Stop 

4/6/20 at 08:54;  Status DC


Acetaminophen/ Hydrocodone Bitart (Lortab 5/325) 1 tab PRN Q6HRS  PRN PO 

MODERATE PAIN 4-6 Last administered on 4/5/20at 21:06;  Start 3/30/20 at 22:00


Sodium Chloride 1,000 ml @  1,000 mls/hr Q1H PRN IV hypotension;  Start 3/31/20 

at 09:55;  Stop 3/31/20 at 15:54;  Status DC


Albumin Human 200 ml @  200 mls/hr 1X PRN  PRN IV Hypotension Last administered 

on 3/31/20at 10:48;  Start 3/31/20 at 10:00;  Stop 3/31/20 at 15:59;  Status DC


Sodium Chloride 1,000 ml @  400 mls/hr Q2H30M PRN IV PATENCY;  Start 3/31/20 at 

09:55;  Stop 3/31/20 at 21:54;  Status DC


Info (PHARMACY MONITORING -- do not chart) 1 each PRN DAILY  PRN MC SEE 

COMMENTS;  Start 3/31/20 at 10:00;  Status UNV


Info (PHARMACY MONITORING -- do not chart) 1 each PRN DAILY  PRN MC SEE 

COMMENTS;  Start 3/31/20 at 10:00;  Status UNV


Iron Sucrose 500 mg/Sodium Chloride 275 ml @  78.571 mls/ hr 1X  ONCE IV  Last 

administered on 3/31/20at 16:08;  Start 3/31/20 at 13:00;  Stop 3/31/20 at 

16:29;  Status DC


Lidocaine/ Epinephrine (LIDOCAINE 1%-EPI 1:100,000 Multi-Dose) 20 ml STK-MED 

ONCE .ROUTE ;  Start 3/31/20 at 12:29;  Stop 3/31/20 at 12:30;  Status DC


Midazolam HCl (Versed) 2 mg STK-MED ONCE .ROUTE ;  Start 3/31/20 at 12:34;  Stop

3/31/20 at 12:34;  Status DC


Fentanyl Citrate (Fentanyl 2ml Vial) 100 mcg STK-MED ONCE .ROUTE ;  Start 

3/31/20 at 12:34;  Stop 3/31/20 at 12:34;  Status DC


Midazolam HCl (Versed) 2 mg 1X  ONCE IV  Last administered on 3/31/20at 13:08;  

Start 3/31/20 at 12:45;  Stop 3/31/20 at 12:47;  Status DC


Fentanyl Citrate (Fentanyl 2ml Vial) 100 mcg 1X  ONCE IV  Last administered on 

3/31/20at 13:08;  Start 3/31/20 at 12:45;  Stop 3/31/20 at 12:47;  Status DC


Lidocaine/ Epinephrine (LIDOCAINE 1%-EPI 1:100,000 Multi-Dose) 20 ml 1X  ONCE SQ

 Last administered on 3/31/20at 13:10;  Start 3/31/20 at 12:45;  Stop 3/31/20 at

12:47;  Status DC


Lactobacillus Rhamnosus (Culturelle) 1 cap BID PO  Last administered on 4/5/20at

21:06;  Start 3/31/20 at 21:00


Albuterol Sulfate (Ventolin Neb Soln) 2.5 mg RTQID NEB  Last administered on 

4/6/20at 07:14;  Start 4/1/20 at 12:00


Darbepoetin Castro (ARANESP for DIALYSIS PTS) 60 mcg WEEKLYHS SQ  Last 

administered on 4/1/20at 22:02;  Start 4/1/20 at 21:00


Sodium Chloride 1,000 ml @  1,000 mls/hr Q1H PRN IV hypotension;  Start 4/1/20 

at 14:02;  Stop 4/1/20 at 20:01;  Status DC


Sodium Chloride (Normal Saline Flush) 10 ml 1X PRN  PRN IV AP catheter pack;  

Start 4/1/20 at 14:15;  Stop 4/2/20 at 14:14;  Status DC


Sodium Chloride (Normal Saline Flush) 10 ml 1X PRN  PRN IV  catheter pack;  

Start 4/1/20 at 14:15;  Stop 4/2/20 at 14:14;  Status DC


Info (PHARMACY MONITORING -- do not chart) 1 each PRN DAILY  PRN MC SEE 

COMMENTS;  Start 4/1/20 at 14:15;  Status UNV


Info (PHARMACY MONITORING -- do not chart) 1 each PRN DAILY  PRN MC SEE 

COMMENTS;  Start 4/1/20 at 14:15;  Stop 4/3/20 at 16:03;  Status DC


Multivitamins (Thera M Plus) 1 tab DAILY PO  Last administered on 4/5/20at 

08:54;  Start 4/2/20 at 09:00


Ascorbic Acid (Vitamin C) 500 mg DAILY PO  Last administered on 4/5/20at 08:54; 

Start 4/2/20 at 09:00


Sodium Chloride 1,000 ml @  1,000 mls/hr Q1H PRN IV hypotension;  Start 4/3/20 

at 08:11;  Stop 4/3/20 at 14:10;  Status DC


Albumin Human 200 ml @  200 mls/hr 1X PRN  PRN IV Hypotension;  Start 4/3/20 at 

08:15;  Stop 4/3/20 at 14:14;  Status DC


Acetaminophen (Tylenol) 500 mg 1X PRN  PRN PO MILD PAIN / TEMP;  Start 4/3/20 at

08:15;  Stop 4/4/20 at 08:14;  Status DC


Diphenhydramine HCl (Benadryl) 25 mg 1X PRN  PRN IV ITCHING;  Start 4/3/20 at 

08:15;  Stop 4/4/20 at 08:14;  Status DC


Diphenhydramine HCl (Benadryl) 25 mg 1X PRN  PRN IV ITCHING;  Start 4/3/20 at 

08:15;  Stop 4/4/20 at 08:14;  Status DC


Sodium Chloride 1,000 ml @  400 mls/hr Q2H30M PRN IV PATENCY;  Start 4/3/20 at 

08:11;  Stop 4/3/20 at 20:10;  Status DC


Info (PHARMACY MONITORING -- do not chart) 1 each PRN DAILY  PRN MC SEE 

COMMENTS;  Start 4/3/20 at 08:15;  Status Cancel


Insulin Glargine (Lantus Syringe) 40 unit DAILY SQ  Last administered on 

4/4/20at 13:48;  Start 4/4/20 at 09:00


Sodium Chloride 1,000 ml @  1,000 mls/hr Q1H PRN IV hypotension;  Start 4/4/20 

at 07:40;  Stop 4/4/20 at 13:39;  Status DC


Albumin Human 200 ml @  200 mls/hr 1X PRN  PRN IV Hypotension;  Start 4/4/20 at 

07:45;  Stop 4/4/20 at 13:44;  Status DC


Acetaminophen (Tylenol) 500 mg 1X PRN  PRN PO MILD PAIN / TEMP;  Start 4/4/20 at

07:45;  Stop 4/5/20 at 07:44;  Status DC


Diphenhydramine HCl (Benadryl) 25 mg 1X PRN  PRN IV ITCHING;  Start 4/4/20 at 

07:45;  Stop 4/5/20 at 07:44;  Status DC


Diphenhydramine HCl (Benadryl) 25 mg 1X PRN  PRN IV ITCHING;  Start 4/4/20 at 

07:45;  Stop 4/5/20 at 07:44;  Status DC


Sodium Chloride (Normal Saline Flush) 10 ml 1X PRN  PRN IV AP catheter pack;  

Start 4/4/20 at 07:45;  Stop 4/5/20 at 07:44;  Status DC


Sodium Chloride (Normal Saline Flush) 10 ml 1X PRN  PRN IV  catheter pack;  

Start 4/4/20 at 07:45;  Stop 4/5/20 at 07:44;  Status DC


Sodium Chloride 1,000 ml @  400 mls/hr Q2H30M PRN IV PATENCY;  Start 4/4/20 at 

07:40;  Stop 4/4/20 at 19:39;  Status DC


Info (PHARMACY MONITORING -- do not chart) 1 each PRN DAILY  PRN MC SEE 

COMMENTS;  Start 4/4/20 at 07:45;  Status Cancel


Sodium Chloride 1,000 ml @  1,000 mls/hr Q1H PRN IV hypotension;  Start 4/6/20 

at 07:41;  Stop 4/6/20 at 13:40


Albumin Human 200 ml @  200 mls/hr 1X PRN  PRN IV Hypotension;  Start 4/6/20 at 

07:45;  Stop 4/6/20 at 13:44


Acetaminophen (Tylenol) 500 mg 1X PRN  PRN PO MILD PAIN / TEMP;  Start 4/6/20 at

07:45;  Stop 4/7/20 at 07:44


Diphenhydramine HCl (Benadryl) 25 mg 1X PRN  PRN IV ITCHING;  Start 4/6/20 at 

07:45;  Stop 4/7/20 at 07:44


Diphenhydramine HCl (Benadryl) 25 mg 1X PRN  PRN IV ITCHING;  Start 4/6/20 at 

07:45;  Stop 4/7/20 at 07:44


Sodium Chloride 1,000 ml @  400 mls/hr Q2H30M PRN IV PATENCY;  Start 4/6/20 at 

07:41;  Stop 4/6/20 at 19:40


Info (PHARMACY MONITORING -- do not chart) 1 each PRN DAILY  PRN MC SEE 

COMMENTS;  Start 4/6/20 at 07:45


Amoxicillin/ Clavulanate Potassium (Augmentin 500/ 125mg) 1 tab BID PO ;  Start 

4/6/20 at 09:00;  Stop 4/10/20 at 21:01





Active Scripts


Active


Reported


Metformin Hcl Er (Metformin Hcl) 1,000 Mg Tab.er.24 1,000 Mg PO BIDWMEALS


Lisinopril-Hctz 20-12.5 Mg Tab (Lisinopril/Hydrochlorothiazide) 1 Each Tablet 1 

Tab PO DAILY


Glipizide Er (Glipizide) 10 Mg Tab.er.24 10 Mg PO BIDWMEALS


Xalatan (Latanoprost) 2.5 Ml Drops 1 Drop OP HS


Triamcinolone Acetonide 0.1% Cream (Triamcinolone Acetonide) 15 Gm Cream..g. 1 

Ashley TP BID


Tramadol Hcl 50 Mg Tablet 50 Mg PO DAILY PRN


Flonase Allergy Relief (Fluticasone Propionate) 9.9 Ml Spray.susp 2 Sprays NS 

DAILY


Novolin N (Nph, Human Insulin Isophane) 100 Unit/1 Ml Vial 40 Unit SQ [id number]


Carvedilol ** (Carvedilol) 6.25 Mg Tablet 6.25 Mg PO BIDWMEALS


Simvastatin 40 Mg Tablet 1 Tab PO QHS


Furosemide 20 Mg Tablet 1 Tab PO DAILY


Vitals/I & O





Vital Sign - Last 24 Hours








 4/5/20 4/5/20 4/5/20 4/5/20





 11:00 12:32 14:42 15:00


 


Temp 98.2   98.0





 98.2   98.0


 


Pulse 85   100


 


Resp 19  18 19


 


B/P (MAP) 99/41 (60)   90/47 (61)


 


Pulse Ox 93 93 93 93


 


O2 Delivery Nasal Cannula Nasal Cannula Nasal Cannula Nasal Cannula


 


O2 Flow Rate 4.0 5.0 5.0 5.0


 


    





    





 4/5/20 4/5/20 4/5/20 4/5/20





 15:42 16:10 19:55 19:55


 


Temp    97.9





    97.9


 


Pulse    86


 


Resp    16


 


B/P (MAP)    119/60 (79)


 


Pulse Ox 93 93 92 93


 


O2 Delivery Nasal Cannula Nasal Cannula Nasal Cannula Nasal Cannula


 


O2 Flow Rate 5.0 5.0 5.0 5.0


 


    





    





 4/5/20 4/5/20 4/5/20 4/5/20





 19:58 21:06 22:06 23:41


 


Temp    97.8





    97.8


 


Pulse    81


 


Resp  20 18 16


 


B/P (MAP)    115/51 (72)


 


Pulse Ox  97  95


 


O2 Delivery Nasal Cannula Nasal Cannula  Nasal Cannula


 


O2 Flow Rate 4.0 4.0  4.0


 


    





    





 4/6/20 4/6/20 4/6/20 





 03:00 07:00 07:15 


 


Temp 97.5 98.3  





 97.5 98.3  


 


Pulse 83 98  


 


Resp 16 18  


 


B/P (MAP) 116/51 (72) 129/71 (90)  


 


Pulse Ox 97 94 93 


 


O2 Delivery Nasal Cannula Nasal Cannula Nasal Cannula 


 


O2 Flow Rate 5.0 5.0 5.0 














Intake and Output   


 


 4/5/20 4/5/20 4/6/20





 15:00 23:00 07:00


 


Intake Total 900 ml 300 ml 200 ml


 


Output Total  300 ml 150 ml


 


Balance 900 ml 0 ml 50 ml

















RUFINO CRUZ MD           Apr 6, 2020 10:08

## 2020-04-06 NOTE — PDOC
Infectious Disease Note


Subjective:


Subjective


Comfortable, denies pain


eager for dc to rehab facility


Denies F/C/SOA/N/V





Vital Signs:


Vital Signs





Vital Signs








  Date Time  Temp Pulse Resp B/P (MAP) Pulse Ox O2 Delivery O2 Flow Rate FiO2


 


4/6/20 07:15     93 Nasal Cannula 5.0 


 


4/6/20 07:00 98.3 98 18 129/71 (90)    





 98.3       











Physical Exam:


PHYSICAL EXAM


GENERAL:  Propped up in bed, alert in NAD 


HEENT:  Oral cavity pink, no lesions 


NECK:  Supple


LUNGS:  Clear.


HEART:  S1, S2 regular.


ABDOMEN:  Obese, soft and nontender. 


: Venegas in place 


EXTREMITIES:  Bilateral lower extremity pitting edema and excoriation of both 

the legs with superficial ulcerations.  Foot has no


wound at the fifth metatarsal or phalangeal area bilaterally.  


The patient appears to have puncture wound with slight dry crackly skin into the

actually calcaneal area.  Dorsalis pedis is palpable bilaterally.


NEUROLOGIC: Alert, awake and appropriate.  No focal neurologic deficit.


Tunneled HD catheter (3/31) without signs of infection


PIV ok





Medications:


Inpatient Meds:





Current Medications








 Medications


  (Trade)  Dose


 Ordered  Sig/Eva  Start Time


 Stop Time Status Last Admin


Dose Admin


 


 Acetaminophen


  (Tylenol)  500 mg  1X PRN  PRN  4/6/20 07:45


 4/7/20 07:44   





 


 Acetaminophen/


 Hydrocodone Bitart


  (Lortab 5/325)  1 tab  PRN Q6HRS  PRN  3/30/20 22:00


    4/5/20 21:06


1 TAB


 


 Albumin Human  200 ml @ 


 200 mls/hr  1X PRN  PRN  4/6/20 07:45


 4/6/20 13:44   





 


 Albuterol Sulfate


  (Ventolin Neb


 Soln)  2.5 mg  RTQID  4/1/20 12:00


    4/6/20 07:14


2.5 MG


 


 Ascorbic Acid


  (Vitamin C)  500 mg  DAILY  4/2/20 09:00


    4/5/20 08:54


500 MG


 


 Aspirin


  (Ecotrin)  81 mg  DAILYWBKFT  3/31/20 08:00


    4/5/20 08:54


81 MG


 


 Calcium Gluconate


  (Calcium


 Gluconate)  1,000 mg  1X  ONCE  3/28/20 18:00


 3/28/20 18:01 DC 3/28/20 18:24


1,000 MG


 


 Darbepoetin Castro


  (ARANESP for


 DIALYSIS PTS)  60 mcg  WEEKLYHS  4/1/20 21:00


    4/1/20 22:02


60 MCG


 


 Dextrose


  (Dextrose


 50%-Water Syringe)  25 gm  STK-MED ONCE  3/29/20 21:19


 3/29/20 21:19 DC  





 


 Dextrose


  (Iv Dextrose 5%)  250 ml  PRN Q15MIN  PRN  3/29/20 21:15


     





 


 Diphenhydramine


 HCl


  (Benadryl)  25 mg  1X PRN  PRN  4/6/20 07:45


 4/7/20 07:44   





 


 Enoxaparin Sodium


  (Lovenox 120mg


 Syringe)  120 mg  1X  ONCE  3/28/20 19:30


 3/28/20 19:31 DC 3/28/20 20:05


120 MG


 


 Fentanyl Citrate


  (Fentanyl 2ml


 Vial)  100 mcg  1X  ONCE  3/31/20 12:45


 3/31/20 12:47 DC 3/31/20 13:08


50 MCG


 


 Furosemide


  (Lasix)  60 mg  DAILY  3/30/20 12:30


    4/5/20 08:55


60 MG


 


 Info


  (PHARMACY


 MONITORING -- do


 not chart)  1 each  PRN DAILY  PRN  4/6/20 07:45


     





 


 Insulin Glargine


  (Lantus Syringe)  40 unit  DAILY  4/4/20 09:00


    4/4/20 13:48


40 UNIT


 


 Iron Sucrose 500


 mg/Sodium Chloride  275 ml @ 


 78.571 mls/


 hr  1X  ONCE  3/31/20 13:00


 3/31/20 16:29 DC 3/31/20 16:08


78.571 MLS/HR


 


 Lactobacillus


 Rhamnosus


  (Culturelle)  1 cap  BID  3/31/20 21:00


    4/5/20 21:06


1 CAP


 


 Lidocaine HCl


  (Buffered


 Lidocaine 1%)  6 ml  1X  ONCE  3/30/20 12:45


 3/30/20 12:46 DC 3/30/20 12:56


3 ML


 


 Lidocaine/


 Epinephrine


  (LIDOCAINE


 1%-EPI 1:100,000


 Multi-Dose)  20 ml  1X  ONCE  3/31/20 12:45


 3/31/20 12:47 DC 3/31/20 13:10


10 ML


 


 Magnesium Sulfate  50 ml @ 25


 mls/hr  1X  ONCE  3/30/20 15:45


 3/30/20 17:44 DC 3/30/20 19:21


25 MLS/HR


 


 Midazolam HCl


  (Versed)  2 mg  1X  ONCE  3/31/20 12:45


 3/31/20 12:47 DC 3/31/20 13:08


1 MG


 


 Multivitamins


  (Thera M Plus)  1 tab  DAILY  4/2/20 09:00


    4/5/20 08:54


1 TAB


 


 Piperacillin Sod/


 Tazobactam Sod


  (Zosyn Per


 Pharmacy)  1 each  PRN DAILY  PRN  3/29/20 13:00


     





 


 Piperacillin Sod/


 Tazobactam Sod


 2.25 gm/Sodium


 Chloride  50 ml @ 


 100 mls/hr  Q8HRS  3/30/20 17:00


    4/6/20 04:59


100 MLS/HR


 


 Sodium


 Polystyrene


 Sulfonate


  (Kayexalate)  15 gm  1X  ONCE  3/29/20 09:00


 3/29/20 09:01 DC 3/29/20 09:24


15 GM


 


 Sodium Chloride  1,000 ml @ 


 400 mls/hr  Q2H30M PRN  4/6/20 07:41


 4/6/20 19:40   





 


 Sodium Chloride


  (Normal Saline


 Flush)  10 ml  1X PRN  PRN  4/4/20 07:45


 4/5/20 07:44 DC  





 


 Vancomycin HCl  250 ml @ 


 166.667


 mls/hr  1X  ONCE  3/28/20 17:45


 3/28/20 19:14 DC 3/28/20 18:24


166.667 MLS/HR











Labs:


Lab





Laboratory Tests








Test


 4/5/20


08:24 4/5/20


11:42 4/5/20


16:35 4/5/20


20:46


 


Glucose (Fingerstick)


 118 mg/dL


(70-99) 119 mg/dL


(70-99) 268 mg/dL


(70-99) 271 mg/dL


(70-99)


 


Test


 4/6/20


06:00 4/6/20


07:26 


 





 


White Blood Count


 7.0 x10^3/uL


(4.0-11.0) 


 


 





 


Red Blood Count


 3.93 x10^6/uL


(3.50-5.40) 


 


 





 


Hemoglobin


 10.5 g/dL


(12.0-15.5) 


 


 





 


Hematocrit


 34.7 %


(36.0-47.0) 


 


 





 


Mean Corpuscular Volume 88 fL ()    


 


Mean Corpuscular Hemoglobin 27 pg (25-35)    


 


Mean Corpuscular Hemoglobin


Concent 30 g/dL


(31-37) 


 


 





 


Red Cell Distribution Width


 18.5 %


(11.5-14.5) 


 


 





 


Platelet Count


 302 x10^3/uL


(140-400) 


 


 





 


Neutrophils (%) (Auto) 73 % (31-73)    


 


Lymphocytes (%) (Auto) 13 % (24-48)    


 


Monocytes (%) (Auto) 10 % (0-9)    


 


Eosinophils (%) (Auto) 2 % (0-3)    


 


Basophils (%) (Auto) 1 % (0-3)    


 


Neutrophils # (Auto)


 5.1 x10^3/uL


(1.8-7.7) 


 


 





 


Lymphocytes # (Auto)


 0.9 x10^3/uL


(1.0-4.8) 


 


 





 


Monocytes # (Auto)


 0.7 x10^3/uL


(0.0-1.1) 


 


 





 


Eosinophils # (Auto)


 0.2 x10^3/uL


(0.0-0.7) 


 


 





 


Basophils # (Auto)


 0.1 x10^3/uL


(0.0-0.2) 


 


 





 


Sodium Level


 139 mmol/L


(136-145) 


 


 





 


Potassium Level


 3.9 mmol/L


(3.5-5.1) 


 


 





 


Chloride Level


 100 mmol/L


() 


 


 





 


Carbon Dioxide Level


 30 mmol/L


(21-32) 


 


 





 


Anion Gap 9 (6-14)    


 


Blood Urea Nitrogen


 22 mg/dL


(7-20) 


 


 





 


Creatinine


 4.2 mg/dL


(0.6-1.0) 


 


 





 


Estimated GFR


(Cockcroft-Gault) 10.3 


 


 


 





 


Glucose Level


 198 mg/dL


(70-99) 


 


 





 


Calcium Level


 8.5 mg/dL


(8.5-10.1) 


 


 





 


Glucose (Fingerstick)


 


 195 mg/dL


(70-99) 


 














Objective:


Assessment:


1.  Bilateral lower extremity venous insufficiency with superficial ulcerations


and possible secondary infection.


2.  Left fifth phalangeal changes on x-ray, likely secondary to the trauma and


healing fracture, not infection as there is no open wound there.  Likely have a


puncture wound into the calcaneal area with pain.


3.  Congestive heart failure.


4.  Acute on chronic renal insufficiency, on hemodialysis now.


5.  Diabetes.


6.  Hypertension.


7.  Obesity





Plan:


Plan of Care


DC zosyn 


augmentin bid  5 days renal dosing


Probiotics 


Local wound care


Supportive care











CROW JENKINS MD            Apr 6, 2020 08:03

## 2020-04-06 NOTE — PDOC
SUBJECTIVE


ROS


Stable seen on HD





OBJECTIVE


Vital Signs





Vital Signs








  Date Time  Temp Pulse Resp B/P (MAP) Pulse Ox O2 Delivery O2 Flow Rate FiO2


 


4/6/20 07:15     93 Nasal Cannula 5.0 


 


4/6/20 07:00 98.3 98 18 129/71 (90)    





 98.3       








I & 0











Intake and Output 


 


 4/6/20





 07:00


 


Intake Total 1400 ml


 


Output Total 450 ml


 


Balance 950 ml


 


 


 


Intake Oral 1400 ml


 


Output Urine Total 450 ml


 


# Bowel Movements 1











PHYSICAL EXAM


Physical Exam


GENERAL:  NAD 


HEENT:  Oral cavity moist  


NECK:  Supple


LUNGS:  Clear.


HEART:  S1, S2 regular.


ABDOMEN:  Obese, soft and nontender. 


: Venegas in place 


EXTREMITIES:  Bilateral lower extremity pitting edema and excoriation of both 

the legs with superficial ulcerations.(Per ID) 


NEUROLOGIC: Alert, awake and appropriate.  No focal neurologic deficit.


Tunneled HD catheter (3/31)





DIAGNOSIS/ASSESSMENT


Assessment & Plan


ESRD:  New Onset ESRD 


continue on Monday Wednesday Friday schedule


Seen on HD, tolerating well,  Continue as Discussed and reviewed with Daja 


Access Tunnelled HDC, OP Chair time per SW  





ANEMIA On Aranesp as ordered,  





HTN: antihypertensives





Bilateral lower extremity venous insufficiency with superficial ulcerations and 

possible secondary infection.





Congestive heart failure- compensated  





DM per primary





COMMENT/RELEVANT DATA


Meds





Current Medications








 Medications


  (Trade)  Dose


 Ordered  Sig/Eva  Start Time


 Stop Time Status Last Admin


Dose Admin


 


 Acetaminophen


  (Tylenol)  500 mg  1X PRN  PRN  4/6/20 07:45


 4/7/20 07:44   





 


 Acetaminophen/


 Hydrocodone Bitart


  (Lortab 5/325)  1 tab  PRN Q6HRS  PRN  3/30/20 22:00


    4/5/20 21:06


1 TAB


 


 Albumin Human  200 ml @ 


 200 mls/hr  1X PRN  PRN  4/6/20 07:45


 4/6/20 13:44   





 


 Albuterol Sulfate


  (Ventolin Neb


 Soln)  2.5 mg  RTQID  4/1/20 12:00


    4/6/20 07:14


2.5 MG


 


 Amoxicillin/


 Clavulanate


 Potassium


  (Augmentin 500/


 125mg)  1 tab  BID  4/6/20 09:00


 4/10/20 21:01   





 


 Ascorbic Acid


  (Vitamin C)  500 mg  DAILY  4/2/20 09:00


    4/5/20 08:54


500 MG


 


 Aspirin


  (Ecotrin)  81 mg  DAILYWBKFT  3/31/20 08:00


    4/5/20 08:54


81 MG


 


 Calcium Gluconate


  (Calcium


 Gluconate)  1,000 mg  1X  ONCE  3/28/20 18:00


 3/28/20 18:01 DC 3/28/20 18:24


1,000 MG


 


 Darbepoetin Castro


  (ARANESP for


 DIALYSIS PTS)  60 mcg  WEEKLYHS  4/1/20 21:00


    4/1/20 22:02


60 MCG


 


 Dextrose


  (Dextrose


 50%-Water Syringe)  25 gm  STK-MED ONCE  3/29/20 21:19


 3/29/20 21:19 DC  





 


 Dextrose


  (Iv Dextrose 5%)  250 ml  PRN Q15MIN  PRN  3/29/20 21:15


     





 


 Diphenhydramine


 HCl


  (Benadryl)  25 mg  1X PRN  PRN  4/6/20 07:45


 4/7/20 07:44   





 


 Enoxaparin Sodium


  (Lovenox 120mg


 Syringe)  120 mg  1X  ONCE  3/28/20 19:30


 3/28/20 19:31 DC 3/28/20 20:05


120 MG


 


 Fentanyl Citrate


  (Fentanyl 2ml


 Vial)  100 mcg  1X  ONCE  3/31/20 12:45


 3/31/20 12:47 DC 3/31/20 13:08


50 MCG


 


 Furosemide


  (Lasix)  60 mg  DAILY  3/30/20 12:30


    4/5/20 08:55


60 MG


 


 Info


  (PHARMACY


 MONITORING -- do


 not chart)  1 each  PRN DAILY  PRN  4/6/20 07:45


     





 


 Insulin Glargine


  (Lantus Syringe)  40 unit  DAILY  4/4/20 09:00


    4/4/20 13:48


40 UNIT


 


 Iron Sucrose 500


 mg/Sodium Chloride  275 ml @ 


 78.571 mls/


 hr  1X  ONCE  3/31/20 13:00


 3/31/20 16:29 DC 3/31/20 16:08


78.571 MLS/HR


 


 Lactobacillus


 Rhamnosus


  (Culturelle)  1 cap  BID  3/31/20 21:00


    4/5/20 21:06


1 CAP


 


 Lidocaine HCl


  (Buffered


 Lidocaine 1%)  6 ml  1X  ONCE  3/30/20 12:45


 3/30/20 12:46 DC 3/30/20 12:56


3 ML


 


 Lidocaine/


 Epinephrine


  (LIDOCAINE


 1%-EPI 1:100,000


 Multi-Dose)  20 ml  1X  ONCE  3/31/20 12:45


 3/31/20 12:47 DC 3/31/20 13:10


10 ML


 


 Magnesium Sulfate  50 ml @ 25


 mls/hr  1X  ONCE  3/30/20 15:45


 3/30/20 17:44 DC 3/30/20 19:21


25 MLS/HR


 


 Midazolam HCl


  (Versed)  2 mg  1X  ONCE  3/31/20 12:45


 3/31/20 12:47 DC 3/31/20 13:08


1 MG


 


 Multivitamins


  (Thera M Plus)  1 tab  DAILY  4/2/20 09:00


    4/5/20 08:54


1 TAB


 


 Piperacillin Sod/


 Tazobactam Sod


  (Zosyn Per


 Pharmacy)  1 each  PRN DAILY  PRN  3/29/20 13:00


 4/6/20 08:56 DC  





 


 Piperacillin Sod/


 Tazobactam Sod


 2.25 gm/Sodium


 Chloride  50 ml @ 


 100 mls/hr  Q8HRS  3/30/20 17:00


 4/6/20 08:54 DC 4/6/20 04:59


100 MLS/HR


 


 Sodium


 Polystyrene


 Sulfonate


  (Kayexalate)  15 gm  1X  ONCE  3/29/20 09:00


 3/29/20 09:01 DC 3/29/20 09:24


15 GM


 


 Sodium Chloride  1,000 ml @ 


 400 mls/hr  Q2H30M PRN  4/6/20 07:41


 4/6/20 19:40   





 


 Sodium Chloride


  (Normal Saline


 Flush)  10 ml  1X PRN  PRN  4/4/20 07:45


 4/5/20 07:44 DC  





 


 Vancomycin HCl  250 ml @ 


 166.667


 mls/hr  1X  ONCE  3/28/20 17:45


 3/28/20 19:14 DC 3/28/20 18:24


166.667 MLS/HR








Lab





Laboratory Tests








Test


 4/5/20


11:42 4/5/20


16:35 4/5/20


20:46 4/6/20


06:00


 


Glucose (Fingerstick)


 119 mg/dL


(70-99) 268 mg/dL


(70-99) 271 mg/dL


(70-99) 





 


White Blood Count


 


 


 


 7.0 x10^3/uL


(4.0-11.0)


 


Red Blood Count


 


 


 


 3.93 x10^6/uL


(3.50-5.40)


 


Hemoglobin


 


 


 


 10.5 g/dL


(12.0-15.5)


 


Hematocrit


 


 


 


 34.7 %


(36.0-47.0)


 


Mean Corpuscular Volume    88 fL () 


 


Mean Corpuscular Hemoglobin    27 pg (25-35) 


 


Mean Corpuscular Hemoglobin


Concent 


 


 


 30 g/dL


(31-37)


 


Red Cell Distribution Width


 


 


 


 18.5 %


(11.5-14.5)


 


Platelet Count


 


 


 


 302 x10^3/uL


(140-400)


 


Neutrophils (%) (Auto)    73 % (31-73) 


 


Lymphocytes (%) (Auto)    13 % (24-48) 


 


Monocytes (%) (Auto)    10 % (0-9) 


 


Eosinophils (%) (Auto)    2 % (0-3) 


 


Basophils (%) (Auto)    1 % (0-3) 


 


Neutrophils # (Auto)


 


 


 


 5.1 x10^3/uL


(1.8-7.7)


 


Lymphocytes # (Auto)


 


 


 


 0.9 x10^3/uL


(1.0-4.8)


 


Monocytes # (Auto)


 


 


 


 0.7 x10^3/uL


(0.0-1.1)


 


Eosinophils # (Auto)


 


 


 


 0.2 x10^3/uL


(0.0-0.7)


 


Basophils # (Auto)


 


 


 


 0.1 x10^3/uL


(0.0-0.2)


 


Sodium Level


 


 


 


 139 mmol/L


(136-145)


 


Potassium Level


 


 


 


 3.9 mmol/L


(3.5-5.1)


 


Chloride Level


 


 


 


 100 mmol/L


()


 


Carbon Dioxide Level


 


 


 


 30 mmol/L


(21-32)


 


Anion Gap    9 (6-14) 


 


Blood Urea Nitrogen


 


 


 


 22 mg/dL


(7-20)


 


Creatinine


 


 


 


 4.2 mg/dL


(0.6-1.0)


 


Estimated GFR


(Cockcroft-Gault) 


 


 


 10.3 





 


Glucose Level


 


 


 


 198 mg/dL


(70-99)


 


Calcium Level


 


 


 


 8.5 mg/dL


(8.5-10.1)


 


Test


 4/6/20


07:26 


 


 





 


Glucose (Fingerstick)


 195 mg/dL


(70-99) 


 


 











Results


All relevant outside records, renal labs, imaging studies, telemetry/EKG's were 

reviewed.











RONIT CHARLES MD                 Apr 6, 2020 10:37

## 2020-04-07 VITALS — SYSTOLIC BLOOD PRESSURE: 144 MMHG | DIASTOLIC BLOOD PRESSURE: 60 MMHG

## 2020-04-07 VITALS — SYSTOLIC BLOOD PRESSURE: 110 MMHG | DIASTOLIC BLOOD PRESSURE: 53 MMHG

## 2020-04-07 VITALS — SYSTOLIC BLOOD PRESSURE: 143 MMHG | DIASTOLIC BLOOD PRESSURE: 71 MMHG

## 2020-04-07 VITALS — SYSTOLIC BLOOD PRESSURE: 143 MMHG | DIASTOLIC BLOOD PRESSURE: 66 MMHG

## 2020-04-07 VITALS — SYSTOLIC BLOOD PRESSURE: 136 MMHG | DIASTOLIC BLOOD PRESSURE: 79 MMHG

## 2020-04-07 VITALS — DIASTOLIC BLOOD PRESSURE: 84 MMHG | SYSTOLIC BLOOD PRESSURE: 163 MMHG

## 2020-04-07 RX ADMIN — ALBUTEROL SULFATE SCH MG: 108 AEROSOL, METERED RESPIRATORY (INHALATION) at 12:26

## 2020-04-07 RX ADMIN — INSULIN GLARGINE SCH UNIT: 100 INJECTION, SOLUTION SUBCUTANEOUS at 08:35

## 2020-04-07 RX ADMIN — ALBUTEROL SULFATE SCH MG: 108 AEROSOL, METERED RESPIRATORY (INHALATION) at 08:49

## 2020-04-07 RX ADMIN — ALBUTEROL SULFATE SCH MG: 108 AEROSOL, METERED RESPIRATORY (INHALATION) at 16:29

## 2020-04-07 RX ADMIN — HYDROCODONE BITARTRATE AND ACETAMINOPHEN PRN TAB: 5; 325 TABLET ORAL at 23:36

## 2020-04-07 RX ADMIN — OXYCODONE HYDROCHLORIDE AND ACETAMINOPHEN SCH MG: 500 TABLET ORAL at 08:23

## 2020-04-07 RX ADMIN — ASPIRIN SCH MG: 81 TABLET, COATED ORAL at 08:23

## 2020-04-07 RX ADMIN — FUROSEMIDE SCH MG: 10 INJECTION, SOLUTION INTRAMUSCULAR; INTRAVENOUS at 08:25

## 2020-04-07 RX ADMIN — MULTIPLE VITAMINS W/ MINERALS TAB SCH TAB: TAB at 08:23

## 2020-04-07 RX ADMIN — Medication SCH CAP: at 23:36

## 2020-04-07 RX ADMIN — Medication SCH CAP: at 08:23

## 2020-04-07 RX ADMIN — AMOXICILLIN AND CLAVULANATE POTASSIUM SCH TAB: 500; 125 TABLET, FILM COATED ORAL at 08:23

## 2020-04-07 NOTE — PDOC
CARDIOLOGY PROGRESS NOTE


SUBJECTIVE:


No new issues overnight. 


Reports she feels much better today. 


No chest pain. No dyspnea. 


LE pain improved.





OBJECTIVE:


Vital Signs/I&O:





                                   Vital Signs








  Date Time  Temp Pulse Resp B/P (MAP) Pulse Ox O2 Delivery O2 Flow Rate FiO2


 


4/7/20 10:38 97.6 108 22 163/84 (110) 95 Nasal Cannula 5.0 





 97.6       














                                    I & O   


 


 4/6/20 4/6/20 4/7/20





 15:00 23:00 07:00


 


Intake Total 120 ml 720 ml 60 ml


 


Output Total  100 ml 


 


Balance 120 ml 620 ml 60 ml








Objective:


a/o x3. NAD


RRR. 


Soft abd


1+ LE edema, improved.





CURRENT MEDICATIONS:


meds reviewed. 


asa, lasix





DIAGNOSTIC TESTING:


Labs reviewed





ASSESSMENT:


1. Acute on chronic diastolic HF due to ESRD and morbid obesity


2. ESRD


3. LE venous stasis changes/wounds





PLAN:


1. Continue negative fluid balance with HD. Supportive care. 


No further CV testing needed. Outpt f/u in 3 months.











SAMI WILKINSON MD        Apr 7, 2020 11:06

## 2020-04-07 NOTE — NUR
SS following up with discharge planning. SS discussed with pt RN. Pt accepted at [facility], [phone]; fax [phone]. Pt has outpatient dialysis set up at 
[facility], [phone]; fax [phone]. Pt unable to discharge at this time 
due to possible exposure to COVID19. Testing ordered and pt is COVID19 test pending. SS will 
continue to follow for discharge planning. [facility] and [facility] notified of 
delay in discharge.

## 2020-04-07 NOTE — PDOC
Infectious Disease Note


Subjective:


Subjective


Comfortable, denies pain


Denies F/C/SOA/N/V





Vital Signs:


Vital Signs





Vital Signs








  Date Time  Temp Pulse Resp B/P (MAP) Pulse Ox O2 Delivery O2 Flow Rate FiO2


 


4/7/20 07:23 97.6 105 18 143/66 (91) 100 Nasal Cannula 5.0 





 97.6       











Physical Exam:


PHYSICAL EXAM


GENERAL:  Propped up in bed, alert in NAD 


HEENT:  Oral cavity pink, no lesions 


NECK:  Supple


LUNGS:  Clear.


HEART:  S1, S2 regular.


ABDOMEN:  Obese, soft and nontender. 


: Venegas in place 


EXTREMITIES:  Bilateral lower extremity pitting edema and excoriation of both 

the legs with superficial ulcerations.  Foot has no


wound at the fifth metatarsal or phalangeal area bilaterally.  


The patient appears to have puncture wound with slight dry crackly skin into the

actually calcaneal area.  Dorsalis pedis is palpable bilaterally.


NEUROLOGIC: Alert, awake and appropriate.  No focal neurologic deficit.


Tunneled HD catheter (3/31) without signs of infection


PIV ok





Medications:


Inpatient Meds:





Current Medications








 Medications


  (Trade)  Dose


 Ordered  Sig/Eva  Start Time


 Stop Time Status Last Admin


Dose Admin


 


 Acetaminophen


  (Tylenol)  500 mg  1X PRN  PRN  4/6/20 07:45


 4/7/20 07:44 DC  





 


 Acetaminophen/


 Hydrocodone Bitart


  (Lortab 5/325)  1 tab  PRN Q6HRS  PRN  3/30/20 22:00


    4/6/20 20:40


1 TAB


 


 Albumin Human  200 ml @ 


 200 mls/hr  1X PRN  PRN  4/6/20 07:45


 4/6/20 13:44 DC  





 


 Albuterol Sulfate


  (Ventolin Neb


 Soln)  2.5 mg  RTQID  4/1/20 12:00


    4/6/20 20:04


2.5 MG


 


 Amoxicillin/


 Clavulanate


 Potassium


  (Augmentin 500/


 125mg)  1 tab  BID  4/6/20 09:00


 4/10/20 21:01  4/6/20 20:39


1 TAB


 


 Ascorbic Acid


  (Vitamin C)  500 mg  DAILY  4/2/20 09:00


    4/6/20 14:11


500 MG


 


 Aspirin


  (Ecotrin)  81 mg  DAILYWBKFT  3/31/20 08:00


    4/6/20 14:10


81 MG


 


 Calcium Gluconate


  (Calcium


 Gluconate)  1,000 mg  1X  ONCE  3/28/20 18:00


 3/28/20 18:01 DC 3/28/20 18:24


1,000 MG


 


 Darbepoetin Castro


  (ARANESP for


 DIALYSIS PTS)  60 mcg  WEEKLYHS  4/1/20 21:00


    4/1/20 22:02


60 MCG


 


 Dextrose


  (Dextrose


 50%-Water Syringe)  25 gm  STK-MED ONCE  3/29/20 21:19


 3/29/20 21:19 DC  





 


 Dextrose


  (Iv Dextrose 5%)  250 ml  PRN Q15MIN  PRN  3/29/20 21:15


     





 


 Diphenhydramine


 HCl


  (Benadryl)  25 mg  1X PRN  PRN  4/6/20 07:45


 4/7/20 07:44 DC  





 


 Enoxaparin Sodium


  (Lovenox 120mg


 Syringe)  120 mg  1X  ONCE  3/28/20 19:30


 3/28/20 19:31 DC 3/28/20 20:05


120 MG


 


 Fentanyl Citrate


  (Fentanyl 2ml


 Vial)  100 mcg  1X  ONCE  3/31/20 12:45


 3/31/20 12:47 DC 3/31/20 13:08


50 MCG


 


 Furosemide


  (Lasix)  60 mg  DAILY  3/30/20 12:30


    4/6/20 14:10


60 MG


 


 Info


  (PHARMACY


 MONITORING -- do


 not chart)  1 each  PRN DAILY  PRN  4/6/20 07:45


     





 


 Insulin Glargine


  (Lantus Syringe)  40 unit  DAILY  4/4/20 09:00


    4/4/20 13:48


40 UNIT


 


 Iron Sucrose 500


 mg/Sodium Chloride  275 ml @ 


 78.571 mls/


 hr  1X  ONCE  3/31/20 13:00


 3/31/20 16:29 DC 3/31/20 16:08


78.571 MLS/HR


 


 Lactobacillus


 Rhamnosus


  (Culturelle)  1 cap  BID  3/31/20 21:00


    4/6/20 20:39


1 CAP


 


 Lidocaine HCl


  (Buffered


 Lidocaine 1%)  6 ml  1X  ONCE  3/30/20 12:45


 3/30/20 12:46 DC 3/30/20 12:56


3 ML


 


 Lidocaine/


 Epinephrine


  (LIDOCAINE


 1%-EPI 1:100,000


 Multi-Dose)  20 ml  1X  ONCE  3/31/20 12:45


 3/31/20 12:47 DC 3/31/20 13:10


10 ML


 


 Magnesium Sulfate  50 ml @ 25


 mls/hr  1X  ONCE  3/30/20 15:45


 3/30/20 17:44 DC 3/30/20 19:21


25 MLS/HR


 


 Midazolam HCl


  (Versed)  2 mg  1X  ONCE  3/31/20 12:45


 3/31/20 12:47 DC 3/31/20 13:08


1 MG


 


 Multivitamins


  (Thera M Plus)  1 tab  DAILY  4/2/20 09:00


    4/6/20 14:14


1 TAB


 


 Piperacillin Sod/


 Tazobactam Sod


  (Zosyn Per


 Pharmacy)  1 each  PRN DAILY  PRN  3/29/20 13:00


 4/6/20 08:56 DC  





 


 Piperacillin Sod/


 Tazobactam Sod


 2.25 gm/Sodium


 Chloride  50 ml @ 


 100 mls/hr  Q8HRS  3/30/20 17:00


 4/6/20 08:54 DC 4/6/20 04:59


100 MLS/HR


 


 Sodium


 Polystyrene


 Sulfonate


  (Kayexalate)  15 gm  1X  ONCE  3/29/20 09:00


 3/29/20 09:01 DC 3/29/20 09:24


15 GM


 


 Sodium Chloride  1,000 ml @ 


 400 mls/hr  Q2H30M PRN  4/6/20 07:41


 4/6/20 19:40 DC  





 


 Sodium Chloride


  (Normal Saline


 Flush)  10 ml  1X PRN  PRN  4/4/20 07:45


 4/5/20 07:44 DC  





 


 Vancomycin HCl  250 ml @ 


 166.667


 mls/hr  1X  ONCE  3/28/20 17:45


 3/28/20 19:14 DC 3/28/20 18:24


166.667 MLS/HR











Labs:


Lab





Laboratory Tests








Test


 4/6/20


13:20 4/6/20


16:33 4/6/20


20:48 4/7/20


07:35


 


Glucose (Fingerstick)


 128 mg/dL


(70-99) 203 mg/dL


(70-99) 352 mg/dL


(70-99) 181 mg/dL


(70-99)











Objective:


Assessment:


1.  Bilateral lower extremity venous insufficiency with superficial ulcerations


and possible secondary infection.


2.  Left fifth phalangeal changes on x-ray, likely secondary to the trauma and


healing fracture, not infection as there is no open wound there.  Likely have a


puncture wound into the calcaneal area with pain.


3.  Congestive heart failure.


4.  Acute on chronic renal insufficiency, on hemodialysis now.


5.  Diabetes.


6.  Hypertension.


7.  Obesity





Plan:


Plan of Care


awaiting tx to rehab 


augmentin bid 4 more days renal dosing


Probiotics 


Local wound care


Supportive care











CROW JENKINS MD            Apr 7, 2020 08:06

## 2020-04-07 NOTE — PDOC
SUBJECTIVE


ROS


Stable





OBJECTIVE


Vital Signs





Vital Signs








  Date Time  Temp Pulse Resp B/P (MAP) Pulse Ox O2 Delivery O2 Flow Rate FiO2


 


4/7/20 10:38 97.6 108 22 163/84 (110) 95 Nasal Cannula 5.0 





 97.6       








I & 0











Intake and Output 


 


 4/7/20





 07:00


 


Intake Total 900 ml


 


Output Total 100 ml


 


Balance 800 ml


 


 


 


Intake Oral 900 ml


 


Output Urine Total 100 ml











PHYSICAL EXAM


Physical Exam


GENERAL:  NAD 


HEENT:  Oral cavity moist  


NECK:  Supple


LUNGS:  Clear.


HEART:  S1, S2 regular.


ABDOMEN:  Obese, soft and nontender. 


: Venegas in place 


EXTREMITIES:  Bilateral lower extremity pitting edema and excoriation of both 

the legs with superficial ulcerations.(Per ID) 


NEUROLOGIC: Alert, awake and appropriate.  No focal neurologic deficit.


Tunneled HD catheter (3/31)





DIAGNOSIS/ASSESSMENT


Assessment & Plan


ESRD:  New Onset ESRD ,on Monday Wednesday Friday schedule


No indication for HD today  


Access Tunnelled HDC, OP Chair time per SW  





ANEMIA On Aranesp as ordered,  





HTN: antihypertensives





Bilateral lower extremity venous insufficiency with superficial ulcerations and 

possible secondary infection.





Congestive heart failure- compensated  





DM per primary





Awaiting COVID - sent today





COMMENT/RELEVANT DATA


Meds





Current Medications








 Medications


  (Trade)  Dose


 Ordered  Sig/Eva  Start Time


 Stop Time Status Last Admin


Dose Admin


 


 Acetaminophen


  (Tylenol)  500 mg  1X PRN  PRN  4/6/20 07:45


 4/7/20 07:44 DC  





 


 Acetaminophen/


 Hydrocodone Bitart


  (Lortab 5/325)  1 tab  PRN Q6HRS  PRN  3/30/20 22:00


    4/6/20 20:40


1 TAB


 


 Albumin Human  200 ml @ 


 200 mls/hr  1X PRN  PRN  4/6/20 07:45


 4/6/20 13:44 DC  





 


 Albuterol Sulfate


  (Ventolin Neb


 Soln)  2.5 mg  RTQID  4/1/20 12:00


    4/7/20 08:49


2.5 MG


 


 Amoxicillin/


 Clavulanate


 Potassium


  (Augmentin 500/


 125mg)  1 tab  BID  4/6/20 09:00


 4/10/20 21:01  4/7/20 08:23


1 TAB


 


 Ascorbic Acid


  (Vitamin C)  500 mg  DAILY  4/2/20 09:00


    4/7/20 08:23


500 MG


 


 Aspirin


  (Ecotrin)  81 mg  DAILYWBKFT  3/31/20 08:00


    4/7/20 08:23


81 MG


 


 Calcium Gluconate


  (Calcium


 Gluconate)  1,000 mg  1X  ONCE  3/28/20 18:00


 3/28/20 18:01 DC 3/28/20 18:24


1,000 MG


 


 Darbepoetin Castro


  (ARANESP for


 DIALYSIS PTS)  60 mcg  WEEKLYHS  4/1/20 21:00


    4/1/20 22:02


60 MCG


 


 Dextrose


  (Dextrose


 50%-Water Syringe)  25 gm  STK-MED ONCE  3/29/20 21:19


 3/29/20 21:19 DC  





 


 Dextrose


  (Iv Dextrose 5%)  250 ml  PRN Q15MIN  PRN  3/29/20 21:15


     





 


 Diphenhydramine


 HCl


  (Benadryl)  25 mg  1X PRN  PRN  4/6/20 07:45


 4/7/20 07:44 DC  





 


 Enoxaparin Sodium


  (Lovenox 120mg


 Syringe)  120 mg  1X  ONCE  3/28/20 19:30


 3/28/20 19:31 DC 3/28/20 20:05


120 MG


 


 Fentanyl Citrate


  (Fentanyl 2ml


 Vial)  100 mcg  1X  ONCE  3/31/20 12:45


 3/31/20 12:47 DC 3/31/20 13:08


50 MCG


 


 Furosemide


  (Lasix)  60 mg  DAILY  3/30/20 12:30


    4/7/20 08:25


60 MG


 


 Info


  (PHARMACY


 MONITORING -- do


 not chart)  1 each  PRN DAILY  PRN  4/6/20 07:45


     





 


 Insulin Glargine


  (Lantus Syringe)  40 unit  DAILY  4/4/20 09:00


    4/7/20 08:35


40 UNIT


 


 Iron Sucrose 500


 mg/Sodium Chloride  275 ml @ 


 78.571 mls/


 hr  1X  ONCE  3/31/20 13:00


 3/31/20 16:29 DC 3/31/20 16:08


78.571 MLS/HR


 


 Lactobacillus


 Rhamnosus


  (Culturelle)  1 cap  BID  3/31/20 21:00


    4/7/20 08:23


1 CAP


 


 Lidocaine HCl


  (Buffered


 Lidocaine 1%)  6 ml  1X  ONCE  3/30/20 12:45


 3/30/20 12:46 DC 3/30/20 12:56


3 ML


 


 Lidocaine/


 Epinephrine


  (LIDOCAINE


 1%-EPI 1:100,000


 Multi-Dose)  20 ml  1X  ONCE  3/31/20 12:45


 3/31/20 12:47 DC 3/31/20 13:10


10 ML


 


 Magnesium Sulfate  50 ml @ 25


 mls/hr  1X  ONCE  3/30/20 15:45


 3/30/20 17:44 DC 3/30/20 19:21


25 MLS/HR


 


 Midazolam HCl


  (Versed)  2 mg  1X  ONCE  3/31/20 12:45


 3/31/20 12:47 DC 3/31/20 13:08


1 MG


 


 Multivitamins


  (Thera M Plus)  1 tab  DAILY  4/2/20 09:00


    4/7/20 08:23


1 TAB


 


 Piperacillin Sod/


 Tazobactam Sod


  (Zosyn Per


 Pharmacy)  1 each  PRN DAILY  PRN  3/29/20 13:00


 4/6/20 08:56 DC  





 


 Piperacillin Sod/


 Tazobactam Sod


 2.25 gm/Sodium


 Chloride  50 ml @ 


 100 mls/hr  Q8HRS  3/30/20 17:00


 4/6/20 08:54 DC 4/6/20 04:59


100 MLS/HR


 


 Sodium


 Polystyrene


 Sulfonate


  (Kayexalate)  15 gm  1X  ONCE  3/29/20 09:00


 3/29/20 09:01 DC 3/29/20 09:24


15 GM


 


 Sodium Chloride  1,000 ml @ 


 400 mls/hr  Q2H30M PRN  4/6/20 07:41


 4/6/20 19:40 DC  





 


 Sodium Chloride


  (Normal Saline


 Flush)  10 ml  1X PRN  PRN  4/4/20 07:45


 4/5/20 07:44 DC  





 


 Vancomycin HCl  250 ml @ 


 166.667


 mls/hr  1X  ONCE  3/28/20 17:45


 3/28/20 19:14 DC 3/28/20 18:24


166.667 MLS/HR








Lab





Laboratory Tests








Test


 4/6/20


13:20 4/6/20


16:33 4/6/20


20:48 4/7/20


07:35


 


Glucose (Fingerstick)


 128 mg/dL


(70-99) 203 mg/dL


(70-99) 352 mg/dL


(70-99) 181 mg/dL


(70-99)


 


Test


 4/7/20


11:33 


 


 





 


Glucose (Fingerstick)


 256 mg/dL


(70-99) 


 


 











Results


All relevant outside records, renal labs, imaging studies, telemetry/EKG's were 

reviewed.











RONIT CHARLES MD                 Apr 7, 2020 11:37

## 2020-04-07 NOTE — PDOC
PROGRESS NOTES


Chief Complaint


Chief Complaint


Acute on chronic kidney injury, 


hyperkalemia, resolved


hyponatremia resolved


azotemia, imrpoved 


acute on chronic systolic and diastolic heart failure,


elevated troponin 


anemia and lower extremity wounds.  // Lower extremity cellulitis. 


Left fifth phalangeal changes on x-ray, likely secondary to the trauma and 

healing fracture,





covid-19 exposure in hospital noted





follow Nephrology consultant recommendations


follow Cardiology. recommendations


Hold ACE inhibitor.  


IV antibiotics.  


PT, OT.  


Wound care nurse to evaluate and treat.  


DVT prophylaxis.  


Full code.  


Prognosis //guarded.


temp dialysis cath placement  3/30


OP HD SET UP FOR MWF


Ultrasound and fluoroscopic guided placement of right internal jugular temporary

dialysis catheter


NEEDS TDC AND OP HD SET UP


Dialysis as per consultant


covid-19 testing 4/7





COVID-19 CRITERIA:    The patient was evaluated during the global COVID-19 

pandemic, and that diagnosis was suspected/considered upon their initial 

presentation.  Their evaluation, treatment and testing was consistent with 

current guidelines for patients who present with complaints or symptoms that may

be related to COVID-19.








37 min pt exam, chart review, > 50% of time spent with exam, chart review, pt 

care coordination








History of Present Illness


History of Present Illness


No acute events reported overnight, case discussed with nursing staff patient in

no acute distress no complaints during my visit most likely will require 

placement





Vitals


Vitals





Vital Signs








  Date Time  Temp Pulse Resp B/P (MAP) Pulse Ox O2 Delivery O2 Flow Rate FiO2


 


4/7/20 08:52     98 Nasal Cannula 5.0 


 


4/7/20 07:23 97.6 105 18 143/66 (91)    





 97.6       











Physical Exam


Physical Exam


GENERAL:  Propped up in bed, alert in NAD 


HEENT:  Oral cavity pink, no lesions 


NECK:  Supple


LUNGS:  Clear.


HEART:  S1, S2 regular.


ABDOMEN:  Obese, soft and nontender. 


: Venegas in place 


EXTREMITIES:  Bilateral lower extremity pitting edema and excoriation of both 

the legs with superficial ulcerations.  Foot has no


wound at the fifth metatarsal or phalangeal area bilaterally.  


The patient appears to have puncture wound with slight dry crackly skin into the

actually calcaneal area.  Dorsalis pedis is palpable bilaterally.


NEUROLOGIC: Alert, awake and appropriate.  No focal neurologic deficit.


Tunneled HD catheter (3/31) without signs of infection


PIV ok


General:  Alert, Oriented X3, Cooperative, No acute distress


Heart:  Regular rate


Lungs:  Clear


Abdomen:  Normal bowel sounds, No tenderness


Extremities:  No clubbing, No cyanosis





Labs


LABS





Laboratory Tests








Test


 4/6/20


13:20 4/6/20


16:33 4/6/20


20:48 4/7/20


07:35


 


Glucose (Fingerstick)


 128 mg/dL


(70-99) 203 mg/dL


(70-99) 352 mg/dL


(70-99) 181 mg/dL


(70-99)











Assessment and Plan


Assessmemt and Plan


Problems


Medical Problems:


(1) Cellulitis


Status: Acute  





(2) CHF (congestive heart failure)


Status: Acute  





(3) Elevated troponin


Status: Acute  





(4) Shortness of breath


Status: Acute  











Comment


Review of Relevant


I have reviewed the following items rizwan (where applicable) has been applied.


Labs





Laboratory Tests








Test


 4/5/20


11:42 4/5/20


16:35 4/5/20


20:46 4/6/20


06:00


 


Glucose (Fingerstick)


 119 mg/dL


(70-99) 268 mg/dL


(70-99) 271 mg/dL


(70-99) 





 


White Blood Count


 


 


 


 7.0 x10^3/uL


(4.0-11.0)


 


Red Blood Count


 


 


 


 3.93 x10^6/uL


(3.50-5.40)


 


Hemoglobin


 


 


 


 10.5 g/dL


(12.0-15.5)


 


Hematocrit


 


 


 


 34.7 %


(36.0-47.0)


 


Mean Corpuscular Volume    88 fL () 


 


Mean Corpuscular Hemoglobin    27 pg (25-35) 


 


Mean Corpuscular Hemoglobin


Concent 


 


 


 30 g/dL


(31-37)


 


Red Cell Distribution Width


 


 


 


 18.5 %


(11.5-14.5)


 


Platelet Count


 


 


 


 302 x10^3/uL


(140-400)


 


Neutrophils (%) (Auto)    73 % (31-73) 


 


Lymphocytes (%) (Auto)    13 % (24-48) 


 


Monocytes (%) (Auto)    10 % (0-9) 


 


Eosinophils (%) (Auto)    2 % (0-3) 


 


Basophils (%) (Auto)    1 % (0-3) 


 


Neutrophils # (Auto)


 


 


 


 5.1 x10^3/uL


(1.8-7.7)


 


Lymphocytes # (Auto)


 


 


 


 0.9 x10^3/uL


(1.0-4.8)


 


Monocytes # (Auto)


 


 


 


 0.7 x10^3/uL


(0.0-1.1)


 


Eosinophils # (Auto)


 


 


 


 0.2 x10^3/uL


(0.0-0.7)


 


Basophils # (Auto)


 


 


 


 0.1 x10^3/uL


(0.0-0.2)


 


Sodium Level


 


 


 


 139 mmol/L


(136-145)


 


Potassium Level


 


 


 


 3.9 mmol/L


(3.5-5.1)


 


Chloride Level


 


 


 


 100 mmol/L


()


 


Carbon Dioxide Level


 


 


 


 30 mmol/L


(21-32)


 


Anion Gap    9 (6-14) 


 


Blood Urea Nitrogen


 


 


 


 22 mg/dL


(7-20)


 


Creatinine


 


 


 


 4.2 mg/dL


(0.6-1.0)


 


Estimated GFR


(Cockcroft-Gault) 


 


 


 10.3 





 


Glucose Level


 


 


 


 198 mg/dL


(70-99)


 


Calcium Level


 


 


 


 8.5 mg/dL


(8.5-10.1)


 


Test


 4/6/20


07:26 4/6/20


13:20 4/6/20


16:33 4/6/20


20:48


 


Glucose (Fingerstick)


 195 mg/dL


(70-99) 128 mg/dL


(70-99) 203 mg/dL


(70-99) 352 mg/dL


(70-99)


 


Test


 4/7/20


07:35 


 


 





 


Glucose (Fingerstick)


 181 mg/dL


(70-99) 


 


 











Laboratory Tests








Test


 4/6/20


13:20 4/6/20


16:33 4/6/20


20:48 4/7/20


07:35


 


Glucose (Fingerstick)


 128 mg/dL


(70-99) 203 mg/dL


(70-99) 352 mg/dL


(70-99) 181 mg/dL


(70-99)








Medications





Current Medications


Vancomycin HCl 250 ml @  166.667 mls/hr 1X  ONCE IV  Last administered on 

3/28/20at 18:24;  Start 3/28/20 at 17:45;  Stop 3/28/20 at 19:14;  Status DC


Sodium Chloride 1,000 ml @  1,000 mls/hr 1X  ONCE IV  Last administered on 

3/28/20at 18:23;  Start 3/28/20 at 18:00;  Stop 3/28/20 at 18:59;  Status DC


Calcium Gluconate (Calcium Gluconate) 1,000 mg 1X  ONCE IVP  Last administered 

on 3/28/20at 18:24;  Start 3/28/20 at 18:00;  Stop 3/28/20 at 18:01;  Status DC


Albuterol Sulfate (Ventolin Neb Soln) 10 mg 1X  ONCE CONT NEB  Last administered

on 3/28/20at 18:00;  Start 3/28/20 at 18:00;  Stop 3/28/20 at 18:01;  Status DC


Furosemide (Lasix) 40 mg 1X  ONCE PO ;  Start 3/28/20 at 18:30;  Stop 3/28/20 at

18:31;  Status DC


Enoxaparin Sodium (Lovenox 120mg Syringe) 120 mg 1X  ONCE SQ  Last administered 

on 3/28/20at 20:05;  Start 3/28/20 at 19:30;  Stop 3/28/20 at 19:31;  Status DC


Furosemide (Lasix) 40 mg 1X  ONCE IVP  Last administered on 3/28/20at 20:05;  

Start 3/28/20 at 19:30;  Stop 3/28/20 at 19:31;  Status DC


Dextrose (Dextrose 50%-Water Syringe) 25 gm STK-MED ONCE IV ;  Start 3/29/20 at 

08:11;  Stop 3/29/20 at 08:11;  Status DC


Sodium Polystyrene Sulfonate (Kayexalate) 30 gm 1X  ONCE RC ;  Start 3/29/20 at 

09:00;  Stop 3/29/20 at 08:38;  Status DC


Sodium Polystyrene Sulfonate (Kayexalate) 15 gm 1X  ONCE PO  Last administered 

on 3/29/20at 09:24;  Start 3/29/20 at 09:00;  Stop 3/29/20 at 09:01;  Status DC


Piperacillin Sod/ Tazobactam Sod (Zosyn Per Pharmacy) 1 each PRN DAILY  PRN MC 

SEE COMMENTS;  Start 3/29/20 at 13:00;  Stop 4/6/20 at 08:56;  Status DC


Piperacillin Sod/ Tazobactam Sod 2.25 gm/Sodium Chloride 50 ml @  100 mls/hr 

Q6HRS IV  Last administered on 3/30/20at 05:32;  Start 3/29/20 at 14:00;  Stop 

3/30/20 at 16:05;  Status DC


Dextrose (Dextrose 50%-Water Syringe) 12.5 gm PRN Q15MIN  PRN IV SEE COMMENTS 

Last administered on 3/30/20at 07:56;  Start 3/29/20 at 21:15


Dextrose (Iv Dextrose 5%) 250 ml PRN Q15MIN  PRN IV SEE COMMENTS;  Start 3/29/20

at 21:15


Dextrose (Dextrose 50%-Water Syringe) 25 gm STK-MED ONCE IV ;  Start 3/29/20 at 

21:19;  Stop 3/29/20 at 21:19;  Status DC


Furosemide (Lasix) 60 mg DAILY IVP  Last administered on 4/7/20at 08:25;  Start 

3/30/20 at 12:30


Lidocaine HCl (Buffered Lidocaine 1%) 3 ml STK-MED ONCE .ROUTE ;  Start 3/30/20 

at 12:36;  Stop 3/30/20 at 12:36;  Status DC


Lidocaine HCl (Buffered Lidocaine 1%) 6 ml 1X  ONCE INJ  Last administered on 

3/30/20at 12:56;  Start 3/30/20 at 12:45;  Stop 3/30/20 at 12:46;  Status DC


Sodium Chloride 1,000 ml @  1,000 mls/hr Q1H PRN IV hypotension;  Start 3/30/20 

at 13:11;  Stop 3/30/20 at 19:10;  Status DC


Albumin Human 200 ml @  200 mls/hr 1X PRN  PRN IV Hypotension;  Start 3/30/20 at

13:15;  Stop 3/30/20 at 19:14;  Status DC


Diphenhydramine HCl (Benadryl) 25 mg 1X PRN  PRN IV ITCHING;  Start 3/30/20 at 

13:15;  Stop 3/31/20 at 13:14;  Status DC


Diphenhydramine HCl (Benadryl) 25 mg 1X PRN  PRN IV ITCHING;  Start 3/30/20 at 

13:15;  Stop 3/31/20 at 13:14;  Status DC


Sodium Chloride 1,000 ml @  400 mls/hr Q2H30M PRN IV PATENCY;  Start 3/30/20 at 

13:11;  Stop 3/31/20 at 01:10;  Status DC


Info (PHARMACY MONITORING -- do not chart) 1 each PRN DAILY  PRN MC SEE 

COMMENTS;  Start 3/30/20 at 13:15;  Stop 4/3/20 at 16:02;  Status DC


Magnesium Sulfate 50 ml @ 25 mls/hr 1X  ONCE IV  Last administered on 3/30/20at 

19:21;  Start 3/30/20 at 15:45;  Stop 3/30/20 at 17:44;  Status DC


Aspirin (Ecotrin) 81 mg DAILYWBKFT PO  Last administered on 4/7/20at 08:23;  

Start 3/31/20 at 08:00


Piperacillin Sod/ Tazobactam Sod 2.25 gm/Sodium Chloride 50 ml @  100 mls/hr 

Q8HRS IV  Last administered on 4/6/20at 04:59;  Start 3/30/20 at 17:00;  Stop 

4/6/20 at 08:54;  Status DC


Acetaminophen/ Hydrocodone Bitart (Lortab 5/325) 1 tab PRN Q6HRS  PRN PO 

MODERATE PAIN 4-6 Last administered on 4/6/20at 20:40;  Start 3/30/20 at 22:00


Sodium Chloride 1,000 ml @  1,000 mls/hr Q1H PRN IV hypotension;  Start 3/31/20 

at 09:55;  Stop 3/31/20 at 15:54;  Status DC


Albumin Human 200 ml @  200 mls/hr 1X PRN  PRN IV Hypotension Last administered 

on 3/31/20at 10:48;  Start 3/31/20 at 10:00;  Stop 3/31/20 at 15:59;  Status DC


Sodium Chloride 1,000 ml @  400 mls/hr Q2H30M PRN IV PATENCY;  Start 3/31/20 at 

09:55;  Stop 3/31/20 at 21:54;  Status DC


Info (PHARMACY MONITORING -- do not chart) 1 each PRN DAILY  PRN MC SEE 

COMMENTS;  Start 3/31/20 at 10:00;  Status UNV


Info (PHARMACY MONITORING -- do not chart) 1 each PRN DAILY  PRN MC SEE 

COMMENTS;  Start 3/31/20 at 10:00;  Status UNV


Iron Sucrose 500 mg/Sodium Chloride 275 ml @  78.571 mls/ hr 1X  ONCE IV  Last 

administered on 3/31/20at 16:08;  Start 3/31/20 at 13:00;  Stop 3/31/20 at 

16:29;  Status DC


Lidocaine/ Epinephrine (LIDOCAINE 1%-EPI 1:100,000 Multi-Dose) 20 ml STK-MED 

ONCE .ROUTE ;  Start 3/31/20 at 12:29;  Stop 3/31/20 at 12:30;  Status DC


Midazolam HCl (Versed) 2 mg STK-MED ONCE .ROUTE ;  Start 3/31/20 at 12:34;  Stop

3/31/20 at 12:34;  Status DC


Fentanyl Citrate (Fentanyl 2ml Vial) 100 mcg STK-MED ONCE .ROUTE ;  Start 

3/31/20 at 12:34;  Stop 3/31/20 at 12:34;  Status DC


Midazolam HCl (Versed) 2 mg 1X  ONCE IV  Last administered on 3/31/20at 13:08;  

Start 3/31/20 at 12:45;  Stop 3/31/20 at 12:47;  Status DC


Fentanyl Citrate (Fentanyl 2ml Vial) 100 mcg 1X  ONCE IV  Last administered on 

3/31/20at 13:08;  Start 3/31/20 at 12:45;  Stop 3/31/20 at 12:47;  Status DC


Lidocaine/ Epinephrine (LIDOCAINE 1%-EPI 1:100,000 Multi-Dose) 20 ml 1X  ONCE SQ

 Last administered on 3/31/20at 13:10;  Start 3/31/20 at 12:45;  Stop 3/31/20 at

12:47;  Status DC


Lactobacillus Rhamnosus (Culturelle) 1 cap BID PO  Last administered on 4/7/20at

08:23;  Start 3/31/20 at 21:00


Albuterol Sulfate (Ventolin Neb Soln) 2.5 mg RTQID NEB  Last administered on 4/ 7/20at 08:49;  Start 4/1/20 at 12:00


Darbepoetin Castro (ARANESP for DIALYSIS PTS) 60 mcg WEEKLYHS SQ  Last 

administered on 4/1/20at 22:02;  Start 4/1/20 at 21:00


Sodium Chloride 1,000 ml @  1,000 mls/hr Q1H PRN IV hypotension;  Start 4/1/20 

at 14:02;  Stop 4/1/20 at 20:01;  Status DC


Sodium Chloride (Normal Saline Flush) 10 ml 1X PRN  PRN IV AP catheter pack;  

Start 4/1/20 at 14:15;  Stop 4/2/20 at 14:14;  Status DC


Sodium Chloride (Normal Saline Flush) 10 ml 1X PRN  PRN IV  catheter pack;  

Start 4/1/20 at 14:15;  Stop 4/2/20 at 14:14;  Status DC


Info (PHARMACY MONITORING -- do not chart) 1 each PRN DAILY  PRN MC SEE 

COMMENTS;  Start 4/1/20 at 14:15;  Status UNV


Info (PHARMACY MONITORING -- do not chart) 1 each PRN DAILY  PRN MC SEE 

COMMENTS;  Start 4/1/20 at 14:15;  Stop 4/3/20 at 16:03;  Status DC


Multivitamins (Thera M Plus) 1 tab DAILY PO  Last administered on 4/7/20at 

08:23;  Start 4/2/20 at 09:00


Ascorbic Acid (Vitamin C) 500 mg DAILY PO  Last administered on 4/7/20at 08:23; 

Start 4/2/20 at 09:00


Sodium Chloride 1,000 ml @  1,000 mls/hr Q1H PRN IV hypotension;  Start 4/3/20 

at 08:11;  Stop 4/3/20 at 14:10;  Status DC


Albumin Human 200 ml @  200 mls/hr 1X PRN  PRN IV Hypotension;  Start 4/3/20 at 

08:15;  Stop 4/3/20 at 14:14;  Status DC


Acetaminophen (Tylenol) 500 mg 1X PRN  PRN PO MILD PAIN / TEMP;  Start 4/3/20 at

08:15;  Stop 4/4/20 at 08:14;  Status DC


Diphenhydramine HCl (Benadryl) 25 mg 1X PRN  PRN IV ITCHING;  Start 4/3/20 at 

08:15;  Stop 4/4/20 at 08:14;  Status DC


Diphenhydramine HCl (Benadryl) 25 mg 1X PRN  PRN IV ITCHING;  Start 4/3/20 at 

08:15;  Stop 4/4/20 at 08:14;  Status DC


Sodium Chloride 1,000 ml @  400 mls/hr Q2H30M PRN IV PATENCY;  Start 4/3/20 at 

08:11;  Stop 4/3/20 at 20:10;  Status DC


Info (PHARMACY MONITORING -- do not chart) 1 each PRN DAILY  PRN MC SEE 

COMMENTS;  Start 4/3/20 at 08:15;  Status Cancel


Insulin Glargine (Lantus Syringe) 40 unit DAILY SQ  Last administered on 

4/7/20at 08:35;  Start 4/4/20 at 09:00


Sodium Chloride 1,000 ml @  1,000 mls/hr Q1H PRN IV hypotension;  Start 4/4/20 

at 07:40;  Stop 4/4/20 at 13:39;  Status DC


Albumin Human 200 ml @  200 mls/hr 1X PRN  PRN IV Hypotension;  Start 4/4/20 at 

07:45;  Stop 4/4/20 at 13:44;  Status DC


Acetaminophen (Tylenol) 500 mg 1X PRN  PRN PO MILD PAIN / TEMP;  Start 4/4/20 at

07:45;  Stop 4/5/20 at 07:44;  Status DC


Diphenhydramine HCl (Benadryl) 25 mg 1X PRN  PRN IV ITCHING;  Start 4/4/20 at 

07:45;  Stop 4/5/20 at 07:44;  Status DC


Diphenhydramine HCl (Benadryl) 25 mg 1X PRN  PRN IV ITCHING;  Start 4/4/20 at 

07:45;  Stop 4/5/20 at 07:44;  Status DC


Sodium Chloride (Normal Saline Flush) 10 ml 1X PRN  PRN IV AP catheter pack;  

Start 4/4/20 at 07:45;  Stop 4/5/20 at 07:44;  Status DC


Sodium Chloride (Normal Saline Flush) 10 ml 1X PRN  PRN IV  catheter pack;  

Start 4/4/20 at 07:45;  Stop 4/5/20 at 07:44;  Status DC


Sodium Chloride 1,000 ml @  400 mls/hr Q2H30M PRN IV PATENCY;  Start 4/4/20 at 

07:40;  Stop 4/4/20 at 19:39;  Status DC


Info (PHARMACY MONITORING -- do not chart) 1 each PRN DAILY  PRN MC SEE 

COMMENTS;  Start 4/4/20 at 07:45;  Status Cancel


Sodium Chloride 1,000 ml @  1,000 mls/hr Q1H PRN IV hypotension;  Start 4/6/20 

at 07:41;  Stop 4/6/20 at 13:40;  Status DC


Albumin Human 200 ml @  200 mls/hr 1X PRN  PRN IV Hypotension;  Start 4/6/20 at 

07:45;  Stop 4/6/20 at 13:44;  Status DC


Acetaminophen (Tylenol) 500 mg 1X PRN  PRN PO MILD PAIN / TEMP;  Start 4/6/20 at

07:45;  Stop 4/7/20 at 07:44;  Status DC


Diphenhydramine HCl (Benadryl) 25 mg 1X PRN  PRN IV ITCHING;  Start 4/6/20 at 

07:45;  Stop 4/7/20 at 07:44;  Status DC


Diphenhydramine HCl (Benadryl) 25 mg 1X PRN  PRN IV ITCHING;  Start 4/6/20 at 

07:45;  Stop 4/7/20 at 07:44;  Status DC


Sodium Chloride 1,000 ml @  400 mls/hr Q2H30M PRN IV PATENCY;  Start 4/6/20 at 

07:41;  Stop 4/6/20 at 19:40;  Status DC


Info (PHARMACY MONITORING -- do not chart) 1 each PRN DAILY  PRN MC SEE 

COMMENTS;  Start 4/6/20 at 07:45


Amoxicillin/ Clavulanate Potassium (Augmentin 500/ 125mg) 1 tab BID PO  Last 

administered on 4/7/20at 08:23;  Start 4/6/20 at 09:00;  Stop 4/10/20 at 21:01





Active Scripts


Active


Reported


Metformin Hcl Er (Metformin Hcl) 1,000 Mg Tab.er.24 1,000 Mg PO BIDWMEALS


Lisinopril-Hctz 20-12.5 Mg Tab (Lisinopril/Hydrochlorothiazide) 1 Each Tablet 1 

Tab PO DAILY


Glipizide Er (Glipizide) 10 Mg Tab.er.24 10 Mg PO BIDWMEALS


Xalatan (Latanoprost) 2.5 Ml Drops 1 Drop OP HS


Triamcinolone Acetonide 0.1% Cream (Triamcinolone Acetonide) 15 Gm Cream..g. 1 

Ashley TP BID


Tramadol Hcl 50 Mg Tablet 50 Mg PO DAILY PRN


Flonase Allergy Relief (Fluticasone Propionate) 9.9 Ml Spray.susp 2 Sprays NS 

DAILY


Novolin N (Nph, Human Insulin Isophane) 100 Unit/1 Ml Vial 40 Unit SQ [id number]


Carvedilol ** (Carvedilol) 6.25 Mg Tablet 6.25 Mg PO BIDWMEALS


Simvastatin 40 Mg Tablet 1 Tab PO QHS


Furosemide 20 Mg Tablet 1 Tab PO DAILY


Vitals/I & O





Vital Sign - Last 24 Hours








 4/6/20 4/6/20 4/6/20 4/6/20





 13:25 14:58 16:57 18:35


 


Temp 97.7 97.0  98.8





 97.7 97.0  98.8


 


Pulse 117 90  104


 


Resp 18 24  20


 


B/P (MAP) 129/54 (79) 120/54 (76)  119/50 (73)


 


Pulse Ox 90 95 93 94


 


O2 Delivery Nasal Cannula Nasal Cannula Nasal Cannula Nasal Cannula


 


O2 Flow Rate 5.0 5.0 5.0 5.0


 


    





    





 4/6/20 4/6/20 4/6/20 4/6/20





 20:04 20:13 20:40 21:40


 


Resp   18 18


 


Pulse Ox 97  95 95


 


O2 Delivery Nasal Cannula Nasal Cannula Nasal Cannula Nasal Cannula


 


O2 Flow Rate 5.0 4.0 4.0 4.0





 4/6/20 4/7/20 4/7/20 4/7/20





 22:45 03:30 07:23 08:52


 


Temp 97.8 97.8 97.6 





 97.8 97.8 97.6 


 


Pulse 86 95 105 


 


Resp 20 20 18 


 


B/P (MAP) 123/48 (73) 144/60 (88) 143/66 (91) 


 


Pulse Ox 94 94 100 98


 


O2 Delivery Nasal Cannula Nasal Cannula Nasal Cannula Nasal Cannula


 


O2 Flow Rate 5.0 5.0 5.0 5.0














Intake and Output   


 


 4/6/20 4/6/20 4/7/20





 15:00 23:00 07:00


 


Intake Total 120 ml 720 ml 60 ml


 


Output Total  100 ml 


 


Balance 120 ml 620 ml 60 ml

















RUFINO CRUZ MD           Apr 7, 2020 10:35

## 2020-04-07 NOTE — NUR
Wound Care



Pt is now pending COVID-19, pictures from Sunday assessed,  recommending to continue with 
same dressing changes. 



Wound Type/Assessment:  BLE stasis ulcers, L plantar heel DFU, bilateral buttocks Stage III 
pressure ulcers



Treatment Recommendations/Plan:  

BLE-Thorough cleaning and removal of dead skin, Xeroform gauze, ABDs and kerlix, Medigrip 
compression (if pt can tolerate), change dressings every other day.

L plantar foot/heel-Iodoflex and foam dressing,change every 3-4 days. Extra iodoflex 
dressings given to SY Smith for future dressing changes. 

Buttocks/Coccyx-Calazime cream TID and PRN, as pt is having incontinent episodes.



Education provided: SY Smith educated on turning q2h and continuing with POC 



POC discussed with SY Smith.



Offloading surface/device: P500 low air loss bed



Recommended Referrals/Tests: na



Discharge Recommendations for dressings: 

BLE-Thorough cleaning and removal of dead skin in the periwound, lotion to intact skin, 
Xeroform gauze, ABDs and kerlix, Medigrip compression (when pt can tolerate), change 
dressings every other day.

L plantar foot/heel-Iodoflex and foam dressing, change every 3-4 days.

Buttocks/Coccyx-Calazime cream TID and PRN, as pt is having incontinent episodes. Once that 
resolves, recommend Hydrocolloid and foam dressings every 3-4 days.

## 2020-04-08 VITALS
SYSTOLIC BLOOD PRESSURE: 172 MMHG | DIASTOLIC BLOOD PRESSURE: 87 MMHG | DIASTOLIC BLOOD PRESSURE: 87 MMHG | SYSTOLIC BLOOD PRESSURE: 172 MMHG | DIASTOLIC BLOOD PRESSURE: 87 MMHG | SYSTOLIC BLOOD PRESSURE: 172 MMHG

## 2020-04-08 VITALS — SYSTOLIC BLOOD PRESSURE: 155 MMHG | DIASTOLIC BLOOD PRESSURE: 67 MMHG

## 2020-04-08 VITALS — SYSTOLIC BLOOD PRESSURE: 154 MMHG | DIASTOLIC BLOOD PRESSURE: 74 MMHG

## 2020-04-08 LAB
ANION GAP SERPL CALC-SCNC: 5 MMOL/L (ref 6–14)
BUN SERPL-MCNC: 30 MG/DL (ref 7–20)
CALCIUM SERPL-MCNC: 8.6 MG/DL (ref 8.5–10.1)
CHLORIDE SERPL-SCNC: 102 MMOL/L (ref 98–107)
CO2 SERPL-SCNC: 32 MMOL/L (ref 21–32)
CREAT SERPL-MCNC: 5.4 MG/DL (ref 0.6–1)
GFR SERPLBLD BASED ON 1.73 SQ M-ARVRAT: 7.7 ML/MIN
GLUCOSE SERPL-MCNC: 165 MG/DL (ref 70–99)
POTASSIUM SERPL-SCNC: 3.9 MMOL/L (ref 3.5–5.1)
SODIUM SERPL-SCNC: 139 MMOL/L (ref 136–145)

## 2020-04-08 PROCEDURE — 5A1D70Z PERFORMANCE OF URINARY FILTRATION, INTERMITTENT, LESS THAN 6 HOURS PER DAY: ICD-10-PCS | Performed by: INTERNAL MEDICINE

## 2020-04-08 RX ADMIN — OXYCODONE HYDROCHLORIDE AND ACETAMINOPHEN SCH MG: 500 TABLET ORAL at 08:48

## 2020-04-08 RX ADMIN — FUROSEMIDE SCH MG: 10 INJECTION, SOLUTION INTRAMUSCULAR; INTRAVENOUS at 08:49

## 2020-04-08 RX ADMIN — ASPIRIN SCH MG: 81 TABLET, COATED ORAL at 08:48

## 2020-04-08 RX ADMIN — MULTIPLE VITAMINS W/ MINERALS TAB SCH TAB: TAB at 08:48

## 2020-04-08 RX ADMIN — INSULIN GLARGINE SCH UNIT: 100 INJECTION, SOLUTION SUBCUTANEOUS at 08:56

## 2020-04-08 RX ADMIN — Medication SCH CAP: at 08:48

## 2020-04-08 NOTE — NUR
SS following up with discharge planning. SS discussed with pt RN. Pt COVID19 negative. 
Physician notified. Pt accepted at [facility], [phone]; fax 
[phone]. SS currently awaiting on discharge orders at this time. Liliya Chacko 
notified of potential discharge for today. SS will continue to follow for discharge 
planning.

## 2020-04-08 NOTE — SNU/HH DC
DISCHARGE ORDERS


DISCHARGE INFORMATION:


FINAL DIAGNOSIS


Problems


Medical Problems:


(1) Cellulitis


Status: Acute  





(2) CHF (congestive heart failure)


Status: Acute  





(3) Elevated troponin


Status: Acute  





(4) Shortness of breath


Status: Acute  








CONDITION ON DISCHARGE:  Stable





CODE STATUS:


Code Status:  Full





SKILLED NURSING:


SNF STAY <30 DAYS:  Yes





HOSPICE:


HOSPICE:  No


HOSPICE EVAL & TREAT:  No





LTAC:


ADMIT TO LTAC:  No





POST DISCHARGE ORDERS:


ACTIVITY ORDERS:  Activity as tolerated


WEIGHT BEARING STATUS:  As tolerated


BATHING ORDERS:  Shower-keep dressing dry, No Tub Bath until see 


DIET AFTER DISCHARGE:  ADA





CHECKS AFTER DISCHARGE:


CHECKS AFTER DISCHARGE:  Check blood sugar, ac/hs





TREATMENT/EQUIPMENT ORDERS:


RESPIRATORY EQUIPMENT NEEDED:  Oxygen


Physical Therapy For:  Evalulation/Treatment


Occupational Therapy For:  Evaluation/Treatment


Speech Language Pathology For:  Evaluation/Treatment





DISCHARGE MEDICATIONS:


Home Meds


Active Scripts


Multivits,Ca,Minerals/Iron/Fa (THERA-M TABLET) 1 Each Tablet, 1 TAB PO DAILY for

SUPPLEMENT for 30 Days, #30 TAB


   Prov:RUFINO CRUZ MD         4/8/20


Ascorbic Acid (VITAMIN C) 500 Mg Tablet, 500 MG PO DAILY for SUPPLEMENT for 30 

Days, #30 TAB


   Prov:RUFINO CRUZ MD         4/8/20


Lactobacillus Rhamnosus Gg (CULTURELLE) 1 Each Cap.sprink, 1 CAP PO BID for 

SUPPLEMENT for 30 Days, #60 CAP


   Prov:RUFINO CRUZ MD         4/8/20


Aspirin (ASPIRIN EC) 81 Mg Tablet.dr, 81 MG PO DAILYWBKFT for HEART HEALTH for 

30 Days, #30 TAB.SR


   Prov:RUFINO CRUZ MD         4/8/20


Albuterol Sulfate (Proair Hfa) 8.5 Gm Hfa.aer.ad, 2.5 MG NEB PRN Q4HRS PRN for 

WHEEZING for 30 Days, #2 INHALER


   Prov:RUFINO CRUZ MD         4/8/20


Amoxicillin/Potassium Clav (AMOX TR-K -125 MG TAB) 1 Each Tablet, 1 TAB 

PO DAILY for INFECTION for 3 Days, #3 TAB


   Prov:RUFINO CRUZ MD         4/8/20


Reported Medications


Latanoprost (XALATAN) 2.5 Ml Drops, 1 DROP OP HS, BOTTLE


   4/3/20


Fluticasone Propionate (Flonase Allergy Relief) 9.9 Ml [place].susp, 2 SPRAYS NS 

DAILY for allergies, BOTTLE


   3/29/20


Nph, Human Insulin Isophane (NOVOLIN N) 100 Unit/1 Ml Vial, 40 UNIT SQ 

DAILYWBKFT for glucose, VIAL


   11/29/17


Carvedilol (CARVEDILOL **) 6.25 Mg Tablet, 6.25 MG PO BIDWMEALS, TAB


   11/29/17


Simvastatin (SIMVASTATIN) 40 Mg Tablet, 1 TAB PO QHS, #30 TAB 5 Refills


   11/29/17


Furosemide (FUROSEMIDE) 20 Mg Tablet, 1 TAB PO DAILY, #90 TAB 1 Refill


   11/29/17


Discontinued Reported Medications


Metformin Hcl (METFORMIN HCL ER) 1,000 Mg Tab.er.24, 1000 MG PO BIDWMEALS for 

ANTI-DIABETIC, TAB 0 Refills


   4/3/20


Lisinopril/Hydrochlorothiazide (LISINOPRIL-HCTZ 20-12.5 MG TAB) 1 Each Tablet, 1

TAB PO DAILY, #90 TAB 3 Refills


   4/3/20


Glipizide (GLIPIZIDE ER) 10 Mg Tab.er.24, 10 MG PO BIDWMEALS, TAB.SR


   4/3/20


Triamcinolone Acetonide (TRIAMCINOLONE ACETONIDE 0.1% CREAM) 15 Gm Cream..g., 1 

DOMO TP BID for skin, TUBE


   3/29/20


Tramadol Hcl (TRAMADOL HCL) 50 Mg Tablet, 50 MG PO DAILY PRN for PAIN, TAB 0 

Refills


   3/29/20


Metformin Hcl (METFORMIN HCL) 1,000 Mg Tablet, 1000 MG PO BIDWMEALS, TAB


   11/29/17


Lisinopril (LISINOPRIL) 40 Mg Tablet, 1 TAB PO DAILY, #30 TAB 5 Refills


   11/29/17


Escitalopram Oxalate (ESCITALOPRAM OXALATE) 10 Mg Tablet, 1 TAB PO DAILY, #30 

TAB 3 Refills


   11/29/17


Glipizide (GLIPIZIDE) 10 Mg Tablet, 10 MG PO BID, TAB


   11/29/17


Discontinued Scripts


Hydrocodone/Apap 5-325 (NORCO 5-325 TABLET) 1 Each Tablet, 1 TAB PO PRN Q6HRS 

PRN for PAIN, #8 TAB 0 Refills


   Prov:MADONNA GEORGE APRNADJA         2/4/20


Oxycodone Hcl/Acetaminophen (OXYCODONE-ACETAMINOPHEN 5-325) 1 Each Tablet, 1 TAB

PO PRN Q4HRS PRN for PAIN, #30 TAB


   Prov:CYNDI CHANDLER MD         12/2/17











RUFINO CRUZ MD           Apr 8, 2020 14:35

## 2020-04-08 NOTE — PDOC3
Discharge Summary


Date of Admission:  Mar 28, 2020


Date of Discharge:  Apr 8, 2020


Follow-Up:  1-2 days


Admitting Diagnosis comment:





discharge diagnosis===============








Acute on chronic kidney injury, 


hyperkalemia, resolved


hyponatremia resolved


azotemia, imrpoved 


acute on chronic systolic and diastolic heart failure,


elevated troponin 


anemia and lower extremity wounds.  // Lower extremity cellulitis. 


Left fifth phalangeal changes on x-ray, likely secondary to the trauma and 

healing fracture,





covid-19 exposure in hospital noted, neg on 4/8/20





follow Nephrology consultant recommendations


follow Cardiology. recommendations


Hold ACE inhibitor.  


IV antibiotics.  


PT, OT.  


Wound care nurse to evaluate and treat.  


DVT prophylaxis.  


Full code.  


Prognosis //guarded.


temp dialysis cath placement  3/30


OP HD SET UP FOR MWF


Ultrasound and fluoroscopic guided placement of right internal jugular temporary

dialysis catheter


NEEDS TDC AND OP HD SET UP


Dialysis as per consultant


covid-19 testing 4/7 neg 





COVID-19 CRITERIA:    The patient was evaluated during the global COVID-19 

pandemic, and that diagnosis was suspected/considered upon their initial presen

tation.  Their evaluation, treatment and testing was consistent with current 

guidelines for patients who present with complaints or symptoms that may be 

related to COVID-19.








37 min pt exam, chart review d/c planning time , > 50% of time spent with exam, 

chart review, pt care coordination








History of Present Illness


History of Present Illness


No acute events reported overnight, case discussed with nursing staff patient in

no acute distress no complaints during my visit most likely will require 

placement





Vitals


Vitals





Vital Signs








  Date Time  Temp Pulse Resp B/P (MAP) Pulse Ox O2 Delivery O2 Flow Rate FiO2


 


4/8/20 10:56 97.7 92 16 172/87 (115) 97 Nasal Cannula 5.0 





 97.7       











Physical Exam


Physical Exam


GENERAL:  Propped up in bed, alert in NAD 


HEENT:  Oral cavity pink, no lesions 


NECK:  Supple


LUNGS:  Clear.


HEART:  S1, S2 regular.


ABDOMEN:  Obese, soft and nontender. 


: Venegas in place 


EXTREMITIES:  Bilateral lower extremity pitting edema and excoriation of both 

the legs with superficial ulcerations.  Foot has no


wound at the fifth metatarsal or phalangeal area bilaterally.  


The patient appears to have puncture wound with slight dry crackly skin into the

actually calcaneal area.  Dorsalis pedis is palpable bilaterally.


NEUROLOGIC: Alert, awake and appropriate.  No focal neurologic deficit.


Tunneled HD catheter (3/31) without signs of infection


PIV ok


General:  Alert, Oriented X3, Cooperative, No acute distress


Heart:  Regular rate, Normal S1, Normal S2


Lungs:  Clear


Abdomen:  Normal bowel sounds, No tenderness


Extremities:  No clubbing, No cyanosis


Skin:  No significant lesion


FINAL DIAGNOSIS


Problems


Medical Problems:


(1) Cellulitis


Status: Acute  





(2) CHF (congestive heart failure)


Status: Acute  





(3) Elevated troponin


Status: Acute  





(4) Shortness of breath


Status: Acute  





Brief Hospital Course


Ms. Pascal  is a 75 old [sex] who presented with [ DANNY]


CONDITION AT DISCHARGE:  Improved


Discharge Medications





Current Medications


Vancomycin HCl 250 ml @  166.667 mls/hr 1X  ONCE IV  Last administered on 

3/28/20at 18:24;  Start 3/28/20 at 17:45;  Stop 3/28/20 at 19:14;  Status DC


Sodium Chloride 1,000 ml @  1,000 mls/hr 1X  ONCE IV  Last administered on 

3/28/20at 18:23;  Start 3/28/20 at 18:00;  Stop 3/28/20 at 18:59;  Status DC


Calcium Gluconate (Calcium Gluconate) 1,000 mg 1X  ONCE IVP  Last administered 

on 3/28/20at 18:24;  Start 3/28/20 at 18:00;  Stop 3/28/20 at 18:01;  Status DC


Albuterol Sulfate (Ventolin Neb Soln) 10 mg 1X  ONCE CONT NEB  Last administered

on 3/28/20at 18:00;  Start 3/28/20 at 18:00;  Stop 3/28/20 at 18:01;  Status DC


Furosemide (Lasix) 40 mg 1X  ONCE PO ;  Start 3/28/20 at 18:30;  Stop 3/28/20 at

18:31;  Status DC


Enoxaparin Sodium (Lovenox 120mg Syringe) 120 mg 1X  ONCE SQ  Last administered 

on 3/28/20at 20:05;  Start 3/28/20 at 19:30;  Stop 3/28/20 at 19:31;  Status DC


Furosemide (Lasix) 40 mg 1X  ONCE IVP  Last administered on 3/28/20at 20:05;  

Start 3/28/20 at 19:30;  Stop 3/28/20 at 19:31;  Status DC


Dextrose (Dextrose 50%-Water Syringe) 25 gm STK-MED ONCE IV ;  Start 3/29/20 at 

08:11;  Stop 3/29/20 at 08:11;  Status DC


Sodium Polystyrene Sulfonate (Kayexalate) 30 gm 1X  ONCE RC ;  Start 3/29/20 at 

09:00;  Stop 3/29/20 at 08:38;  Status DC


Sodium Polystyrene Sulfonate (Kayexalate) 15 gm 1X  ONCE PO  Last administered 

on 3/29/20at 09:24;  Start 3/29/20 at 09:00;  Stop 3/29/20 at 09:01;  Status DC


Piperacillin Sod/ Tazobactam Sod (Zosyn Per Pharmacy) 1 each PRN DAILY  PRN MC 

SEE COMMENTS;  Start 3/29/20 at 13:00;  Stop 4/6/20 at 08:56;  Status DC


Piperacillin Sod/ Tazobactam Sod 2.25 gm/Sodium Chloride 50 ml @  100 mls/hr 

Q6HRS IV  Last administered on 3/30/20at 05:32;  Start 3/29/20 at 14:00;  Stop 

3/30/20 at 16:05;  Status DC


Dextrose (Dextrose 50%-Water Syringe) 12.5 gm PRN Q15MIN  PRN IV SEE COMMENTS 

Last administered on 3/30/20at 07:56;  Start 3/29/20 at 21:15


Dextrose (Iv Dextrose 5%) 250 ml PRN Q15MIN  PRN IV SEE COMMENTS;  Start 3/29/20

at 21:15


Dextrose (Dextrose 50%-Water Syringe) 25 gm STK-MED ONCE IV ;  Start 3/29/20 at 

21:19;  Stop 3/29/20 at 21:19;  Status DC


Furosemide (Lasix) 60 mg DAILY IVP  Last administered on 4/8/20at 08:49;  Start 

3/30/20 at 12:30


Lidocaine HCl (Buffered Lidocaine 1%) 3 ml STK-MED ONCE .ROUTE ;  Start 3/30/20 

at 12:36;  Stop 3/30/20 at 12:36;  Status DC


Lidocaine HCl (Buffered Lidocaine 1%) 6 ml 1X  ONCE INJ  Last administered on 

3/30/20at 12:56;  Start 3/30/20 at 12:45;  Stop 3/30/20 at 12:46;  Status DC


Sodium Chloride 1,000 ml @  1,000 mls/hr Q1H PRN IV hypotension;  Start 3/30/20 

at 13:11;  Stop 3/30/20 at 19:10;  Status DC


Albumin Human 200 ml @  200 mls/hr 1X PRN  PRN IV Hypotension;  Start 3/30/20 at

13:15;  Stop 3/30/20 at 19:14;  Status DC


Diphenhydramine HCl (Benadryl) 25 mg 1X PRN  PRN IV ITCHING;  Start 3/30/20 at 

13:15;  Stop 3/31/20 at 13:14;  Status DC


Diphenhydramine HCl (Benadryl) 25 mg 1X PRN  PRN IV ITCHING;  Start 3/30/20 at 

13:15;  Stop 3/31/20 at 13:14;  Status DC


Sodium Chloride 1,000 ml @  400 mls/hr Q2H30M PRN IV PATENCY;  Start 3/30/20 at 

13:11;  Stop 3/31/20 at 01:10;  Status DC


Info (PHARMACY MONITORING -- do not chart) 1 each PRN DAILY  PRN MC SEE 

COMMENTS;  Start 3/30/20 at 13:15;  Stop 4/3/20 at 16:02;  Status DC


Magnesium Sulfate 50 ml @ 25 mls/hr 1X  ONCE IV  Last administered on 3/30/20at 

19:21;  Start 3/30/20 at 15:45;  Stop 3/30/20 at 17:44;  Status DC


Aspirin (Ecotrin) 81 mg DAILYWBKFT PO  Last administered on 4/8/20at 08:48;  

Start 3/31/20 at 08:00


Piperacillin Sod/ Tazobactam Sod 2.25 gm/Sodium Chloride 50 ml @  100 mls/hr 

Q8HRS IV  Last administered on 4/6/20at 04:59;  Start 3/30/20 at 17:00;  Stop 

4/6/20 at 08:54;  Status DC


Acetaminophen/ Hydrocodone Bitart (Lortab 5/325) 1 tab PRN Q6HRS  PRN PO MODE

RATE PAIN 4-6 Last administered on 4/7/20at 23:36;  Start 3/30/20 at 22:00


Sodium Chloride 1,000 ml @  1,000 mls/hr Q1H PRN IV hypotension;  Start 3/31/20 

at 09:55;  Stop 3/31/20 at 15:54;  Status DC


Albumin Human 200 ml @  200 mls/hr 1X PRN  PRN IV Hypotension Last administered 

on 3/31/20at 10:48;  Start 3/31/20 at 10:00;  Stop 3/31/20 at 15:59;  Status DC


Sodium Chloride 1,000 ml @  400 mls/hr Q2H30M PRN IV PATENCY;  Start 3/31/20 at 

09:55;  Stop 3/31/20 at 21:54;  Status DC


Info (PHARMACY MONITORING -- do not chart) 1 each PRN DAILY  PRN MC SEE 

COMMENTS;  Start 3/31/20 at 10:00;  Status UNV


Info (PHARMACY MONITORING -- do not chart) 1 each PRN DAILY  PRN MC SEE 

COMMENTS;  Start 3/31/20 at 10:00;  Status UNV


Iron Sucrose 500 mg/Sodium Chloride 275 ml @  78.571 mls/ hr 1X  ONCE IV  Last 

administered on 3/31/20at 16:08;  Start 3/31/20 at 13:00;  Stop 3/31/20 at 

16:29;  Status DC


Lidocaine/ Epinephrine (LIDOCAINE 1%-EPI 1:100,000 Multi-Dose) 20 ml STK-MED 

ONCE .ROUTE ;  Start 3/31/20 at 12:29;  Stop 3/31/20 at 12:30;  Status DC


Midazolam HCl (Versed) 2 mg STK-MED ONCE .ROUTE ;  Start 3/31/20 at 12:34;  Stop

3/31/20 at 12:34;  Status DC


Fentanyl Citrate (Fentanyl 2ml Vial) 100 mcg STK-MED ONCE .ROUTE ;  Start 

3/31/20 at 12:34;  Stop 3/31/20 at 12:34;  Status DC


Midazolam HCl (Versed) 2 mg 1X  ONCE IV  Last administered on 3/31/20at 13:08;  

Start 3/31/20 at 12:45;  Stop 3/31/20 at 12:47;  Status DC


Fentanyl Citrate (Fentanyl 2ml Vial) 100 mcg 1X  ONCE IV  Last administered on 

3/31/20at 13:08;  Start 3/31/20 at 12:45;  Stop 3/31/20 at 12:47;  Status DC


Lidocaine/ Epinephrine (LIDOCAINE 1%-EPI 1:100,000 Multi-Dose) 20 ml 1X  ONCE SQ

 Last administered on 3/31/20at 13:10;  Start 3/31/20 at 12:45;  Stop 3/31/20 at

12:47;  Status DC


Lactobacillus Rhamnosus (Culturelle) 1 cap BID PO  Last administered on 4/8/20at

08:48;  Start 3/31/20 at 21:00


Albuterol Sulfate (Ventolin Neb Soln) 2.5 mg RTQID NEB  Last administered on 

4/7/20at 16:29;  Start 4/1/20 at 12:00;  Stop 4/7/20 at 19:31;  Status DC


Darbepoetin [place] (ARANESP for DIALYSIS PTS) 60 mcg WEEKLYHS SQ  Last 

administered on 4/1/20at 22:02;  Start 4/1/20 at 21:00


Sodium Chloride 1,000 ml @  1,000 mls/hr Q1H PRN IV hypotension;  Start 4/1/20 

at 14:02;  Stop 4/1/20 at 20:01;  Status DC


Sodium Chloride (Normal Saline Flush) 10 ml 1X PRN  PRN IV AP catheter pack;  

Start 4/1/20 at 14:15;  Stop 4/2/20 at 14:14;  Status DC


Sodium Chloride (Normal Saline Flush) 10 ml 1X PRN  PRN IV  catheter pack;  

Start 4/1/20 at 14:15;  Stop 4/2/20 at 14:14;  Status DC


Info (PHARMACY MONITORING -- do not chart) 1 each PRN DAILY  PRN MC SEE 

COMMENTS;  Start 4/1/20 at 14:15;  Status UNV


Info (PHARMACY MONITORING -- do not chart) 1 each PRN DAILY  PRN MC SEE 

COMMENTS;  Start 4/1/20 at 14:15;  Stop 4/3/20 at 16:03;  Status DC


Multivitamins (Thera M Plus) 1 tab DAILY PO  Last administered on 4/8/20at 

08:48;  Start 4/2/20 at 09:00


Ascorbic Acid (Vitamin C) 500 mg DAILY PO  Last administered on 4/8/20at 08:48; 

Start 4/2/20 at 09:00


Sodium Chloride 1,000 ml @  1,000 mls/hr Q1H PRN IV hypotension;  Start 4/3/20 

at 08:11;  Stop 4/3/20 at 14:10;  Status DC


Albumin Human 200 ml @  200 mls/hr 1X PRN  PRN IV Hypotension;  Start 4/3/20 at 

08:15;  Stop 4/3/20 at 14:14;  Status DC


Acetaminophen (Tylenol) 500 mg 1X PRN  PRN PO MILD PAIN / TEMP;  Start 4/3/20 at

08:15;  Stop 4/4/20 at 08:14;  Status DC


Diphenhydramine HCl (Benadryl) 25 mg 1X PRN  PRN IV ITCHING;  Start 4/3/20 at 

08:15;  Stop 4/4/20 at 08:14;  Status DC


Diphenhydramine HCl (Benadryl) 25 mg 1X PRN  PRN IV ITCHING;  Start 4/3/20 at 

08:15;  Stop 4/4/20 at 08:14;  Status DC


Sodium Chloride 1,000 ml @  400 mls/hr Q2H30M PRN IV PATENCY;  Start 4/3/20 at 

08:11;  Stop 4/3/20 at 20:10;  Status DC


Info (PHARMACY MONITORING -- do not chart) 1 each PRN DAILY  PRN MC SEE 

COMMENTS;  Start 4/3/20 at 08:15;  Status Cancel


Insulin Glargine (Lantus Syringe) 40 unit DAILY SQ  Last administered on 

4/8/20at 08:56;  Start 4/4/20 at 09:00


Sodium Chloride 1,000 ml @  1,000 mls/hr Q1H PRN IV hypotension;  Start 4/4/20 

at 07:40;  Stop 4/4/20 at 13:39;  Status DC


Albumin Human 200 ml @  200 mls/hr 1X PRN  PRN IV Hypotension;  Start 4/4/20 at 

07:45;  Stop 4/4/20 at 13:44;  Status DC


Acetaminophen (Tylenol) 500 mg 1X PRN  PRN PO MILD PAIN / TEMP;  Start 4/4/20 at

07:45;  Stop 4/5/20 at 07:44;  Status DC


Diphenhydramine HCl (Benadryl) 25 mg 1X PRN  PRN IV ITCHING;  Start 4/4/20 at 

07:45;  Stop 4/5/20 at 07:44;  Status DC


Diphenhydramine HCl (Benadryl) 25 mg 1X PRN  PRN IV ITCHING;  Start 4/4/20 at 

07:45;  Stop 4/5/20 at 07:44;  Status DC


Sodium Chloride (Normal Saline Flush) 10 ml 1X PRN  PRN IV AP catheter pack;  

Start 4/4/20 at 07:45;  Stop 4/5/20 at 07:44;  Status DC


Sodium Chloride (Normal Saline Flush) 10 ml 1X PRN  PRN IV  catheter pack;  

Start 4/4/20 at 07:45;  Stop 4/5/20 at 07:44;  Status DC


Sodium Chloride 1,000 ml @  400 mls/hr Q2H30M PRN IV PATENCY;  Start 4/4/20 at 

07:40;  Stop 4/4/20 at 19:39;  Status DC


Info (PHARMACY MONITORING -- do not chart) 1 each PRN DAILY  PRN MC SEE 

COMMENTS;  Start 4/4/20 at 07:45;  Status Cancel


Sodium Chloride 1,000 ml @  1,000 mls/hr Q1H PRN IV hypotension;  Start 4/6/20 

at 07:41;  Stop 4/6/20 at 13:40;  Status DC


Albumin Human 200 ml @  200 mls/hr 1X PRN  PRN IV Hypotension;  Start 4/6/20 at 

07:45;  Stop 4/6/20 at 13:44;  Status DC


Acetaminophen (Tylenol) 500 mg 1X PRN  PRN PO MILD PAIN / TEMP;  Start 4/6/20 at

07:45;  Stop 4/7/20 at 07:44;  Status DC


Diphenhydramine HCl (Benadryl) 25 mg 1X PRN  PRN IV ITCHING;  Start 4/6/20 at 

07:45;  Stop 4/7/20 at 07:44;  Status DC


Diphenhydramine HCl (Benadryl) 25 mg 1X PRN  PRN IV ITCHING;  Start 4/6/20 at 

07:45;  Stop 4/7/20 at 07:44;  Status DC


Sodium Chloride 1,000 ml @  400 mls/hr Q2H30M PRN IV PATENCY;  Start 4/6/20 at 

07:41;  Stop 4/6/20 at 19:40;  Status DC


Info (PHARMACY MONITORING -- do not chart) 1 each PRN DAILY  PRN MC SEE 

COMMENTS;  Start 4/6/20 at 07:45


Amoxicillin/ Clavulanate Potassium (Augmentin 500/ 125mg) 1 tab BID PO  Last 

administered on 4/7/20at 08:23;  Start 4/6/20 at 09:00;  Stop 4/7/20 at 15:31;  

Status DC


Amoxicillin/ Clavulanate Potassium (Augmentin 500/ 125mg) 1 tab DAILY PO  Last 

administered on 4/8/20at 08:48;  Start 4/8/20 at 09:00;  Stop 4/9/20 at 09:01


Albuterol Sulfate (Ventolin Neb Soln) 2.5 mg PRN Q4HRS  PRN NEB WHEEZING;  Start

4/7/20 at 19:30





Active Scripts


Active


Reported


Metformin Hcl Er (Metformin Hcl) 1,000 Mg Tab.er.24 1,000 Mg PO BIDWMEALS


Lisinopril-Hctz 20-12.5 Mg Tab (Lisinopril/Hydrochlorothiazide) 1 Each Tablet 1 

Tab PO DAILY


Glipizide Er (Glipizide) 10 Mg Tab.er.24 10 Mg PO BIDWMEALS


Xalatan (Latanoprost) 2.5 Ml Drops 1 Drop OP HS


Triamcinolone Acetonide 0.1% Cream (Triamcinolone Acetonide) 15 Gm Cream..g. 1 

[place] TP BID


Tramadol Hcl 50 Mg Tablet 50 Mg PO DAILY PRN


Flonase Allergy Relief (Fluticasone Propionate) 9.9 Ml Spray.susp 2 Sprays NS 

DAILY


Novolin N (Nph, Human Insulin Isophane) 100 Unit/1 Ml Vial 40 Unit SQ DAILYWBKFT


Carvedilol ** (Carvedilol) 6.25 Mg Tablet 6.25 Mg PO BIDWMEALS


Simvastatin 40 Mg Tablet 1 Tab PO QHS


Furosemide 20 Mg Tablet 1 Tab PO DAILY


Vital Signs





Vital Signs








  Date Time  Temp Pulse Resp B/P (MAP) Pulse Ox O2 Delivery O2 Flow Rate FiO2


 


4/8/20 10:56 97.7 92 16 172/87 (115) 97 Nasal Cannula 5.0 





 97.7       








Labs





Laboratory Tests








Test


 4/6/20


16:33 4/6/20


20:48 4/7/20


07:35 4/7/20


11:33


 


Glucose (Fingerstick)


 203 mg/dL


(70-99) 352 mg/dL


(70-99) 181 mg/dL


(70-99) 256 mg/dL


(70-99)


 


Test


 4/7/20


12:00 4/7/20


16:33 4/7/20


22:02 4/8/20


03:50


 


Coronavirus (COVID-19)(PCR)


 See separate


report 


 


 





 


Glucose (Fingerstick)


 


 258 mg/dL


(70-99) 277 mg/dL


(70-99) 





 


Sodium Level


 


 


 


 139 mmol/L


(136-145)


 


Potassium Level


 


 


 


 3.9 mmol/L


(3.5-5.1)


 


Chloride Level


 


 


 


 102 mmol/L


()


 


Carbon Dioxide Level


 


 


 


 32 mmol/L


(21-32)


 


Anion Gap    5 (6-14) 


 


Blood Urea Nitrogen


 


 


 


 30 mg/dL


(7-20)


 


Creatinine


 


 


 


 5.4 mg/dL


(0.6-1.0)


 


Estimated GFR


(Cockcroft-Gault) 


 


 


 7.7 





 


Glucose Level


 


 


 


 165 mg/dL


(70-99)


 


Calcium Level


 


 


 


 8.6 mg/dL


(8.5-10.1)


 


Test


 4/8/20


07:56 4/8/20


11:28 


 





 


Glucose (Fingerstick)


 101 mg/dL


(70-99) 147 mg/dL


(70-99) 


 











Laboratory Tests








Test


 4/7/20


16:33 4/7/20


22:02 4/8/20


03:50 4/8/20


07:56


 


Glucose (Fingerstick)


 258 mg/dL


(70-99) 277 mg/dL


(70-99) 


 101 mg/dL


(70-99)


 


Sodium Level


 


 


 139 mmol/L


(136-145) 





 


Potassium Level


 


 


 3.9 mmol/L


(3.5-5.1) 





 


Chloride Level


 


 


 102 mmol/L


() 





 


Carbon Dioxide Level


 


 


 32 mmol/L


(21-32) 





 


Anion Gap   5 (6-14)  


 


Blood Urea Nitrogen


 


 


 30 mg/dL


(7-20) 





 


Creatinine


 


 


 5.4 mg/dL


(0.6-1.0) 





 


Estimated GFR


(Cockcroft-Gault) 


 


 7.7 


 





 


Glucose Level


 


 


 165 mg/dL


(70-99) 





 


Calcium Level


 


 


 8.6 mg/dL


(8.5-10.1) 





 


Test


 4/8/20


11:28 


 


 





 


Glucose (Fingerstick)


 147 mg/dL


(70-99) 


 


 











Allergies





                                    Allergies








Coded Allergies Type Severity Reaction Last Updated Verified


 


  No Known Drug Allergies    11/28/17 No








Disposition/Orders:  Other (D/C TO [facility])











RUFINO CRUZ MD           Apr 8, 2020 14:29

## 2020-04-08 NOTE — PDOC
PROGRESS NOTES


Chief Complaint


Chief Complaint





discharge diagnosis===============








Acute on chronic kidney injury, 


hyperkalemia, resolved


hyponatremia resolved


azotemia, imrpoved 


acute on chronic systolic and diastolic heart failure,


elevated troponin 


anemia and lower extremity wounds.  // Lower extremity cellulitis. 


Left fifth phalangeal changes on x-ray, likely secondary to the trauma and 

healing fracture,





covid-19 exposure in hospital noted, neg on 4/8/20





follow Nephrology consultant recommendations


follow Cardiology. recommendations


Hold ACE inhibitor.  


IV antibiotics.  


PT, OT.  


Wound care nurse to evaluate and treat.  


DVT prophylaxis.  


Full code.  


Prognosis //guarded.


temp dialysis cath placement  3/30


OP HD SET UP FOR MWF


Ultrasound and fluoroscopic guided placement of right internal jugular temporary

dialysis catheter


NEEDS TDC AND OP HD SET UP


Dialysis as per consultant


covid-19 testing 4/7 neg 





COVID-19 CRITERIA:    The patient was evaluated during the global COVID-19 

pandemic, and that diagnosis was suspected/considered upon their initial 

presentation.  Their evaluation, treatment and testing was consistent with 

current guidelines for patients who present with complaints or symptoms that may

be related to COVID-19.








37 min pt exam, chart review d/c planning time , > 50% of time spent with exam, 

chart review, pt care coordination








History of Present Illness


History of Present Illness


No acute events reported overnight, case discussed with nursing staff patient in

no acute distress no complaints during my visit most likely will require 

placement





Vitals


Vitals





Vital Signs








  Date Time  Temp Pulse Resp B/P (MAP) Pulse Ox O2 Delivery O2 Flow Rate FiO2


 


4/8/20 10:56 97.7 92 16 172/87 (115) 97 Nasal Cannula 5.0 





 97.7       











Physical Exam


Physical Exam


GENERAL:  Propped up in bed, alert in NAD 


HEENT:  Oral cavity pink, no lesions 


NECK:  Supple


LUNGS:  Clear.


HEART:  S1, S2 regular.


ABDOMEN:  Obese, soft and nontender. 


: Venegas in place 


EXTREMITIES:  Bilateral lower extremity pitting edema and excoriation of both 

the legs with superficial ulcerations.  Foot has no


wound at the fifth metatarsal or phalangeal area bilaterally.  


The patient appears to have puncture wound with slight dry crackly skin into the

actually calcaneal area.  Dorsalis pedis is palpable bilaterally.


NEUROLOGIC: Alert, awake and appropriate.  No focal neurologic deficit.


Tunneled HD catheter (3/31) without signs of infection


PIV ok


General:  Alert, Oriented X3, Cooperative, No acute distress


Heart:  Regular rate, Normal S1, Normal S2


Lungs:  Clear


Abdomen:  Normal bowel sounds, No tenderness


Extremities:  No clubbing, No cyanosis


Skin:  No significant lesion





Labs


LABS





Laboratory Tests








Test


 4/7/20


11:33 4/7/20


12:00 4/7/20


16:33 4/7/20


22:02


 


Glucose (Fingerstick)


 256 mg/dL


(70-99) 


 258 mg/dL


(70-99) 277 mg/dL


(70-99)


 


Coronavirus (COVID-19)(PCR)


 


 See separate


report 


 





 


Test


 4/8/20


03:50 4/8/20


07:56 


 





 


Sodium Level


 139 mmol/L


(136-145) 


 


 





 


Potassium Level


 3.9 mmol/L


(3.5-5.1) 


 


 





 


Chloride Level


 102 mmol/L


() 


 


 





 


Carbon Dioxide Level


 32 mmol/L


(21-32) 


 


 





 


Anion Gap 5 (6-14)    


 


Blood Urea Nitrogen


 30 mg/dL


(7-20) 


 


 





 


Creatinine


 5.4 mg/dL


(0.6-1.0) 


 


 





 


Estimated GFR


(Cockcroft-Gault) 7.7 


 


 


 





 


Glucose Level


 165 mg/dL


(70-99) 


 


 





 


Calcium Level


 8.6 mg/dL


(8.5-10.1) 


 


 





 


Glucose (Fingerstick)


 


 101 mg/dL


(70-99) 


 














Assessment and Plan


Assessmemt and Plan


Problems


Medical Problems:


(1) Cellulitis


Status: Acute  





(2) CHF (congestive heart failure)


Status: Acute  





(3) Elevated troponin


Status: Acute  





(4) Shortness of breath


Status: Acute  











Comment


Review of Relevant


I have reviewed the following items rizwan (where applicable) has been applied.


Labs





Laboratory Tests








Test


 4/6/20


13:20 4/6/20


16:33 4/6/20


20:48 4/7/20


07:35


 


Glucose (Fingerstick)


 128 mg/dL


(70-99) 203 mg/dL


(70-99) 352 mg/dL


(70-99) 181 mg/dL


(70-99)


 


Test


 4/7/20


11:33 4/7/20


12:00 4/7/20


16:33 4/7/20


22:02


 


Glucose (Fingerstick)


 256 mg/dL


(70-99) 


 258 mg/dL


(70-99) 277 mg/dL


(70-99)


 


Coronavirus (COVID-19)(PCR)


 


 See separate


report 


 





 


Test


 4/8/20


03:50 4/8/20


07:56 


 





 


Sodium Level


 139 mmol/L


(136-145) 


 


 





 


Potassium Level


 3.9 mmol/L


(3.5-5.1) 


 


 





 


Chloride Level


 102 mmol/L


() 


 


 





 


Carbon Dioxide Level


 32 mmol/L


(21-32) 


 


 





 


Anion Gap 5 (6-14)    


 


Blood Urea Nitrogen


 30 mg/dL


(7-20) 


 


 





 


Creatinine


 5.4 mg/dL


(0.6-1.0) 


 


 





 


Estimated GFR


(Cockcroft-Gault) 7.7 


 


 


 





 


Glucose Level


 165 mg/dL


(70-99) 


 


 





 


Calcium Level


 8.6 mg/dL


(8.5-10.1) 


 


 





 


Glucose (Fingerstick)


 


 101 mg/dL


(70-99) 


 











Laboratory Tests








Test


 4/7/20


11:33 4/7/20


12:00 4/7/20


16:33 4/7/20


22:02


 


Glucose (Fingerstick)


 256 mg/dL


(70-99) 


 258 mg/dL


(70-99) 277 mg/dL


(70-99)


 


Coronavirus (COVID-19)(PCR)


 


 See separate


report 


 





 


Test


 4/8/20


03:50 4/8/20


07:56 


 





 


Sodium Level


 139 mmol/L


(136-145) 


 


 





 


Potassium Level


 3.9 mmol/L


(3.5-5.1) 


 


 





 


Chloride Level


 102 mmol/L


() 


 


 





 


Carbon Dioxide Level


 32 mmol/L


(21-32) 


 


 





 


Anion Gap 5 (6-14)    


 


Blood Urea Nitrogen


 30 mg/dL


(7-20) 


 


 





 


Creatinine


 5.4 mg/dL


(0.6-1.0) 


 


 





 


Estimated GFR


(Cockcroft-Gault) 7.7 


 


 


 





 


Glucose Level


 165 mg/dL


(70-99) 


 


 





 


Calcium Level


 8.6 mg/dL


(8.5-10.1) 


 


 





 


Glucose (Fingerstick)


 


 101 mg/dL


(70-99) 


 











Medications





Current Medications


Vancomycin HCl 250 ml @  166.667 mls/hr 1X  ONCE IV  Last administered on 

3/28/20at 18:24;  Start 3/28/20 at 17:45;  Stop 3/28/20 at 19:14;  Status DC


Sodium Chloride 1,000 ml @  1,000 mls/hr 1X  ONCE IV  Last administered on 

3/28/20at 18:23;  Start 3/28/20 at 18:00;  Stop 3/28/20 at 18:59;  Status DC


Calcium Gluconate (Calcium Gluconate) 1,000 mg 1X  ONCE IVP  Last administered 

on 3/28/20at 18:24;  Start 3/28/20 at 18:00;  Stop 3/28/20 at 18:01;  Status DC


Albuterol Sulfate (Ventolin Neb Soln) 10 mg 1X  ONCE CONT NEB  Last administered

on 3/28/20at 18:00;  Start 3/28/20 at 18:00;  Stop 3/28/20 at 18:01;  Status DC


Furosemide (Lasix) 40 mg 1X  ONCE PO ;  Start 3/28/20 at 18:30;  Stop 3/28/20 at

18:31;  Status DC


Enoxaparin Sodium (Lovenox 120mg Syringe) 120 mg 1X  ONCE SQ  Last administered 

on 3/28/20at 20:05;  Start 3/28/20 at 19:30;  Stop 3/28/20 at 19:31;  Status DC


Furosemide (Lasix) 40 mg 1X  ONCE IVP  Last administered on 3/28/20at 20:05;  

Start 3/28/20 at 19:30;  Stop 3/28/20 at 19:31;  Status DC


Dextrose (Dextrose 50%-Water Syringe) 25 gm STK-MED ONCE IV ;  Start 3/29/20 at 

08:11;  Stop 3/29/20 at 08:11;  Status DC


Sodium Polystyrene Sulfonate (Kayexalate) 30 gm 1X  ONCE RC ;  Start 3/29/20 at 

09:00;  Stop 3/29/20 at 08:38;  Status DC


Sodium Polystyrene Sulfonate (Kayexalate) 15 gm 1X  ONCE PO  Last administered 

on 3/29/20at 09:24;  Start 3/29/20 at 09:00;  Stop 3/29/20 at 09:01;  Status DC


Piperacillin Sod/ Tazobactam Sod (Zosyn Per Pharmacy) 1 each PRN DAILY  PRN MC 

SEE COMMENTS;  Start 3/29/20 at 13:00;  Stop 4/6/20 at 08:56;  Status DC


Piperacillin Sod/ Tazobactam Sod 2.25 gm/Sodium Chloride 50 ml @  100 mls/hr 

Q6HRS IV  Last administered on 3/30/20at 05:32;  Start 3/29/20 at 14:00;  Stop 

3/30/20 at 16:05;  Status DC


Dextrose (Dextrose 50%-Water Syringe) 12.5 gm PRN Q15MIN  PRN IV SEE COMMENTS 

Last administered on 3/30/20at 07:56;  Start 3/29/20 at 21:15


Dextrose (Iv Dextrose 5%) 250 ml PRN Q15MIN  PRN IV SEE COMMENTS;  Start 3/29/20

at 21:15


Dextrose (Dextrose 50%-Water Syringe) 25 gm STK-MED ONCE IV ;  Start 3/29/20 at 

21:19;  Stop 3/29/20 at 21:19;  Status DC


Furosemide (Lasix) 60 mg DAILY IVP  Last administered on 4/8/20at 08:49;  Start 

3/30/20 at 12:30


Lidocaine HCl (Buffered Lidocaine 1%) 3 ml STK-MED ONCE .ROUTE ;  Start 3/30/20 

at 12:36;  Stop 3/30/20 at 12:36;  Status DC


Lidocaine HCl (Buffered Lidocaine 1%) 6 ml 1X  ONCE INJ  Last administered on 

3/30/20at 12:56;  Start 3/30/20 at 12:45;  Stop 3/30/20 at 12:46;  Status DC


Sodium Chloride 1,000 ml @  1,000 mls/hr Q1H PRN IV hypotension;  Start 3/30/20 

at 13:11;  Stop 3/30/20 at 19:10;  Status DC


Albumin Human 200 ml @  200 mls/hr 1X PRN  PRN IV Hypotension;  Start 3/30/20 at

13:15;  Stop 3/30/20 at 19:14;  Status DC


Diphenhydramine HCl (Benadryl) 25 mg 1X PRN  PRN IV ITCHING;  Start 3/30/20 at 

13:15;  Stop 3/31/20 at 13:14;  Status DC


Diphenhydramine HCl (Benadryl) 25 mg 1X PRN  PRN IV ITCHING;  Start 3/30/20 at 

13:15;  Stop 3/31/20 at 13:14;  Status DC


Sodium Chloride 1,000 ml @  400 mls/hr Q2H30M PRN IV PATENCY;  Start 3/30/20 at 

13:11;  Stop 3/31/20 at 01:10;  Status DC


Info (PHARMACY MONITORING -- do not chart) 1 each PRN DAILY  PRN MC SEE 

COMMENTS;  Start 3/30/20 at 13:15;  Stop 4/3/20 at 16:02;  Status DC


Magnesium Sulfate 50 ml @ 25 mls/hr 1X  ONCE IV  Last administered on 3/30/20at 

19:21;  Start 3/30/20 at 15:45;  Stop 3/30/20 at 17:44;  Status DC


Aspirin (Ecotrin) 81 mg DAILYWBKFT PO  Last administered on 4/8/20at 08:48;  

Start 3/31/20 at 08:00


Piperacillin Sod/ Tazobactam Sod 2.25 gm/Sodium Chloride 50 ml @  100 mls/hr 

Q8HRS IV  Last administered on 4/6/20at 04:59;  Start 3/30/20 at 17:00;  Stop 

4/6/20 at 08:54;  Status DC


Acetaminophen/ Hydrocodone Bitart (Lortab 5/325) 1 tab PRN Q6HRS  PRN PO 

MODERATE PAIN 4-6 Last administered on 4/7/20at 23:36;  Start 3/30/20 at 22:00


Sodium Chloride 1,000 ml @  1,000 mls/hr Q1H PRN IV hypotension;  Start 3/31/20 

at 09:55;  Stop 3/31/20 at 15:54;  Status DC


Albumin Human 200 ml @  200 mls/hr 1X PRN  PRN IV Hypotension Last administered 

on 3/31/20at 10:48;  Start 3/31/20 at 10:00;  Stop 3/31/20 at 15:59;  Status DC


Sodium Chloride 1,000 ml @  400 mls/hr Q2H30M PRN IV PATENCY;  Start 3/31/20 at 

09:55;  Stop 3/31/20 at 21:54;  Status DC


Info (PHARMACY MONITORING -- do not chart) 1 each PRN DAILY  PRN MC SEE 

COMMENTS;  Start 3/31/20 at 10:00;  Status UNV


Info (PHARMACY MONITORING -- do not chart) 1 each PRN DAILY  PRN MC SEE 

COMMENTS;  Start 3/31/20 at 10:00;  Status UNV


Iron Sucrose 500 mg/Sodium Chloride 275 ml @  78.571 mls/ hr 1X  ONCE IV  Last 

administered on 3/31/20at 16:08;  Start 3/31/20 at 13:00;  Stop 3/31/20 at 

16:29;  Status DC


Lidocaine/ Epinephrine (LIDOCAINE 1%-EPI 1:100,000 Multi-Dose) 20 ml STK-MED 

ONCE .ROUTE ;  Start 3/31/20 at 12:29;  Stop 3/31/20 at 12:30;  Status DC


Midazolam HCl (Versed) 2 mg STK-MED ONCE .ROUTE ;  Start 3/31/20 at 12:34;  Stop

3/31/20 at 12:34;  Status DC


Fentanyl Citrate (Fentanyl 2ml Vial) 100 mcg STK-MED ONCE .ROUTE ;  Start 

3/31/20 at 12:34;  Stop 3/31/20 at 12:34;  Status DC


Midazolam HCl (Versed) 2 mg 1X  ONCE IV  Last administered on 3/31/20at 13:08;  

Start 3/31/20 at 12:45;  Stop 3/31/20 at 12:47;  Status DC


Fentanyl Citrate (Fentanyl 2ml Vial) 100 mcg 1X  ONCE IV  Last administered on 

3/31/20at 13:08;  Start 3/31/20 at 12:45;  Stop 3/31/20 at 12:47;  Status DC


Lidocaine/ Epinephrine (LIDOCAINE 1%-EPI 1:100,000 Multi-Dose) 20 ml 1X  ONCE SQ

 Last administered on 3/31/20at 13:10;  Start 3/31/20 at 12:45;  Stop 3/31/20 at

12:47;  Status DC


Lactobacillus Rhamnosus (Culturelle) 1 cap BID PO  Last administered on 4/8/20at

08:48;  Start 3/31/20 at 21:00


Albuterol Sulfate (Ventolin Neb Soln) 2.5 mg RTQID NEB  Last administered on 

4/7/20at 16:29;  Start 4/1/20 at 12:00;  Stop 4/7/20 at 19:31;  Status DC


Darbepoetin Castro (ARANESP for DIALYSIS PTS) 60 mcg WEEKLYHS SQ  Last 

administered on 4/1/20at 22:02;  Start 4/1/20 at 21:00


Sodium Chloride 1,000 ml @  1,000 mls/hr Q1H PRN IV hypotension;  Start 4/1/20 

at 14:02;  Stop 4/1/20 at 20:01;  Status DC


Sodium Chloride (Normal Saline Flush) 10 ml 1X PRN  PRN IV AP catheter pack;  

Start 4/1/20 at 14:15;  Stop 4/2/20 at 14:14;  Status DC


Sodium Chloride (Normal Saline Flush) 10 ml 1X PRN  PRN IV  catheter pack;  

Start 4/1/20 at 14:15;  Stop 4/2/20 at 14:14;  Status DC


Info (PHARMACY MONITORING -- do not chart) 1 each PRN DAILY  PRN MC SEE 

COMMENTS;  Start 4/1/20 at 14:15;  Status UNV


Info (PHARMACY MONITORING -- do not chart) 1 each PRN DAILY  PRN MC SEE 

COMMENTS;  Start 4/1/20 at 14:15;  Stop 4/3/20 at 16:03;  Status DC


Multivitamins (Thera M Plus) 1 tab DAILY PO  Last administered on 4/8/20at 

08:48;  Start 4/2/20 at 09:00


Ascorbic Acid (Vitamin C) 500 mg DAILY PO  Last administered on 4/8/20at 08:48; 

Start 4/2/20 at 09:00


Sodium Chloride 1,000 ml @  1,000 mls/hr Q1H PRN IV hypotension;  Start 4/3/20 

at 08:11;  Stop 4/3/20 at 14:10;  Status DC


Albumin Human 200 ml @  200 mls/hr 1X PRN  PRN IV Hypotension;  Start 4/3/20 at 

08:15;  Stop 4/3/20 at 14:14;  Status DC


Acetaminophen (Tylenol) 500 mg 1X PRN  PRN PO MILD PAIN / TEMP;  Start 4/3/20 at

08:15;  Stop 4/4/20 at 08:14;  Status DC


Diphenhydramine HCl (Benadryl) 25 mg 1X PRN  PRN IV ITCHING;  Start 4/3/20 at 

08:15;  Stop 4/4/20 at 08:14;  Status DC


Diphenhydramine HCl (Benadryl) 25 mg 1X PRN  PRN IV ITCHING;  Start 4/3/20 at 

08:15;  Stop 4/4/20 at 08:14;  Status DC


Sodium Chloride 1,000 ml @  400 mls/hr Q2H30M PRN IV PATENCY;  Start 4/3/20 at 

08:11;  Stop 4/3/20 at 20:10;  Status DC


Info (PHARMACY MONITORING -- do not chart) 1 each PRN DAILY  PRN MC SEE 

COMMENTS;  Start 4/3/20 at 08:15;  Status Cancel


Insulin Glargine (Lantus Syringe) 40 unit DAILY SQ  Last administered on 

4/8/20at 08:56;  Start 4/4/20 at 09:00


Sodium Chloride 1,000 ml @  1,000 mls/hr Q1H PRN IV hypotension;  Start 4/4/20 

at 07:40;  Stop 4/4/20 at 13:39;  Status DC


Albumin Human 200 ml @  200 mls/hr 1X PRN  PRN IV Hypotension;  Start 4/4/20 at 

07:45;  Stop 4/4/20 at 13:44;  Status DC


Acetaminophen (Tylenol) 500 mg 1X PRN  PRN PO MILD PAIN / TEMP;  Start 4/4/20 at

07:45;  Stop 4/5/20 at 07:44;  Status DC


Diphenhydramine HCl (Benadryl) 25 mg 1X PRN  PRN IV ITCHING;  Start 4/4/20 at 

07:45;  Stop 4/5/20 at 07:44;  Status DC


Diphenhydramine HCl (Benadryl) 25 mg 1X PRN  PRN IV ITCHING;  Start 4/4/20 at 

07:45;  Stop 4/5/20 at 07:44;  Status DC


Sodium Chloride (Normal Saline Flush) 10 ml 1X PRN  PRN IV AP catheter pack;  

Start 4/4/20 at 07:45;  Stop 4/5/20 at 07:44;  Status DC


Sodium Chloride (Normal Saline Flush) 10 ml 1X PRN  PRN IV  catheter pack;  

Start 4/4/20 at 07:45;  Stop 4/5/20 at 07:44;  Status DC


Sodium Chloride 1,000 ml @  400 mls/hr Q2H30M PRN IV PATENCY;  Start 4/4/20 at 

07:40;  Stop 4/4/20 at 19:39;  Status DC


Info (PHARMACY MONITORING -- do not chart) 1 each PRN DAILY  PRN MC SEE 

COMMENTS;  Start 4/4/20 at 07:45;  Status Cancel


Sodium Chloride 1,000 ml @  1,000 mls/hr Q1H PRN IV hypotension;  Start 4/6/20 

at 07:41;  Stop 4/6/20 at 13:40;  Status DC


Albumin Human 200 ml @  200 mls/hr 1X PRN  PRN IV Hypotension;  Start 4/6/20 at 

07:45;  Stop 4/6/20 at 13:44;  Status DC


Acetaminophen (Tylenol) 500 mg 1X PRN  PRN PO MILD PAIN / TEMP;  Start 4/6/20 at

07:45;  Stop 4/7/20 at 07:44;  Status DC


Diphenhydramine HCl (Benadryl) 25 mg 1X PRN  PRN IV ITCHING;  Start 4/6/20 at 07

:45;  Stop 4/7/20 at 07:44;  Status DC


Diphenhydramine HCl (Benadryl) 25 mg 1X PRN  PRN IV ITCHING;  Start 4/6/20 at 

07:45;  Stop 4/7/20 at 07:44;  Status DC


Sodium Chloride 1,000 ml @  400 mls/hr Q2H30M PRN IV PATENCY;  Start 4/6/20 at 

07:41;  Stop 4/6/20 at 19:40;  Status DC


Info (PHARMACY MONITORING -- do not chart) 1 each PRN DAILY  PRN MC SEE 

COMMENTS;  Start 4/6/20 at 07:45


Amoxicillin/ Clavulanate Potassium (Augmentin 500/ 125mg) 1 tab BID PO  Last 

administered on 4/7/20at 08:23;  Start 4/6/20 at 09:00;  Stop 4/7/20 at 15:31;  

Status DC


Amoxicillin/ Clavulanate Potassium (Augmentin 500/ 125mg) 1 tab DAILY PO  Last 

administered on 4/8/20at 08:48;  Start 4/8/20 at 09:00;  Stop 4/9/20 at 09:01


Albuterol Sulfate (Ventolin Neb Soln) 2.5 mg PRN Q4HRS  PRN NEB WHEEZING;  Start

4/7/20 at 19:30





Active Scripts


Active


Reported


Metformin Hcl Er (Metformin Hcl) 1,000 Mg Tab.er.24 1,000 Mg PO BIDWMEALS


Lisinopril-Hctz 20-12.5 Mg Tab (Lisinopril/Hydrochlorothiazide) 1 Each Tablet 1 

Tab PO DAILY


Glipizide Er (Glipizide) 10 Mg Tab.er.24 10 Mg PO BIDWMEALS


Xalatan (Latanoprost) 2.5 Ml Drops 1 Drop OP HS


Triamcinolone Acetonide 0.1% Cream (Triamcinolone Acetonide) 15 Gm Cream..g. 1 

Ashley TP BID


Tramadol Hcl 50 Mg Tablet 50 Mg PO DAILY PRN


Flonase Allergy Relief (Fluticasone Propionate) 9.9 Ml Spray.susp 2 Sprays NS 

DAILY


Novolin N (Nph, Human Insulin Isophane) 100 Unit/1 Ml Vial 40 Unit SQ DAILYWBKFT


Carvedilol ** (Carvedilol) 6.25 Mg Tablet 6.25 Mg PO BIDWMEALS


Simvastatin 40 Mg Tablet 1 Tab PO QHS


Furosemide 20 Mg Tablet 1 Tab PO DAILY


Vitals/I & O





Vital Sign - Last 24 Hours








 4/7/20 4/7/20 4/7/20 4/7/20





 12:00 12:27 15:24 16:29


 


Temp   97.5 





   97.5 


 


Pulse   108 


 


Resp   20 


 


B/P (MAP)   110/53 (72) 


 


Pulse Ox   97 95


 


O2 Delivery Nasal Cannula Nasal Cannula Nasal Cannula Nasal Cannula


 


O2 Flow Rate 4.0 5.0 5.0 5.0


 


    





    





 4/7/20 4/7/20 4/7/20 4/7/20





 19:30 20:16 22:04 23:36


 


Temp 97.5  97.7 





 97.5  97.7 


 


Pulse 108  108 


 


Resp 20  20 18


 


B/P (MAP) 136/79 (98)  143/71 (95) 


 


Pulse Ox 99  99 99


 


O2 Delivery Nasal Cannula Nasal Cannula Nasal Cannula Nasal Cannula


 


O2 Flow Rate 5.0 4.0 5.0 5.0


 


    





    





 4/8/20 4/8/20 4/8/20 4/8/20





 00:36 02:30 07:00 10:56


 


Temp  97.6 97.8 97.7





  97.6 97.8 97.7


 


Pulse  105 76 92


 


Resp 18 20 16 16


 


B/P (MAP)  155/67 (96) 154/74 (100) 172/87 (115)


 


Pulse Ox 99 99 99 97


 


O2 Delivery Nasal Cannula Nasal Cannula Nasal Cannula Nasal Cannula


 


O2 Flow Rate 5.0 5.0 5.0 5.0














Intake and Output   


 


 4/7/20 4/7/20 4/8/20





 15:00 23:00 07:00


 


Intake Total 720 ml 410 ml 170 ml


 


Output Total  350 ml 50 ml


 


Balance 720 ml 60 ml 120 ml

















RUFINO CRUZ MD           Apr 8, 2020 11:01

## 2020-04-08 NOTE — PDOC
Infectious Disease Note


Subjective:


Subjective


Comfortable, denies pain


Denies F/C/SOA/N/V





Vital Signs:


Vital Signs





Vital Signs








  Date Time  Temp Pulse Resp B/P (MAP) Pulse Ox O2 Delivery O2 Flow Rate FiO2


 


4/8/20 02:30 97.6 105 20 155/67 (96) 99 Nasal Cannula 5.0 





 97.6       











Physical Exam:


PHYSICAL EXAM


GENERAL:  Propped up in bed, alert in NAD 


HEENT:  Oral cavity pink, no lesions 


NECK:  Supple


LUNGS:  Clear.


HEART:  S1, S2 regular.


ABDOMEN:  Obese, soft and nontender. 


: Venegas in place 


EXTREMITIES:  Bilateral lower extremity pitting edema and excoriation of both 

the legs with superficial ulcerations.  Foot has no


wound at the fifth metatarsal or phalangeal area bilaterally.  


The patient appears to have puncture wound with slight dry crackly skin into the

actually calcaneal area.  Dorsalis pedis is palpable bilaterally.


NEUROLOGIC: Alert, awake and appropriate.  No focal neurologic deficit.


Tunneled HD catheter (3/31) without signs of infection


PIV ok





Medications:


Inpatient Meds:





Current Medications








 Medications


  (Trade)  Dose


 Ordered  Sig/Eva  Start Time


 Stop Time Status Last Admin


Dose Admin


 


 Acetaminophen


  (Tylenol)  500 mg  1X PRN  PRN  4/6/20 07:45


 4/7/20 07:44 DC  





 


 Acetaminophen/


 Hydrocodone Bitart


  (Lortab 5/325)  1 tab  PRN Q6HRS  PRN  3/30/20 22:00


    4/7/20 23:36


1 TAB


 


 Albumin Human  200 ml @ 


 200 mls/hr  1X PRN  PRN  4/6/20 07:45


 4/6/20 13:44 DC  





 


 Albuterol Sulfate


  (Ventolin Neb


 Soln)  2.5 mg  PRN Q4HRS  PRN  4/7/20 19:30


     





 


 Amoxicillin/


 Clavulanate


 Potassium


  (Augmentin 500/


 125mg)  1 tab  DAILY  4/8/20 09:00


 4/9/20 09:01   





 


 Ascorbic Acid


  (Vitamin C)  500 mg  DAILY  4/2/20 09:00


    4/7/20 08:23


500 MG


 


 Aspirin


  (Ecotrin)  81 mg  [id number]  3/31/20 08:00


    4/7/20 08:23


81 MG


 


 Calcium Gluconate


  (Calcium


 Gluconate)  1,000 mg  1X  ONCE  3/28/20 18:00


 3/28/20 18:01 DC 3/28/20 18:24


1,000 MG


 


 Darbepoetin Castro


  (ARANESP for


 DIALYSIS PTS)  60 mcg  WEEKLYHS  4/1/20 21:00


    4/1/20 22:02


60 MCG


 


 Dextrose


  (Dextrose


 50%-Water Syringe)  25 gm  STK-MED ONCE  3/29/20 21:19


 3/29/20 21:19 DC  





 


 Dextrose


  (Iv Dextrose 5%)  250 ml  PRN Q15MIN  PRN  3/29/20 21:15


     





 


 Diphenhydramine


 HCl


  (Benadryl)  25 mg  1X PRN  PRN  4/6/20 07:45


 4/7/20 07:44 DC  





 


 Enoxaparin Sodium


  (Lovenox 120mg


 Syringe)  120 mg  1X  ONCE  3/28/20 19:30


 3/28/20 19:31 DC 3/28/20 20:05


120 MG


 


 Fentanyl Citrate


  (Fentanyl 2ml


 Vial)  100 mcg  1X  ONCE  3/31/20 12:45


 3/31/20 12:47 DC 3/31/20 13:08


50 MCG


 


 Furosemide


  (Lasix)  60 mg  DAILY  3/30/20 12:30


    4/7/20 08:25


60 MG


 


 Info


  (PHARMACY


 MONITORING -- do


 not chart)  1 each  PRN DAILY  PRN  4/6/20 07:45


     





 


 Insulin Glargine


  (Lantus Syringe)  40 unit  DAILY  4/4/20 09:00


    4/7/20 08:35


40 UNIT


 


 Iron Sucrose 500


 mg/Sodium Chloride  275 ml @ 


 78.571 mls/


 hr  1X  ONCE  3/31/20 13:00


 3/31/20 16:29 DC 3/31/20 16:08


78.571 MLS/HR


 


 Lactobacillus


 Rhamnosus


  (Culturelle)  1 cap  BID  3/31/20 21:00


    4/7/20 23:36


1 CAP


 


 Lidocaine HCl


  (Buffered


 Lidocaine 1%)  6 ml  1X  ONCE  3/30/20 12:45


 3/30/20 12:46 DC 3/30/20 12:56


3 ML


 


 Lidocaine/


 Epinephrine


  (LIDOCAINE


 1%-EPI 1:100,000


 Multi-Dose)  20 ml  1X  ONCE  3/31/20 12:45


 3/31/20 12:47 DC 3/31/20 13:10


10 ML


 


 Magnesium Sulfate  50 ml @ 25


 mls/hr  1X  ONCE  3/30/20 15:45


 3/30/20 17:44 DC 3/30/20 19:21


25 MLS/HR


 


 Midazolam HCl


  (Versed)  2 mg  1X  ONCE  3/31/20 12:45


 3/31/20 12:47 DC 3/31/20 13:08


1 MG


 


 Multivitamins


  (Thera M Plus)  1 tab  DAILY  4/2/20 09:00


    4/7/20 08:23


1 TAB


 


 Piperacillin Sod/


 Tazobactam Sod


  (Zosyn Per


 Pharmacy)  1 each  PRN DAILY  PRN  3/29/20 13:00


 4/6/20 08:56 DC  





 


 Piperacillin Sod/


 Tazobactam Sod


 2.25 gm/Sodium


 Chloride  50 ml @ 


 100 mls/hr  Q8HRS  3/30/20 17:00


 4/6/20 08:54 DC 4/6/20 04:59


100 MLS/HR


 


 Sodium


 Polystyrene


 Sulfonate


  (Kayexalate)  15 gm  1X  ONCE  3/29/20 09:00


 3/29/20 09:01 DC 3/29/20 09:24


15 GM


 


 Sodium Chloride  1,000 ml @ 


 400 mls/hr  Q2H30M PRN  4/6/20 07:41


 4/6/20 19:40 DC  





 


 Sodium Chloride


  (Normal Saline


 Flush)  10 ml  1X PRN  PRN  4/4/20 07:45


 4/5/20 07:44 DC  





 


 Vancomycin HCl  250 ml @ 


 166.667


 mls/hr  1X  ONCE  3/28/20 17:45


 3/28/20 19:14 DC 3/28/20 18:24


166.667 MLS/HR











Labs:


Lab





Laboratory Tests








Test


 4/7/20


07:35 4/7/20


11:33 4/7/20


16:33 4/7/20


22:02


 


Glucose (Fingerstick)


 181 mg/dL


(70-99) 256 mg/dL


(70-99) 258 mg/dL


(70-99) 277 mg/dL


(70-99)


 


Test


 4/8/20


03:50 


 


 





 


Sodium Level


 139 mmol/L


(136-145) 


 


 





 


Potassium Level


 3.9 mmol/L


(3.5-5.1) 


 


 





 


Chloride Level


 102 mmol/L


() 


 


 





 


Carbon Dioxide Level


 32 mmol/L


(21-32) 


 


 





 


Anion Gap 5 (6-14)    


 


Blood Urea Nitrogen


 30 mg/dL


(7-20) 


 


 





 


Creatinine


 5.4 mg/dL


(0.6-1.0) 


 


 





 


Estimated GFR


(Cockcroft-Gault) 7.7 


 


 


 





 


Glucose Level


 165 mg/dL


(70-99) 


 


 





 


Calcium Level


 8.6 mg/dL


(8.5-10.1) 


 


 














Objective:


Assessment:


1.  Bilateral lower extremity venous insufficiency with superficial ulcerations


and possible secondary infection.


2.  Left fifth phalangeal changes on x-ray, likely secondary to the trauma and


healing fracture, not infection as there is no open wound there.  Likely have a


puncture wound into the calcaneal area with pain.


3.  Congestive heart failure.


4.  Acute on chronic renal insufficiency, on hemodialysis now.


5.  Diabetes.


6.  Hypertension.


7.  Obesity





Plan:


Plan of Care


awaiting tx to rehab 


augmentin bid 3 days; renal dosing


Probiotics 


Local wound care


Supportive care











CROW JENKINS MD            Apr 8, 2020 07:29

## 2020-04-08 NOTE — NUR
Discharge Note:



NEENA DUTTON



Discharge instructions and discharge home medications reviewed with Other facility and a 
copy given. All questions have been answered and understanding verbalized. 



instructions and handouts were called to [phone] SY Hardy.



Discontinued lines and drains: IV catheter removed intact.



Patient discharged to [facility] via [facility].

## 2020-04-08 NOTE — PDOC
SUBJECTIVE


ROS


Stable





OBJECTIVE


Vital Signs





Vital Signs








  Date Time  Temp Pulse Resp B/P (MAP) Pulse Ox O2 Delivery O2 Flow Rate FiO2


 


4/8/20 07:00 97.8 76 16 154/74 (100) 99 Nasal Cannula 5.0 





 97.8       








I & 0











Intake and Output 


 


 4/8/20





 07:00


 


Intake Total 1300 ml


 


Output Total 400 ml


 


Balance 900 ml


 


 


 


Intake Oral 1300 ml


 


Output Urine Total 400 ml


 


# Bowel Movements 1











PHYSICAL EXAM


Physical Exam


GENERAL:  NAD 


HEENT:  Oral cavity moist  


NECK:  Supple


LUNGS:  Clear.


HEART:  S1, S2 regular.


ABDOMEN:  Obese, soft and nontender. 


: Venegas in place 


EXTREMITIES:  Bilateral lower extremity pitting edema and excoriation of both 

the legs with superficial ulcerations.(Per ID) 


NEUROLOGIC: Alert, awake and appropriate.  No focal neurologic deficit.


Tunneled HD catheter (3/31)





DIAGNOSIS/ASSESSMENT





DIAGNOSIS/ASSESSMENT


Assessment & Plan





ESRD:  New Onset ESRD ,on Monday Wednesday Friday schedule


seen on HD , tolerating well, continue as ordered, Dw Drn 


Access Tunnelled HDC, OP Chair time per SW  





ANEMIA On Aranesp as ordered,  





HTN: antihypertensives





Bilateral lower extremity venous insufficiency with superficial ulcerations and 

possible secondary infection.





Congestive heart failure- compensated  





DM per primary





CoVid negative





COMMENT/RELEVANT DATA


Meds





Current Medications








 Medications


  (Trade)  Dose


 Ordered  Sig/Eva  Start Time


 Stop Time Status Last Admin


Dose Admin


 


 Acetaminophen


  (Tylenol)  500 mg  1X PRN  PRN  4/6/20 07:45


 4/7/20 07:44 DC  





 


 Acetaminophen/


 Hydrocodone Bitart


  (Lortab 5/325)  1 tab  PRN Q6HRS  PRN  3/30/20 22:00


    4/7/20 23:36


1 TAB


 


 Albumin Human  200 ml @ 


 200 mls/hr  1X PRN  PRN  4/6/20 07:45


 4/6/20 13:44 DC  





 


 Albuterol Sulfate


  (Ventolin Neb


 Soln)  2.5 mg  PRN Q4HRS  PRN  4/7/20 19:30


     





 


 Amoxicillin/


 Clavulanate


 Potassium


  (Augmentin 500/


 125mg)  1 tab  DAILY  4/8/20 09:00


 4/9/20 09:01  4/8/20 08:48


1 TAB


 


 Ascorbic Acid


  (Vitamin C)  500 mg  DAILY  4/2/20 09:00


    4/8/20 08:48


500 MG


 


 Aspirin


  (Ecotrin)  81 mg  DAILYWBKFT  3/31/20 08:00


    4/8/20 08:48


81 MG


 


 Calcium Gluconate


  (Calcium


 Gluconate)  1,000 mg  1X  ONCE  3/28/20 18:00


 3/28/20 18:01 DC 3/28/20 18:24


1,000 MG


 


 Darbepoetin Castro


  (ARANESP for


 DIALYSIS PTS)  60 mcg  WEEKLYHS  4/1/20 21:00


    4/1/20 22:02


60 MCG


 


 Dextrose


  (Dextrose


 50%-Water Syringe)  25 gm  STK-MED ONCE  3/29/20 21:19


 3/29/20 21:19 DC  





 


 Dextrose


  (Iv Dextrose 5%)  250 ml  PRN Q15MIN  PRN  3/29/20 21:15


     





 


 Diphenhydramine


 HCl


  (Benadryl)  25 mg  1X PRN  PRN  4/6/20 07:45


 4/7/20 07:44 DC  





 


 Enoxaparin Sodium


  (Lovenox 120mg


 Syringe)  120 mg  1X  ONCE  3/28/20 19:30


 3/28/20 19:31 DC 3/28/20 20:05


120 MG


 


 Fentanyl Citrate


  (Fentanyl 2ml


 Vial)  100 mcg  1X  ONCE  3/31/20 12:45


 3/31/20 12:47 DC 3/31/20 13:08


50 MCG


 


 Furosemide


  (Lasix)  60 mg  DAILY  3/30/20 12:30


    4/8/20 08:49


60 MG


 


 Info


  (PHARMACY


 MONITORING -- do


 not chart)  1 each  PRN DAILY  PRN  4/6/20 07:45


     





 


 Insulin Glargine


  (Lantus Syringe)  40 unit  DAILY  4/4/20 09:00


    4/8/20 08:56


40 UNIT


 


 Iron Sucrose 500


 mg/Sodium Chloride  275 ml @ 


 78.571 mls/


 hr  1X  ONCE  3/31/20 13:00


 3/31/20 16:29 DC 3/31/20 16:08


78.571 MLS/HR


 


 Lactobacillus


 Rhamnosus


  (Culturelle)  1 cap  BID  3/31/20 21:00


    4/8/20 08:48


1 CAP


 


 Lidocaine HCl


  (Buffered


 Lidocaine 1%)  6 ml  1X  ONCE  3/30/20 12:45


 3/30/20 12:46 DC 3/30/20 12:56


3 ML


 


 Lidocaine/


 Epinephrine


  (LIDOCAINE


 1%-EPI 1:100,000


 Multi-Dose)  20 ml  1X  ONCE  3/31/20 12:45


 3/31/20 12:47 DC 3/31/20 13:10


10 ML


 


 Magnesium Sulfate  50 ml @ 25


 mls/hr  1X  ONCE  3/30/20 15:45


 3/30/20 17:44 DC 3/30/20 19:21


25 MLS/HR


 


 Midazolam HCl


  (Versed)  2 mg  1X  ONCE  3/31/20 12:45


 3/31/20 12:47 DC 3/31/20 13:08


1 MG


 


 Multivitamins


  (Thera M Plus)  1 tab  DAILY  4/2/20 09:00


    4/8/20 08:48


1 TAB


 


 Piperacillin Sod/


 Tazobactam Sod


  (Zosyn Per


 Pharmacy)  1 each  PRN DAILY  PRN  3/29/20 13:00


 4/6/20 08:56 DC  





 


 Piperacillin Sod/


 Tazobactam Sod


 2.25 gm/Sodium


 Chloride  50 ml @ 


 100 mls/hr  Q8HRS  3/30/20 17:00


 4/6/20 08:54 DC 4/6/20 04:59


100 MLS/HR


 


 Sodium


 Polystyrene


 Sulfonate


  (Kayexalate)  15 gm  1X  ONCE  3/29/20 09:00


 3/29/20 09:01 DC 3/29/20 09:24


15 GM


 


 Sodium Chloride  1,000 ml @ 


 400 mls/hr  Q2H30M PRN  4/6/20 07:41


 4/6/20 19:40 DC  





 


 Sodium Chloride


  (Normal Saline


 Flush)  10 ml  1X PRN  PRN  4/4/20 07:45


 4/5/20 07:44 DC  





 


 Vancomycin HCl  250 ml @ 


 166.667


 mls/hr  1X  ONCE  3/28/20 17:45


 3/28/20 19:14 DC 3/28/20 18:24


166.667 MLS/HR








Lab





Laboratory Tests








Test


 4/7/20


11:33 4/7/20


16:33 4/7/20


22:02 4/8/20


03:50


 


Glucose (Fingerstick)


 256 mg/dL


(70-99) 258 mg/dL


(70-99) 277 mg/dL


(70-99) 





 


Sodium Level


 


 


 


 139 mmol/L


(136-145)


 


Potassium Level


 


 


 


 3.9 mmol/L


(3.5-5.1)


 


Chloride Level


 


 


 


 102 mmol/L


()


 


Carbon Dioxide Level


 


 


 


 32 mmol/L


(21-32)


 


Anion Gap    5 (6-14) 


 


Blood Urea Nitrogen


 


 


 


 30 mg/dL


(7-20)


 


Creatinine


 


 


 


 5.4 mg/dL


(0.6-1.0)


 


Estimated GFR


(Cockcroft-Gault) 


 


 


 7.7 





 


Glucose Level


 


 


 


 165 mg/dL


(70-99)


 


Calcium Level


 


 


 


 8.6 mg/dL


(8.5-10.1)


 


Test


 4/8/20


07:56 


 


 





 


Glucose (Fingerstick)


 101 mg/dL


(70-99) 


 


 











Results


All relevant outside records, renal labs, imaging studies, telemetry/EKG's were 

reviewed.











RONIT CHARLES MD                 Apr 8, 2020 09:22

## 2020-06-04 ENCOUNTER — HOSPITAL ENCOUNTER (OUTPATIENT)
Dept: HOSPITAL 61 - LAB | Age: 75
Discharge: HOME | End: 2020-06-04
Attending: INTERNAL MEDICINE
Payer: MEDICARE

## 2020-06-04 DIAGNOSIS — N17.9: Primary | ICD-10-CM

## 2020-06-04 DIAGNOSIS — E61.1: ICD-10-CM

## 2020-06-04 PROCEDURE — 83540 ASSAY OF IRON: CPT

## 2020-06-04 PROCEDURE — 82728 ASSAY OF FERRITIN: CPT

## 2020-06-04 PROCEDURE — 36415 COLL VENOUS BLD VENIPUNCTURE: CPT

## 2020-06-04 PROCEDURE — 80069 RENAL FUNCTION PANEL: CPT

## 2020-06-04 PROCEDURE — 85025 COMPLETE CBC W/AUTO DIFF WBC: CPT

## 2020-06-04 PROCEDURE — 82575 CREATININE CLEARANCE TEST: CPT

## 2020-06-04 PROCEDURE — 83550 IRON BINDING TEST: CPT

## 2020-06-08 LAB
ALBUMIN SERPL-MCNC: 2.7 G/DL (ref 3.4–5)
ANION GAP SERPL CALC-SCNC: 6 MMOL/L (ref 6–14)
BASOPHILS # BLD AUTO: 0.1 X10^3/UL (ref 0–0.2)
BASOPHILS NFR BLD: 1 % (ref 0–3)
BUN SERPL-MCNC: 28 MG/DL (ref 7–20)
CALCIUM SERPL-MCNC: 9.1 MG/DL (ref 8.5–10.1)
CHLORIDE SERPL-SCNC: 99 MMOL/L (ref 98–107)
CO2 SERPL-SCNC: 34 MMOL/L (ref 21–32)
CREAT SERPL-MCNC: 1.3 MG/DL (ref 0.6–1)
EOSINOPHIL NFR BLD: 0.3 X10^3/UL (ref 0–0.7)
EOSINOPHIL NFR BLD: 3 % (ref 0–3)
ERYTHROCYTE [DISTWIDTH] IN BLOOD BY AUTOMATED COUNT: 19.2 % (ref 11.5–14.5)
GFR SERPLBLD BASED ON 1.73 SQ M-ARVRAT: 39.7 ML/MIN
GLUCOSE SERPL-MCNC: 177 MG/DL (ref 70–99)
HCT VFR BLD CALC: 36 % (ref 36–47)
HGB BLD-MCNC: 11.6 G/DL (ref 12–15.5)
LYMPHOCYTES # BLD: 1.2 X10^3/UL (ref 1–4.8)
LYMPHOCYTES NFR BLD AUTO: 12 % (ref 24–48)
MCH RBC QN AUTO: 29 PG (ref 25–35)
MCHC RBC AUTO-ENTMCNC: 32 G/DL (ref 31–37)
MCV RBC AUTO: 89 FL (ref 79–100)
MONO #: 0.6 X10^3/UL (ref 0–1.1)
MONOCYTES NFR BLD: 7 % (ref 0–9)
NEUT #: 7.5 X10^3/UL (ref 1.8–7.7)
NEUTROPHILS NFR BLD AUTO: 78 % (ref 31–73)
PHOSPHATE SERPL-MCNC: 3.9 MG/DL (ref 2.6–4.7)
PLATELET # BLD AUTO: 377 X10^3/UL (ref 140–400)
POTASSIUM SERPL-SCNC: 4 MMOL/L (ref 3.5–5.1)
RBC # BLD AUTO: 4.04 X10^6/UL (ref 3.5–5.4)
SODIUM SERPL-SCNC: 139 MMOL/L (ref 136–145)
WBC # BLD AUTO: 9.6 X10^3/UL (ref 4–11)

## 2020-06-09 LAB
CREAT CLEAR 24: 49 ML/MIN (ref 88–128)
CREAT SERPL-MCNC: 1.14 MG/DL (ref 0.57–1)
CREAT UR-MCNC: 44.6 MG/DL

## 2020-09-29 ENCOUNTER — HOSPITAL ENCOUNTER (OUTPATIENT)
Dept: HOSPITAL 61 - LAB | Age: 75
Discharge: HOME | End: 2020-09-29
Attending: NURSE PRACTITIONER
Payer: MEDICARE

## 2020-09-29 DIAGNOSIS — N17.9: Primary | ICD-10-CM

## 2020-09-29 LAB
ALBUMIN SERPL-MCNC: 3.1 G/DL (ref 3.4–5)
ANION GAP SERPL CALC-SCNC: 4 MMOL/L (ref 6–14)
BASOPHILS # BLD AUTO: 0 X10^3/UL (ref 0–0.2)
BASOPHILS NFR BLD: 1 % (ref 0–3)
BUN SERPL-MCNC: 59 MG/DL (ref 7–20)
CALCIUM SERPL-MCNC: 9.5 MG/DL (ref 8.5–10.1)
CHLORIDE SERPL-SCNC: 94 MMOL/L (ref 98–107)
CO2 SERPL-SCNC: 31 MMOL/L (ref 21–32)
CREAT SERPL-MCNC: 1.7 MG/DL (ref 0.6–1)
CREATININE,RANDOM URINE: 14.6 MG/DL
EOSINOPHIL NFR BLD: 0.1 X10^3/UL (ref 0–0.7)
EOSINOPHIL NFR BLD: 2 % (ref 0–3)
ERYTHROCYTE [DISTWIDTH] IN BLOOD BY AUTOMATED COUNT: 13.8 % (ref 11.5–14.5)
GFR SERPLBLD BASED ON 1.73 SQ M-ARVRAT: 29.1 ML/MIN
GLUCOSE SERPL-MCNC: 432 MG/DL (ref 70–99)
HBV SURFACE AB SER RIA-ACNC: 17 MG/DL
HBV SURFACE AG SERPL QL IA: 48.6 UG/ML
HCT VFR BLD CALC: 39.1 % (ref 36–47)
HGB BLD-MCNC: 13.1 G/DL (ref 12–15.5)
LYMPHOCYTES # BLD: 1.2 X10^3/UL (ref 1–4.8)
LYMPHOCYTES NFR BLD AUTO: 23 % (ref 24–48)
MCH RBC QN AUTO: 31 PG (ref 25–35)
MCHC RBC AUTO-ENTMCNC: 34 G/DL (ref 31–37)
MCV RBC AUTO: 91 FL (ref 79–100)
MICRO CREAT RATIO: 286 MG/G CREAT (ref 0–29)
MONO #: 0.4 X10^3/UL (ref 0–1.1)
MONOCYTES NFR BLD: 8 % (ref 0–9)
NEUT #: 3.7 X10^3/UL (ref 1.8–7.7)
NEUTROPHILS NFR BLD AUTO: 67 % (ref 31–73)
PHOSPHATE SERPL-MCNC: 3.7 MG/DL (ref 2.6–4.7)
PLATELET # BLD AUTO: 216 X10^3/UL (ref 140–400)
POTASSIUM SERPL-SCNC: 4.2 MMOL/L (ref 3.5–5.1)
RBC # BLD AUTO: 4.29 X10^6/UL (ref 3.5–5.4)
SODIUM SERPL-SCNC: 129 MMOL/L (ref 136–145)
URATE SERPL-MCNC: 6.1 MG/DL (ref 2.6–6)
WBC # BLD AUTO: 5.5 X10^3/UL (ref 4–11)

## 2020-09-29 PROCEDURE — 82728 ASSAY OF FERRITIN: CPT

## 2020-09-29 PROCEDURE — 83970 ASSAY OF PARATHORMONE: CPT

## 2020-09-29 PROCEDURE — 83550 IRON BINDING TEST: CPT

## 2020-09-29 PROCEDURE — 83540 ASSAY OF IRON: CPT

## 2020-09-29 PROCEDURE — 80069 RENAL FUNCTION PANEL: CPT

## 2020-09-29 PROCEDURE — 84156 ASSAY OF PROTEIN URINE: CPT

## 2020-09-29 PROCEDURE — 84550 ASSAY OF BLOOD/URIC ACID: CPT

## 2020-09-29 PROCEDURE — 85025 COMPLETE CBC W/AUTO DIFF WBC: CPT

## 2020-09-29 PROCEDURE — 82043 UR ALBUMIN QUANTITATIVE: CPT

## 2020-09-29 PROCEDURE — 82570 ASSAY OF URINE CREATININE: CPT

## 2020-09-30 LAB
CALCIUM PTH: 9.7 MG/DL (ref 8.7–10.3)
CREATININE PTH: 1.48 MG/DL (ref 0.57–1)
PHOSPHORUS PTH: 3.6 MG/DL (ref 3–4.3)
PTH-INTACT SERPL-MCNC: 38 PG/ML (ref 15–65)

## 2021-01-11 ENCOUNTER — HOSPITAL ENCOUNTER (EMERGENCY)
Dept: HOSPITAL 61 - ER | Age: 76
LOS: 1 days | Discharge: HOME | End: 2021-01-12
Payer: MEDICARE

## 2021-01-11 VITALS — HEIGHT: 62 IN | WEIGHT: 251.11 LBS | BODY MASS INDEX: 46.21 KG/M2

## 2021-01-11 DIAGNOSIS — I12.9: ICD-10-CM

## 2021-01-11 DIAGNOSIS — Z87.891: ICD-10-CM

## 2021-01-11 DIAGNOSIS — Z20.828: ICD-10-CM

## 2021-01-11 DIAGNOSIS — E11.22: ICD-10-CM

## 2021-01-11 DIAGNOSIS — M25.561: Primary | ICD-10-CM
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
## 2021-01-11 DIAGNOSIS — N18.9: ICD-10-CM

## 2021-01-11 DIAGNOSIS — M25.562: ICD-10-CM

## 2021-01-11 DIAGNOSIS — N17.9: ICD-10-CM
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
## 2021-01-11 DIAGNOSIS — Z96.651: ICD-10-CM

## 2021-01-11 DIAGNOSIS — J44.9: ICD-10-CM
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
## 2021-01-11 DIAGNOSIS — Z90.49: ICD-10-CM

## 2021-01-11 DIAGNOSIS — G89.11: ICD-10-CM

## 2021-01-11 DIAGNOSIS — M17.12: ICD-10-CM

## 2021-01-11 LAB
ALBUMIN SERPL-MCNC: 3.1 G/DL (ref 3.4–5)
ALBUMIN/GLOB SERPL: 0.8 {RATIO} (ref 1–1.7)
ALP SERPL-CCNC: 147 U/L (ref 46–116)
ALT SERPL-CCNC: 24 U/L (ref 14–59)
ANION GAP SERPL CALC-SCNC: 6 MMOL/L (ref 6–14)
APTT PPP: YELLOW S
AST SERPL-CCNC: 15 U/L (ref 15–37)
BACTERIA #/AREA URNS HPF: (no result) /HPF
BASOPHILS # BLD AUTO: 0.1 X10^3/UL (ref 0–0.2)
BASOPHILS NFR BLD: 1 % (ref 0–3)
BILIRUB SERPL-MCNC: 0.3 MG/DL (ref 0.2–1)
BILIRUB UR QL STRIP: NEGATIVE
BUN SERPL-MCNC: 37 MG/DL (ref 7–20)
BUN/CREAT SERPL: 25 (ref 6–20)
CALCIUM SERPL-MCNC: 9.4 MG/DL (ref 8.5–10.1)
CHLORIDE SERPL-SCNC: 104 MMOL/L (ref 98–107)
CO2 SERPL-SCNC: 33 MMOL/L (ref 21–32)
CREAT SERPL-MCNC: 1.5 MG/DL (ref 0.6–1)
EOSINOPHIL NFR BLD: 0.1 X10^3/UL (ref 0–0.7)
EOSINOPHIL NFR BLD: 1 % (ref 0–3)
ERYTHROCYTE [DISTWIDTH] IN BLOOD BY AUTOMATED COUNT: 14.2 % (ref 11.5–14.5)
FIBRINOGEN PPP-MCNC: CLEAR MG/DL
GFR SERPLBLD BASED ON 1.73 SQ M-ARVRAT: 33.7 ML/MIN
GLUCOSE SERPL-MCNC: 395 MG/DL (ref 70–99)
HCT VFR BLD CALC: 38.8 % (ref 36–47)
HGB BLD-MCNC: 12.9 G/DL (ref 12–15.5)
LYMPHOCYTES # BLD: 1 X10^3/UL (ref 1–4.8)
LYMPHOCYTES NFR BLD AUTO: 17 % (ref 24–48)
MAGNESIUM SERPL-MCNC: 1.7 MG/DL (ref 1.8–2.4)
MCH RBC QN AUTO: 31 PG (ref 25–35)
MCHC RBC AUTO-ENTMCNC: 33 G/DL (ref 31–37)
MCV RBC AUTO: 94 FL (ref 79–100)
MONO #: 0.6 X10^3/UL (ref 0–1.1)
MONOCYTES NFR BLD: 9 % (ref 0–9)
NEUT #: 4.4 X10^3/UL (ref 1.8–7.7)
NEUTROPHILS NFR BLD AUTO: 72 % (ref 31–73)
NITRITE UR QL STRIP: NEGATIVE
PH UR STRIP: 5.5 [PH]
PLATELET # BLD AUTO: 229 X10^3/UL (ref 140–400)
POTASSIUM SERPL-SCNC: 4.5 MMOL/L (ref 3.5–5.1)
PROT SERPL-MCNC: 6.9 G/DL (ref 6.4–8.2)
PROT UR STRIP-MCNC: 100 MG/DL
RBC # BLD AUTO: 4.12 X10^6/UL (ref 3.5–5.4)
RBC #/AREA URNS HPF: (no result) /HPF (ref 0–2)
SODIUM SERPL-SCNC: 143 MMOL/L (ref 136–145)
UROBILINOGEN UR-MCNC: 0.2 MG/DL
WBC # BLD AUTO: 6.1 X10^3/UL (ref 4–11)
WBC #/AREA URNS HPF: (no result) /HPF (ref 0–4)

## 2021-01-11 PROCEDURE — 99285 EMERGENCY DEPT VISIT HI MDM: CPT

## 2021-01-11 PROCEDURE — 83880 ASSAY OF NATRIURETIC PEPTIDE: CPT

## 2021-01-11 PROCEDURE — 96374 THER/PROPH/DIAG INJ IV PUSH: CPT

## 2021-01-11 PROCEDURE — 84145 PROCALCITONIN (PCT): CPT

## 2021-01-11 PROCEDURE — 85025 COMPLETE CBC W/AUTO DIFF WBC: CPT

## 2021-01-11 PROCEDURE — 83735 ASSAY OF MAGNESIUM: CPT

## 2021-01-11 PROCEDURE — U0003 INFECTIOUS AGENT DETECTION BY NUCLEIC ACID (DNA OR RNA); SEVERE ACUTE RESPIRATORY SYNDROME CORONAVIRUS 2 (SARS-COV-2) (CORONAVIRUS DISEASE [COVID-19]), AMPLIFIED PROBE TECHNIQUE, MAKING USE OF HIGH THROUGHPUT TECHNOLOGIES AS DESCRIBED BY CMS-2020-01-R: HCPCS

## 2021-01-11 PROCEDURE — 83605 ASSAY OF LACTIC ACID: CPT

## 2021-01-11 PROCEDURE — 71045 X-RAY EXAM CHEST 1 VIEW: CPT

## 2021-01-11 PROCEDURE — C9803 HOPD COVID-19 SPEC COLLECT: HCPCS

## 2021-01-11 PROCEDURE — 36415 COLL VENOUS BLD VENIPUNCTURE: CPT

## 2021-01-11 PROCEDURE — 84484 ASSAY OF TROPONIN QUANT: CPT

## 2021-01-11 PROCEDURE — 81001 URINALYSIS AUTO W/SCOPE: CPT

## 2021-01-11 PROCEDURE — 93005 ELECTROCARDIOGRAM TRACING: CPT

## 2021-01-11 PROCEDURE — 80053 COMPREHEN METABOLIC PANEL: CPT

## 2021-01-11 PROCEDURE — 73564 X-RAY EXAM KNEE 4 OR MORE: CPT

## 2021-01-11 NOTE — RAD
XR KNEE 4 VIEWS WITH PATELLA



History: Reason: bilat knee pain after fall / Spl. Instructions:  / History: 



Technique: 4 views bilateral knees.



Comparison: February 4, 2020



Findings:

Right knee: Right total knee arthroplasty. Normal alignment. No fracture. No significant knee joint e
ffusion. Anterior knee soft tissue swelling.



Left knee: Genu varus alignment. Moderate tricompartment knee DJD most prominent within the medial an
d patellofemoral compartments. No fracture. Anterior knee soft tissue swelling.



Impression: 

1.  No acute osseous abnormality.

2.  Moderate tricompartment left knee DJD.

3.  Right total knee arthroplasty.



Electronically signed by: Chaz Duke DO (1/11/2021 11:41 PM) [facility]

## 2021-01-11 NOTE — PHYS DOC
Past Medical History


Past Medical History:  Arthritis, COPD, Diabetes-Type II, Hypertension


Additional Past Medical Histor:  HOME O2,CHRONIC KIDNEY DISEASE


Past Surgical History:  Cholecystectomy, Knee Replacement, Tonsillectomy


Additional Past Surgical Histo:  BREAST CYSTS, CARPAL TUNNEL


Smoking Status:  Former Smoker


Alcohol Use:  None


Drug Use:  None





General Adult


EDM:


Chief Complaint:  MECHANICAL FALL





HPI:


HPI:





Patient is a 75  year old female with history of COPD, hypertension, high 

cholesterol, diabetes type 2, chronic redness to bilateral lower 

extremities/cellulitis, who presents to the ED today complaining of 8 out of 10 

bilateral knee pain that began today after she fell.  Patient denies any loss of

consciousness.  Denies hitting her head on the ground.  She is also complaining 

of chills that began today.  She states her pain to bilateral knees is worse on 

weightbearing.  Denies anything relieving her pain.





Review of Systems:


Review of Systems:


Constitutional:   Reports chills.  Denies fever 


Eyes:   Denies change in visual acuity. []


HENT:   Denies nasal congestion or sore throat. [] 


Respiratory:   Denies cough or shortness of breath. [] 


Cardiovascular:   Denies chest pain or edema. [] 


GI:   Denies abdominal pain, nausea, vomiting, bloody stools or diarrhea. [] 


:  Denies dysuria. [] 


Musculoskeletal:   Bilateral knee pain.  Denies back pain or joint pain. [] 


Integument:   Denies rash. [] 


Neurologic:   Denies headache, focal weakness or sensory changes. [] 


Psychiatric:  Denies depression or anxiety. []





Heart Score:


Risk Factors:


Risk Factors:  DM, Current or recent (<one month) smoker, HTN, HLP, family 

history of CAD, obesity.


Risk Scores:


Score 0 - 3:  2.5% MACE over next 6 weeks - Discharge Home


Score 4 - 6:  20.3% MACE over next 6 weeks - Admit for Clinical Observation


Score 7 - 10:  72.7% MACE over next 6 weeks - Early Invasive Strategies





Current Medications:





Current Medications








 Medications


  (Trade)  Dose


 Ordered  Sig/Eva  Start Time


 Stop Time Status Last Admin


Dose Admin


 


 Morphine Sulfate


  (Morphine


 Sulfate)  4 mg  PRN Q15MIN  PRN  1/11/21 22:30


 1/12/21 22:29   














Allergies:


Allergies:





Allergies








Coded Allergies Type Severity Reaction Last Updated Verified


 


  No Known Drug Allergies    11/28/17 No











Physical Exam:


PE:





Constitutional: Well developed, well nourished, no acute distress, non-toxic 

appearance. []


HENT: Normocephalic, atraumatic, bilateral external ears normal, oropharynx 

moist, no oral exudates, nose normal. []


Eyes: PERRLA, EOMI, conjunctiva normal, no discharge. [] 


Neck: Normal range of motion, no tenderness, supple, no stridor. [] 


Cardiovascular:Heart rate regular rhythm, no murmur []


Lungs & Thorax:  Bilateral breath sounds clear to auscultation []


Abdomen: Bowel sounds normal, soft, no tenderness, no masses, no pulsatile 

masses. [] 


Skin: Warm, dry, no erythema, no rash. [] 


Back: No tenderness, no CVA tenderness. [] 


Extremities: Obese patient.  Bilateral lower extremities with chronic redness 

along the shin, no warmth over this regions, this appears to be old venous 

stasis with cellulitis.  Negative Homans' sign bilaterally.  No tenderness, no 

cyanosis, no clubbing, ROM intact, chronic trace edema to bilateral lower 

extremities.  Range of motion limited to bilateral knees due to weight.  +2 

bilateral pedal pulses.


Neurologic: Alert and oriented X 3, normal motor function, normal sensory 

function, no focal deficits noted. []


Psychologic: Affect normal, judgement normal, mood normal. []





Current Patient Data:


Vital Signs:





                                   Vital Signs








  Date Time  Temp Pulse Resp B/P (MAP) Pulse Ox O2 Delivery O2 Flow Rate FiO2


 


1/11/21 22:18 97.3 82 20 179/80 (113) 93 Room Air  





 97.3       











EKG:


EKG:


[]





Radiology/Procedures:


Radiology/Procedures:


[]PROCEDURE: KNEE BILAT 4V





XR KNEE 4 VIEWS WITH PATELLA





History: Reason: bilat knee pain after fall / Spl. Instructions:  / History: 





Technique: 4 views bilateral knees.





Comparison: February 4, 2020





Findings:


Right knee: Right total knee arthroplasty. Normal alignment. No fracture. No 

significant knee joint effusion. Anterior knee soft tissue swelling.





Left knee: Genu varus alignment. Moderate tricompartment knee DJD most prominent

 within the medial and patellofemoral compartments. No fracture. Anterior knee 

soft tissue swelling.





Impression: 


1.  No acute osseous abnormality.


2.  Moderate tricompartment left knee DJD.


3.  Right total knee arthroplasty.





Electronically signed by: Chaz Duke DO (1/11/2021 11:41 PM) [email or web address]














DICTATED and SIGNED BY:     CHAZ DUKE DO


DATE:     01/11/21 [id number] 0


PROCEDURE: PORTABLE CHEST 1V





XR CHEST 1V





History: Reason: fever / Spl. Instructions:  / History: 





Comparison: April 4, 2020





Findings:


Small left pleural effusion with adjacent opacity. Unchanged heart size.. No 

pneumothorax. Right shoulder arthroplasty.





Impression: 


1.  Small left pleural effusion.





Electronically signed by: Chaz Duke DO (1/11/2021 11:28 PM) [email or web address]














DICTATED and SIGNED BY:     CHAZ DUKE DO


DATE:     01/11/21 [id number] 0





Course & Med Decision Making:


Course & Med Decision Making


Pertinent Labs and Imaging studies reviewed. (See chart for details)





This is a 75-year-old female patient presenting to the ED today with bilateral 

knee pain that began today after she fell.  Also complaining of chills.  

Bilateral knee x-rays are negative for any acute findings, EKG,  CBC with no 

acute acute findings.  CMP with creatinine of 1.5, BUN is normal.  Glucose 395, 

 with normal anion gap patient has history of chronic kidney disease as well as 

diabetes type 2 which appears to be uncontrolled from multiple visits in the ED.





She was discharged to home.  Instructed to follow-up with her PCP next week as 

well as orthopedic doctor.





Lashay Disclaimer:


Dragon Disclaimer:


This electronic medical record was generated, in whole or in part, using a voice

 recognition dictation system.





Departure


Departure


Impression:  


   Primary Impression:  


   Acute on chronic renal failure


   Qualified Codes:  N17.9 - Acute kidney failure, unspecified; N18.9 - Chronic 

   kidney disease, unspecified


   Additional Impressions:  


   Bilateral knee pain


   Qualified Codes:  M25.561 - Pain in right knee; M25.562 - Pain in left knee


   Fall from standing


   Qualified Codes:  W19.XXXA - Unspecified fall, initial encounter


   Chills


   Person under investigation for COVID-19


Disposition:  01 DC HOME SELF CARE/HOMELESS


Condition:  STABLE


Referrals:  


JESÚS LYLES DO (PCP)


follow up next week





MARCO STEWART MD


follow up next week


Patient Instructions:  Fall Prevention and Home Safety, Knee Pain, Easy-to-Read





Additional Instructions:  


You were evaluated in the emergency room, your knee x-rays are negative for any 

acute findings.  Please follow-up with orthopedic doctor provided.  You can also

 follow-up with your orthopedic doctor if you have one.   Your glucose levels 

are high.  Ensure you are using your diabetes medicine.











ONEIDA BOLTON              Jan 11, 2021 23:09

## 2021-01-11 NOTE — RAD
XR CHEST 1V



History: Reason: fever / Spl. Instructions:  / History: 



Comparison: April 4, 2020



Findings:

Small left pleural effusion with adjacent opacity. Unchanged heart size.. No pneumothorax. Right shou
lder arthroplasty.



Impression: 

1.  Small left pleural effusion.



Electronically signed by: Chaz Duke DO (1/11/2021 11:28 PM) [facility]

## 2021-01-12 VITALS
SYSTOLIC BLOOD PRESSURE: 185 MMHG | DIASTOLIC BLOOD PRESSURE: 79 MMHG | SYSTOLIC BLOOD PRESSURE: 185 MMHG | DIASTOLIC BLOOD PRESSURE: 79 MMHG

## 2021-01-12 NOTE — EKG
[facility]

              [address] [place] [address]

Test Date:    2021               Test Time:    22:53:35

Pat Name:     NEENA DUTTON             Department:   

Patient ID:   [id number]           Room:          

Gender:       F                        Technician:   

:          1945               Requested By: ONEIDA BOLTON

Order Number: [id number]           Reading MD:     

                                 Measurements

Intervals                              Axis          

Rate:         81                       P:            

SC:                                    QRS:          46

QRSD:         88                       T:            52

QT:           374                                    

QTc:          435                                    

                           Interpretive Statements

IRREGULAR RHYTHM, NO P-WAVE FOUND

OTHERWISE NORMAL ECG

RI6.02

No previous ECG available for comparison

## 2021-03-30 ENCOUNTER — HOSPITAL ENCOUNTER (OUTPATIENT)
Dept: HOSPITAL 61 - LAB | Age: 76
End: 2021-03-30
Attending: NURSE PRACTITIONER
Payer: MEDICARE

## 2021-03-30 DIAGNOSIS — N18.31: ICD-10-CM

## 2021-03-30 DIAGNOSIS — I12.9: Primary | ICD-10-CM

## 2021-03-30 DIAGNOSIS — E11.9: ICD-10-CM

## 2021-03-30 DIAGNOSIS — R80.9: ICD-10-CM

## 2021-03-30 LAB
ALBUMIN SERPL-MCNC: 2.8 G/DL (ref 3.4–5)
ANION GAP SERPL CALC-SCNC: 7 MMOL/L (ref 6–14)
BASOPHILS # BLD AUTO: 0.1 X10^3/UL (ref 0–0.2)
BASOPHILS NFR BLD: 1 % (ref 0–3)
BUN SERPL-MCNC: 38 MG/DL (ref 7–20)
CALCIUM SERPL-MCNC: 8.9 MG/DL (ref 8.5–10.1)
CHLORIDE SERPL-SCNC: 101 MMOL/L (ref 98–107)
CO2 SERPL-SCNC: 31 MMOL/L (ref 21–32)
CREAT SERPL-MCNC: 1.2 MG/DL (ref 0.6–1)
EOSINOPHIL NFR BLD: 0.1 X10^3/UL (ref 0–0.7)
EOSINOPHIL NFR BLD: 2 % (ref 0–3)
ERYTHROCYTE [DISTWIDTH] IN BLOOD BY AUTOMATED COUNT: 14 % (ref 11.5–14.5)
GFR SERPLBLD BASED ON 1.73 SQ M-ARVRAT: 43.4 ML/MIN
GLUCOSE SERPL-MCNC: 216 MG/DL (ref 70–99)
HCT VFR BLD CALC: 39.4 % (ref 36–47)
HGB BLD-MCNC: 12.9 G/DL (ref 12–15.5)
LYMPHOCYTES # BLD: 1.3 X10^3/UL (ref 1–4.8)
LYMPHOCYTES NFR BLD AUTO: 18 % (ref 24–48)
MCH RBC QN AUTO: 31 PG (ref 25–35)
MCHC RBC AUTO-ENTMCNC: 33 G/DL (ref 31–37)
MCV RBC AUTO: 93 FL (ref 79–100)
MONO #: 0.6 X10^3/UL (ref 0–1.1)
MONOCYTES NFR BLD: 8 % (ref 0–9)
NEUT #: 5.1 X10^3/UL (ref 1.8–7.7)
NEUTROPHILS NFR BLD AUTO: 71 % (ref 31–73)
PHOSPHATE SERPL-MCNC: 3.8 MG/DL (ref 2.6–4.7)
PLATELET # BLD AUTO: 204 X10^3/UL (ref 140–400)
POTASSIUM SERPL-SCNC: 4.7 MMOL/L (ref 3.5–5.1)
RBC # BLD AUTO: 4.24 X10^6/UL (ref 3.5–5.4)
SODIUM SERPL-SCNC: 139 MMOL/L (ref 136–145)
WBC # BLD AUTO: 7.2 X10^3/UL (ref 4–11)

## 2021-03-30 PROCEDURE — 85025 COMPLETE CBC W/AUTO DIFF WBC: CPT

## 2021-03-30 PROCEDURE — 36415 COLL VENOUS BLD VENIPUNCTURE: CPT

## 2021-03-30 PROCEDURE — 83970 ASSAY OF PARATHORMONE: CPT

## 2021-03-30 PROCEDURE — 80069 RENAL FUNCTION PANEL: CPT

## 2021-03-31 LAB
CALCIUM PTH: 9.4 MG/DL (ref 8.7–10.3)
CREATININE PTH: 1.13 MG/DL (ref 0.57–1)
PHOSPHORUS PTH: 3.6 MG/DL (ref 3–4.3)
PTH-INTACT SERPL-MCNC: 56 PG/ML (ref 15–65)

## 2021-08-11 ENCOUNTER — HOSPITAL ENCOUNTER (INPATIENT)
Dept: HOSPITAL 61 - ER | Age: 76
LOS: 6 days | Discharge: NURSING, REHAB OR LONG-TERM CARE | DRG: 682 | End: 2021-08-17
Attending: INTERNAL MEDICINE | Admitting: INTERNAL MEDICINE
Payer: MEDICARE

## 2021-08-11 VITALS — SYSTOLIC BLOOD PRESSURE: 112 MMHG | DIASTOLIC BLOOD PRESSURE: 62 MMHG

## 2021-08-11 VITALS — HEIGHT: 62 IN | BODY MASS INDEX: 53.37 KG/M2 | WEIGHT: 290 LBS

## 2021-08-11 VITALS — SYSTOLIC BLOOD PRESSURE: 140 MMHG | DIASTOLIC BLOOD PRESSURE: 75 MMHG
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
## 2021-08-11 DIAGNOSIS — E66.01: ICD-10-CM

## 2021-08-11 DIAGNOSIS — D63.1: ICD-10-CM

## 2021-08-11 DIAGNOSIS — E83.42: ICD-10-CM

## 2021-08-11 DIAGNOSIS — Z96.651: ICD-10-CM

## 2021-08-11 DIAGNOSIS — M48.02: ICD-10-CM

## 2021-08-11 DIAGNOSIS — E87.6: ICD-10-CM

## 2021-08-11 DIAGNOSIS — E11.40: ICD-10-CM

## 2021-08-11 DIAGNOSIS — Z98.41: ICD-10-CM

## 2021-08-11 DIAGNOSIS — E11.22: ICD-10-CM

## 2021-08-11 DIAGNOSIS — E78.5: ICD-10-CM

## 2021-08-11 DIAGNOSIS — Z82.3: ICD-10-CM

## 2021-08-11 DIAGNOSIS — Z99.2: ICD-10-CM

## 2021-08-11 DIAGNOSIS — E87.70: ICD-10-CM

## 2021-08-11 DIAGNOSIS — N17.0: Primary | ICD-10-CM
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
## 2021-08-11 DIAGNOSIS — Z86.73: ICD-10-CM

## 2021-08-11 DIAGNOSIS — I65.23: ICD-10-CM

## 2021-08-11 DIAGNOSIS — Z87.891: ICD-10-CM

## 2021-08-11 DIAGNOSIS — Z99.81: ICD-10-CM

## 2021-08-11 DIAGNOSIS — I12.9: ICD-10-CM

## 2021-08-11 DIAGNOSIS — Z82.49: ICD-10-CM

## 2021-08-11 DIAGNOSIS — Z90.49: ICD-10-CM

## 2021-08-11 DIAGNOSIS — I16.0: ICD-10-CM

## 2021-08-11 DIAGNOSIS — S13.4XXA: ICD-10-CM

## 2021-08-11 DIAGNOSIS — N28.1: ICD-10-CM

## 2021-08-11 DIAGNOSIS — Z79.899: ICD-10-CM
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
## 2021-08-11 DIAGNOSIS — J44.9: ICD-10-CM

## 2021-08-11 DIAGNOSIS — E43: ICD-10-CM

## 2021-08-11 DIAGNOSIS — N28.89: ICD-10-CM

## 2021-08-11 DIAGNOSIS — M47.9: ICD-10-CM

## 2021-08-11 DIAGNOSIS — N18.32: ICD-10-CM

## 2021-08-11 LAB
ALBUMIN SERPL-MCNC: 2.2 G/DL (ref 3.4–5)
ALBUMIN/GLOB SERPL: 0.5 {RATIO} (ref 1–1.7)
ALP SERPL-CCNC: 155 U/L (ref 46–116)
ALT SERPL-CCNC: 26 U/L (ref 14–59)
ANION GAP SERPL CALC-SCNC: 5 MMOL/L (ref 6–14)
APTT PPP: YELLOW S
AST SERPL-CCNC: 21 U/L (ref 15–37)
BACTERIA #/AREA URNS HPF: 0 /HPF
BASOPHILS # BLD AUTO: 0.1 X10^3/UL (ref 0–0.2)
BASOPHILS NFR BLD: 1 % (ref 0–3)
BILIRUB SERPL-MCNC: 0.2 MG/DL (ref 0.2–1)
BILIRUB UR QL STRIP: NEGATIVE
BUN SERPL-MCNC: 36 MG/DL (ref 7–20)
BUN/CREAT SERPL: 20 (ref 6–20)
CALCIUM SERPL-MCNC: 8.2 MG/DL (ref 8.5–10.1)
CHLORIDE SERPL-SCNC: 101 MMOL/L (ref 98–107)
CO2 SERPL-SCNC: 35 MMOL/L (ref 21–32)
CREAT SERPL-MCNC: 1.8 MG/DL (ref 0.6–1)
EOSINOPHIL NFR BLD: 0.1 X10^3/UL (ref 0–0.7)
EOSINOPHIL NFR BLD: 2 % (ref 0–3)
ERYTHROCYTE [DISTWIDTH] IN BLOOD BY AUTOMATED COUNT: 14.8 % (ref 11.5–14.5)
FIBRINOGEN PPP-MCNC: CLEAR MG/DL
GFR SERPLBLD BASED ON 1.73 SQ M-ARVRAT: 27.2 ML/MIN
GLUCOSE SERPL-MCNC: 321 MG/DL (ref 70–99)
HCT VFR BLD CALC: 36.7 % (ref 36–47)
HGB BLD-MCNC: 11.6 G/DL (ref 12–15.5)
LYMPHOCYTES # BLD: 0.7 X10^3/UL (ref 1–4.8)
LYMPHOCYTES NFR BLD AUTO: 9 % (ref 24–48)
MCH RBC QN AUTO: 30 PG (ref 25–35)
MCHC RBC AUTO-ENTMCNC: 32 G/DL (ref 31–37)
MCV RBC AUTO: 94 FL (ref 79–100)
MONO #: 0.6 X10^3/UL (ref 0–1.1)
MONOCYTES NFR BLD: 8 % (ref 0–9)
NEUT #: 6.1 X10^3/UL (ref 1.8–7.7)
NEUTROPHILS NFR BLD AUTO: 81 % (ref 31–73)
NITRITE UR QL STRIP: NEGATIVE
PH UR STRIP: 6 [PH]
PLATELET # BLD AUTO: 252 X10^3/UL (ref 140–400)
POTASSIUM SERPL-SCNC: 3.6 MMOL/L (ref 3.5–5.1)
PROT SERPL-MCNC: 6.8 G/DL (ref 6.4–8.2)
PROT UR STRIP-MCNC: >=300 MG/DL
RBC # BLD AUTO: 3.93 X10^6/UL (ref 3.5–5.4)
RBC #/AREA URNS HPF: (no result) /HPF (ref 0–2)
SODIUM SERPL-SCNC: 141 MMOL/L (ref 136–145)
UROBILINOGEN UR-MCNC: 0.2 MG/DL
WBC # BLD AUTO: 7.5 X10^3/UL (ref 4–11)
WBC #/AREA URNS HPF: 0 /HPF (ref 0–4)

## 2021-08-11 PROCEDURE — 83550 IRON BINDING TEST: CPT

## 2021-08-11 PROCEDURE — 80048 BASIC METABOLIC PNL TOTAL CA: CPT

## 2021-08-11 PROCEDURE — 76770 US EXAM ABDO BACK WALL COMP: CPT

## 2021-08-11 PROCEDURE — 85025 COMPLETE CBC W/AUTO DIFF WBC: CPT

## 2021-08-11 PROCEDURE — 72131 CT LUMBAR SPINE W/O DYE: CPT

## 2021-08-11 PROCEDURE — 82962 GLUCOSE BLOOD TEST: CPT

## 2021-08-11 PROCEDURE — 70450 CT HEAD/BRAIN W/O DYE: CPT

## 2021-08-11 PROCEDURE — 80053 COMPREHEN METABOLIC PANEL: CPT

## 2021-08-11 PROCEDURE — 93880 EXTRACRANIAL BILAT STUDY: CPT

## 2021-08-11 PROCEDURE — 36415 COLL VENOUS BLD VENIPUNCTURE: CPT

## 2021-08-11 PROCEDURE — U0003 INFECTIOUS AGENT DETECTION BY NUCLEIC ACID (DNA OR RNA); SEVERE ACUTE RESPIRATORY SYNDROME CORONAVIRUS 2 (SARS-COV-2) (CORONAVIRUS DISEASE [COVID-19]), AMPLIFIED PROBE TECHNIQUE, MAKING USE OF HIGH THROUGHPUT TECHNOLOGIES AS DESCRIBED BY CMS-2020-01-R: HCPCS

## 2021-08-11 PROCEDURE — 87426 SARSCOV CORONAVIRUS AG IA: CPT

## 2021-08-11 PROCEDURE — 84100 ASSAY OF PHOSPHORUS: CPT

## 2021-08-11 PROCEDURE — 83880 ASSAY OF NATRIURETIC PEPTIDE: CPT

## 2021-08-11 PROCEDURE — 93306 TTE W/DOPPLER COMPLETE: CPT

## 2021-08-11 PROCEDURE — 72128 CT CHEST SPINE W/O DYE: CPT

## 2021-08-11 PROCEDURE — 81001 URINALYSIS AUTO W/SCOPE: CPT

## 2021-08-11 PROCEDURE — 72125 CT NECK SPINE W/O DYE: CPT

## 2021-08-11 PROCEDURE — 83540 ASSAY OF IRON: CPT

## 2021-08-11 PROCEDURE — G0378 HOSPITAL OBSERVATION PER HR: HCPCS

## 2021-08-11 PROCEDURE — 83735 ASSAY OF MAGNESIUM: CPT

## 2021-08-11 NOTE — PHYS DOC
Past Medical History


Past Medical History:  Arthritis, COPD, Diabetes-Type II, Hypertension, Renal 

Disease


Additional Past Medical Histor:  HOME O2,CHRONIC KIDNEY DISEASE


Past Surgical History:  Cholecystectomy, Knee Replacement, Tonsillectomy


Additional Past Surgical Histo:  BREAST CYSTS, CARPAL TUNNEL


Smoking Status:  Former Smoker


Alcohol Use:  None


Drug Use:  None





General Adult


EDM:


Chief Complaint:  MECHANICAL FALL





HPI:


HPI:





Patient is a 76  year old female with history of diabetes type 2, hypertension, 

COPD on oxygen, chronic kidney disease, she states she used to be on dialysis 

but was taken off a year ago.  Patient presents today complaining of falling.  

She states she was trying to get into her shower, she tripped and fell.  Denies 

any loss of consciousness but reports hitting her head on the ground.  

Complaining of mild posterior neck pain.  She is also complaining of swelling to

bilateral lower extremities.  Patient states she is afraid her kidney function 

is bad and she may have to go back on dialysis.  Patient states her pain to the 

neck is worse on touching the back of her neck.  States immobilization is 

relieving the pain.





Review of Systems:


Review of Systems:


Constitutional:   Denies fever or chills. []


Eyes:   Denies change in visual acuity. []


HENT:   Denies nasal congestion or sore throat. [] 


Respiratory:   Denies cough or shortness of breath. [] 


Cardiovascular:   Denies chest pain or edema. [] 


GI:   Denies abdominal pain, nausea, vomiting, bloody stools or diarrhea. [] 


:  Denies dysuria. [] 


Musculoskeletal:   Reports posterior neck pain denies back pain 


Integument:   Reports bilateral lower extremity swelling


Neurologic:   Denies headache, focal weakness or sensory changes. [] 


Psychiatric:  Denies depression or anxiety. []





Heart Score:


C/O Chest Pain:  N/A


Risk Factors:


Risk Factors:  DM, Current or recent (<one month) smoker, HTN, HLP, family 

history of CAD, obesity.


Risk Scores:


Score 0 - 3:  2.5% MACE over next 6 weeks - Discharge Home


Score 4 - 6:  20.3% MACE over next 6 weeks - Admit for Clinical Observation


Score 7 - 10:  72.7% MACE over next 6 weeks - Early Invasive Strategies





Allergies:


Allergies:





Allergies








Coded Allergies Type Severity Reaction Last Updated Verified


 


  shellfish derived Adverse Reaction Intermediate Nausea 8/11/21 Yes











Physical Exam:


PE:





Constitutional: Well developed, well nourished, no acute distress, non-toxic beverly

earance. []


HENT: Normocephalic,bilateral external ears normal, oropharynx moist, no oral 

exudates, nose normal. []


Eyes: PERRLA, EOMI, conjunctiva normal, no discharge. [] 


Neck: Normal range of motion, diffuse paraspinal muscle tenderness to posterior 

cervical spine, no midline cervical spine tenderness, supple, no stridor. [] 


Cardiovascular:Heart rate regular rhythm


Lungs & Thorax: Diminished breath sounds


Abdomen: Bowel sounds normal, soft, no tenderness, no masses, no pulsatile 

masses. [] 


Skin: Bilateral lower extremities with +3 edema, cellulitis on the shin the 

patient states is chronic, right lower extremity shin is weeping clear fluid.  

+1 bilateral pedal pulses.  Range of motion is intact bilateral lower 

extremities.


Back: No tenderness, no CVA tenderness. [] 


Extremities: No tenderness, no cyanosis, no clubbing, ROM intact, no edema. [] 


Neurologic: Alert and oriented X 3, normal motor function, normal sensory 

function, no focal deficits noted.  Cranial nerves II through XII intact


Psychologic: Affect normal, judgement normal, mood normal. []





Current Patient Data:


Labs:





                                Laboratory Tests








Test


 8/11/21


16:45 8/11/21


16:53 8/11/21


17:03


 


White Blood Count


 7.5 x10^3/uL


(4.0-11.0) 


 





 


Red Blood Count


 3.93 x10^6/uL


(3.50-5.40) 


 





 


Hemoglobin


 11.6 g/dL


(12.0-15.5)  L 


 





 


Hematocrit


 36.7 %


(36.0-47.0) 


 





 


Mean Corpuscular Volume


 94 fL ()


 


 





 


Mean Corpuscular Hemoglobin 30 pg (25-35)    


 


Mean Corpuscular Hemoglobin


Concent 32 g/dL


(31-37) 


 





 


Red Cell Distribution Width


 14.8 %


(11.5-14.5)  H 


 





 


Platelet Count


 252 x10^3/uL


(140-400) 


 





 


Neutrophils (%) (Auto) 81 % (31-73)  H  


 


Lymphocytes (%) (Auto) 9 % (24-48)  L  


 


Monocytes (%) (Auto) 8 % (0-9)    


 


Eosinophils (%) (Auto) 2 % (0-3)    


 


Basophils (%) (Auto) 1 % (0-3)    


 


Neutrophils # (Auto)


 6.1 x10^3/uL


(1.8-7.7) 


 





 


Lymphocytes # (Auto)


 0.7 x10^3/uL


(1.0-4.8)  L 


 





 


Monocytes # (Auto)


 0.6 x10^3/uL


(0.0-1.1) 


 





 


Eosinophils # (Auto)


 0.1 x10^3/uL


(0.0-0.7) 


 





 


Basophils # (Auto)


 0.1 x10^3/uL


(0.0-0.2) 


 





 


Urine Collection Type  U cath   


 


Urine Color  Yellow   


 


Urine Clarity  Clear   


 


Urine pH


 


 6.0 (<5.0-8.0)


 





 


Urine Specific Gravity


 


 1.020


(1.000-1.030) 





 


Urine Protein


 


 >=300 mg/dL


(NEG-TRACE) 





 


Urine Glucose (UA)


 


 >=1000 mg/dL


(NEG) 





 


Urine Ketones (Stick)


 


 Negative mg/dL


(NEG) 





 


Urine Blood  Small (NEG)   


 


Urine Nitrite


 


 Negative (NEG)


 





 


Urine Bilirubin


 


 Negative (NEG)


 





 


Urine Urobilinogen Dipstick


 


 0.2 mg/dL (0.2


mg/dL) 





 


Urine Leukocyte Esterase


 


 Negative (NEG)


 





 


Urine RBC


 


 Rare /HPF


(0-2) 





 


Urine WBC  0 /HPF (0-4)   


 


Urine Squamous Epithelial


Cells 


 Many /LPF  


 





 


Urine Bacteria


 


 0 /HPF (0-FEW)


 





 


Sodium Level


 


 


 141 mmol/L


(136-145)


 


Potassium Level


 


 


 3.6 mmol/L


(3.5-5.1)


 


Chloride Level


 


 


 101 mmol/L


()


 


Carbon Dioxide Level


 


 


 35 mmol/L


(21-32)  H


 


Anion Gap   5 (6-14)  L


 


Blood Urea Nitrogen


 


 


 36 mg/dL


(7-20)  H


 


Creatinine


 


 


 1.8 mg/dL


(0.6-1.0)  H


 


Estimated GFR


(Cockcroft-Gault) 


 


 27.2  





 


BUN/Creatinine Ratio   20 (6-20)  


 


Glucose Level


 


 


 321 mg/dL


(70-99)  H


 


Calcium Level


 


 


 8.2 mg/dL


(8.5-10.1)  L


 


Total Bilirubin


 


 


 0.2 mg/dL


(0.2-1.0)


 


Aspartate Amino Transferase


(AST) 


 


 21 U/L (15-37)





 


Alanine Aminotransferase (ALT)


 


 


 26 U/L (14-59)





 


Alkaline Phosphatase


 


 


 155 U/L


()  H


 


NT-Pro-B-Type Natriuretic


Peptide 


 


 4639 pg/mL


(0-449)  H


 


Total Protein


 


 


 6.8 g/dL


(6.4-8.2)


 


Albumin


 


 


 2.2 g/dL


(3.4-5.0)  L


 


Albumin/Globulin Ratio


 


 


 0.5 (1.0-1.7)


L





                                Laboratory Tests


8/11/21 16:45








                                Laboratory Tests


8/11/21 17:03








Vital Signs:





                                   Vital Signs








  Date Time  Temp Pulse Resp B/P (MAP) Pulse Ox O2 Delivery O2 Flow Rate FiO2


 


8/11/21 19:26  82  148/67 (94) 97 Nasal Cannula 2.0 


 


8/11/21 16:35 97.7  20     





 97.7       











EKG:


EKG:


[]





Radiology/Procedures:


Radiology/Procedures:


[]PROCEDURE: CT THORACIC SPINE WO CONTRAST





PQRS Compliance Statement:





One or more of the following individualized dose reduction techniques were 

utilized for this examination:  


1. Automated exposure control  


2. Adjustment of the mA and/or kV according to patient size  


3. Use of iterative reconstruction technique








CT THORACIC SPINE WO, CT LUMBAR SPINE WO





Clinical Indication: Reason: fall pain / Spl. Instructions:  / History: 





Comparison: None.





TECHNIQUE: Helical CT imaging of the thoracic and the lumbar spine is performed 

without IV contrast.





Findings: 


No acute fracture of the thoracic spine is identified. The alignment is 

maintained. The vertebral body heights are maintained. There is moderate 

multilevel degenerative endplate spurring. No high-grade central canal stenosis 

is identified. The neural foramina are patent with the exception of severe 

narrowing on the left at T11/T12.





Atherosclerotic aortic arch. There are several subcentimeter mediastinal lymph 

nodes. Pulmonary arteries are upper limits of normal in size. Coronary artery 

disease. There is cardiomegaly. There is mild centrilobular emphysema. Mild 

scarring or atelectasis in the lung bases. Numerous calcified granulomas in the 

spleen. Cannot exclude a left renal mass, incompletely imaged.





No acute fracture of the lumbar spine is identified. The vertebral body height 

and alignment are maintained. There is vacuum disc phenomenon of L3-S1. 

Transverse processes are intact.





L3/L4: There is posterior disc osteophyte complex and facet hypertrophy and 

ligamentum flavum redundancy. Central canal stenosis is probably mild to 

moderate. There is mild bilateral neural foraminal narrowing.





L4/L5: There is posterior disc osteophyte complex and facet hypertrophy and 

ligamentum flavum redundancy. Central canal stenosis is probably mild to 

moderate. The neural foramina appear adequate.





Atherosclerotic abdominal aorta and iliac arteries, no aneurysm. 





IMPRESSION:


1.  There is no acute fracture of the thoracic or lumbar spine.


2.  Mild degenerative spondylosis.


3.  Cannot exclude a left upper pole renal mass, but kidneys are incompletely 

imaged. Suggest nonemergent renal ultrasound.





Electronically signed by: Conner Marie MD (8/11/2021 5:49 PM) [facility]














DICTATED and SIGNED BY:     CONNER MARIE MD


DATE:     08/11/21 [id number]


PROCEDURE: CT HEAD AND CERVICAL SPINE WO





CT HEAD AND C-SPINE WO





History: Fall, pain.





Comparison: None.





Technique: Noncontrast CT of the head and cervical spine.





Findings:





CT HEAD:


There is no evidence for intracranial mass or hemorrhage.


There is no hydrocephalus or midline shift.


No abnormal extra-axial fluid collections are present.


No evidence of large territorial infarct. Hypodensity in the left thalamus may 

represent sequela of lacunar infarct.


Postsurgical changes of the right lens.


The visualized paranasal sinuses and mastoid air cells are clear.


The skull and scalp are within normal limits.





CT CERVICAL SPINE:


There is no evidence for fracture in the cervical spine.


Severe degenerative changes of the cervical spine with straightening of the 

normal cervical lordosis. Multilevel advanced disc space narrowing and 

uncovertebral hypertrophy. Multilevel neural foraminal stenosis. Prominent 

posterior osteophytes at C5-C6 superimposed on congenitally slender canal 

severely narrow the anterior posterior canal diameter to approximately 5 mm.


No destructive osseous lesions are seen.


Mild emphysematous changes in the lung apices. Bilateral carotid bulb 

calcifications.





Impression: 


1.  No acute intracranial findings. Left thalamic/basal ganglia hypodensity 

likely sequela of lacunar infarct.


2.  No acute fracture in the cervical spine.


3.  Advanced multilevel degenerative changes and congenitally slender canal. 

Severe spinal canal stenosis at C5-C6 with AP diameter of approximately 5 mm.








-------


Exposure: One or more of the following individualized dose reduction techniques 

were utilized for this examination:  


1. Automated exposure control


2. Adjustment of the mA and/or kV according to patient size


3. Use of iterative reconstruction technique.





Electronically signed by: Bradford Jackson MD (8/11/2021 5:44 PM) [facility]














DICTATED and SIGNED BY:     BRADFORD JACKSON MD


DATE:     08/11/21 [id number]





Course & Med Decision Making:


Course & Med Decision Making


Pertinent Labs and Imaging studies reviewed. (See chart for details)





This is a 76-year-old female patient presented to the ED today to be evaluated 

after falling, patient also complaining her kidneys may not be working because 

she has edema to bilateral lower extremities.  History of kidney failure.





CT of the head, cervical spine, thoracic and lumbar spine are negative for any 

acute findings, noted for possible left upper pole renal mass.





CBC with a normal WBC, hemoglobin 11.6 with hematocrit of 36.7.  CMP with 

creatinine of 1.8, BUN of 36.  Patient states this current creatinine is high, 

she states her "kidney numbers" have been running normal.  BNP 4639.  Furosemide

 1 dose ordered.  Urine negative for infection.  Routine consult placed for 

nephrology as well as renal duplex for tomorrow





Spoke with Dr. Baig who accepted patient for admission





Dragon Disclaimer:


Dragon Disclaimer:


This electronic medical record was generated, in whole or in part, using a voice

 recognition dictation system.





Departure


Departure


Impression:  


   Primary Impression:  


   Acute on chronic renal failure


   Qualified Codes:  N17.9 - Acute kidney failure, unspecified; N18.9 - Chronic 

   kidney disease, unspecified


   Additional Impressions:  


   Edema


   Qualified Codes:  R60.9 - Edema, unspecified


   Acute cervical sprain


   Qualified Codes:  S13.9XXA - Sprain of joints and ligaments of unspecified 

   parts of neck, initial encounter


Disposition:  09 ADMITTED AS INPATIENT


Condition:  STABLE


Referrals:  


JESÚS LYLES DO (PCP)











OENIDA BOLTON APRN            Aug 11, 2021 20:27

## 2021-08-11 NOTE — RAD
PQRS Compliance Statement:



One or more of the following individualized dose reduction techniques were utilized for this examinat
ion:  

1. Automated exposure control  

2. Adjustment of the mA and/or kV according to patient size  

3. Use of iterative reconstruction technique





CT THORACIC SPINE WO, CT LUMBAR SPINE WO



Clinical Indication: Reason: fall pain / Spl. Instructions:  / History: 



Comparison: None.



TECHNIQUE: Helical CT imaging of the thoracic and the lumbar spine is performed without IV contrast.



Findings: 

No acute fracture of the thoracic spine is identified. The alignment is maintained. The vertebral bod
y heights are maintained. There is moderate multilevel degenerative endplate spurring. No high-grade 
central canal stenosis is identified. The neural foramina are patent with the exception of severe heidi
rowing on the left at T11/T12.



Atherosclerotic aortic arch. There are several subcentimeter mediastinal lymph nodes. Pulmonary arter
ies are upper limits of normal in size. Coronary artery disease. There is cardiomegaly. There is mild
 centrilobular emphysema. Mild scarring or atelectasis in the lung bases. Numerous calcified granulom
as in the spleen. Cannot exclude a left renal mass, incompletely imaged.



No acute fracture of the lumbar spine is identified. The vertebral body height and alignment are main
tained. There is vacuum disc phenomenon of L3-S1. Transverse processes are intact.



L3/L4: There is posterior disc osteophyte complex and facet hypertrophy and ligamentum flavum redunda
ncy. Central canal stenosis is probably mild to moderate. There is mild bilateral neural foraminal na
rrowing.



L4/L5: There is posterior disc osteophyte complex and facet hypertrophy and ligamentum flavum redunda
ncy. Central canal stenosis is probably mild to moderate. The neural foramina appear adequate.



Atherosclerotic abdominal aorta and iliac arteries, no aneurysm. 



IMPRESSION:

1.  There is no acute fracture of the thoracic or lumbar spine.

2.  Mild degenerative spondylosis.

3.  Cannot exclude a left upper pole renal mass, but kidneys are incompletely imaged. Suggest nonemer
[facility] renal ultrasound.



Electronically signed by: Conner Marie MD (8/11/2021 5:49 PM) [facility]

## 2021-08-11 NOTE — RAD
CT HEAD AND C-SPINE WO



History: Fall, pain.



Comparison: None.



Technique: Noncontrast CT of the head and cervical spine.



Findings:



CT HEAD:

There is no evidence for intracranial mass or hemorrhage.

There is no hydrocephalus or midline shift.

No abnormal extra-axial fluid collections are present.

No evidence of large territorial infarct. Hypodensity in the left thalamus may represent sequela of l
acunar infarct.

Postsurgical changes of the right lens.

The visualized paranasal sinuses and mastoid air cells are clear.

The skull and scalp are within normal limits.



CT CERVICAL SPINE:

There is no evidence for fracture in the cervical spine.

Severe degenerative changes of the cervical spine with straightening of the normal cervical lordosis.
 Multilevel advanced disc space narrowing and uncovertebral hypertrophy. Multilevel neural foraminal 
stenosis. Prominent posterior osteophytes at C5-C6 superimposed on congenitally slender canal severel
y narrow the anterior posterior canal diameter to approximately 5 mm.

No destructive osseous lesions are seen.

Mild emphysematous changes in the lung apices. Bilateral carotid bulb calcifications.



Impression: 

1.  No acute intracranial findings. Left thalamic/basal ganglia hypodensity likely sequela of lacunar
 infarct.

2.  No acute fracture in the cervical spine.

3.  Advanced multilevel degenerative changes and congenitally slender canal. Severe spinal canal sten
osis at C5-C6 with AP diameter of approximately 5 mm.





-------

Exposure: One or more of the following individualized dose reduction techniques were utilized for thi
s examination:  

1. Automated exposure control

2. Adjustment of the mA and/or kV according to patient size

3. Use of iterative reconstruction technique.



Electronically signed by: Bradford Betancourt MD (8/11/2021 5:44 PM) [facility]

## 2021-08-11 NOTE — RAD
PQRS Compliance Statement:



One or more of the following individualized dose reduction techniques were utilized for this examinat
ion:  

1. Automated exposure control  

2. Adjustment of the mA and/or kV according to patient size  

3. Use of iterative reconstruction technique





CT THORACIC SPINE WO, CT LUMBAR SPINE WO



Clinical Indication: Reason: fall pain / Spl. Instructions:  / History: 



Comparison: None.



TECHNIQUE: Helical CT imaging of the thoracic and the lumbar spine is performed without IV contrast.



Findings: 

No acute fracture of the thoracic spine is identified. The alignment is maintained. The vertebral bod
y heights are maintained. There is moderate multilevel degenerative endplate spurring. No high-grade 
central canal stenosis is identified. The neural foramina are patent with the exception of severe heidi
rowing on the left at T11/T12.



Atherosclerotic aortic arch. There are several subcentimeter mediastinal lymph nodes. Pulmonary arter
ies are upper limits of normal in size. Coronary artery disease. There is cardiomegaly. There is mild
 centrilobular emphysema. Mild scarring or atelectasis in the lung bases. Numerous calcified granulom
as in the spleen. Cannot exclude a left renal mass, incompletely imaged.



No acute fracture of the lumbar spine is identified. The vertebral body height and alignment are main
tained. There is vacuum disc phenomenon of L3-S1. Transverse processes are intact.



L3/L4: There is posterior disc osteophyte complex and facet hypertrophy and ligamentum flavum redunda
ncy. Central canal stenosis is probably mild to moderate. There is mild bilateral neural foraminal na
rrowing.



L4/L5: There is posterior disc osteophyte complex and facet hypertrophy and ligamentum flavum redunda
ncy. Central canal stenosis is probably mild to moderate. The neural foramina appear adequate.



Atherosclerotic abdominal aorta and iliac arteries, no aneurysm. 



IMPRESSION:

1.  There is no acute fracture of the thoracic or lumbar spine.

2.  Mild degenerative spondylosis.

3.  Cannot exclude a left upper pole renal mass, but kidneys are incompletely imaged. Suggest nonemer
[facility] renal ultrasound.



Electronically signed by: Conner Marie MD (8/11/2021 5:49 PM) [facility]

## 2021-08-12 VITALS — DIASTOLIC BLOOD PRESSURE: 69 MMHG | SYSTOLIC BLOOD PRESSURE: 134 MMHG

## 2021-08-12 VITALS — SYSTOLIC BLOOD PRESSURE: 119 MMHG | DIASTOLIC BLOOD PRESSURE: 65 MMHG

## 2021-08-12 VITALS — SYSTOLIC BLOOD PRESSURE: 141 MMHG | DIASTOLIC BLOOD PRESSURE: 76 MMHG

## 2021-08-12 VITALS — SYSTOLIC BLOOD PRESSURE: 139 MMHG | DIASTOLIC BLOOD PRESSURE: 72 MMHG

## 2021-08-12 VITALS — SYSTOLIC BLOOD PRESSURE: 121 MMHG | DIASTOLIC BLOOD PRESSURE: 72 MMHG

## 2021-08-12 VITALS — DIASTOLIC BLOOD PRESSURE: 65 MMHG | SYSTOLIC BLOOD PRESSURE: 131 MMHG

## 2021-08-12 LAB
ALBUMIN SERPL-MCNC: 2 G/DL (ref 3.4–5)
ALBUMIN/GLOB SERPL: 0.5 {RATIO} (ref 1–1.7)
ALP SERPL-CCNC: 106 U/L (ref 46–116)
ALT SERPL-CCNC: 21 U/L (ref 14–59)
ANION GAP SERPL CALC-SCNC: 1 MMOL/L (ref 6–14)
AST SERPL-CCNC: 18 U/L (ref 15–37)
BASOPHILS # BLD AUTO: 0 X10^3/UL (ref 0–0.2)
BASOPHILS NFR BLD: 1 % (ref 0–3)
BILIRUB SERPL-MCNC: 0.4 MG/DL (ref 0.2–1)
BUN SERPL-MCNC: 33 MG/DL (ref 7–20)
BUN/CREAT SERPL: 22 (ref 6–20)
CALCIUM SERPL-MCNC: 8.2 MG/DL (ref 8.5–10.1)
CHLORIDE SERPL-SCNC: 105 MMOL/L (ref 98–107)
CO2 SERPL-SCNC: 38 MMOL/L (ref 21–32)
CREAT SERPL-MCNC: 1.5 MG/DL (ref 0.6–1)
EOSINOPHIL NFR BLD: 0.1 X10^3/UL (ref 0–0.7)
EOSINOPHIL NFR BLD: 2 % (ref 0–3)
ERYTHROCYTE [DISTWIDTH] IN BLOOD BY AUTOMATED COUNT: 14.6 % (ref 11.5–14.5)
GFR SERPLBLD BASED ON 1.73 SQ M-ARVRAT: 33.6 ML/MIN
GLUCOSE SERPL-MCNC: 183 MG/DL (ref 70–99)
HCT VFR BLD CALC: 33.6 % (ref 36–47)
HGB BLD-MCNC: 10.9 G/DL (ref 12–15.5)
LYMPHOCYTES # BLD: 0.7 X10^3/UL (ref 1–4.8)
LYMPHOCYTES NFR BLD AUTO: 12 % (ref 24–48)
MCH RBC QN AUTO: 30 PG (ref 25–35)
MCHC RBC AUTO-ENTMCNC: 32 G/DL (ref 31–37)
MCV RBC AUTO: 92 FL (ref 79–100)
MONO #: 0.5 X10^3/UL (ref 0–1.1)
MONOCYTES NFR BLD: 9 % (ref 0–9)
NEUT #: 4.4 X10^3/UL (ref 1.8–7.7)
NEUTROPHILS NFR BLD AUTO: 77 % (ref 31–73)
PLATELET # BLD AUTO: 252 X10^3/UL (ref 140–400)
POTASSIUM SERPL-SCNC: 3.5 MMOL/L (ref 3.5–5.1)
PROT SERPL-MCNC: 6.1 G/DL (ref 6.4–8.2)
RBC # BLD AUTO: 3.65 X10^6/UL (ref 3.5–5.4)
SODIUM SERPL-SCNC: 144 MMOL/L (ref 136–145)
WBC # BLD AUTO: 5.7 X10^3/UL (ref 4–11)

## 2021-08-12 RX ADMIN — INSULIN GLARGINE SCH UNIT: 100 INJECTION, SOLUTION SUBCUTANEOUS at 22:55

## 2021-08-12 RX ADMIN — OXYCODONE HYDROCHLORIDE AND ACETAMINOPHEN SCH MG: 500 TABLET ORAL at 11:47

## 2021-08-12 RX ADMIN — INSULIN LISPRO SCH UNITS: 100 INJECTION, SOLUTION INTRAVENOUS; SUBCUTANEOUS at 09:00

## 2021-08-12 RX ADMIN — ASPIRIN SCH MG: 81 TABLET, COATED ORAL at 11:47

## 2021-08-12 RX ADMIN — CARVEDILOL SCH MG: 6.25 TABLET, FILM COATED ORAL at 17:00

## 2021-08-12 RX ADMIN — CARVEDILOL SCH MG: 6.25 TABLET, FILM COATED ORAL at 11:47

## 2021-08-12 RX ADMIN — HEPARIN SODIUM SCH UNIT: 5000 INJECTION, SOLUTION INTRAVENOUS; SUBCUTANEOUS at 11:50

## 2021-08-12 RX ADMIN — INSULIN LISPRO SCH UNITS: 100 INJECTION, SOLUTION INTRAVENOUS; SUBCUTANEOUS at 17:00

## 2021-08-12 RX ADMIN — HEPARIN SODIUM SCH UNIT: 5000 INJECTION, SOLUTION INTRAVENOUS; SUBCUTANEOUS at 22:55

## 2021-08-12 RX ADMIN — INSULIN LISPRO SCH UNITS: 100 INJECTION, SOLUTION INTRAVENOUS; SUBCUTANEOUS at 12:00

## 2021-08-12 RX ADMIN — SIMVASTATIN SCH MG: 40 TABLET, FILM COATED ORAL at 22:54

## 2021-08-12 NOTE — PDOC1
History and Physical


Date of Service:


DOS:


DATE: 8/12/21 


TIME: 08:17





Chief Complaint:


Chief Complain:


A fall





History of Present Illness:


HPI:


Patient is a 76-year-old female with past medical history of diabetes mellitus 

type 2, hypertension, COPD on home O2, CKD was on dialysis a year ago but taken 

off, morbid obesity, who presents today after falling.  Patient states that she 

was trying to take a shower but she tripped and fell.  She also reports that she

has been getting weaker in the past couple weeks and she has gained about 10 

pounds and feels like she is getting more edematous.  Denies any head trauma or 

loss of consciousness.  However she does complain of mild posterior neck pain 

and also she complains of swelling to the bilateral lower extremities.  Denies 

fevers, shortness of breath, chest pain, abdominal pain, diarrhea or dysuria.  

Patient was given dose of IV Lasix as she does report urinary output but she is 

unsure how much she is putting out because there is a pure wick attached to her.





Past Medical/Surgical History:


PMH/PSH:


Past Medical History:  Arthritis, COPD on home oxygen, Diabetes-Type II, Hyper

tension, Renal Disease,CHRONIC KIDNEY DISEASE


Past Surgical History:  Cholecystectomy, Knee Replacement, Tonsillectomy, BREAST

CYSTS, CARPAL TUNNEL





Allergies:


Allergies:  


Coded Allergies:  


     shellfish derived (Verified  Adverse Reaction, Intermediate, Nausea, 

8/11/21)





Family History:


Family History:


Reviewed with no relevant findings





Social History:


Social History:


Smoking Status:  Former Smoker


Alcohol Use:  None


Drug Use:  None





Current Medications:


Current Medications





Current Medications


Ondansetron HCl (Zofran) 4 mg PRN Q8HRS  PRN IVP NAUSEA/VOMITING;  Start 8/11/21

at 20:30;  Stop 8/12/21 at 20:29


Morphine Sulfate (Morphine Sulfate) 2 mg PRN Q2HR  PRN IVP PAIN;  Start 8/11/21 

at 20:30;  Stop 8/12/21 at 20:29


Acetaminophen (Tylenol) 650 mg PRN Q4HRS  PRN PO FEVER > 100.3'F;  Start 8/11/21

at 20:30;  Stop 8/12/21 at 20:29


Furosemide (Lasix) 40 mg 1X  ONCE IVP  Last administered on 8/11/21at 23:02;  

Start 8/11/21 at 21:00;  Stop 8/11/21 at 21:01;  Status DC





Active Scripts


Active


Thera-M Tablet (Multivits,Ca,Minerals/Iron/Fa) 1 Each Tablet 1 Tab PO DAILY 30 

Days


Vitamin C (Ascorbic Acid) 500 Mg Tablet 500 Mg PO DAILY 30 Days


Culturelle (Lactobacillus Rhamnosus Gg) 1 Each Cap.sprink 1 Cap PO BID 30 Days


Aspirin Ec (Aspirin) 81 Mg Tablet.dr 81 Mg PO DAILYWBKFT 30 Days


Proair Hfa (Albuterol Sulfate) 8.5 Gm Hfa.aer.ad 2.5 Mg NEB PRN Q4HRS PRN 30 

Days


Amox Tr-K Clv 500-125 Mg Tab (Amoxicillin/Potassium Clav) 1 Each Tablet 1 Tab PO

DAILY 3 Days


Reported


Lantus (Insulin Glargine,Hum.rec.anlog) 100 Unit/1 Ml Vial 38 Units SQ HS


Insulin Lispro 100 Unit/1 Ml Vial 13 Units SQ TIDAC


Xalatan (Latanoprost) 2.5 Ml Drops 1 Drop OP HS


Flonase Allergy Relief (Fluticasone Propionate) 9.9 Ml Spray.susp 2 Sprays NS 

DAILY


Novolin N (Nph, Human Insulin Isophane) 100 Unit/1 Ml Vial 40 Unit SQ DAILYWBKFT


Carvedilol ** (Carvedilol) 6.25 Mg Tablet 6.25 Mg PO BIDWMEALS


Simvastatin 40 Mg Tablet 1 Tab PO QHS


Furosemide 20 Mg Tablet 1 Tab PO DAILY





ROS:


Review of Systems


Review of System


REVIEW OF SYSTEMS:


GENERAL:  Denies weakness.  Positive for neck pain


SKIN:  No rash or bruising


EYES:  No blurred, double or loss of vision.


NOSE AND THROAT:  No history of nosebleeds, hoarseness or sore throat.


HEART:  No history of palpitations, chest pain or shortness of breath on


exertion.


LUNGS:  Denies cough, hemoptysis, wheezing or shortness of breath.


GASTROINTESTINAL:  Denies changes in appetite, nausea, vomiting, diarrhea or


constipation.


GENITOURINARY:  No history of frequency, urgency, hesitancy or nocturia.


NEUROLOGIC:  Denies history of numbness, tingling, or tremor.


PSYCHIATRIC:  No history of panic, anxiety or depression.


ENDOCRINE:  No history of heat or cold intolerance, polyuria or polydipsia.


EXTREMITIES: Bilateral lower swelling





Physical Exam:


Vital Signs:





Vital Signs








  Date Time  Temp Pulse Resp B/P (MAP) Pulse Ox O2 Delivery O2 Flow Rate FiO2


 


8/12/21 03:00 98.4 84 18 141/76 (97) 98 Nasal Cannula 3.0 





 98.4       








Physcial Exam:


General: Well developed, well nourished, no acute distress, well appearing


HEENT:  Pupils equally round and reactive to light, EOMI, no discharge, normal 

conjunctiva


Neck:  Supple, no nuchal rigidity, no JVD, trachea midline, no tenderness


Cardiac:  RRR, no murmurs, no gallops, no rubs


Chest/Lungs:  CTAB, no wheeze, no rhonchi, no crackles


Abdomen: soft, non-distended, no guarding, no peritoneal signs, non-tender 


Back:  No tenderness


Extremities:  Bilateral lower extremities with +3 edema, cellulitis on the shin 

the patient states is chronic, right lower extremity shin is weeping clear fl

uid.  +1 bilateral pedal pulses.  Range of motion is intact bilateral lower 

extremities.


Neuro:  Alert and oriented x 4, no focal deficits, normal speech





Labs:


Labs:





Laboratory Tests








Test


 8/11/21


16:45 8/11/21


16:53 8/11/21


17:03 8/12/21


08:07


 


White Blood Count


 7.5 x10^3/uL


(4.0-11.0) 


 


 





 


Red Blood Count


 3.93 x10^6/uL


(3.50-5.40) 


 


 





 


Hemoglobin


 11.6 g/dL


(12.0-15.5) 


 


 





 


Hematocrit


 36.7 %


(36.0-47.0) 


 


 





 


Mean Corpuscular Volume 94 fL ()    


 


Mean Corpuscular Hemoglobin 30 pg (25-35)    


 


Mean Corpuscular Hemoglobin


Concent 32 g/dL


(31-37) 


 


 





 


Red Cell Distribution Width


 14.8 %


(11.5-14.5) 


 


 





 


Platelet Count


 252 x10^3/uL


(140-400) 


 


 





 


Neutrophils (%) (Auto) 81 % (31-73)    


 


Lymphocytes (%) (Auto) 9 % (24-48)    


 


Monocytes (%) (Auto) 8 % (0-9)    


 


Eosinophils (%) (Auto) 2 % (0-3)    


 


Basophils (%) (Auto) 1 % (0-3)    


 


Neutrophils # (Auto)


 6.1 x10^3/uL


(1.8-7.7) 


 


 





 


Lymphocytes # (Auto)


 0.7 x10^3/uL


(1.0-4.8) 


 


 





 


Monocytes # (Auto)


 0.6 x10^3/uL


(0.0-1.1) 


 


 





 


Eosinophils # (Auto)


 0.1 x10^3/uL


(0.0-0.7) 


 


 





 


Basophils # (Auto)


 0.1 x10^3/uL


(0.0-0.2) 


 


 





 


Urine Collection Type  U cath   


 


Urine Color  Yellow   


 


Urine Clarity  Clear   


 


Urine pH  6.0 (<5.0-8.0)   


 


Urine Specific Gravity


 


 1.020


(1.000-1.030) 


 





 


Urine Protein


 


 >=300 mg/dL


(NEG-TRACE) 


 





 


Urine Glucose (UA)


 


 >=1000 mg/dL


(NEG) 


 





 


Urine Ketones (Stick)


 


 Negative mg/dL


(NEG) 


 





 


Urine Blood  Small (NEG)   


 


Urine Nitrite  Negative (NEG)   


 


Urine Bilirubin  Negative (NEG)   


 


Urine Urobilinogen Dipstick


 


 0.2 mg/dL (0.2


mg/dL) 


 





 


Urine Leukocyte Esterase  Negative (NEG)   


 


Urine RBC


 


 Rare /HPF


(0-2) 


 





 


Urine WBC  0 /HPF (0-4)   


 


Urine Squamous Epithelial


Cells 


 Many /LPF 


 


 





 


Urine Bacteria  0 /HPF (0-FEW)   


 


Sodium Level


 


 


 141 mmol/L


(136-145) 





 


Potassium Level


 


 


 3.6 mmol/L


(3.5-5.1) 





 


Chloride Level


 


 


 101 mmol/L


() 





 


Carbon Dioxide Level


 


 


 35 mmol/L


(21-32) 





 


Anion Gap   5 (6-14)  


 


Blood Urea Nitrogen


 


 


 36 mg/dL


(7-20) 





 


Creatinine


 


 


 1.8 mg/dL


(0.6-1.0) 





 


Estimated GFR


(Cockcroft-Gault) 


 


 27.2 


 





 


BUN/Creatinine Ratio   20 (6-20)  


 


Glucose Level


 


 


 321 mg/dL


(70-99) 





 


Calcium Level


 


 


 8.2 mg/dL


(8.5-10.1) 





 


Total Bilirubin


 


 


 0.2 mg/dL


(0.2-1.0) 





 


Aspartate Amino Transf


(AST/SGOT) 


 


 21 U/L (15-37) 


 





 


Alanine Aminotransferase


(ALT/SGPT) 


 


 26 U/L (14-59) 


 





 


Alkaline Phosphatase


 


 


 155 U/L


() 





 


NT-Pro-B-Type Natriuretic


Peptide 


 


 4639 pg/mL


(0-449) 





 


Total Protein


 


 


 6.8 g/dL


(6.4-8.2) 





 


Albumin


 


 


 2.2 g/dL


(3.4-5.0) 





 


Albumin/Globulin Ratio   0.5 (1.0-1.7)  


 


Glucose (Fingerstick)


 


 


 


 181 mg/dL


(70-99)








Laboratory Tests








Test


 8/11/21


16:45 8/11/21


16:53 8/11/21


17:03 8/12/21


08:07


 


White Blood Count


 7.5 x10^3/uL


(4.0-11.0) 


 


 





 


Red Blood Count


 3.93 x10^6/uL


(3.50-5.40) 


 


 





 


Hemoglobin


 11.6 g/dL


(12.0-15.5) 


 


 





 


Hematocrit


 36.7 %


(36.0-47.0) 


 


 





 


Mean Corpuscular Volume 94 fL ()    


 


Mean Corpuscular Hemoglobin 30 pg (25-35)    


 


Mean Corpuscular Hemoglobin


Concent 32 g/dL


(31-37) 


 


 





 


Red Cell Distribution Width


 14.8 %


(11.5-14.5) 


 


 





 


Platelet Count


 252 x10^3/uL


(140-400) 


 


 





 


Neutrophils (%) (Auto) 81 % (31-73)    


 


Lymphocytes (%) (Auto) 9 % (24-48)    


 


Monocytes (%) (Auto) 8 % (0-9)    


 


Eosinophils (%) (Auto) 2 % (0-3)    


 


Basophils (%) (Auto) 1 % (0-3)    


 


Neutrophils # (Auto)


 6.1 x10^3/uL


(1.8-7.7) 


 


 





 


Lymphocytes # (Auto)


 0.7 x10^3/uL


(1.0-4.8) 


 


 





 


Monocytes # (Auto)


 0.6 x10^3/uL


(0.0-1.1) 


 


 





 


Eosinophils # (Auto)


 0.1 x10^3/uL


(0.0-0.7) 


 


 





 


Basophils # (Auto)


 0.1 x10^3/uL


(0.0-0.2) 


 


 





 


Urine Collection Type  U cath   


 


Urine Color  Yellow   


 


Urine Clarity  Clear   


 


Urine pH  6.0 (<5.0-8.0)   


 


Urine Specific Gravity


 


 1.020


(1.000-1.030) 


 





 


Urine Protein


 


 >=300 mg/dL


(NEG-TRACE) 


 





 


Urine Glucose (UA)


 


 >=1000 mg/dL


(NEG) 


 





 


Urine Ketones (Stick)


 


 Negative mg/dL


(NEG) 


 





 


Urine Blood  Small (NEG)   


 


Urine Nitrite  Negative (NEG)   


 


Urine Bilirubin  Negative (NEG)   


 


Urine Urobilinogen Dipstick


 


 0.2 mg/dL (0.2


mg/dL) 


 





 


Urine Leukocyte Esterase  Negative (NEG)   


 


Urine RBC


 


 Rare /HPF


(0-2) 


 





 


Urine WBC  0 /HPF (0-4)   


 


Urine Squamous Epithelial


Cells 


 Many /LPF 


 


 





 


Urine Bacteria  0 /HPF (0-FEW)   


 


Sodium Level


 


 


 141 mmol/L


(136-145) 





 


Potassium Level


 


 


 3.6 mmol/L


(3.5-5.1) 





 


Chloride Level


 


 


 101 mmol/L


() 





 


Carbon Dioxide Level


 


 


 35 mmol/L


(21-32) 





 


Anion Gap   5 (6-14)  


 


Blood Urea Nitrogen


 


 


 36 mg/dL


(7-20) 





 


Creatinine


 


 


 1.8 mg/dL


(0.6-1.0) 





 


Estimated GFR


(Cockcroft-Gault) 


 


 27.2 


 





 


BUN/Creatinine Ratio   20 (6-20)  


 


Glucose Level


 


 


 321 mg/dL


(70-99) 





 


Calcium Level


 


 


 8.2 mg/dL


(8.5-10.1) 





 


Total Bilirubin


 


 


 0.2 mg/dL


(0.2-1.0) 





 


Aspartate Amino Transf


(AST/SGOT) 


 


 21 U/L (15-37) 


 





 


Alanine Aminotransferase


(ALT/SGPT) 


 


 26 U/L (14-59) 


 





 


Alkaline Phosphatase


 


 


 155 U/L


() 





 


NT-Pro-B-Type Natriuretic


Peptide 


 


 4639 pg/mL


(0-449) 





 


Total Protein


 


 


 6.8 g/dL


(6.4-8.2) 





 


Albumin


 


 


 2.2 g/dL


(3.4-5.0) 





 


Albumin/Globulin Ratio   0.5 (1.0-1.7)  


 


Glucose (Fingerstick)


 


 


 


 181 mg/dL


(70-99)











Images:


Images


PROCEDURE: CT HEAD AND CERVICAL SPINE WO





Impression: 


1.  No acute intracranial findings. Left thalamic/basal ganglia hypodensity 

likely sequela of lacunar infarct.


2.  No acute fracture in the cervical spine.


3.  Advanced multilevel degenerative changes and congenitally slender canal. 

Severe spinal canal stenosis at C5-C6 with AP diameter of approximately 5 mm.





PROCEDURE: CT LUMBAR SPINE WO CONTRAST








IMPRESSION:


1.  There is no acute fracture of the thoracic or lumbar spine.


2.  Mild degenerative spondylosis.


3.  Cannot exclude a left upper pole renal mass, but kidneys are incompletely 

imaged. Suggest nonemergent renal ultrasound.





Assessment/Plan


Assessment/Plan


Mechanical fall


Hypertensive urgency


Acute volume overload


Acute on chronic kidney failure, due to possible vasomotor nephropathy


Lacunar infarct


Anemia of chronic kidney disease


History of COPD


History of diabetes mellitus type 2


History of hypertension


Super super morbid obesity


Severe protein malnutrition








Admit to hospitalist for further management


Wound care consult


Nephrology consult for renal failure


IV diuresis as needed


Strict I's and O's


Avoid nephrotoxic agents


Neurology consult for possible lacunar infarct


Permissive hypertension for now


Maintain systolic blood pressure less than 180 but greater than 140


Stroke preventative medicationsaspirin and atorvastatin


PT OT modalities


Heparin for DVT prophylaxis


Protonix GI prophylaxis


ADA diet


Full code


Discussed with RN and SW


Disposition inpatient management as above


Surrogate decision maker is x[facility]





Justifications for Admission


Other Justification














LIANA KEMP MD                  Aug 12, 2021 08:25

## 2021-08-12 NOTE — RAD
EXAM:  US RENAL BILAT 8/12/2021 6:57 AM



INDICATION: Renal failure, renal mass.



COMPARISON: CT thoracic and lumbar spine 8/11/2021



Technique: Grayscale and color Doppler ultrasound images of the kidneys.



FINDINGS: 

There is limited evaluation of the kidneys due to body habitus. The right kidney measures 11.7 x 4.9 
x 5.1 cm.  The left kidney measures 9.8 x 5.3 x 6.1 cm.  Renal echogenicity and cortical thickness ar
e unremarkable.  No hydronephrosis. There is a 2.2 cm exophytic anechoic cyst in the mid left kidney 
with no vascularity. No obvious renal mass in the superior left renal pole. The urinary bladder is in
completely distended.



IMPRESSION: There is a 2.2 cm probable simple cyst in the mid left kidney. No obvious mass in the sup
erior left renal pole although very limited visualization of the kidneys due to body habitus. Conside
r CT or MRI with renal mass protocol for more definitive evaluation.





 



Electronically signed by: Portia Howard MD (8/12/2021 8:41 AM) [id number]

## 2021-08-12 NOTE — PDOC2
CONSULT


Date of Consult


Date of Consult


DATE: 8/12/21 


TIME: 09:57





Reason for Consult


Reason for Consult:


RENAL FAILURE





Referring Physician


Referring Physician:


VIRIDIANA





Identification/Chief Complaint


Chief Complaint


SOB





Source


Source:  Chart review, Patient





History of Present Illness


Reason for Visit:


THIS IS A 76 YR OLD WITH SOB AND INCREASING WEIGHT GAIN FOR A MONTH. HAS LE 

EDEMA WHICH IS WORSE. HAS BEEN TAKING HER DIURETICS BUT NOT HELPING. HAD DANNY 

ATOP HER CKD STAGE 3B LAST YEAR REQUIRING HD VIA A TUNNELED HD LINE. EVENTUALLY 

SHE CAME OFF HD AND HER DIALYSIS CATHETER WAS SUBSEQUENTLY REMOVED. CURRENTLY CR

OF 1.8. CKD IS DUE TO DM II. HYPOXIC AND NEEDING SUPPLEMENTAL OXYGEN. SHE ALSO 

HAS COMPLAINED OF A FALL. TRIPPED AND FELL. SHE HAS NECK AND BACK PAIN. IMAGING 

IS NEG FOR ANY FRACTURES. DID NOT LOSE CONSCIOUSNESS. NO NEPHROTOXINS. 

HEMODYNAMICALLY STABLE. NO OTHER  HX.





Past Medical History


Cardiovascular:  HTN, Hyperlipidemia


Pulmonary:  COPD


GI:  Constipation


Renal/:  Chronic renal insuff, Acute renal failure


Endocrine:  Diabetes





Past Surgical History


Past Surgical History


TUNNELED HD CATHETER


Past Surgical History:  Cholecystectomy, Total knee replacement, Tonsillectomy





Family History


Family History:  Hypertension





Social History


ALCOHOL:  none


Drugs:  None





Current Problem List


Problem List


Problems


Medical Problems:


(1) Acute cervical sprain


Status: Acute  





(2) Acute on chronic renal failure


Status: Acute  











Current Medications


Current Medications





Current Medications


Ondansetron HCl (Zofran) 4 mg PRN Q8HRS  PRN IVP NAUSEA/VOMITING;  Start 8/11/21

at 20:30;  Stop 8/12/21 at 20:29


Morphine Sulfate (Morphine Sulfate) 2 mg PRN Q2HR  PRN IVP PAIN;  Start 8/11/21 

at 20:30;  Stop 8/12/21 at 20:29


Acetaminophen (Tylenol) 650 mg PRN Q4HRS  PRN PO FEVER > 100.3'F;  Start 8/11/21

at 20:30;  Stop 8/12/21 at 20:29


Furosemide (Lasix) 40 mg 1X  ONCE IVP  Last administered on 8/11/21at 23:02;  

Start 8/11/21 at 21:00;  Stop 8/11/21 at 21:01;  Status DC


Ascorbic Acid (Vitamin C) 500 mg DAILY PO ;  Start 8/12/21 at 09:00


Aspirin (Ecotrin) 81 mg DAILYWBKFT PO ;  Start 8/12/21 at 09:00


Carvedilol (Coreg) 6.25 mg BIDWMEALS PO ;  Start 8/12/21 at 09:00


Insulin Glargine (Lantus Syringe) 38 unit HS SQ ;  Start 8/12/21 at 21:00


Insulin Human Lispro (HumaLOG) 13 units TIDWMEALS SQ ;  Start 8/12/21 at 09:00


Simvastatin (Zocor) 40 mg QHS PO ;  Start 8/12/21 at 21:00


Insulin Human Lispro (HumaLOG) 0-7 UNITS TIDWMEALS SQ ;  Start 8/12/21 at 12:00


Dextrose (Dextrose 50%-Water Syringe) 12.5 gm PRN Q15MIN  PRN IV SEE COMMENTS;  

Start 8/12/21 at 08:30


Sennosides (Senna) 17.2 mg PRN BID  PRN PO CONSTIPATION;  Start 8/12/21 at 08:30


Docusate Sodium (Colace) 100 mg PRN DAILY  PRN PO HARD STOOLS;  Start 8/12/21 at

08:30


Ondansetron HCl (Zofran) 4 mg PRN Q6HRS  PRN IVP NAUSEA/VOMITING;  Start 8/12/21

at 08:30


Dextrose (Dextrose 50%-Water Syringe) 12.5 gm PRN Q15MIN  PRN IV SEE COMMENTS;  

Start 8/12/21 at 08:30


Acetaminophen (Tylenol) 650 mg PRN Q4HRS  PRN PO TEMP OVER 100.4F OR MILD PAIN; 

Start 8/12/21 at 08:30


Heparin Sodium (Porcine) (Heparin Sodium) 5,000 unit Q12HR SQ ;  Start 8/12/21 

at 09:00


Prochlorperazine Edisylate (Compazine) 10 mg PRN Q6HRS  PRN IV NAUSEA/VOMITING, 

2ND CHOICE;  Start 8/12/21 at 08:30





Active Scripts


Active


Thera-M Tablet (Multivits,Ca,Minerals/Iron/Fa) 1 Each Tablet 1 Tab PO DAILY 30 

Days


Vitamin C (Ascorbic Acid) 500 Mg Tablet 500 Mg PO DAILY 30 Days


Culturelle (Lactobacillus Rhamnosus Gg) 1 Each Cap.sprink 1 Cap PO BID 30 Days


Aspirin Ec (Aspirin) 81 Mg Tablet.dr 81 Mg PO DAILYWBKFT 30 Days


Proair Hfa (Albuterol Sulfate) 8.5 Gm Hfa.aer.ad 2.5 Mg NEB PRN Q4HRS PRN 30 

Days


Amox Tr-K Clv 500-125 Mg Tab (Amoxicillin/Potassium Clav) 1 Each Tablet 1 Tab PO

DAILY 3 Days


Reported


Lantus (Insulin Glargine,Hum.rec.anlog) 100 Unit/1 Ml Vial 38 Units SQ HS


Insulin Lispro 100 Unit/1 Ml Vial 13 Units SQ TIDAC


Xalatan (Latanoprost) 2.5 Ml Drops 1 Drop OP HS


Flonase Allergy Relief (Fluticasone Propionate) 9.9 Ml Spray.susp 2 Sprays NS 

DAILY


Novolin N (Nph, Human Insulin Isophane) 100 Unit/1 Ml Vial 40 Unit SQ DAILYWBKFT


Carvedilol ** (Carvedilol) 6.25 Mg Tablet 6.25 Mg PO BIDWMEALS


Simvastatin 40 Mg Tablet 1 Tab PO QHS


Furosemide 20 Mg Tablet 1 Tab PO DAILY





Allergies


Allergies:  


Coded Allergies:  


     shellfish derived (Verified  Adverse Reaction, Intermediate, Nausea, 

8/11/21)





ROS


General:  YES: Fatigue, Appetite


PSYCHOLOGICAL ROS:  YES: Anxiety, Depression


Eyes:  Yes Decreased vision


HEENT:  YES: Heacaches


ALLERGY AND IMMUNOLOGY:  YES: Seasonal Allergies


Respiratory:  YES: Cough, Orthopnea, Shortness of breath, SOB with excertion


Cardiovascular:  yes Edema


Gastrointestinal:  Yes Constipation


Genitourinary:  YES Frequency, YES Other


Musculoskeletal:  Yes Joint Stiffness, Yes Muscular Weakness


Neurological:  Yes Weakness


Skin:  Yes Dry Skin





Physical Exam


General:  Alert, Oriented X3, Cooperative, mild distress


HEENT:  Mucous membr. moist/pink


Lungs:  Other (DECREASED AT BASES)


Heart:  Regular rate, Normal S1, Normal S2


Abdomen:  Normal bowel sounds, Soft, No tenderness


Extremities:  No clubbing, No cyanosis, Other (3+ EDEMA)


Neuro:  Normal speech


Psych/Mental Status:  Mental status NL, Mood NL


MUSCULOSKELETAL:  No joint tenderness, No deformity





Vitals


VITALS





Vital Signs








  Date Time  Temp Pulse Resp B/P (MAP) Pulse Ox O2 Delivery O2 Flow Rate FiO2


 


8/12/21 07:00 98.7 86 18 121/72 (88) 94 Nasal Cannula 3.0 





 98.7       











Labs


Labs





Laboratory Tests








Test


 8/11/21


16:45 8/11/21


16:53 8/11/21


17:03 8/12/21


07:40


 


White Blood Count


 7.5 x10^3/uL


(4.0-11.0) 


 


 5.7 x10^3/uL


(4.0-11.0)


 


Red Blood Count


 3.93 x10^6/uL


(3.50-5.40) 


 


 3.65 x10^6/uL


(3.50-5.40)


 


Hemoglobin


 11.6 g/dL


(12.0-15.5) 


 


 10.9 g/dL


(12.0-15.5)


 


Hematocrit


 36.7 %


(36.0-47.0) 


 


 33.6 %


(36.0-47.0)


 


Mean Corpuscular Volume 94 fL ()    92 fL () 


 


Mean Corpuscular Hemoglobin 30 pg (25-35)    30 pg (25-35) 


 


Mean Corpuscular Hemoglobin


Concent 32 g/dL


(31-37) 


 


 32 g/dL


(31-37)


 


Red Cell Distribution Width


 14.8 %


(11.5-14.5) 


 


 14.6 %


(11.5-14.5)


 


Platelet Count


 252 x10^3/uL


(140-400) 


 


 252 x10^3/uL


(140-400)


 


Neutrophils (%) (Auto) 81 % (31-73)    77 % (31-73) 


 


Lymphocytes (%) (Auto) 9 % (24-48)    12 % (24-48) 


 


Monocytes (%) (Auto) 8 % (0-9)    9 % (0-9) 


 


Eosinophils (%) (Auto) 2 % (0-3)    2 % (0-3) 


 


Basophils (%) (Auto) 1 % (0-3)    1 % (0-3) 


 


Neutrophils # (Auto)


 6.1 x10^3/uL


(1.8-7.7) 


 


 4.4 x10^3/uL


(1.8-7.7)


 


Lymphocytes # (Auto)


 0.7 x10^3/uL


(1.0-4.8) 


 


 0.7 x10^3/uL


(1.0-4.8)


 


Monocytes # (Auto)


 0.6 x10^3/uL


(0.0-1.1) 


 


 0.5 x10^3/uL


(0.0-1.1)


 


Eosinophils # (Auto)


 0.1 x10^3/uL


(0.0-0.7) 


 


 0.1 x10^3/uL


(0.0-0.7)


 


Basophils # (Auto)


 0.1 x10^3/uL


(0.0-0.2) 


 


 0.0 x10^3/uL


(0.0-0.2)


 


Urine Collection Type  U cath   


 


Urine Color  Yellow   


 


Urine Clarity  Clear   


 


Urine pH  6.0 (<5.0-8.0)   


 


Urine Specific Gravity


 


 1.020


(1.000-1.030) 


 





 


Urine Protein


 


 >=300 mg/dL


(NEG-TRACE) 


 





 


Urine Glucose (UA)


 


 >=1000 mg/dL


(NEG) 


 





 


Urine Ketones (Stick)


 


 Negative mg/dL


(NEG) 


 





 


Urine Blood  Small (NEG)   


 


Urine Nitrite  Negative (NEG)   


 


Urine Bilirubin  Negative (NEG)   


 


Urine Urobilinogen Dipstick


 


 0.2 mg/dL (0.2


mg/dL) 


 





 


Urine Leukocyte Esterase  Negative (NEG)   


 


Urine RBC


 


 Rare /HPF


(0-2) 


 





 


Urine WBC  0 /HPF (0-4)   


 


Urine Squamous Epithelial


Cells 


 Many /LPF 


 


 





 


Urine Bacteria  0 /HPF (0-FEW)   


 


Sodium Level


 


 


 141 mmol/L


(136-145) 144 mmol/L


(136-145)


 


Potassium Level


 


 


 3.6 mmol/L


(3.5-5.1) 3.5 mmol/L


(3.5-5.1)


 


Chloride Level


 


 


 101 mmol/L


() 105 mmol/L


()


 


Carbon Dioxide Level


 


 


 35 mmol/L


(21-32) 38 mmol/L


(21-32)


 


Anion Gap   5 (6-14)  1 (6-14) 


 


Blood Urea Nitrogen


 


 


 36 mg/dL


(7-20) 33 mg/dL


(7-20)


 


Creatinine


 


 


 1.8 mg/dL


(0.6-1.0) 1.5 mg/dL


(0.6-1.0)


 


Estimated GFR


(Cockcroft-Gault) 


 


 27.2 


 33.6 





 


BUN/Creatinine Ratio   20 (6-20)  22 (6-20) 


 


Glucose Level


 


 


 321 mg/dL


(70-99) 183 mg/dL


(70-99)


 


Calcium Level


 


 


 8.2 mg/dL


(8.5-10.1) 8.2 mg/dL


(8.5-10.1)


 


Total Bilirubin


 


 


 0.2 mg/dL


(0.2-1.0) 0.4 mg/dL


(0.2-1.0)


 


Aspartate Amino Transf


(AST/SGOT) 


 


 21 U/L (15-37) 


 18 U/L (15-37) 





 


Alanine Aminotransferase


(ALT/SGPT) 


 


 26 U/L (14-59) 


 21 U/L (14-59) 





 


Alkaline Phosphatase


 


 


 155 U/L


() 106 U/L


()


 


NT-Pro-B-Type Natriuretic


Peptide 


 


 4639 pg/mL


(0-449) 





 


Total Protein


 


 


 6.8 g/dL


(6.4-8.2) 6.1 g/dL


(6.4-8.2)


 


Albumin


 


 


 2.2 g/dL


(3.4-5.0) 2.0 g/dL


(3.4-5.0)


 


Albumin/Globulin Ratio   0.5 (1.0-1.7)  0.5 (1.0-1.7) 


 


Test


 8/12/21


08:07 


 


 





 


Glucose (Fingerstick)


 181 mg/dL


(70-99) 


 


 











Laboratory Tests








Test


 8/11/21


16:45 8/11/21


16:53 8/11/21


17:03 8/12/21


07:40


 


White Blood Count


 7.5 x10^3/uL


(4.0-11.0) 


 


 5.7 x10^3/uL


(4.0-11.0)


 


Red Blood Count


 3.93 x10^6/uL


(3.50-5.40) 


 


 3.65 x10^6/uL


(3.50-5.40)


 


Hemoglobin


 11.6 g/dL


(12.0-15.5) 


 


 10.9 g/dL


(12.0-15.5)


 


Hematocrit


 36.7 %


(36.0-47.0) 


 


 33.6 %


(36.0-47.0)


 


Mean Corpuscular Volume 94 fL ()    92 fL () 


 


Mean Corpuscular Hemoglobin 30 pg (25-35)    30 pg (25-35) 


 


Mean Corpuscular Hemoglobin


Concent 32 g/dL


(31-37) 


 


 32 g/dL


(31-37)


 


Red Cell Distribution Width


 14.8 %


(11.5-14.5) 


 


 14.6 %


(11.5-14.5)


 


Platelet Count


 252 x10^3/uL


(140-400) 


 


 252 x10^3/uL


(140-400)


 


Neutrophils (%) (Auto) 81 % (31-73)    77 % (31-73) 


 


Lymphocytes (%) (Auto) 9 % (24-48)    12 % (24-48) 


 


Monocytes (%) (Auto) 8 % (0-9)    9 % (0-9) 


 


Eosinophils (%) (Auto) 2 % (0-3)    2 % (0-3) 


 


Basophils (%) (Auto) 1 % (0-3)    1 % (0-3) 


 


Neutrophils # (Auto)


 6.1 x10^3/uL


(1.8-7.7) 


 


 4.4 x10^3/uL


(1.8-7.7)


 


Lymphocytes # (Auto)


 0.7 x10^3/uL


(1.0-4.8) 


 


 0.7 x10^3/uL


(1.0-4.8)


 


Monocytes # (Auto)


 0.6 x10^3/uL


(0.0-1.1) 


 


 0.5 x10^3/uL


(0.0-1.1)


 


Eosinophils # (Auto)


 0.1 x10^3/uL


(0.0-0.7) 


 


 0.1 x10^3/uL


(0.0-0.7)


 


Basophils # (Auto)


 0.1 x10^3/uL


(0.0-0.2) 


 


 0.0 x10^3/uL


(0.0-0.2)


 


Urine Collection Type  U cath   


 


Urine Color  Yellow   


 


Urine Clarity  Clear   


 


Urine pH  6.0 (<5.0-8.0)   


 


Urine Specific Gravity


 


 1.020


(1.000-1.030) 


 





 


Urine Protein


 


 >=300 mg/dL


(NEG-TRACE) 


 





 


Urine Glucose (UA)


 


 >=1000 mg/dL


(NEG) 


 





 


Urine Ketones (Stick)


 


 Negative mg/dL


(NEG) 


 





 


Urine Blood  Small (NEG)   


 


Urine Nitrite  Negative (NEG)   


 


Urine Bilirubin  Negative (NEG)   


 


Urine Urobilinogen Dipstick


 


 0.2 mg/dL (0.2


mg/dL) 


 





 


Urine Leukocyte Esterase  Negative (NEG)   


 


Urine RBC


 


 Rare /HPF


(0-2) 


 





 


Urine WBC  0 /HPF (0-4)   


 


Urine Squamous Epithelial


Cells 


 Many /LPF 


 


 





 


Urine Bacteria  0 /HPF (0-FEW)   


 


Sodium Level


 


 


 141 mmol/L


(136-145) 144 mmol/L


(136-145)


 


Potassium Level


 


 


 3.6 mmol/L


(3.5-5.1) 3.5 mmol/L


(3.5-5.1)


 


Chloride Level


 


 


 101 mmol/L


() 105 mmol/L


()


 


Carbon Dioxide Level


 


 


 35 mmol/L


(21-32) 38 mmol/L


(21-32)


 


Anion Gap   5 (6-14)  1 (6-14) 


 


Blood Urea Nitrogen


 


 


 36 mg/dL


(7-20) 33 mg/dL


(7-20)


 


Creatinine


 


 


 1.8 mg/dL


(0.6-1.0) 1.5 mg/dL


(0.6-1.0)


 


Estimated GFR


(Cockcroft-Gault) 


 


 27.2 


 33.6 





 


BUN/Creatinine Ratio   20 (6-20)  22 (6-20) 


 


Glucose Level


 


 


 321 mg/dL


(70-99) 183 mg/dL


(70-99)


 


Calcium Level


 


 


 8.2 mg/dL


(8.5-10.1) 8.2 mg/dL


(8.5-10.1)


 


Total Bilirubin


 


 


 0.2 mg/dL


(0.2-1.0) 0.4 mg/dL


(0.2-1.0)


 


Aspartate Amino Transf


(AST/SGOT) 


 


 21 U/L (15-37) 


 18 U/L (15-37) 





 


Alanine Aminotransferase


(ALT/SGPT) 


 


 26 U/L (14-59) 


 21 U/L (14-59) 





 


Alkaline Phosphatase


 


 


 155 U/L


() 106 U/L


()


 


NT-Pro-B-Type Natriuretic


Peptide 


 


 4639 pg/mL


(0-449) 





 


Total Protein


 


 


 6.8 g/dL


(6.4-8.2) 6.1 g/dL


(6.4-8.2)


 


Albumin


 


 


 2.2 g/dL


(3.4-5.0) 2.0 g/dL


(3.4-5.0)


 


Albumin/Globulin Ratio   0.5 (1.0-1.7)  0.5 (1.0-1.7) 


 


Test


 8/12/21


08:07 


 


 





 


Glucose (Fingerstick)


 181 mg/dL


(70-99) 


 


 














Images


Images








INDICATION: Renal failure, renal mass.





COMPARISON: CT thoracic and lumbar spine 8/11/2021





Technique: Grayscale and color Doppler ultrasound images of the kidneys.





FINDINGS: 


There is limited evaluation of the kidneys due to body habitus. The right kidney

measures 11.7 x 4.9 x 5.1 cm.  The left kidney measures 9.8 x 5.3 x 6.1 cm.  

Renal echogenicity and cortical thickness are unremarkable.  No hydronephrosis. 

There is a 2.2 cm exophytic anechoic cyst in the mid left kidney with no 

vascularity. No obvious renal mass in the superior left renal pole. The urinary 

bladder is incompletely distended.





IMPRESSION: There is a 2.2 cm probable simple cyst in the mid left kidney. No 

obvious mass in the superior left renal pole although very limited visualization

of the kidneys due to body habitus. Consider CT or MRI with renal mass protocol 

for more definitive evaluation.








Assessment/Plan


Assessment/Plan


IMP





FALL


ANASARCA


EDEMA


CKD STAGE 3B TO 4-CR OF 1.8


MORBID OBESITY


UNCONTROLLED HTN


VOLUME OVERLOAD


CHF - SUSPECT ACUTE ON CHRONIC SYSTOLIC AND DIASTOLIC


HX OF COPD


DM II


PROTEINURIA


DECONDITIONING


ANEMIA OF CKD








PLAN





NEEDS DIURESIS


CONTROL BP


CONTROL BG


HOPEFULLY BP IMPROVES WITH DIURESIS


MAY END UP NEEDING DIALYSIS AGAIN IF UNABLE TO DIURESE


FLUID RESTRICT


AVOID NEPHROTOXINS


CONSIDER ECHOCARDIOGRAM


CHECK IRON STORES


CHECK PO4


ENC COMPLIANCE











MARCO ANTONIO CAMPOS MD                 Aug 12, 2021 10:10

## 2021-08-12 NOTE — NUR
Wound/Ostomy Care



Wound Type/Assessment:  BLE are red and swollen with open areas from swelling. red 
non-granulated bases with maceration. Moderate drainage noted. WC pictured, measured, 
assessed and redressed wounds 





Treatment Recommendations/Plan:  Cleanse legs, apply xeroform, ABD and wrap with kerlix. 
Apply medigrips size G bilaterally from knees to toes with no folds or wrinkles, change 
every 1-2 days and as needed for drainage. 





Education provided: WC POC and PU prevention





Offloading surface/device: pt is a self turn





Recommended Referrals/Tests: may need arterial and venous studies if wounds do not resolve 
with elevation and light compression





Discharge Recommendations for dressings: see above, pt may follow up in wound center if 
desired.

## 2021-08-12 NOTE — PDOC2
NEUROLOGY CONSULT


Date of Service


DOS:


DATE: 8/12/21 


TIME: 13:01





Reason for Consult


Reason for Consult:


Abnormal head CT





Referring Physician


Referring Physician:


Dr. Baig





Source


Source:  Chart review, Patient





History of Present Illness


History of Present Illness


The patient is a 76-year-old right-handed female with longstanding gait disorder

related to arthritis and diabetic neuropathy, tripped and fell in her shower.  

She is a gets around with a cane or a walker.  She has been living on her own 

for over 3 years, prior to that she had a short stay in rehab after a fall.  She

does have some mild neck pain.  She has never had a stroke, seizure, or head 

injury.  There are no focal symptoms of transient ischemic attack now or at any 

time in the past.  I am asked to see her regarding abnormal head CT as reviewed 

below.





Past Medical History


Cardiovascular:  HTN, Hyperlipidemia


Pulmonary:  Other (Sleep apnea)


CENTRAL NERVOUS SYSTEM:  Periperal neuropathy


Endocrine:  Diabetes





Past Surgical History


Past Surgical History:  Cholecystectomy, Total knee replacement, Tonsillectomy, 

Other (Breast biopsy, bilateral carpal tunnel, right shoulder)





Family History


Family History:  CVA





Social History


Social History


Lives alone, no alcohol or tobacco, 





Current Medications


Current Medications





Current Medications


Ondansetron HCl (Zofran) 4 mg PRN Q8HRS  PRN IVP NAUSEA/VOMITING;  Start 8/11/21

at 20:30;  Stop 8/12/21 at 20:29


Morphine Sulfate (Morphine Sulfate) 2 mg PRN Q2HR  PRN IVP PAIN;  Start 8/11/21 

at 20:30;  Stop 8/12/21 at 20:29


Acetaminophen (Tylenol) 650 mg PRN Q4HRS  PRN PO FEVER > 100.3'F;  Start 8/11/21

at 20:30;  Stop 8/12/21 at 20:29


Furosemide (Lasix) 40 mg 1X  ONCE IVP  Last administered on 8/11/21at 23:02;  

Start 8/11/21 at 21:00;  Stop 8/11/21 at 21:01;  Status DC


Ascorbic Acid (Vitamin C) 500 mg DAILY PO  Last administered on 8/12/21at 11:47;

 Start 8/12/21 at 09:00


Aspirin (Ecotrin) 81 mg DAILYWBKFT PO  Last administered on 8/12/21at 11:47;  

Start 8/12/21 at 09:00


Carvedilol (Coreg) 6.25 mg BIDWMEALS PO  Last administered on 8/12/21at 11:47;  

Start 8/12/21 at 09:00


Insulin Glargine (Lantus Syringe) 38 unit HS SQ ;  Start 8/12/21 at 21:00


Insulin Human Lispro (HumaLOG) 13 units TIDWMEALS SQ ;  Start 8/12/21 at 09:00


Simvastatin (Zocor) 40 mg QHS PO ;  Start 8/12/21 at 21:00


Insulin Human Lispro (HumaLOG) 0-7 UNITS TIDWMEALS SQ ;  Start 8/12/21 at 12:00


Dextrose (Dextrose 50%-Water Syringe) 12.5 gm PRN Q15MIN  PRN IV SEE COMMENTS;  

Start 8/12/21 at 08:30


Sennosides (Senna) 17.2 mg PRN BID  PRN PO CONSTIPATION;  Start 8/12/21 at 08:30


Docusate Sodium (Colace) 100 mg PRN DAILY  PRN PO HARD STOOLS;  Start 8/12/21 at

08:30


Ondansetron HCl (Zofran) 4 mg PRN Q6HRS  PRN IVP NAUSEA/VOMITING;  Start 8/12/21

at 08:30


Dextrose (Dextrose 50%-Water Syringe) 12.5 gm PRN Q15MIN  PRN IV SEE COMMENTS;  

Start 8/12/21 at 08:30


Acetaminophen (Tylenol) 650 mg PRN Q4HRS  PRN PO TEMP OVER 100.4F OR MILD PAIN; 

Start 8/12/21 at 08:30


Heparin Sodium (Porcine) (Heparin Sodium) 5,000 unit Q12HR SQ  Last administered

on 8/12/21at 11:50;  Start 8/12/21 at 09:00


Prochlorperazine Edisylate (Compazine) 10 mg PRN Q6HRS  PRN IV NAUSEA/VOMITING, 

2ND CHOICE;  Start 8/12/21 at 08:30


Furosemide (Lasix) 80 mg 1X  ONCE IVP  Last administered on 8/12/21at 11:47;  

Start 8/12/21 at 10:15;  Stop 8/12/21 at 10:18;  Status DC


Furosemide 100 mg/ Sodium Chloride 100 ml @ 5 mls/hr CONT  PRN IV SEE I/O 

RECORD;  Start 8/12/21 at 11:00


Potassium Chloride (Klor-Con) 40 meq 1X  ONCE PO  Last administered on 8/12/21at

11:46;  Start 8/12/21 at 10:15;  Stop 8/12/21 at 10:18;  Status DC





Active Scripts


Active


Thera-M Tablet (Multivits,Ca,Minerals/Iron/Fa) 1 Each Tablet 1 Tab PO DAILY 30 

Days


Vitamin C (Ascorbic Acid) 500 Mg Tablet 500 Mg PO DAILY 30 Days


Culturelle (Lactobacillus Rhamnosus Gg) 1 Each Cap.sprink 1 Cap PO BID 30 Days


Aspirin Ec (Aspirin) 81 Mg Tablet.dr 81 Mg PO DAILYWBKFT 30 Days


Proair Hfa (Albuterol Sulfate) 8.5 Gm Hfa.aer.ad 2.5 Mg NEB PRN Q4HRS PRN 30 

Days


Amox Tr-K Clv 500-125 Mg Tab (Amoxicillin/Potassium Clav) 1 Each Tablet 1 Tab PO

DAILY 3 Days


Reported


Lantus (Insulin Glargine,Hum.rec.anlog) 100 Unit/1 Ml Vial 38 Units SQ HS


Insulin Lispro 100 Unit/1 Ml Vial 13 Units SQ TIDAC


Xalatan (Latanoprost) 2.5 Ml Drops 1 Drop OP HS


Flonase Allergy Relief (Fluticasone Propionate) 9.9 Ml Spray.susp 2 Sprays NS 

DAILY


Novolin N (Nph, Human Insulin Isophane) 100 Unit/1 Ml Vial 40 Unit SQ DAILYWBKFT


Carvedilol ** (Carvedilol) 6.25 Mg Tablet 6.25 Mg PO BIDWMEALS


Simvastatin 40 Mg Tablet 1 Tab PO QHS


Furosemide 20 Mg Tablet 1 Tab PO DAILY





Allergies


Allergies:  


Coded Allergies:  


     shellfish derived (Verified  Adverse Reaction, Intermediate, Nausea, 

8/11/21)





ROS


Review of System


Negative for fever, chills, weight loss, shortness of breath, chest pain, 

indigestion, hematochezia, melena, and dysuria.  Full 14-point review of systems

is negative.





Physical Exam


Physical Examination


General:


Well-developed, well-nourished white female in no acute distress


HEENT:


Normocephalic andatraumatic. Temporal arteriespulsatile and nontender.


Neck:


Supple without bruit, no meningismus


Musculoskeletal:


Stability:see neurologic. Gait exam:see neurologic. Tone:see 

neurologic.Strength:see neurologic.


Neurological:


Mental Status:intact, orientation, memory, attention span/concentration, 

language, fund of knowledge normal. Cranial Nerves:Pupils equal and reactive to

light, extraocular movements areintact, visual fields are full to 

confrontation. Facial sensation is normal. There is no facial asymmetry. 

Vestibulo-ocular reflex is intact. Palate elevates and tongue protrudes in 

midline. All other cranial related problems are negative except as mentioned 

before.Reflexes:0-1+ and symmetric with flexor plantar responses. Motor:4/5 

strength with normal tone and bulk. Coordination:Finger-nose finger and 

heel-to-shin testing are normal. Rapid alternating movements and fine finger 

movements are intact. Gait:Not tested. Sensory:Bilateral leg dressings, still 

has bilateral stocking loss





Vitals


VITALS





Vital Signs








  Date Time  Temp Pulse Resp B/P (MAP) Pulse Ox O2 Delivery O2 Flow Rate FiO2


 


8/12/21 11:47  86  121/72    


 


8/12/21 11:00 97.6  18  97 Nasal Cannula 3.0 





 97.6       











Labs


Labs





Laboratory Tests








Test


 8/11/21


16:45 8/11/21


16:53 8/11/21


17:03 8/12/21


07:40


 


White Blood Count


 7.5 x10^3/uL


(4.0-11.0) 


 


 5.7 x10^3/uL


(4.0-11.0)


 


Red Blood Count


 3.93 x10^6/uL


(3.50-5.40) 


 


 3.65 x10^6/uL


(3.50-5.40)


 


Hemoglobin


 11.6 g/dL


(12.0-15.5) 


 


 10.9 g/dL


(12.0-15.5)


 


Hematocrit


 36.7 %


(36.0-47.0) 


 


 33.6 %


(36.0-47.0)


 


Mean Corpuscular Volume 94 fL ()    92 fL () 


 


Mean Corpuscular Hemoglobin 30 pg (25-35)    30 pg (25-35) 


 


Mean Corpuscular Hemoglobin


Concent 32 g/dL


(31-37) 


 


 32 g/dL


(31-37)


 


Red Cell Distribution Width


 14.8 %


(11.5-14.5) 


 


 14.6 %


(11.5-14.5)


 


Platelet Count


 252 x10^3/uL


(140-400) 


 


 252 x10^3/uL


(140-400)


 


Neutrophils (%) (Auto) 81 % (31-73)    77 % (31-73) 


 


Lymphocytes (%) (Auto) 9 % (24-48)    12 % (24-48) 


 


Monocytes (%) (Auto) 8 % (0-9)    9 % (0-9) 


 


Eosinophils (%) (Auto) 2 % (0-3)    2 % (0-3) 


 


Basophils (%) (Auto) 1 % (0-3)    1 % (0-3) 


 


Neutrophils # (Auto)


 6.1 x10^3/uL


(1.8-7.7) 


 


 4.4 x10^3/uL


(1.8-7.7)


 


Lymphocytes # (Auto)


 0.7 x10^3/uL


(1.0-4.8) 


 


 0.7 x10^3/uL


(1.0-4.8)


 


Monocytes # (Auto)


 0.6 x10^3/uL


(0.0-1.1) 


 


 0.5 x10^3/uL


(0.0-1.1)


 


Eosinophils # (Auto)


 0.1 x10^3/uL


(0.0-0.7) 


 


 0.1 x10^3/uL


(0.0-0.7)


 


Basophils # (Auto)


 0.1 x10^3/uL


(0.0-0.2) 


 


 0.0 x10^3/uL


(0.0-0.2)


 


Urine Collection Type  U cath   


 


Urine Color  Yellow   


 


Urine Clarity  Clear   


 


Urine pH  6.0 (<5.0-8.0)   


 


Urine Specific Gravity


 


 1.020


(1.000-1.030) 


 





 


Urine Protein


 


 >=300 mg/dL


(NEG-TRACE) 


 





 


Urine Glucose (UA)


 


 >=1000 mg/dL


(NEG) 


 





 


Urine Ketones (Stick)


 


 Negative mg/dL


(NEG) 


 





 


Urine Blood  Small (NEG)   


 


Urine Nitrite  Negative (NEG)   


 


Urine Bilirubin  Negative (NEG)   


 


Urine Urobilinogen Dipstick


 


 0.2 mg/dL (0.2


mg/dL) 


 





 


Urine Leukocyte Esterase  Negative (NEG)   


 


Urine RBC


 


 Rare /HPF


(0-2) 


 





 


Urine WBC  0 /HPF (0-4)   


 


Urine Squamous Epithelial


Cells 


 Many /LPF 


 


 





 


Urine Bacteria  0 /HPF (0-FEW)   


 


Sodium Level


 


 


 141 mmol/L


(136-145) 144 mmol/L


(136-145)


 


Potassium Level


 


 


 3.6 mmol/L


(3.5-5.1) 3.5 mmol/L


(3.5-5.1)


 


Chloride Level


 


 


 101 mmol/L


() 105 mmol/L


()


 


Carbon Dioxide Level


 


 


 35 mmol/L


(21-32) 38 mmol/L


(21-32)


 


Anion Gap   5 (6-14)  1 (6-14) 


 


Blood Urea Nitrogen


 


 


 36 mg/dL


(7-20) 33 mg/dL


(7-20)


 


Creatinine


 


 


 1.8 mg/dL


(0.6-1.0) 1.5 mg/dL


(0.6-1.0)


 


Estimated GFR


(Cockcroft-Gault) 


 


 27.2 


 33.6 





 


BUN/Creatinine Ratio   20 (6-20)  22 (6-20) 


 


Glucose Level


 


 


 321 mg/dL


(70-99) 183 mg/dL


(70-99)


 


Calcium Level


 


 


 8.2 mg/dL


(8.5-10.1) 8.2 mg/dL


(8.5-10.1)


 


Total Bilirubin


 


 


 0.2 mg/dL


(0.2-1.0) 0.4 mg/dL


(0.2-1.0)


 


Aspartate Amino Transf


(AST/SGOT) 


 


 21 U/L (15-37) 


 18 U/L (15-37) 





 


Alanine Aminotransferase


(ALT/SGPT) 


 


 26 U/L (14-59) 


 21 U/L (14-59) 





 


Alkaline Phosphatase


 


 


 155 U/L


() 106 U/L


()


 


NT-Pro-B-Type Natriuretic


Peptide 


 


 4639 pg/mL


(0-449) 





 


Total Protein


 


 


 6.8 g/dL


(6.4-8.2) 6.1 g/dL


(6.4-8.2)


 


Albumin


 


 


 2.2 g/dL


(3.4-5.0) 2.0 g/dL


(3.4-5.0)


 


Albumin/Globulin Ratio   0.5 (1.0-1.7)  0.5 (1.0-1.7) 


 


Test


 8/12/21


08:07 


 


 





 


Glucose (Fingerstick)


 181 mg/dL


(70-99) 


 


 











Laboratory Tests








Test


 8/11/21


16:45 8/11/21


16:53 8/11/21


17:03 8/12/21


07:40


 


White Blood Count


 7.5 x10^3/uL


(4.0-11.0) 


 


 5.7 x10^3/uL


(4.0-11.0)


 


Red Blood Count


 3.93 x10^6/uL


(3.50-5.40) 


 


 3.65 x10^6/uL


(3.50-5.40)


 


Hemoglobin


 11.6 g/dL


(12.0-15.5) 


 


 10.9 g/dL


(12.0-15.5)


 


Hematocrit


 36.7 %


(36.0-47.0) 


 


 33.6 %


(36.0-47.0)


 


Mean Corpuscular Volume 94 fL ()    92 fL () 


 


Mean Corpuscular Hemoglobin 30 pg (25-35)    30 pg (25-35) 


 


Mean Corpuscular Hemoglobin


Concent 32 g/dL


(31-37) 


 


 32 g/dL


(31-37)


 


Red Cell Distribution Width


 14.8 %


(11.5-14.5) 


 


 14.6 %


(11.5-14.5)


 


Platelet Count


 252 x10^3/uL


(140-400) 


 


 252 x10^3/uL


(140-400)


 


Neutrophils (%) (Auto) 81 % (31-73)    77 % (31-73) 


 


Lymphocytes (%) (Auto) 9 % (24-48)    12 % (24-48) 


 


Monocytes (%) (Auto) 8 % (0-9)    9 % (0-9) 


 


Eosinophils (%) (Auto) 2 % (0-3)    2 % (0-3) 


 


Basophils (%) (Auto) 1 % (0-3)    1 % (0-3) 


 


Neutrophils # (Auto)


 6.1 x10^3/uL


(1.8-7.7) 


 


 4.4 x10^3/uL


(1.8-7.7)


 


Lymphocytes # (Auto)


 0.7 x10^3/uL


(1.0-4.8) 


 


 0.7 x10^3/uL


(1.0-4.8)


 


Monocytes # (Auto)


 0.6 x10^3/uL


(0.0-1.1) 


 


 0.5 x10^3/uL


(0.0-1.1)


 


Eosinophils # (Auto)


 0.1 x10^3/uL


(0.0-0.7) 


 


 0.1 x10^3/uL


(0.0-0.7)


 


Basophils # (Auto)


 0.1 x10^3/uL


(0.0-0.2) 


 


 0.0 x10^3/uL


(0.0-0.2)


 


Urine Collection Type  U cath   


 


Urine Color  Yellow   


 


Urine Clarity  Clear   


 


Urine pH  6.0 (<5.0-8.0)   


 


Urine Specific Gravity


 


 1.020


(1.000-1.030) 


 





 


Urine Protein


 


 >=300 mg/dL


(NEG-TRACE) 


 





 


Urine Glucose (UA)


 


 >=1000 mg/dL


(NEG) 


 





 


Urine Ketones (Stick)


 


 Negative mg/dL


(NEG) 


 





 


Urine Blood  Small (NEG)   


 


Urine Nitrite  Negative (NEG)   


 


Urine Bilirubin  Negative (NEG)   


 


Urine Urobilinogen Dipstick


 


 0.2 mg/dL (0.2


mg/dL) 


 





 


Urine Leukocyte Esterase  Negative (NEG)   


 


Urine RBC


 


 Rare /HPF


(0-2) 


 





 


Urine WBC  0 /HPF (0-4)   


 


Urine Squamous Epithelial


Cells 


 Many /LPF 


 


 





 


Urine Bacteria  0 /HPF (0-FEW)   


 


Sodium Level


 


 


 141 mmol/L


(136-145) 144 mmol/L


(136-145)


 


Potassium Level


 


 


 3.6 mmol/L


(3.5-5.1) 3.5 mmol/L


(3.5-5.1)


 


Chloride Level


 


 


 101 mmol/L


() 105 mmol/L


()


 


Carbon Dioxide Level


 


 


 35 mmol/L


(21-32) 38 mmol/L


(21-32)


 


Anion Gap   5 (6-14)  1 (6-14) 


 


Blood Urea Nitrogen


 


 


 36 mg/dL


(7-20) 33 mg/dL


(7-20)


 


Creatinine


 


 


 1.8 mg/dL


(0.6-1.0) 1.5 mg/dL


(0.6-1.0)


 


Estimated GFR


(Cockcroft-Gault) 


 


 27.2 


 33.6 





 


BUN/Creatinine Ratio   20 (6-20)  22 (6-20) 


 


Glucose Level


 


 


 321 mg/dL


(70-99) 183 mg/dL


(70-99)


 


Calcium Level


 


 


 8.2 mg/dL


(8.5-10.1) 8.2 mg/dL


(8.5-10.1)


 


Total Bilirubin


 


 


 0.2 mg/dL


(0.2-1.0) 0.4 mg/dL


(0.2-1.0)


 


Aspartate Amino Transf


(AST/SGOT) 


 


 21 U/L (15-37) 


 18 U/L (15-37) 





 


Alanine Aminotransferase


(ALT/SGPT) 


 


 26 U/L (14-59) 


 21 U/L (14-59) 





 


Alkaline Phosphatase


 


 


 155 U/L


() 106 U/L


()


 


NT-Pro-B-Type Natriuretic


Peptide 


 


 4639 pg/mL


(0-449) 





 


Total Protein


 


 


 6.8 g/dL


(6.4-8.2) 6.1 g/dL


(6.4-8.2)


 


Albumin


 


 


 2.2 g/dL


(3.4-5.0) 2.0 g/dL


(3.4-5.0)


 


Albumin/Globulin Ratio   0.5 (1.0-1.7)  0.5 (1.0-1.7) 


 


Test


 8/12/21


08:07 


 


 





 


Glucose (Fingerstick)


 181 mg/dL


(70-99) 


 


 














Images


Images


CT HEAD AND C-SPINE WO





History: Fall, pain.





Comparison: None.





Technique: Noncontrast CT of the head and cervical spine.





Findings:





CT HEAD:


There is no evidence for intracranial mass or hemorrhage.


There is no hydrocephalus or midline shift.


No abnormal extra-axial fluid collections are present.


No evidence of large territorial infarct. Hypodensity in the left thalamus may 

represent sequela of lacunar infarct.


Postsurgical changes of the right lens.


The visualized paranasal sinuses and mastoid air cells are clear.


The skull and scalp are within normal limits.





CT CERVICAL SPINE:


There is no evidence for fracture in the cervical spine.


Severe degenerative changes of the cervical spine with straightening of the 

normal cervical lordosis. Multilevel advanced disc space narrowing and 

uncovertebral hypertrophy. Multilevel neural foraminal stenosis. Prominent post

erior osteophytes at C5-C6 superimposed on congenitally slender canal severely 

narrow the anterior posterior canal diameter to approximately 5 mm.


No destructive osseous lesions are seen.


Mild emphysematous changes in the lung apices. Bilateral carotid bulb 

calcifications.





Impression: 


1.  No acute intracranial findings. Left thalamic/basal ganglia hypodensity 

likely sequela of lacunar infarct.


2.  No acute fracture in the cervical spine.


3.  Advanced multilevel degenerative changes and congenitally slender canal. 

Severe spinal canal stenosis at C5-C6 with AP diameter of approximately 5 mm.





Assessment/Plan


Assessment/Plan


Impression:


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 

evaluation or treatment.  Specifically the patient denies any history of stroke 

or transient ischemic attack symptoms


Mechanical fall


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





Recommendations:


Discussed further work-up with the patient such as echocardiogram and carotid 

Doppler, I do not think she needs this


She is on aspirin and statin


Rehabilitation modalities


Neurology signs off.





Thank you for letting me help with the patient's care.











MILAGROS MURRY MD           Aug 12, 2021 13:09

## 2021-08-12 NOTE — NUR
SW following. Discussed with RN, pt from home, 3L (SW to determine if pt uses oxygen at 
home), renal diet, rapid COVID-19 negative. Neurology, Nephrology and Wound care following. 
PT/OT ordered. SW will continue to follow.

## 2021-08-13 VITALS — DIASTOLIC BLOOD PRESSURE: 63 MMHG | SYSTOLIC BLOOD PRESSURE: 131 MMHG

## 2021-08-13 VITALS — SYSTOLIC BLOOD PRESSURE: 139 MMHG | DIASTOLIC BLOOD PRESSURE: 64 MMHG

## 2021-08-13 VITALS — DIASTOLIC BLOOD PRESSURE: 60 MMHG | SYSTOLIC BLOOD PRESSURE: 134 MMHG

## 2021-08-13 VITALS — SYSTOLIC BLOOD PRESSURE: 148 MMHG | DIASTOLIC BLOOD PRESSURE: 68 MMHG

## 2021-08-13 VITALS — SYSTOLIC BLOOD PRESSURE: 114 MMHG | DIASTOLIC BLOOD PRESSURE: 46 MMHG

## 2021-08-13 VITALS — DIASTOLIC BLOOD PRESSURE: 70 MMHG | SYSTOLIC BLOOD PRESSURE: 149 MMHG

## 2021-08-13 LAB
ANION GAP SERPL CALC-SCNC: 0 MMOL/L (ref 6–14)
BASOPHILS # BLD AUTO: 0 X10^3/UL (ref 0–0.2)
BASOPHILS NFR BLD: 1 % (ref 0–3)
BUN SERPL-MCNC: 34 MG/DL (ref 7–20)
CALCIUM SERPL-MCNC: 8.8 MG/DL (ref 8.5–10.1)
CHLORIDE SERPL-SCNC: 106 MMOL/L (ref 98–107)
CO2 SERPL-SCNC: 36 MMOL/L (ref 21–32)
CREAT SERPL-MCNC: 1.5 MG/DL (ref 0.6–1)
EOSINOPHIL NFR BLD: 0.1 X10^3/UL (ref 0–0.7)
EOSINOPHIL NFR BLD: 2 % (ref 0–3)
ERYTHROCYTE [DISTWIDTH] IN BLOOD BY AUTOMATED COUNT: 14.8 % (ref 11.5–14.5)
GFR SERPLBLD BASED ON 1.73 SQ M-ARVRAT: 33.6 ML/MIN
GLUCOSE SERPL-MCNC: 175 MG/DL (ref 70–99)
HCT VFR BLD CALC: 32.2 % (ref 36–47)
HGB BLD-MCNC: 10.2 G/DL (ref 12–15.5)
LYMPHOCYTES # BLD: 0.7 X10^3/UL (ref 1–4.8)
LYMPHOCYTES NFR BLD AUTO: 14 % (ref 24–48)
MAGNESIUM SERPL-MCNC: 1.8 MG/DL (ref 1.8–2.4)
MCH RBC QN AUTO: 30 PG (ref 25–35)
MCHC RBC AUTO-ENTMCNC: 32 G/DL (ref 31–37)
MCV RBC AUTO: 93 FL (ref 79–100)
MONO #: 0.5 X10^3/UL (ref 0–1.1)
MONOCYTES NFR BLD: 10 % (ref 0–9)
NEUT #: 3.9 X10^3/UL (ref 1.8–7.7)
NEUTROPHILS NFR BLD AUTO: 73 % (ref 31–73)
PHOSPHATE SERPL-MCNC: 3.2 MG/DL (ref 2.6–4.7)
PLATELET # BLD AUTO: 228 X10^3/UL (ref 140–400)
POTASSIUM SERPL-SCNC: 4 MMOL/L (ref 3.5–5.1)
RBC # BLD AUTO: 3.46 X10^6/UL (ref 3.5–5.4)
SODIUM SERPL-SCNC: 142 MMOL/L (ref 136–145)
WBC # BLD AUTO: 5.3 X10^3/UL (ref 4–11)

## 2021-08-13 RX ADMIN — INSULIN LISPRO SCH UNITS: 100 INJECTION, SOLUTION INTRAVENOUS; SUBCUTANEOUS at 08:00

## 2021-08-13 RX ADMIN — INSULIN LISPRO SCH UNITS: 100 INJECTION, SOLUTION INTRAVENOUS; SUBCUTANEOUS at 17:00

## 2021-08-13 RX ADMIN — INSULIN LISPRO SCH UNITS: 100 INJECTION, SOLUTION INTRAVENOUS; SUBCUTANEOUS at 18:35

## 2021-08-13 RX ADMIN — CARVEDILOL SCH MG: 6.25 TABLET, FILM COATED ORAL at 09:19

## 2021-08-13 RX ADMIN — OXYCODONE HYDROCHLORIDE AND ACETAMINOPHEN SCH MG: 500 TABLET ORAL at 09:18

## 2021-08-13 RX ADMIN — INSULIN LISPRO SCH UNITS: 100 INJECTION, SOLUTION INTRAVENOUS; SUBCUTANEOUS at 12:49

## 2021-08-13 RX ADMIN — INSULIN LISPRO SCH UNITS: 100 INJECTION, SOLUTION INTRAVENOUS; SUBCUTANEOUS at 12:48

## 2021-08-13 RX ADMIN — SIMVASTATIN SCH MG: 40 TABLET, FILM COATED ORAL at 20:47

## 2021-08-13 RX ADMIN — DILTIAZEM HYDROCHLORIDE PRN MLS/HR: 5 INJECTION INTRAVENOUS at 22:17

## 2021-08-13 RX ADMIN — HEPARIN SODIUM SCH UNIT: 5000 INJECTION, SOLUTION INTRAVENOUS; SUBCUTANEOUS at 20:56

## 2021-08-13 RX ADMIN — ACETAMINOPHEN PRN MG: 325 TABLET, FILM COATED ORAL at 12:47

## 2021-08-13 RX ADMIN — INSULIN GLARGINE SCH UNIT: 100 INJECTION, SOLUTION SUBCUTANEOUS at 20:56

## 2021-08-13 RX ADMIN — CARVEDILOL SCH MG: 6.25 TABLET, FILM COATED ORAL at 18:34

## 2021-08-13 RX ADMIN — HEPARIN SODIUM SCH UNIT: 5000 INJECTION, SOLUTION INTRAVENOUS; SUBCUTANEOUS at 09:20

## 2021-08-13 RX ADMIN — Medication SCH MG: at 12:46

## 2021-08-13 RX ADMIN — INSULIN LISPRO SCH UNITS: 100 INJECTION, SOLUTION INTRAVENOUS; SUBCUTANEOUS at 09:24

## 2021-08-13 RX ADMIN — INSULIN LISPRO SCH UNITS: 100 INJECTION, SOLUTION INTRAVENOUS; SUBCUTANEOUS at 09:25

## 2021-08-13 RX ADMIN — ASPIRIN SCH MG: 81 TABLET, COATED ORAL at 09:18

## 2021-08-13 RX ADMIN — POTASSIUM CHLORIDE SCH MEQ: 1500 TABLET, EXTENDED RELEASE ORAL at 12:47

## 2021-08-13 NOTE — RAD
EXAM: CAROTID DOPPLER SONOGRAM.



HISTORY: Left thalamic stroke, fall.



TECHNIQUE: Gray scale and color Doppler sonographic evaluation of the neck with spectral waveform daniel
lysis was performed and static images are submitted for review. 



FINDINGS: 



RIGHT: The peak systolic velocity within the common carotid artery is 218 cm/sec. The peak systolic v
elocity within the internal carotid artery is 249 cm/sec and the end diastolic velocity within the in
ternal carotid artery is 29 cm/sec. The ICA/CCA ratio is 1.3. Grayscale images demonstrate no graysca
le stenosis.



LEFT: The peak systolic velocity within the common carotid artery is 161 cm/sec. The peak systolic ve
locity within the internal carotid artery is 160 cm/sec and the end diastolic velocity within the int
ernal carotid artery is 37 cm/sec. The ICA/CCA ratio is 1.0. Grayscale images demonstrate no grayscal
e stenosis.



There is antegrade flow within both vertebral arteries.



IMPRESSION: 

1. >70% stenosis within the right proximal internal carotid artery. Lesser stenosis more distally on 
the right.

2. 50-69% stenosis within the left proximal and mid internal carotid artery.

3. CTA or MRA could further quantify stenosis if there is persistent concern.





PQRS Compliance Statement - Stenosis calculations for CT, MR and conventional angiography are based u
diana measurement of the distal ICA diameter in accordance with the NASCET methodology.  Stenosis calcu
lations for carotid ultrasound studies are derived from validated velocity criteria which are known t
o correlate with the NASCET methodology.



Electronically signed by: AXEL Garcia MD (8/13/2021 11:18 AM) [id number]

## 2021-08-13 NOTE — NUR
Report called to SY Goetz and patient transferred to room 675 for lasix gtt. All 
belongings sent with pt.

## 2021-08-13 NOTE — PDOC
[facility] [facility] PROGRESS NOTE


Date of Service


DOS:


DATE: 8/13/21 


TIME: 11:04





Chief Complaint


Chief Complaint


Mechanical fall.


Hypertensive urgency.


Cervical spinal stenosis.


Acute volume overload.


Acute on chronic kidney failure, due to possible vasomotor nephropathy.


Left thalamus hypodensity, incidental finding, no further evaluation or 

treatment.


Anemia of chronic kidney disease.


Deconditioning.


Peripheral neuropathy.


Osteoarthritis.


COPD.


DM type 2.


HTN.


Morbid obesity.


Severe protein malnutrition.





History of Present Illness


History of Present Illness


Patient is a 76-year-old female with past medical history of diabetes mellitus 

type 2, hypertension, COPD on home O2, CKD was on dialysis a year ago but taken 

off, morbid obesity, who presents today after falling.  Patient states that she 

was trying to take a shower but she tripped and fell.  She also reports that she

has been getting weaker in the past couple weeks and she has gained about 10 

pounds and feels like she is getting more edematous.  Denies any head trauma or 

loss of consciousness.  However she does complain of mild posterior neck pain 

and also she complains of swelling to the bilateral lower extremities.  Denies 

fevers, shortness of breath, chest pain, abdominal pain, diarrhea or dysuria.  

Patient was given dose of IV Lasix as she does report urinary output but she is 

unsure how much she is putting out because there is a pure wick attached to her.





8/13/21


Patient seen and examined at bedside.


D/W RN.


Chart reviewed.


SpO2 94% on 3 lpm via NC.


U/S found L renal simple cyst.


Patient denies stroke symptoms.


Per neurology, hypodensity was found in L thalamus. No further evaluation or 

treatment necessary.


Patient prefers to have carotid doppler study done.





Vitals/I&O


Vitals/I&O:





                                   Vital Signs








  Date Time  Temp Pulse Resp B/P (MAP) Pulse Ox O2 Delivery O2 Flow Rate FiO2


 


8/13/21 09:19  69  114/46    


 


8/13/21 07:00 98.6  20  94 Nasal Cannula 3.0 





 98.6       














                                    I & O   


 


 8/12/21 8/12/21 8/13/21





 15:00 23:00 07:00


 


Intake Total 610 ml 600 ml 


 


Balance 610 ml 600 ml 











Physical Exam


General:  Alert, Oriented X3, Cooperative, mild distress


Heart:  Regular rate, Normal S1, Normal S2


Lungs:  Clear


Abdomen:  Normal bowel sounds, Soft, No tenderness


Extremities:  No clubbing, No cyanosis, Other (3+ EDEMA)





Labs


Labs:





Laboratory Tests








Test


 8/12/21


17:03 8/12/21


20:46 8/13/21


04:30 8/13/21


07:41


 


Glucose (Fingerstick)


 193 mg/dL


(70-99) 248 mg/dL


(70-99) 


 93 mg/dL


(70-99)


 


Sodium Level


 


 


 142 mmol/L


(136-145) 





 


Potassium Level


 


 


 4.0 mmol/L


(3.5-5.1) 





 


Chloride Level


 


 


 106 mmol/L


() 





 


Carbon Dioxide Level


 


 


 36 mmol/L


(21-32) 





 


Anion Gap   0 (6-14)  


 


Blood Urea Nitrogen


 


 


 34 mg/dL


(7-20) 





 


Creatinine


 


 


 1.5 mg/dL


(0.6-1.0) 





 


Estimated GFR


(Cockcroft-Gault) 


 


 33.6 


 





 


Glucose Level


 


 


 175 mg/dL


(70-99) 





 


Calcium Level


 


 


 8.8 mg/dL


(8.5-10.1) 





 


Phosphorus Level


 


 


 3.2 mg/dL


(2.6-4.7) 





 


Magnesium Level


 


 


 1.8 mg/dL


(1.8-2.4) 





 


Iron Level


 


 


 29 ug/dL


() 





 


Total Iron Binding Capacity


 


 


 233 ug/dL


(250-450) 





 


Iron Saturation   12 % (15-34)  


 


Test


 8/13/21


09:20 


 


 





 


White Blood Count


 5.3 x10^3/uL


(4.0-11.0) 


 


 





 


Red Blood Count


 3.46 x10^6/uL


(3.50-5.40) 


 


 





 


Hemoglobin


 10.2 g/dL


(12.0-15.5) 


 


 





 


Hematocrit


 32.2 %


(36.0-47.0) 


 


 





 


Mean Corpuscular Volume 93 fL ()    


 


Mean Corpuscular Hemoglobin 30 pg (25-35)    


 


Mean Corpuscular Hemoglobin


Concent 32 g/dL


(31-37) 


 


 





 


Red Cell Distribution Width


 14.8 %


(11.5-14.5) 


 


 





 


Platelet Count


 228 x10^3/uL


(140-400) 


 


 





 


Neutrophils (%) (Auto) 73 % (31-73)    


 


Lymphocytes (%) (Auto) 14 % (24-48)    


 


Monocytes (%) (Auto) 10 % (0-9)    


 


Eosinophils (%) (Auto) 2 % (0-3)    


 


Basophils (%) (Auto) 1 % (0-3)    


 


Neutrophils # (Auto)


 3.9 x10^3/uL


(1.8-7.7) 


 


 





 


Lymphocytes # (Auto)


 0.7 x10^3/uL


(1.0-4.8) 


 


 





 


Monocytes # (Auto)


 0.5 x10^3/uL


(0.0-1.1) 


 


 





 


Eosinophils # (Auto)


 0.1 x10^3/uL


(0.0-0.7) 


 


 





 


Basophils # (Auto)


 0.0 x10^3/uL


(0.0-0.2) 


 


 














Review of Systems


Review of Systems:


Denies focal neurologic deficits or confusion.


Denies hunger.





Assessment and Plan


Assessmemt and Plan


Problems


Medical Problems:


(1) Acute cervical sprain


Status: Acute  





(2) Acute on chronic renal failure


Status: Acute  





Assessment:


Mechanical fall.


Hypertensive urgency.


Cervical spinal stenosis.


Acute volume overload.


Acute on chronic kidney failure, due to possible vasomotor nephropathy.


Left thalamus hypodensity, incidental finding, no further evaluation or 

treatment.


Anemia of chronic kidney disease.


Deconditioning.


Peripheral neuropathy.


Osteoarthritis.


COPD.


DM type 2.


HTN.


Morbid obesity.


Severe protein malnutrition.





Plan:


Cardiac monitoring.


Wound care.


Trend labs.


Appreciate subspecialist input.


Lasix.


Awaiting carotid doppler.


Avoid nephrotoxic agents.


Control BP.


Control BG.


Iron supplements.


Home meds.


Aspirin.


Atorvastatin.


PT/OT.


Heparin for DVT prophylaxis.


Protonix GI prophylaxis.


ADA diet.


Full code.











Comment


Review of Relevant


I have reviewed the following items rizwan (where applicable) has been applied.


Medications:





Current Medications








 Medications


  (Trade)  Dose


 Ordered  Sig/Eva


 Route


 PRN Reason  Start Time


 Stop Time Status Last Admin


Dose Admin


 


 Insulin Glargine


  (Lantus Syringe)  38 unit  [facility]


 SQ


   8/12/21 21:00


    8/12/21 22:55





 


 Simvastatin


  (Zocor)  40 mg  [facility]


 PO


   8/12/21 21:00


    8/12/21 22:54





 


 Insulin Human


 Lispro


  (HumaLOG)  0-7 UNITS  TIDWMEALS


 SQ


   8/12/21 12:00


    8/12/21 17:00














Justifications for Admission


Other Justification


Fall with acute renal failure











ELMO STEARNS III DO           Aug 13, 2021 11:20

## 2021-08-13 NOTE — PDOC
Renal-Progress Notes


Subjective Notes


Notes


NO NEW COMPLAINTS





History of Present Illness


Hx of present illness


STABLE





Vitals


Vitals





Vital Signs








  Date Time  Temp Pulse Resp B/P (MAP) Pulse Ox O2 Delivery O2 Flow Rate FiO2


 


8/13/21 09:19  69  114/46    


 


8/13/21 07:00 98.6  20  94 Nasal Cannula 3.0 





 98.6       








Weight


Weight [ ]





I.O.


Intake and Output











Intake and Output 


 


 8/13/21





 07:00


 


Intake Total 1210 ml


 


Balance 1210 ml


 


 


 


Intake Oral 1210 ml


 


# Voids 2


 


# Bowel Movements 2











Labs


Labs





Laboratory Tests








Test


 8/12/21


17:03 8/12/21


20:46 8/13/21


04:30 8/13/21


07:41


 


Glucose (Fingerstick)


 193 mg/dL


(70-99) 248 mg/dL


(70-99) 


 93 mg/dL


(70-99)


 


Sodium Level


 


 


 142 mmol/L


(136-145) 





 


Potassium Level


 


 


 4.0 mmol/L


(3.5-5.1) 





 


Chloride Level


 


 


 106 mmol/L


() 





 


Carbon Dioxide Level


 


 


 36 mmol/L


(21-32) 





 


Anion Gap   0 (6-14)  


 


Blood Urea Nitrogen


 


 


 34 mg/dL


(7-20) 





 


Creatinine


 


 


 1.5 mg/dL


(0.6-1.0) 





 


Estimated GFR


(Cockcroft-Gault) 


 


 33.6 


 





 


Glucose Level


 


 


 175 mg/dL


(70-99) 





 


Calcium Level


 


 


 8.8 mg/dL


(8.5-10.1) 





 


Phosphorus Level


 


 


 3.2 mg/dL


(2.6-4.7) 





 


Magnesium Level


 


 


 1.8 mg/dL


(1.8-2.4) 





 


Iron Level


 


 


 29 ug/dL


() 





 


Total Iron Binding Capacity


 


 


 233 ug/dL


(250-450) 





 


Iron Saturation   12 % (15-34)  


 


Test


 8/13/21


09:20 


 


 





 


White Blood Count


 5.3 x10^3/uL


(4.0-11.0) 


 


 





 


Red Blood Count


 3.46 x10^6/uL


(3.50-5.40) 


 


 





 


Hemoglobin


 10.2 g/dL


(12.0-15.5) 


 


 





 


Hematocrit


 32.2 %


(36.0-47.0) 


 


 





 


Mean Corpuscular Volume 93 fL ()    


 


Mean Corpuscular Hemoglobin 30 pg (25-35)    


 


Mean Corpuscular Hemoglobin


Concent 32 g/dL


(31-37) 


 


 





 


Red Cell Distribution Width


 14.8 %


(11.5-14.5) 


 


 





 


Platelet Count


 228 x10^3/uL


(140-400) 


 


 





 


Neutrophils (%) (Auto) 73 % (31-73)    


 


Lymphocytes (%) (Auto) 14 % (24-48)    


 


Monocytes (%) (Auto) 10 % (0-9)    


 


Eosinophils (%) (Auto) 2 % (0-3)    


 


Basophils (%) (Auto) 1 % (0-3)    


 


Neutrophils # (Auto)


 3.9 x10^3/uL


(1.8-7.7) 


 


 





 


Lymphocytes # (Auto)


 0.7 x10^3/uL


(1.0-4.8) 


 


 





 


Monocytes # (Auto)


 0.5 x10^3/uL


(0.0-1.1) 


 


 





 


Eosinophils # (Auto)


 0.1 x10^3/uL


(0.0-0.7) 


 


 





 


Basophils # (Auto)


 0.0 x10^3/uL


(0.0-0.2) 


 


 














Review of Systems


Constitutional:  yes: weakness, alert, oriented


Ears/Nose/Throat:  Yes: no symptom reported


Eyes:  Yes: no symptom reported


Pulmonary:  Yes dyspnea


Cardiovascular:  Yes edema


Gastrointestional:  Yes: constipation


Genitourinary:  Yes: no symptom reported


Musculoskeletal:  Yes: muscle stiffness


Skin:  Yes no symptom reported


Psychiatric/Neurological:  Yes: depressed


Endocrine:  Yes: no symptom reported


Hematologic/Lymphatic:  Yes: no symptom reported





Physical Exam


General Appearance:  no apparent distress, obese, febrile


Skin:  warm, edema


Respiratory:  decreased breath sounds


Heart:  S1S2


Abdomen:  soft, bowel sounds present


Genitourinary:  bladder flat, dunaway catheter


Extremities:  pulses present, edema


Neurology:  alert, oriented, Ext weakness


Musculoskeletal:  Osteoarthritis





Assessment


Assessment


IMP





FALL


ANASARCA


EDEMA


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


MORBID OBESITY


UNCONTROLLED HTN


VOLUME OVERLOAD


CHF - SUSPECT ACUTE ON CHRONIC SYSTOLIC AND DIASTOLIC


HX OF COPD


DM II


PROTEINURIA


DECONDITIONING


ANEMIA OF CKD








PLAN





CONT DIURESIS


LASIX GTT


CONTROL BP


CONTROL BG


HOPEFULLY BP IMPROVES WITH DIURESIS


MAY END UP NEEDING DIALYSIS AGAIN IF UNABLE TO DIURESE


FLUID RESTRICT


AVOID NEPHROTOXINS


CONSIDER ECHOCARDIOGRAM


IRON SUPPLEMENTS


ENC COMPLIANCE


PUI FOR MARCO ANTONIO ALICIA MD                 Aug 13, 2021 10:15

## 2021-08-13 NOTE — NUR
SW following. Discussed with RN, pt from home, 3L, renal diet. Therapy recommending SNF - SW 
met with pt, pt agreeable as long as she doesn't have to quarantine for 14 days. Pt reported 
she has had the COVID vaccines - SW advised she does not need to quarantine because she has 
had the vaccines. Pt agreeable to referral, would like it faxed to [facility]. SW 
phoned and faxed referral. Pt accepted, pending insurance auth - anticipate auth on Monday 
(8/16/21). RN notified. COVID-19 negative. SW will continue to follow.

## 2021-08-13 NOTE — PDOC
PROGRESS NOTES


Date of Service


DATE: 8/13/21 


TIME: 10:16


Assessment


Problems


Medical Problems:


(1) Acute cervical sprain


Status: Acute  





(2) Acute on chronic renal failure


Status: Acute  





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 

evaluation or treatment.  Specifically the patient denies any history of stroke 

or transient ischemic attack symptoms


Mechanical fall


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


Plan


Discussed further work-up with the patient such as echocardiogram and carotid 

Doppler, patient would now like to get the carotid Doppler checked.  I am not 

can add check an echocardiogram because I do not think she is a candidate for 

anticoagulation given her gait disorder, certainly cardiology can order one of 

the deem it necessary


She is on aspirin and statin


PT/OT


Subjective


Strongly wants to go home rather than to any rehab


Objective





Vital Signs








  Date Time  Temp Pulse Resp B/P (MAP) Pulse Ox O2 Delivery O2 Flow Rate FiO2


 


8/13/21 09:19  69  114/46    


 


8/13/21 07:00 98.6  20  94 Nasal Cannula 3.0 





 98.6       














Intake and Output 


 


 8/13/21





 07:00


 


Intake Total 1210 ml


 


Balance 1210 ml


 


 


 


Intake Oral 1210 ml


 


# Voids 2


 


# Bowel Movements 2








PHYSICAL EXAM


Physical Exam:


Alert. Oriented to time, place and person.


PERRL.


EOMI.


CN: no focal findings.


Muscle tone: normal.


Muscle strength: 4/5


DTR: 0-1+


Plantar reflex: flexor


Gait: not examined in bed.


Sensory exam: bilateral stocking loss.


No cerebellar signs elicited.


Review of Relevant


I have reviewed the following items rizwan (where applicable) has been applied.


Labs





Laboratory Tests








Test


 8/11/21


13:25 8/11/21


16:45 8/11/21


16:53 8/11/21


17:03


 


SARS-CoV-2 RNA (JANINE)


 Negative


(Negative) 


 


 





 


SARS-CoV-2 Antigen (Rapid)


 Negative


(NEGATIVE) 


 


 





 


White Blood Count


 


 7.5 x10^3/uL


(4.0-11.0) 


 





 


Red Blood Count


 


 3.93 x10^6/uL


(3.50-5.40) 


 





 


Hemoglobin


 


 11.6 g/dL


(12.0-15.5) 


 





 


Hematocrit


 


 36.7 %


(36.0-47.0) 


 





 


Mean Corpuscular Volume  94 fL ()   


 


Mean Corpuscular Hemoglobin  30 pg (25-35)   


 


Mean Corpuscular Hemoglobin


Concent 


 32 g/dL


(31-37) 


 





 


Red Cell Distribution Width


 


 14.8 %


(11.5-14.5) 


 





 


Platelet Count


 


 252 x10^3/uL


(140-400) 


 





 


Neutrophils (%) (Auto)  81 % (31-73)   


 


Lymphocytes (%) (Auto)  9 % (24-48)   


 


Monocytes (%) (Auto)  8 % (0-9)   


 


Eosinophils (%) (Auto)  2 % (0-3)   


 


Basophils (%) (Auto)  1 % (0-3)   


 


Neutrophils # (Auto)


 


 6.1 x10^3/uL


(1.8-7.7) 


 





 


Lymphocytes # (Auto)


 


 0.7 x10^3/uL


(1.0-4.8) 


 





 


Monocytes # (Auto)


 


 0.6 x10^3/uL


(0.0-1.1) 


 





 


Eosinophils # (Auto)


 


 0.1 x10^3/uL


(0.0-0.7) 


 





 


Basophils # (Auto)


 


 0.1 x10^3/uL


(0.0-0.2) 


 





 


Urine Collection Type   U cath  


 


Urine Color   Yellow  


 


Urine Clarity   Clear  


 


Urine pH   6.0 (<5.0-8.0)  


 


Urine Specific Gravity


 


 


 1.020


(1.000-1.030) 





 


Urine Protein


 


 


 >=300 mg/dL


(NEG-TRACE) 





 


Urine Glucose (UA)


 


 


 >=1000 mg/dL


(NEG) 





 


Urine Ketones (Stick)


 


 


 Negative mg/dL


(NEG) 





 


Urine Blood   Small (NEG)  


 


Urine Nitrite   Negative (NEG)  


 


Urine Bilirubin   Negative (NEG)  


 


Urine Urobilinogen Dipstick


 


 


 0.2 mg/dL (0.2


mg/dL) 





 


Urine Leukocyte Esterase   Negative (NEG)  


 


Urine RBC


 


 


 Rare /HPF


(0-2) 





 


Urine WBC   0 /HPF (0-4)  


 


Urine Squamous Epithelial


Cells 


 


 Many /LPF 


 





 


Urine Bacteria   0 /HPF (0-FEW)  


 


Sodium Level


 


 


 


 141 mmol/L


(136-145)


 


Potassium Level


 


 


 


 3.6 mmol/L


(3.5-5.1)


 


Chloride Level


 


 


 


 101 mmol/L


()


 


Carbon Dioxide Level


 


 


 


 35 mmol/L


(21-32)


 


Anion Gap    5 (6-14) 


 


Blood Urea Nitrogen


 


 


 


 36 mg/dL


(7-20)


 


Creatinine


 


 


 


 1.8 mg/dL


(0.6-1.0)


 


Estimated GFR


(Cockcroft-Gault) 


 


 


 27.2 





 


BUN/Creatinine Ratio    20 (6-20) 


 


Glucose Level


 


 


 


 321 mg/dL


(70-99)


 


Calcium Level


 


 


 


 8.2 mg/dL


(8.5-10.1)


 


Total Bilirubin


 


 


 


 0.2 mg/dL


(0.2-1.0)


 


Aspartate Amino Transf


(AST/SGOT) 


 


 


 21 U/L (15-37) 





 


Alanine Aminotransferase


(ALT/SGPT) 


 


 


 26 U/L (14-59) 





 


Alkaline Phosphatase


 


 


 


 155 U/L


()


 


NT-Pro-B-Type Natriuretic


Peptide 


 


 


 4639 pg/mL


(0-449)


 


Total Protein


 


 


 


 6.8 g/dL


(6.4-8.2)


 


Albumin


 


 


 


 2.2 g/dL


(3.4-5.0)


 


Albumin/Globulin Ratio    0.5 (1.0-1.7) 


 


Test


 8/12/21


07:40 8/12/21


08:07 8/12/21


17:03 8/12/21


20:46


 


White Blood Count


 5.7 x10^3/uL


(4.0-11.0) 


 


 





 


Red Blood Count


 3.65 x10^6/uL


(3.50-5.40) 


 


 





 


Hemoglobin


 10.9 g/dL


(12.0-15.5) 


 


 





 


Hematocrit


 33.6 %


(36.0-47.0) 


 


 





 


Mean Corpuscular Volume 92 fL ()    


 


Mean Corpuscular Hemoglobin 30 pg (25-35)    


 


Mean Corpuscular Hemoglobin


Concent 32 g/dL


(31-37) 


 


 





 


Red Cell Distribution Width


 14.6 %


(11.5-14.5) 


 


 





 


Platelet Count


 252 x10^3/uL


(140-400) 


 


 





 


Neutrophils (%) (Auto) 77 % (31-73)    


 


Lymphocytes (%) (Auto) 12 % (24-48)    


 


Monocytes (%) (Auto) 9 % (0-9)    


 


Eosinophils (%) (Auto) 2 % (0-3)    


 


Basophils (%) (Auto) 1 % (0-3)    


 


Neutrophils # (Auto)


 4.4 x10^3/uL


(1.8-7.7) 


 


 





 


Lymphocytes # (Auto)


 0.7 x10^3/uL


(1.0-4.8) 


 


 





 


Monocytes # (Auto)


 0.5 x10^3/uL


(0.0-1.1) 


 


 





 


Eosinophils # (Auto)


 0.1 x10^3/uL


(0.0-0.7) 


 


 





 


Basophils # (Auto)


 0.0 x10^3/uL


(0.0-0.2) 


 


 





 


Sodium Level


 144 mmol/L


(136-145) 


 


 





 


Potassium Level


 3.5 mmol/L


(3.5-5.1) 


 


 





 


Chloride Level


 105 mmol/L


() 


 


 





 


Carbon Dioxide Level


 38 mmol/L


(21-32) 


 


 





 


Anion Gap 1 (6-14)    


 


Blood Urea Nitrogen


 33 mg/dL


(7-20) 


 


 





 


Creatinine


 1.5 mg/dL


(0.6-1.0) 


 


 





 


Estimated GFR


(Cockcroft-Gault) 33.6 


 


 


 





 


BUN/Creatinine Ratio 22 (6-20)    


 


Glucose Level


 183 mg/dL


(70-99) 


 


 





 


Calcium Level


 8.2 mg/dL


(8.5-10.1) 


 


 





 


Total Bilirubin


 0.4 mg/dL


(0.2-1.0) 


 


 





 


Aspartate Amino Transf


(AST/SGOT) 18 U/L (15-37) 


 


 


 





 


Alanine Aminotransferase


(ALT/SGPT) 21 U/L (14-59) 


 


 


 





 


Alkaline Phosphatase


 106 U/L


() 


 


 





 


Total Protein


 6.1 g/dL


(6.4-8.2) 


 


 





 


Albumin


 2.0 g/dL


(3.4-5.0) 


 


 





 


Albumin/Globulin Ratio 0.5 (1.0-1.7)    


 


Glucose (Fingerstick)


 


 181 mg/dL


(70-99) 193 mg/dL


(70-99) 248 mg/dL


(70-99)


 


Test


 8/13/21


04:30 8/13/21


07:41 8/13/21


09:20 





 


Sodium Level


 142 mmol/L


(136-145) 


 


 





 


Potassium Level


 4.0 mmol/L


(3.5-5.1) 


 


 





 


Chloride Level


 106 mmol/L


() 


 


 





 


Carbon Dioxide Level


 36 mmol/L


(21-32) 


 


 





 


Anion Gap 0 (6-14)    


 


Blood Urea Nitrogen


 34 mg/dL


(7-20) 


 


 





 


Creatinine


 1.5 mg/dL


(0.6-1.0) 


 


 





 


Estimated GFR


(Cockcroft-Gault) 33.6 


 


 


 





 


Glucose Level


 175 mg/dL


(70-99) 


 


 





 


Calcium Level


 8.8 mg/dL


(8.5-10.1) 


 


 





 


Phosphorus Level


 3.2 mg/dL


(2.6-4.7) 


 


 





 


Magnesium Level


 1.8 mg/dL


(1.8-2.4) 


 


 





 


Iron Level


 29 ug/dL


() 


 


 





 


Total Iron Binding Capacity


 233 ug/dL


(250-450) 


 


 





 


Iron Saturation 12 % (15-34)    


 


Glucose (Fingerstick)


 


 93 mg/dL


(70-99) 


 





 


White Blood Count


 


 


 5.3 x10^3/uL


(4.0-11.0) 





 


Red Blood Count


 


 


 3.46 x10^6/uL


(3.50-5.40) 





 


Hemoglobin


 


 


 10.2 g/dL


(12.0-15.5) 





 


Hematocrit


 


 


 32.2 %


(36.0-47.0) 





 


Mean Corpuscular Volume   93 fL ()  


 


Mean Corpuscular Hemoglobin   30 pg (25-35)  


 


Mean Corpuscular Hemoglobin


Concent 


 


 32 g/dL


(31-37) 





 


Red Cell Distribution Width


 


 


 14.8 %


(11.5-14.5) 





 


Platelet Count


 


 


 228 x10^3/uL


(140-400) 





 


Neutrophils (%) (Auto)   73 % (31-73)  


 


Lymphocytes (%) (Auto)   14 % (24-48)  


 


Monocytes (%) (Auto)   10 % (0-9)  


 


Eosinophils (%) (Auto)   2 % (0-3)  


 


Basophils (%) (Auto)   1 % (0-3)  


 


Neutrophils # (Auto)


 


 


 3.9 x10^3/uL


(1.8-7.7) 





 


Lymphocytes # (Auto)


 


 


 0.7 x10^3/uL


(1.0-4.8) 





 


Monocytes # (Auto)


 


 


 0.5 x10^3/uL


(0.0-1.1) 





 


Eosinophils # (Auto)


 


 


 0.1 x10^3/uL


(0.0-0.7) 





 


Basophils # (Auto)


 


 


 0.0 x10^3/uL


(0.0-0.2) 











Laboratory Tests








Test


 8/12/21


17:03 8/12/21


20:46 8/13/21


04:30 8/13/21


07:41


 


Glucose (Fingerstick)


 193 mg/dL


(70-99) 248 mg/dL


(70-99) 


 93 mg/dL


(70-99)


 


Sodium Level


 


 


 142 mmol/L


(136-145) 





 


Potassium Level


 


 


 4.0 mmol/L


(3.5-5.1) 





 


Chloride Level


 


 


 106 mmol/L


() 





 


Carbon Dioxide Level


 


 


 36 mmol/L


(21-32) 





 


Anion Gap   0 (6-14)  


 


Blood Urea Nitrogen


 


 


 34 mg/dL


(7-20) 





 


Creatinine


 


 


 1.5 mg/dL


(0.6-1.0) 





 


Estimated GFR


(Cockcroft-Gault) 


 


 33.6 


 





 


Glucose Level


 


 


 175 mg/dL


(70-99) 





 


Calcium Level


 


 


 8.8 mg/dL


(8.5-10.1) 





 


Phosphorus Level


 


 


 3.2 mg/dL


(2.6-4.7) 





 


Magnesium Level


 


 


 1.8 mg/dL


(1.8-2.4) 





 


Iron Level


 


 


 29 ug/dL


() 





 


Total Iron Binding Capacity


 


 


 233 ug/dL


(250-450) 





 


Iron Saturation   12 % (15-34)  


 


Test


 8/13/21


09:20 


 


 





 


White Blood Count


 5.3 x10^3/uL


(4.0-11.0) 


 


 





 


Red Blood Count


 3.46 x10^6/uL


(3.50-5.40) 


 


 





 


Hemoglobin


 10.2 g/dL


(12.0-15.5) 


 


 





 


Hematocrit


 32.2 %


(36.0-47.0) 


 


 





 


Mean Corpuscular Volume 93 fL ()    


 


Mean Corpuscular Hemoglobin 30 pg (25-35)    


 


Mean Corpuscular Hemoglobin


Concent 32 g/dL


(31-37) 


 


 





 


Red Cell Distribution Width


 14.8 %


(11.5-14.5) 


 


 





 


Platelet Count


 228 x10^3/uL


(140-400) 


 


 





 


Neutrophils (%) (Auto) 73 % (31-73)    


 


Lymphocytes (%) (Auto) 14 % (24-48)    


 


Monocytes (%) (Auto) 10 % (0-9)    


 


Eosinophils (%) (Auto) 2 % (0-3)    


 


Basophils (%) (Auto) 1 % (0-3)    


 


Neutrophils # (Auto)


 3.9 x10^3/uL


(1.8-7.7) 


 


 





 


Lymphocytes # (Auto)


 0.7 x10^3/uL


(1.0-4.8) 


 


 





 


Monocytes # (Auto)


 0.5 x10^3/uL


(0.0-1.1) 


 


 





 


Eosinophils # (Auto)


 0.1 x10^3/uL


(0.0-0.7) 


 


 





 


Basophils # (Auto)


 0.0 x10^3/uL


(0.0-0.2) 


 


 











Medications





Current Medications


Ondansetron HCl (Zofran) 4 mg PRN Q8HRS  PRN IVP NAUSEA/VOMITING;  Start 8/11/21

at 20:30;  Stop 8/12/21 at 18:37;  Status DC


Morphine Sulfate (Morphine Sulfate) 2 mg PRN Q2HR  PRN IVP PAIN;  Start 8/11/21 

at 20:30;  Stop 8/12/21 at 20:29;  Status DC


Acetaminophen (Tylenol) 650 mg PRN Q4HRS  PRN PO FEVER > 100.3'F;  Start 8/11/21

at 20:30;  Stop 8/12/21 at 18:36;  Status DC


Furosemide (Lasix) 40 mg 1X  ONCE IVP  Last administered on 8/11/21at 23:02;  

Start 8/11/21 at 21:00;  Stop 8/11/21 at 21:01;  Status DC


Ascorbic Acid (Vitamin C) 500 mg DAILY PO  Last administered on 8/13/21at 09:18;

 Start 8/12/21 at 09:00


Aspirin (Ecotrin) 81 mg DAILYWBKFT PO  Last administered on 8/13/21at 09:18;  

Start 8/12/21 at 09:00


Carvedilol (Coreg) 6.25 mg BIDWMEALS PO  Last administered on 8/13/21at 09:19;  

Start 8/12/21 at 09:00


Insulin Glargine (Lantus Syringe) 38 unit HS SQ  Last administered on 8/12/21at 

22:55;  Start 8/12/21 at 21:00


Insulin Human Lispro (HumaLOG) 13 units TIDWMEALS SQ  Last administered on 

8/12/21at 17:00;  Start 8/12/21 at 09:00


Simvastatin (Zocor) 40 mg QHS PO  Last administered on 8/12/21at 22:54;  Start 

8/12/21 at 21:00


Insulin Human Lispro (HumaLOG) 0-7 UNITS TIDWMEALS SQ  Last administered on 

8/12/21at 17:00;  Start 8/12/21 at 12:00


Dextrose (Dextrose 50%-Water Syringe) 12.5 gm PRN Q15MIN  PRN IV SEE COMMENTS;  

Start 8/12/21 at 08:30


Sennosides (Senna) 17.2 mg PRN BID  PRN PO CONSTIPATION;  Start 8/12/21 at 08:30


Docusate Sodium (Colace) 100 mg PRN DAILY  PRN PO HARD STOOLS Last administered 

on 8/13/21at 09:17;  Start 8/12/21 at 08:30


Ondansetron HCl (Zofran) 4 mg PRN Q6HRS  PRN IVP NAUSEA/VOMITING, 1st CHOICE 

Last administered on 8/13/21at 09:19;  Start 8/12/21 at 08:30


Dextrose (Dextrose 50%-Water Syringe) 12.5 gm PRN Q15MIN  PRN IV SEE COMMENTS;  

Start 8/12/21 at 08:30;  Status Cancel


Acetaminophen (Tylenol) 650 mg PRN Q4HRS  PRN PO TEMP OVER 100.4F OR MILD PAIN; 

Start 8/12/21 at 08:30


Heparin Sodium (Porcine) (Heparin Sodium) 5,000 unit Q12HR SQ  Last administered

on 8/13/21at 09:20;  Start 8/12/21 at 09:00


Prochlorperazine Edisylate (Compazine) 10 mg PRN Q6HRS  PRN IV NAUSEA/VOMITING, 

2ND CHOICE;  Start 8/12/21 at 08:30


Furosemide (Lasix) 80 mg 1X  ONCE IVP  Last administered on 8/12/21at 11:47;  

Start 8/12/21 at 10:15;  Stop 8/12/21 at 10:18;  Status DC


Furosemide 100 mg/ Sodium Chloride 100 ml @ 5 mls/hr CONT  PRN IV SEE I/O 

RECORD;  Start 8/12/21 at 11:00


Potassium Chloride (Klor-Con) 40 meq 1X  ONCE PO  Last administered on 8/12/21at

11:46;  Start 8/12/21 at 10:15;  Stop 8/12/21 at 10:18;  Status DC





Active Scripts


Active


Thera-M Tablet (Multivits,Ca,Minerals/Iron/Fa) 1 Each Tablet 1 Tab PO DAILY 30 

Days


Vitamin C (Ascorbic Acid) 500 Mg Tablet 500 Mg PO DAILY 30 Days


Culturelle (Lactobacillus Rhamnosus Gg) 1 Each Cap.sprink 1 Cap PO BID 30 Days


Aspirin Ec (Aspirin) 81 Mg Tablet.dr 81 Mg PO DAILYWBKFT 30 Days


Proair Hfa (Albuterol Sulfate) 8.5 Gm Hfa.aer.ad 2.5 Mg NEB PRN Q4HRS PRN 30 

Days


Amox Tr-K Clv 500-125 Mg Tab (Amoxicillin/Potassium Clav) 1 Each Tablet 1 Tab PO

DAILY 3 Days


Reported


Lantus (Insulin Glargine,Hum.rec.anlog) 100 Unit/1 Ml Vial 38 Units SQ HS


Insulin Lispro 100 Unit/1 Ml Vial 13 Units SQ TIDAC


Xalatan (Latanoprost) 2.5 Ml Drops 1 Drop OP HS


Flonase Allergy Relief (Fluticasone Propionate) 9.9 Ml Spray.susp 2 Sprays NS 

DAILY


Novolin N (Nph, Human Insulin Isophane) 100 Unit/1 Ml Vial 40 Unit SQ DAILYWBKFT


Carvedilol ** (Carvedilol) 6.25 Mg Tablet 6.25 Mg PO BIDWMEALS


Simvastatin 40 Mg Tablet 1 Tab PO QHS


Furosemide 20 Mg Tablet 1 Tab PO DAILY


Vitals/I & O





Vital Sign - Last 24 Hours








 8/12/21 8/12/21 8/12/21 8/12/21





 11:00 11:47 15:00 15:00


 


Temp 97.6  98.6 97.4





 97.6  98.6 97.4


 


Pulse 81 86  80


 


Resp 18   18


 


B/P (MAP) 134/69 (90) 121/72  139/72 (94)


 


Pulse Ox 97   94


 


O2 Delivery Nasal Cannula   Nasal Cannula


 


O2 Flow Rate 3.0   3.0


 


    





    





 8/12/21 8/12/21 8/12/21 8/12/21





 17:00 19:00 20:07 23:00


 


Temp  98.2  98.1





  98.2  98.1


 


Pulse 86 68  75


 


Resp  18  17


 


B/P (MAP) 121/72 119/65 (83)  131/65 (87)


 


Pulse Ox  94  98


 


O2 Delivery  Nasal Cannula Nasal Cannula Nasal Cannula


 


O2 Flow Rate  3.0 3.0 3.0


 


    





    





 8/13/21 8/13/21 8/13/21 





 03:00 07:00 09:19 


 


Temp 98.1 98.6  





 98.1 98.6  


 


Pulse 70 69 69 


 


Resp 18 20  


 


B/P (MAP) 134/60 (84) 114/46 (68) 114/46 


 


Pulse Ox 98 94  


 


O2 Delivery Nasal Cannula Nasal Cannula  


 


O2 Flow Rate 3.0 3.0  














Intake and Output   


 


 8/12/21 8/12/21 8/13/21





 15:00 23:00 07:00


 


Intake Total 610 ml 600 ml 


 


Balance 610 ml 600 ml 











Justicifation of Admission Dx:


Justifications for Admission:


Justification of Admission Dx:  N/A











MILAGROS MURRY MD           Aug 13, 2021 10:19

## 2021-08-14 VITALS — DIASTOLIC BLOOD PRESSURE: 90 MMHG | SYSTOLIC BLOOD PRESSURE: 194 MMHG

## 2021-08-14 VITALS — DIASTOLIC BLOOD PRESSURE: 64 MMHG | SYSTOLIC BLOOD PRESSURE: 141 MMHG

## 2021-08-14 VITALS — SYSTOLIC BLOOD PRESSURE: 157 MMHG | DIASTOLIC BLOOD PRESSURE: 77 MMHG

## 2021-08-14 VITALS — DIASTOLIC BLOOD PRESSURE: 67 MMHG | SYSTOLIC BLOOD PRESSURE: 147 MMHG

## 2021-08-14 VITALS — DIASTOLIC BLOOD PRESSURE: 60 MMHG | SYSTOLIC BLOOD PRESSURE: 140 MMHG

## 2021-08-14 VITALS — SYSTOLIC BLOOD PRESSURE: 158 MMHG | DIASTOLIC BLOOD PRESSURE: 66 MMHG

## 2021-08-14 LAB
ANION GAP SERPL CALC-SCNC: 0 MMOL/L (ref 6–14)
BASOPHILS # BLD AUTO: 0 X10^3/UL (ref 0–0.2)
BASOPHILS NFR BLD: 0 % (ref 0–3)
BUN SERPL-MCNC: 36 MG/DL (ref 7–20)
CALCIUM SERPL-MCNC: 8.9 MG/DL (ref 8.5–10.1)
CHLORIDE SERPL-SCNC: 106 MMOL/L (ref 98–107)
CO2 SERPL-SCNC: 37 MMOL/L (ref 21–32)
CREAT SERPL-MCNC: 1.6 MG/DL (ref 0.6–1)
EOSINOPHIL NFR BLD: 0.1 X10^3/UL (ref 0–0.7)
EOSINOPHIL NFR BLD: 3 % (ref 0–3)
ERYTHROCYTE [DISTWIDTH] IN BLOOD BY AUTOMATED COUNT: 14.9 % (ref 11.5–14.5)
GFR SERPLBLD BASED ON 1.73 SQ M-ARVRAT: 31.2 ML/MIN
GLUCOSE SERPL-MCNC: 186 MG/DL (ref 70–99)
HCT VFR BLD CALC: 35.2 % (ref 36–47)
HGB BLD-MCNC: 10.9 G/DL (ref 12–15.5)
LYMPHOCYTES # BLD: 0.5 X10^3/UL (ref 1–4.8)
LYMPHOCYTES NFR BLD AUTO: 11 % (ref 24–48)
MAGNESIUM SERPL-MCNC: 1.9 MG/DL (ref 1.8–2.4)
MCH RBC QN AUTO: 29 PG (ref 25–35)
MCHC RBC AUTO-ENTMCNC: 31 G/DL (ref 31–37)
MCV RBC AUTO: 95 FL (ref 79–100)
MONO #: 0.5 X10^3/UL (ref 0–1.1)
MONOCYTES NFR BLD: 11 % (ref 0–9)
NEUT #: 3.6 X10^3/UL (ref 1.8–7.7)
NEUTROPHILS NFR BLD AUTO: 75 % (ref 31–73)
PLATELET # BLD AUTO: 235 X10^3/UL (ref 140–400)
POTASSIUM SERPL-SCNC: 4.4 MMOL/L (ref 3.5–5.1)
RBC # BLD AUTO: 3.72 X10^6/UL (ref 3.5–5.4)
SODIUM SERPL-SCNC: 143 MMOL/L (ref 136–145)
WBC # BLD AUTO: 4.8 X10^3/UL (ref 4–11)

## 2021-08-14 RX ADMIN — Medication SCH MG: at 09:39

## 2021-08-14 RX ADMIN — SIMVASTATIN SCH MG: 40 TABLET, FILM COATED ORAL at 22:35

## 2021-08-14 RX ADMIN — HEPARIN SODIUM SCH UNIT: 5000 INJECTION, SOLUTION INTRAVENOUS; SUBCUTANEOUS at 09:52

## 2021-08-14 RX ADMIN — POTASSIUM CHLORIDE SCH MEQ: 1500 TABLET, EXTENDED RELEASE ORAL at 09:39

## 2021-08-14 RX ADMIN — OXYCODONE HYDROCHLORIDE AND ACETAMINOPHEN SCH MG: 500 TABLET ORAL at 09:39

## 2021-08-14 RX ADMIN — INSULIN LISPRO SCH UNITS: 100 INJECTION, SOLUTION INTRAVENOUS; SUBCUTANEOUS at 09:51

## 2021-08-14 RX ADMIN — INSULIN GLARGINE SCH UNIT: 100 INJECTION, SOLUTION SUBCUTANEOUS at 22:44

## 2021-08-14 RX ADMIN — INSULIN LISPRO SCH UNITS: 100 INJECTION, SOLUTION INTRAVENOUS; SUBCUTANEOUS at 12:26

## 2021-08-14 RX ADMIN — INSULIN LISPRO SCH UNITS: 100 INJECTION, SOLUTION INTRAVENOUS; SUBCUTANEOUS at 17:00

## 2021-08-14 RX ADMIN — HEPARIN SODIUM SCH UNIT: 5000 INJECTION, SOLUTION INTRAVENOUS; SUBCUTANEOUS at 22:42

## 2021-08-14 RX ADMIN — DILTIAZEM HYDROCHLORIDE PRN MLS/HR: 5 INJECTION INTRAVENOUS at 13:58

## 2021-08-14 RX ADMIN — INSULIN LISPRO SCH UNITS: 100 INJECTION, SOLUTION INTRAVENOUS; SUBCUTANEOUS at 12:00

## 2021-08-14 RX ADMIN — INSULIN LISPRO SCH UNITS: 100 INJECTION, SOLUTION INTRAVENOUS; SUBCUTANEOUS at 09:50

## 2021-08-14 RX ADMIN — CARVEDILOL SCH MG: 6.25 TABLET, FILM COATED ORAL at 09:40

## 2021-08-14 RX ADMIN — CARVEDILOL SCH MG: 6.25 TABLET, FILM COATED ORAL at 17:06

## 2021-08-14 RX ADMIN — ASPIRIN SCH MG: 81 TABLET, COATED ORAL at 08:00

## 2021-08-14 NOTE — NUR
0100 Pt had purewick line tucked underneath her, line had become disconnected from suction 
canister. Pt urine was not measured this occurrence as purewick did not catch it. Chux was 
soaked, pt cleaned and chux changed. Purewick re-connected and is working properly now. 
Accurate I&O attempted to be obtained due to pt on lasix gtt per nephrology.

## 2021-08-14 NOTE — CONS
DATE OF CONSULTATION: 08/14/2021

CHIEF COMPLAINT:  Recent fall.



HISTORY OF PRESENT ILLNESS:  The patient is a 76-year-old female with multiple 

medical problems including class 3 chronic kidney disease, COPD, hypertension, 

hyperlipidemia, diabetes and osteoarthritis, who presents after a recent fall.  

She denies any unilateral weakness/numbness, vision loss, speech changes or 

other lateralizing TIA or stroke symptoms.  She has some chronic instability.  

She was recently getting into the shower where she tripped and fell.  During her

evaluation, she had a carotid artery ultrasound that showed some increased 

velocities in her right common and internal carotid arteries.  This may be 

consistent with approximately 70% right internal carotid artery narrowing.  

There is more moderate left internal carotid artery disease.  Although there are

increased velocities in the right internal carotid artery, the internal common 

carotid ratio was low.  She had a CT of the head that showed no acute 

intracranial findings.  There is a left thalamic and basal ganglia hypodensity 

likely sequelae of a previous lacunar infarct.  I was asked to evaluate her due 

to her carotid vascular disease.



PAST MEDICAL HISTORY:

1.  Hypertension.

2.  Hyperlipidemia.

3.  COPD, on home oxygen (2-3 liters).

4.  Chronic kidney disease (class 3).

5.  Diabetes.

6.  Morbid obesity.



PAST SURGICAL HISTORY:

1.  Cholecystectomy.

2.  Right total knee arthroplasty.

3.  Right shoulder ORIF.

4.  Tonsillectomy.

5.  Bilateral carpal tunnel release.

6.  Previous right cataract removal.



CURRENT MEDICATIONS:  Zaroxolyn, Niferex, potassium chloride, Zocor, insulin, 

Coreg, aspirin, vitamin C, Compazine, senna, Colace, supplemental oxygen.



ALLERGIES:  No known drug allergies.



SOCIAL HISTORY:  She denies any smoking for the last 14 years.  She denies any 

alcohol use.  She currently lives with her family.



FAMILY HISTORY:  Significant for hypertension.



REVIEW OF SYSTEMS:  No recent fevers, chills or chest pain.  She has chronic 

shortness of breath with minimal activity.  She is on supplemental oxygen as 

noted above.  She has some chronic constipation for which she takes medications.

 She denies any hematochezia or melena.  She denies any dysuria.  She has a 

previous episode of acute kidney failure, requiring temporary hemodialysis.  

This was stopped almost a year ago.  She has a history of previous bleeding in 

the right eye that prompted intervention.  She has had no sudden recent changes 

in her eyesight, however.  She denies any TIA or stroke symptoms as outlined 

above.  Other 14-point review of system was unremarkable.



PHYSICAL EXAMINATION:

GENERAL:  This is a morbidly obese female in no acute distress, but somewhat 

short of breath even lying in bed.

VITAL SIGNS:  Temperature 98.1, pulse 71, blood pressure 158/66, respirations 

20, O2 sat is 95% on 3 liters per nasal cannula supplemental oxygen.

NECK:  Supple, no lymphadenopathy.  Very obese neck.

CARDIAC:  Regular rhythm.

RESPIRATIONS:  Slightly increased work of breathing.

ABDOMEN:  Obese, soft, nontender, nondistended, no palpable masses.

EXTREMITIES:  She has palpable radial and pedal pulses bilaterally.  She has 

moderate bilateral lower extremity swelling.

NEUROLOGIC:  She is awake, alert, answering questions appropriately.  Her face 

is symmetrical without focal deficits.  Her tongue is midline.  Equal upper and 

lower extremity strength.  No focal deficits at this time.



LABORATORY DATA:  Labs are significant for white blood cell 4.8, hemoglobin 

10.9, platelet count of 235.  Sodium 143, potassium 4.4, BUN 36, creatinine 1.6,

glucose 186.  ProBNP on admission was 4639.



RADIOGRAPHIC IMAGING:  As above.



IMPRESSION:

1.  Asymptomatic bilateral carotid artery stenosis, right somewhat worse than 

left.  The ultrasound velocities are consistent with approximately 70% right 

internal carotid artery stenosis.  There may be some more significant common 

carotid artery stenosis as well.

2.  Generalized weakness and instability.

3.  Chronic kidney disease (class 3).

4.  Chronic obstructive pulmonary disease, on home oxygen.

5.  Morbid obesity.

6.  Hypertension.

7.  Osteoarthritis.



RECOMMENDATION:

1.  I discussed the risks of carotid artery stenosis as well as options for risk

factor reduction for future stroke.  She has no TIA or stroke symptoms at this 

time.  Would continue aspirin daily with good efforts of blood pressure and 

cholesterol control.

2.  I discussed options of intervention including risks and potential benefits 

for her carotid vascular disease.  It is unclear if the right carotid vascular 

narrowing is more than 70%.  I outlined risks and potential benefits of either 

endarterectomy or stenting due to her carotid vascular disease.  Risks likely 

outweigh potential benefits.  If we were going to consider intervention at some 

point in the future, we will warrant more proximal evaluation with either CT 

angiogram or MR angiogram.  Would not put her through the risk of contrast 

exposure at this time, because I would not consider intervention even with 

greater than 70% common or internal carotid artery stenosis due to her other 

comorbid factors.  Risks of intervention likely outweigh potential benefits.  I 

discussed this extensively with her and her nephews at the bedside.  All 

questions were answered.









PACHECO

DR: KAH/nts   DD: 08/14/2021 11:52

DT: 08/14/2021 13:18   TID: [id number]

CC:     MILAGROS MURRY MD, LIANA KEMP MD, Silverio Cooper, DO

## 2021-08-14 NOTE — PDOC
Provider Note


Date of Service:


DATE: 8/14/21 


TIME: 11:39


Provider Note


(Please see full dictated note for details.)


She is a 76-year-old female with multiple medical problems including COPD, 

morbid obesity, and chronic renal failure who presented with a recent fall.  She

has no lateralizing TIA or stroke symptoms.  A carotid ultrasound showed some 

increased velocities in her right common and internal carotid artery that may be

consistent with around 70% diameter narrowing.  I would not recommend any 

intervention for her asymptomatic carotid disease.  Risks likely outweigh po

tential benefits.  Would continue maximum medical management for stroke risk 

reduction including good blood pressure and cholesterol control as well as 

antiplatelet therapy with aspirin daily.  Would likely only recommend carotid 

intervention if she has symptomatic disease.





Justifications for Admission


Other Justification


Fall with acute renal failure











DEE CURRY MD                Aug 14, 2021 11:41

## 2021-08-14 NOTE — PDOC
[facility] PROGRESS NOTE


Date of Service


DOS:


DATE: 8/14/21 


TIME: 11:22





Chief Complaint


Chief Complaint


Mechanical fall.


Hypertensive urgency.


Cervical spinal stenosis.


Acute volume overload.


Acute on chronic kidney failure, due to possible vasomotor nephropathy.


Left thalamus hypodensity, incidental finding, no further evaluation or 

treatment.


Anemia of chronic kidney disease.


Deconditioning.


Peripheral neuropathy.


Osteoarthritis.


COPD.


DM type 2.


HTN.


Morbid obesity.


Severe protein malnutrition.





History of Present Illness


History of Present Illness


Patient is a 76-year-old female with past medical history of diabetes mellitus 

type 2, hypertension, COPD on home O2, CKD was on dialysis a year ago but taken 

off, morbid obesity, who presents today after falling.  Patient states that she 

was trying to take a shower but she tripped and fell.  She also reports that she

has been getting weaker in the past couple weeks and she has gained about 10 

pounds and feels like she is getting more edematous.  Denies any head trauma or 

loss of consciousness.  However she does complain of mild posterior neck pain 

and also she complains of swelling to the bilateral lower extremities.  Denies 

fevers, shortness of breath, chest pain, abdominal pain, diarrhea or dysuria.  

Patient was given dose of IV Lasix as she does report urinary output but she is 

unsure how much she is putting out because there is a pure wick attached to her.





8/13/21


Patient seen and examined at bedside.


D/W RN.


Chart reviewed.


SpO2 94% on 3 lpm via NC.


U/S found L renal simple cyst.


Patient denies stroke symptoms.


Per neurology, hypodensity was found in L thalamus. No further evaluation or 

treatment necessary.


Patient prefers to have carotid doppler study done.





8/14/21


Patient seen and examined at bedside.


D/W RN.


Dr. Ewing (nephrology) and I plan to follow creatinine levels, continue 

diuretics, and consider dialysis.


Carotid doppler found >70% stenosis of the right proximal internal carotid. 


Plan to consult Dr. Kirkland (vascular surgery).





Vitals/I&O


Vitals/I&O:





                                   Vital Signs








  Date Time  Temp Pulse Resp B/P (MAP) Pulse Ox O2 Delivery O2 Flow Rate FiO2


 


8/14/21 09:40  71  158/66    


 


8/14/21 06:55 98.1  21  95 Nasal Cannula 3.0 





 98.1       














                                    I & O   


 


 8/13/21 8/13/21 8/14/21





 15:00 23:00 07:00


 


Intake Total   200 ml


 


Output Total   625 ml


 


Balance   -425 ml











Physical Exam


General:  Alert, Oriented X3, Cooperative, mild distress


Heart:  Regular rate, Normal S1, Normal S2


Lungs:  Clear


Abdomen:  Normal bowel sounds, Soft, No tenderness


Extremities:  No clubbing, No cyanosis, Other (3+ EDEMA)





Labs


Labs:





Laboratory Tests








Test


 8/13/21


11:48 8/13/21


17:05 8/13/21


20:40 8/14/21


07:33


 


Glucose (Fingerstick)


 95 mg/dL


(70-99) 154 mg/dL


(70-99) 212 mg/dL


(70-99) 175 mg/dL


(70-99)


 


Test


 8/14/21


07:35 


 


 





 


White Blood Count


 4.8 x10^3/uL


(4.0-11.0) 


 


 





 


Red Blood Count


 3.72 x10^6/uL


(3.50-5.40) 


 


 





 


Hemoglobin


 10.9 g/dL


(12.0-15.5) 


 


 





 


Hematocrit


 35.2 %


(36.0-47.0) 


 


 





 


Mean Corpuscular Volume 95 fL ()    


 


Mean Corpuscular Hemoglobin 29 pg (25-35)    


 


Mean Corpuscular Hemoglobin


Concent 31 g/dL


(31-37) 


 


 





 


Red Cell Distribution Width


 14.9 %


(11.5-14.5) 


 


 





 


Platelet Count


 235 x10^3/uL


(140-400) 


 


 





 


Neutrophils (%) (Auto) 75 % (31-73)    


 


Lymphocytes (%) (Auto) 11 % (24-48)    


 


Monocytes (%) (Auto) 11 % (0-9)    


 


Eosinophils (%) (Auto) 3 % (0-3)    


 


Basophils (%) (Auto) 0 % (0-3)    


 


Neutrophils # (Auto)


 3.6 x10^3/uL


(1.8-7.7) 


 


 





 


Lymphocytes # (Auto)


 0.5 x10^3/uL


(1.0-4.8) 


 


 





 


Monocytes # (Auto)


 0.5 x10^3/uL


(0.0-1.1) 


 


 





 


Eosinophils # (Auto)


 0.1 x10^3/uL


(0.0-0.7) 


 


 





 


Basophils # (Auto)


 0.0 x10^3/uL


(0.0-0.2) 


 


 





 


Sodium Level


 143 mmol/L


(136-145) 


 


 





 


Potassium Level


 4.4 mmol/L


(3.5-5.1) 


 


 





 


Chloride Level


 106 mmol/L


() 


 


 





 


Carbon Dioxide Level


 37 mmol/L


(21-32) 


 


 





 


Anion Gap 0 (6-14)    


 


Blood Urea Nitrogen


 36 mg/dL


(7-20) 


 


 





 


Creatinine


 1.6 mg/dL


(0.6-1.0) 


 


 





 


Estimated GFR


(Cockcroft-Gault) 31.2 


 


 


 





 


Glucose Level


 186 mg/dL


(70-99) 


 


 





 


Calcium Level


 8.9 mg/dL


(8.5-10.1) 


 


 





 


Phosphorus Level


 3.0 mg/dL


(2.6-4.7) 


 


 





 


Magnesium Level


 1.9 mg/dL


(1.8-2.4) 


 


 














Review of Systems


Review of Systems:


Denies confusion.


Denies nausea or vomiting.





Assessment and Plan


Assessmemt and Plan


Problems


Medical Problems:


(1) Acute cervical sprain


Status: Acute  





(2) Acute on chronic renal failure


Status: Acute  





Assessment:


Mechanical fall.


Hypertensive urgency.


Cervical spinal stenosis.


Acute volume overload.


Acute on chronic kidney failure, due to possible vasomotor nephropathy.


Left thalamus hypodensity, incidental finding, no further evaluation or 

treatment.


Anemia of chronic kidney disease.


Deconditioning.


Peripheral neuropathy.


Osteoarthritis.


COPD.


DM type 2.


HTN.


Morbid obesity.


Severe protein malnutrition.





Plan:


Cardiac monitoring.


Wound care.


Trend labs.


Appreciate subspecialist input.


Continue Lasix.


Add thiazide diuretics.


Consult vascular surgery.


Avoid nephrotoxic agents.


Control BP.


Control BG.


Iron supplements.


Home meds.


Aspirin.


Atorvastatin.


PT/OT.


Heparin for DVT prophylaxis.


Protonix GI prophylaxis.


ADA diet.


Full code.








Comment


Review of Relevant


I have reviewed the following items rizwan (where applicable) has been applied.


Medications:





Current Medications








 Medications


  (Trade)  Dose


 Ordered  Sig/Eva


 Route


 PRN Reason  Start Time


 Stop Time Status Last Admin


Dose Admin


 


 Potassium Chloride


  (Klor-Con)  20 meq  [id number]


 PO


   8/13/21 12:00


    8/14/21 09:39





 


 Polysaccharide


 Iron Complex


  (Niferex 150)  150 mg  DAILY


 PO


   8/13/21 12:00


    8/14/21 09:39














Justifications for Admission


Other Justification


Fall with acute renal failure











ELMO STEARNS III DO           Aug 14, 2021 11:30

## 2021-08-14 NOTE — PDOC
Renal-Progress Notes


Subjective Notes


Notes


STILL SOB





History of Present Illness


Hx of present illness


SUBJECTIVELY NOT ANY BETTER WITH RESPECT TO SOB





Vitals


Vitals





Vital Signs








  Date Time  Temp Pulse Resp B/P (MAP) Pulse Ox O2 Delivery O2 Flow Rate FiO2


 


8/14/21 09:40  71  158/66    


 


8/14/21 06:55 98.1  21  95 Nasal Cannula 3.0 





 98.1       








Weight


Weight [ ]





I.O.


Intake and Output











Intake and Output 


 


 8/14/21





 07:00


 


Intake Total 200 ml


 


Output Total 625 ml


 


Balance -425 ml


 


 


 


Intake Oral 200 ml


 


Output Urine Total 625 ml


 


# Voids 3


 


# Bowel Movements 2











Labs


Labs





Laboratory Tests








Test


 8/13/21


11:48 8/13/21


17:05 8/13/21


20:40 8/14/21


07:33


 


Glucose (Fingerstick)


 95 mg/dL


(70-99) 154 mg/dL


(70-99) 212 mg/dL


(70-99) 175 mg/dL


(70-99)


 


Test


 8/14/21


07:35 


 


 





 


White Blood Count


 4.8 x10^3/uL


(4.0-11.0) 


 


 





 


Red Blood Count


 3.72 x10^6/uL


(3.50-5.40) 


 


 





 


Hemoglobin


 10.9 g/dL


(12.0-15.5) 


 


 





 


Hematocrit


 35.2 %


(36.0-47.0) 


 


 





 


Mean Corpuscular Volume 95 fL ()    


 


Mean Corpuscular Hemoglobin 29 pg (25-35)    


 


Mean Corpuscular Hemoglobin


Concent 31 g/dL


(31-37) 


 


 





 


Red Cell Distribution Width


 14.9 %


(11.5-14.5) 


 


 





 


Platelet Count


 235 x10^3/uL


(140-400) 


 


 





 


Neutrophils (%) (Auto) 75 % (31-73)    


 


Lymphocytes (%) (Auto) 11 % (24-48)    


 


Monocytes (%) (Auto) 11 % (0-9)    


 


Eosinophils (%) (Auto) 3 % (0-3)    


 


Basophils (%) (Auto) 0 % (0-3)    


 


Neutrophils # (Auto)


 3.6 x10^3/uL


(1.8-7.7) 


 


 





 


Lymphocytes # (Auto)


 0.5 x10^3/uL


(1.0-4.8) 


 


 





 


Monocytes # (Auto)


 0.5 x10^3/uL


(0.0-1.1) 


 


 





 


Eosinophils # (Auto)


 0.1 x10^3/uL


(0.0-0.7) 


 


 





 


Basophils # (Auto)


 0.0 x10^3/uL


(0.0-0.2) 


 


 





 


Sodium Level


 143 mmol/L


(136-145) 


 


 





 


Potassium Level


 4.4 mmol/L


(3.5-5.1) 


 


 





 


Chloride Level


 106 mmol/L


() 


 


 





 


Carbon Dioxide Level


 37 mmol/L


(21-32) 


 


 





 


Anion Gap 0 (6-14)    


 


Blood Urea Nitrogen


 36 mg/dL


(7-20) 


 


 





 


Creatinine


 1.6 mg/dL


(0.6-1.0) 


 


 





 


Estimated GFR


(Cockcroft-Gault) 31.2 


 


 


 





 


Glucose Level


 186 mg/dL


(70-99) 


 


 





 


Calcium Level


 8.9 mg/dL


(8.5-10.1) 


 


 





 


Phosphorus Level


 3.0 mg/dL


(2.6-4.7) 


 


 





 


Magnesium Level


 1.9 mg/dL


(1.8-2.4) 


 


 














Review of Systems


Constitutional:  yes: weakness, alert, oriented


Ears/Nose/Throat:  Yes: no symptom reported


Eyes:  Yes: no symptom reported


Pulmonary:  Yes dyspnea


Cardiovascular:  Yes edema


Gastrointestional:  Yes: constipation


Genitourinary:  Yes: no symptom reported


Musculoskeletal:  Yes: muscle stiffness


Skin:  Yes no symptom reported


Psychiatric/Neurological:  Yes: depressed


Endocrine:  Yes: no symptom reported


Hematologic/Lymphatic:  Yes: no symptom reported





Physical Exam


General Appearance:  no apparent distress, obese, febrile


Skin:  warm, edema


Respiratory:  decreased breath sounds


Heart:  S1S2


Abdomen:  soft, bowel sounds present


Genitourinary:  bladder flat, dunaway catheter


Extremities:  pulses present, edema


Neurology:  alert, oriented, Ext weakness


Musculoskeletal:  Osteoarthritis





Assessment


Assessment


IMP





FALL


ANASARCA


EDEMA


CKD STAGE 3B TO 4-CR OF 1.6


MORBID OBESITY


UNCONTROLLED HTN


VOLUME OVERLOAD


CHF - SUSPECT ACUTE ON CHRONIC SYSTOLIC AND DIASTOLIC


HX OF COPD


DM II


PROTEINURIA


DECONDITIONING


ANEMIA OF CKD








PLAN





CONT DIURESIS


LASIX GTT


START THIAZIDE TO SUPPLEMENT LOOP DIURETIC


CONTROL BP


CONTROL BG


HOPEFULLY BP IMPROVES WITH DIURESIS


MAY END UP NEEDING DIALYSIS AGAIN IF UNABLE TO DIURESE


FLUID RESTRICT


AVOID NEPHROTOXINS


CONSIDER ECHOCARDIOGRAM


IRON SUPPLEMENTS


ENC COMPLIANCE


COVID 19 NEG


D/W ATTENDING











MARCO ANTONIO CAMPSO MD                 Aug 14, 2021 10:57

## 2021-08-15 VITALS — DIASTOLIC BLOOD PRESSURE: 68 MMHG | SYSTOLIC BLOOD PRESSURE: 167 MMHG

## 2021-08-15 VITALS — DIASTOLIC BLOOD PRESSURE: 71 MMHG | SYSTOLIC BLOOD PRESSURE: 176 MMHG

## 2021-08-15 VITALS — SYSTOLIC BLOOD PRESSURE: 169 MMHG | DIASTOLIC BLOOD PRESSURE: 61 MMHG

## 2021-08-15 VITALS — DIASTOLIC BLOOD PRESSURE: 71 MMHG | SYSTOLIC BLOOD PRESSURE: 156 MMHG

## 2021-08-15 VITALS — DIASTOLIC BLOOD PRESSURE: 75 MMHG | SYSTOLIC BLOOD PRESSURE: 193 MMHG

## 2021-08-15 VITALS — DIASTOLIC BLOOD PRESSURE: 75 MMHG | SYSTOLIC BLOOD PRESSURE: 184 MMHG

## 2021-08-15 LAB
ANION GAP SERPL CALC-SCNC: 1 MMOL/L (ref 6–14)
BASOPHILS # BLD AUTO: 0 X10^3/UL (ref 0–0.2)
BASOPHILS NFR BLD: 1 % (ref 0–3)
BUN SERPL-MCNC: 35 MG/DL (ref 7–20)
CALCIUM SERPL-MCNC: 8.9 MG/DL (ref 8.5–10.1)
CHLORIDE SERPL-SCNC: 103 MMOL/L (ref 98–107)
CO2 SERPL-SCNC: 41 MMOL/L (ref 21–32)
CREAT SERPL-MCNC: 1.7 MG/DL (ref 0.6–1)
EOSINOPHIL NFR BLD: 0.2 X10^3/UL (ref 0–0.7)
EOSINOPHIL NFR BLD: 4 % (ref 0–3)
ERYTHROCYTE [DISTWIDTH] IN BLOOD BY AUTOMATED COUNT: 14.6 % (ref 11.5–14.5)
GFR SERPLBLD BASED ON 1.73 SQ M-ARVRAT: 29.1 ML/MIN
GLUCOSE SERPL-MCNC: 163 MG/DL (ref 70–99)
HCT VFR BLD CALC: 35.4 % (ref 36–47)
HGB BLD-MCNC: 11.5 G/DL (ref 12–15.5)
LYMPHOCYTES # BLD: 0.7 X10^3/UL (ref 1–4.8)
LYMPHOCYTES NFR BLD AUTO: 13 % (ref 24–48)
MAGNESIUM SERPL-MCNC: 1.4 MG/DL (ref 1.8–2.4)
MCH RBC QN AUTO: 30 PG (ref 25–35)
MCHC RBC AUTO-ENTMCNC: 33 G/DL (ref 31–37)
MCV RBC AUTO: 92 FL (ref 79–100)
MONO #: 0.4 X10^3/UL (ref 0–1.1)
MONOCYTES NFR BLD: 9 % (ref 0–9)
NEUT #: 3.7 X10^3/UL (ref 1.8–7.7)
NEUTROPHILS NFR BLD AUTO: 74 % (ref 31–73)
PLATELET # BLD AUTO: 234 X10^3/UL (ref 140–400)
POTASSIUM SERPL-SCNC: 3.8 MMOL/L (ref 3.5–5.1)
RBC # BLD AUTO: 3.85 X10^6/UL (ref 3.5–5.4)
SODIUM SERPL-SCNC: 145 MMOL/L (ref 136–145)
WBC # BLD AUTO: 5 X10^3/UL (ref 4–11)

## 2021-08-15 RX ADMIN — OXYCODONE HYDROCHLORIDE AND ACETAMINOPHEN SCH MG: 500 TABLET ORAL at 08:45

## 2021-08-15 RX ADMIN — INSULIN LISPRO SCH UNITS: 100 INJECTION, SOLUTION INTRAVENOUS; SUBCUTANEOUS at 12:00

## 2021-08-15 RX ADMIN — CARVEDILOL SCH MG: 6.25 TABLET, FILM COATED ORAL at 08:46

## 2021-08-15 RX ADMIN — INSULIN LISPRO SCH UNITS: 100 INJECTION, SOLUTION INTRAVENOUS; SUBCUTANEOUS at 08:50

## 2021-08-15 RX ADMIN — Medication SCH MG: at 08:45

## 2021-08-15 RX ADMIN — HEPARIN SODIUM SCH UNIT: 5000 INJECTION, SOLUTION INTRAVENOUS; SUBCUTANEOUS at 22:20

## 2021-08-15 RX ADMIN — DILTIAZEM HYDROCHLORIDE PRN MLS/HR: 5 INJECTION INTRAVENOUS at 08:48

## 2021-08-15 RX ADMIN — HEPARIN SODIUM SCH UNIT: 5000 INJECTION, SOLUTION INTRAVENOUS; SUBCUTANEOUS at 08:47

## 2021-08-15 RX ADMIN — INSULIN LISPRO SCH UNITS: 100 INJECTION, SOLUTION INTRAVENOUS; SUBCUTANEOUS at 08:00

## 2021-08-15 RX ADMIN — ASPIRIN SCH MG: 81 TABLET, COATED ORAL at 08:45

## 2021-08-15 RX ADMIN — INSULIN LISPRO SCH UNITS: 100 INJECTION, SOLUTION INTRAVENOUS; SUBCUTANEOUS at 17:49

## 2021-08-15 RX ADMIN — CARVEDILOL SCH MG: 6.25 TABLET, FILM COATED ORAL at 17:34

## 2021-08-15 RX ADMIN — INSULIN GLARGINE SCH UNIT: 100 INJECTION, SOLUTION SUBCUTANEOUS at 22:20

## 2021-08-15 RX ADMIN — POTASSIUM CHLORIDE SCH MEQ: 1500 TABLET, EXTENDED RELEASE ORAL at 08:45

## 2021-08-15 RX ADMIN — SIMVASTATIN SCH MG: 40 TABLET, FILM COATED ORAL at 22:14

## 2021-08-15 RX ADMIN — INSULIN LISPRO SCH UNITS: 100 INJECTION, SOLUTION INTRAVENOUS; SUBCUTANEOUS at 17:48

## 2021-08-15 NOTE — PDOC
[facility] PROGRESS NOTE


Date of Service


DOS:


DATE: 8/15/21 


TIME: 13:38





Chief Complaint


Chief Complaint


Mechanical fall.


Hypertensive urgency.


Cervical spinal stenosis.


Acute volume overload.


Acute on chronic kidney failure, due to possible vasomotor nephropathy.


Left thalamus hypodensity, incidental finding, no further evaluation or 

treatment.


Anemia of chronic kidney disease.


Deconditioning.


Peripheral neuropathy.


Osteoarthritis.


COPD.


DM type 2.


HTN.


Morbid obesity.


Severe protein malnutrition.





History of Present Illness


History of Present Illness


Patient is a 76-year-old female with past medical history of diabetes mellitus 

type 2, hypertension, COPD on home O2, CKD was on dialysis a year ago but taken 

off, morbid obesity, who presents today after falling.  Patient states that she 

was trying to take a shower but she tripped and fell.  She also reports that she

has been getting weaker in the past couple weeks and she has gained about 10 

pounds and feels like she is getting more edematous.  Denies any head trauma or 

loss of consciousness.  However she does complain of mild posterior neck pain 

and also she complains of swelling to the bilateral lower extremities.  Denies 

fevers, shortness of breath, chest pain, abdominal pain, diarrhea or dysuria.  

Patient was given dose of IV Lasix as she does report urinary output but she is 

unsure how much she is putting out because there is a pure wick attached to her.





8/13/21


Patient seen and examined at bedside.


D/W RN.


Chart reviewed.


SpO2 94% on 3 lpm via NC.


U/S found L renal simple cyst.


Patient denies stroke symptoms.


Per neurology, hypodensity was found in L thalamus. No further evaluation or 

treatment necessary.


Patient prefers to have carotid doppler study done.





8/14/21


Patient seen and examined at bedside.


D/W RN.


Dr. Ewing (nephrology) and I plan to follow creatinine levels, continue 

diuretics, and consider dialysis.


Carotid doppler found >70% stenosis of the right proximal internal carotid. 


Plan to consult Dr. Kirkland (vascular surgery).





8/15/21


Patient seen and examined at bedside.


D/W RN.


Per Dr. Kirkland, intervention of carotid stenosis is warranted.





Vitals/I&O


Vitals/I&O:





                                   Vital Signs








  Date Time  Temp Pulse Resp B/P (MAP) Pulse Ox O2 Delivery O2 Flow Rate FiO2


 


8/15/21 11:00 97.7 64 20 167/68 (101) 95 Nasal Cannula 3.0 





 97.7       














                                    I & O   


 


 8/14/21 8/14/21 8/15/21





 15:00 23:00 07:00


 


Intake Total  159 ml 780 ml


 


Output Total 900 ml 700 ml 1600 ml


 


Balance -900 ml -541 ml -820 ml











Physical Exam


General:  Alert, Oriented X3, Cooperative, mild distress


Heart:  Regular rate, Normal S1, Normal S2


Lungs:  Clear


Abdomen:  Normal bowel sounds, Soft, No tenderness


Extremities:  No clubbing, No cyanosis, Other (3+ EDEMA)





Labs


Labs:





Laboratory Tests








Test


 8/14/21


16:54 8/14/21


20:46 8/15/21


06:45 8/15/21


07:41


 


Glucose (Fingerstick)


 54 mg/dL


(70-99) 185 mg/dL


(70-99) 


 140 mg/dL


(70-99)


 


White Blood Count


 


 


 5.0 x10^3/uL


(4.0-11.0) 





 


Red Blood Count


 


 


 3.85 x10^6/uL


(3.50-5.40) 





 


Hemoglobin


 


 


 11.5 g/dL


(12.0-15.5) 





 


Hematocrit


 


 


 35.4 %


(36.0-47.0) 





 


Mean Corpuscular Volume   92 fL ()  


 


Mean Corpuscular Hemoglobin   30 pg (25-35)  


 


Mean Corpuscular Hemoglobin


Concent 


 


 33 g/dL


(31-37) 





 


Red Cell Distribution Width


 


 


 14.6 %


(11.5-14.5) 





 


Platelet Count


 


 


 234 x10^3/uL


(140-400) 





 


Neutrophils (%) (Auto)   74 % (31-73)  


 


Lymphocytes (%) (Auto)   13 % (24-48)  


 


Monocytes (%) (Auto)   9 % (0-9)  


 


Eosinophils (%) (Auto)   4 % (0-3)  


 


Basophils (%) (Auto)   1 % (0-3)  


 


Neutrophils # (Auto)


 


 


 3.7 x10^3/uL


(1.8-7.7) 





 


Lymphocytes # (Auto)


 


 


 0.7 x10^3/uL


(1.0-4.8) 





 


Monocytes # (Auto)


 


 


 0.4 x10^3/uL


(0.0-1.1) 





 


Eosinophils # (Auto)


 


 


 0.2 x10^3/uL


(0.0-0.7) 





 


Basophils # (Auto)


 


 


 0.0 x10^3/uL


(0.0-0.2) 





 


Sodium Level


 


 


 145 mmol/L


(136-145) 





 


Potassium Level


 


 


 3.8 mmol/L


(3.5-5.1) 





 


Chloride Level


 


 


 103 mmol/L


() 





 


Carbon Dioxide Level


 


 


 41 mmol/L


(21-32) 





 


Anion Gap   1 (6-14)  


 


Blood Urea Nitrogen


 


 


 35 mg/dL


(7-20) 





 


Creatinine


 


 


 1.7 mg/dL


(0.6-1.0) 





 


Estimated GFR


(Cockcroft-Gault) 


 


 29.1 


 





 


Glucose Level


 


 


 163 mg/dL


(70-99) 





 


Calcium Level


 


 


 8.9 mg/dL


(8.5-10.1) 





 


Phosphorus Level


 


 


 3.0 mg/dL


(2.6-4.7) 





 


Magnesium Level


 


 


 1.4 mg/dL


(1.8-2.4) 





 


Test


 8/15/21


11:44 


 


 





 


Glucose (Fingerstick)


 135 mg/dL


(70-99) 


 


 














Review of Systems


Review of Systems:


No fever.


No confusion.





Assessment and Plan


Assessmemt and Plan


Problems


Medical Problems:


(1) Acute cervical sprain


Status: Acute  





(2) Acute on chronic renal failure


Status: Acute  





Assessment:


Mechanical fall.


Hypertensive urgency.


Cervical spinal stenosis.


Acute volume overload.


Acute on chronic kidney failure, due to possible vasomotor nephropathy.


Left thalamus hypodensity, incidental finding, no further evaluation or 

treatment.


Anemia of chronic kidney disease.


Deconditioning.


Peripheral neuropathy.


Osteoarthritis.


COPD.


DM type 2.


HTN.


Morbid obesity.


Severe protein malnutrition.





Plan:


Cardiac monitoring.


Wound care.


Trend labs.


Appreciate subspecialist input.


Continue Lasix.


Continue thiazide diuretics.


Avoid nephrotoxic agents.


Magnesium replacement.


Control BP.


Control BG.


Iron supplements.


Home meds.


Aspirin.


Atorvastatin.


PT/OT.


Heparin for DVT prophylaxis.


Protonix GI prophylaxis.


ADA diet.


Full code.








Comment


Review of Relevant


I have reviewed the following items rizwan (where applicable) has been applied.


Medications:





Current Medications








 Medications


  (Trade)  Dose


 Ordered  Sig/Eva


 Route


 PRN Reason  Start Time


 Stop Time Status Last Admin


Dose Admin


 


 Magnesium Sulfate  50 ml @ 25


 mls/hr  1X  ONCE


 IV


   8/15/21 12:00


 8/15/21 13:59  8/15/21 12:00














Justifications for Admission


Other Justification


Fall with acute renal failure











ELMO STEARNS III DO           Aug 15, 2021 13:42

## 2021-08-15 NOTE — PDOC
Renal-Progress Notes


Subjective Notes


Notes


NO NEW COMPLAINTS





History of Present Illness


Hx of present illness


STABLE





Vitals


Vitals





Vital Signs








  Date Time  Temp Pulse Resp B/P (MAP) Pulse Ox O2 Delivery O2 Flow Rate FiO2


 


8/15/21 08:46  70  193/75    


 


8/15/21 07:41 97.7  24  93 Nasal Cannula 3.0 





 97.7       








Weight


Weight [ ]





I.O.


Intake and Output











Intake and Output 


 


 8/15/21





 07:00


 


Intake Total 939 ml


 


Output Total 3200 ml


 


Balance -2261 ml


 


 


 


Intake Oral 780 ml


 


IV Total 159 ml


 


Output Urine Total 3200 ml


 


# Voids 2


 


# Bowel Movements 1











Labs


Labs





Laboratory Tests








Test


 8/14/21


11:28 8/14/21


16:54 8/14/21


20:46 8/15/21


06:45


 


Glucose (Fingerstick)


 149 mg/dL


(70-99) 54 mg/dL


(70-99) 185 mg/dL


(70-99) 





 


White Blood Count


 


 


 


 5.0 x10^3/uL


(4.0-11.0)


 


Red Blood Count


 


 


 


 3.85 x10^6/uL


(3.50-5.40)


 


Hemoglobin


 


 


 


 11.5 g/dL


(12.0-15.5)


 


Hematocrit


 


 


 


 35.4 %


(36.0-47.0)


 


Mean Corpuscular Volume    92 fL () 


 


Mean Corpuscular Hemoglobin    30 pg (25-35) 


 


Mean Corpuscular Hemoglobin


Concent 


 


 


 33 g/dL


(31-37)


 


Red Cell Distribution Width


 


 


 


 14.6 %


(11.5-14.5)


 


Platelet Count


 


 


 


 234 x10^3/uL


(140-400)


 


Neutrophils (%) (Auto)    74 % (31-73) 


 


Lymphocytes (%) (Auto)    13 % (24-48) 


 


Monocytes (%) (Auto)    9 % (0-9) 


 


Eosinophils (%) (Auto)    4 % (0-3) 


 


Basophils (%) (Auto)    1 % (0-3) 


 


Neutrophils # (Auto)


 


 


 


 3.7 x10^3/uL


(1.8-7.7)


 


Lymphocytes # (Auto)


 


 


 


 0.7 x10^3/uL


(1.0-4.8)


 


Monocytes # (Auto)


 


 


 


 0.4 x10^3/uL


(0.0-1.1)


 


Eosinophils # (Auto)


 


 


 


 0.2 x10^3/uL


(0.0-0.7)


 


Basophils # (Auto)


 


 


 


 0.0 x10^3/uL


(0.0-0.2)


 


Sodium Level


 


 


 


 145 mmol/L


(136-145)


 


Potassium Level


 


 


 


 3.8 mmol/L


(3.5-5.1)


 


Chloride Level


 


 


 


 103 mmol/L


()


 


Carbon Dioxide Level


 


 


 


 41 mmol/L


(21-32)


 


Anion Gap    1 (6-14) 


 


Blood Urea Nitrogen


 


 


 


 35 mg/dL


(7-20)


 


Creatinine


 


 


 


 1.7 mg/dL


(0.6-1.0)


 


Estimated GFR


(Cockcroft-Gault) 


 


 


 29.1 





 


Glucose Level


 


 


 


 163 mg/dL


(70-99)


 


Calcium Level


 


 


 


 8.9 mg/dL


(8.5-10.1)


 


Phosphorus Level


 


 


 


 3.0 mg/dL


(2.6-4.7)


 


Magnesium Level


 


 


 


 1.4 mg/dL


(1.8-2.4)


 


Test


 8/15/21


07:41 


 


 





 


Glucose (Fingerstick)


 140 mg/dL


(70-99) 


 


 














Review of Systems


Constitutional:  yes: weakness, alert, oriented


Ears/Nose/Throat:  Yes: no symptom reported


Eyes:  Yes: no symptom reported


Pulmonary:  Yes dyspnea


Cardiovascular:  Yes edema


Gastrointestional:  Yes: constipation


Genitourinary:  Yes: no symptom reported


Musculoskeletal:  Yes: muscle stiffness


Skin:  Yes no symptom reported


Psychiatric/Neurological:  Yes: depressed


Endocrine:  Yes: no symptom reported


Hematologic/Lymphatic:  Yes: no symptom reported





Physical Exam


General Appearance:  no apparent distress, obese, febrile


Skin:  warm, edema


Respiratory:  decreased breath sounds


Heart:  S1S2


Abdomen:  soft, bowel sounds present


Genitourinary:  bladder flat, dunaway catheter


Extremities:  pulses present, edema


Neurology:  alert, oriented, Ext weakness


Musculoskeletal:  Osteoarthritis





Assessment


Assessment


IMP





FALL


ANASARCA


EDEMA


CKD STAGE 3B TO 4-CR OF 1.7


HYPOMAGNESEMIA


MORBID OBESITY


UNCONTROLLED HTN


VOLUME OVERLOAD


CHF - SUSPECT ACUTE ON CHRONIC SYSTOLIC AND DIASTOLIC


HX OF COPD


DM II


PROTEINURIA


DECONDITIONING


ANEMIA OF CKD








PLAN





CONT DIURESIS


LASIX GTT


CONT METOLAZONE


REPLACE MAG


CONTROL BP


CONTROL BG


HOPEFULLY BP IMPROVES WITH DIURESIS


MAY END UP NEEDING DIALYSIS AGAIN IF UNABLE TO DIURESE


FLUID RESTRICT


AVOID NEPHROTOXINS


CONSIDER ECHOCARDIOGRAM


IRON SUPPLEMENTS


ENC COMPLIANCE


D/W ATTENDING











MARCO NATONIO CAMPOS MD                 Aug 15, 2021 10:53

## 2021-08-16 VITALS — SYSTOLIC BLOOD PRESSURE: 138 MMHG | DIASTOLIC BLOOD PRESSURE: 83 MMHG

## 2021-08-16 VITALS — DIASTOLIC BLOOD PRESSURE: 79 MMHG | SYSTOLIC BLOOD PRESSURE: 150 MMHG

## 2021-08-16 VITALS — DIASTOLIC BLOOD PRESSURE: 85 MMHG | SYSTOLIC BLOOD PRESSURE: 133 MMHG

## 2021-08-16 VITALS — DIASTOLIC BLOOD PRESSURE: 68 MMHG | SYSTOLIC BLOOD PRESSURE: 169 MMHG

## 2021-08-16 VITALS — SYSTOLIC BLOOD PRESSURE: 178 MMHG | DIASTOLIC BLOOD PRESSURE: 61 MMHG

## 2021-08-16 LAB
ANION GAP SERPL CALC-SCNC: (no result) MMOL/L (ref 6–14)
BUN SERPL-MCNC: 29 MG/DL (ref 7–20)
CALCIUM SERPL-MCNC: 9.4 MG/DL (ref 8.5–10.1)
CHLORIDE SERPL-SCNC: 100 MMOL/L (ref 98–107)
CO2 SERPL-SCNC: > 45 MMOL/L (ref 21–32)
CREAT SERPL-MCNC: 1.5 MG/DL (ref 0.6–1)
GFR SERPLBLD BASED ON 1.73 SQ M-ARVRAT: 33.6 ML/MIN
GLUCOSE SERPL-MCNC: 77 MG/DL (ref 70–99)
MAGNESIUM SERPL-MCNC: 1.5 MG/DL (ref 1.8–2.4)
PHOSPHATE SERPL-MCNC: 3.2 MG/DL (ref 2.6–4.7)
POTASSIUM SERPL-SCNC: 3.2 MMOL/L (ref 3.5–5.1)
SODIUM SERPL-SCNC: 148 MMOL/L (ref 136–145)

## 2021-08-16 RX ADMIN — DILTIAZEM HYDROCHLORIDE PRN MLS/HR: 5 INJECTION INTRAVENOUS at 03:33

## 2021-08-16 RX ADMIN — HEPARIN SODIUM SCH UNIT: 5000 INJECTION, SOLUTION INTRAVENOUS; SUBCUTANEOUS at 08:39

## 2021-08-16 RX ADMIN — INSULIN LISPRO SCH UNITS: 100 INJECTION, SOLUTION INTRAVENOUS; SUBCUTANEOUS at 08:00

## 2021-08-16 RX ADMIN — ACETAMINOPHEN PRN MG: 325 TABLET, FILM COATED ORAL at 21:45

## 2021-08-16 RX ADMIN — HEPARIN SODIUM SCH UNIT: 5000 INJECTION, SOLUTION INTRAVENOUS; SUBCUTANEOUS at 21:46

## 2021-08-16 RX ADMIN — Medication SCH MG: at 08:34

## 2021-08-16 RX ADMIN — SIMVASTATIN SCH MG: 40 TABLET, FILM COATED ORAL at 21:45

## 2021-08-16 RX ADMIN — POTASSIUM CHLORIDE SCH MEQ: 1500 TABLET, EXTENDED RELEASE ORAL at 08:34

## 2021-08-16 RX ADMIN — CARVEDILOL SCH MG: 6.25 TABLET, FILM COATED ORAL at 17:10

## 2021-08-16 RX ADMIN — OXYCODONE HYDROCHLORIDE AND ACETAMINOPHEN SCH MG: 500 TABLET ORAL at 08:34

## 2021-08-16 RX ADMIN — CARVEDILOL SCH MG: 6.25 TABLET, FILM COATED ORAL at 08:33

## 2021-08-16 RX ADMIN — DILTIAZEM HYDROCHLORIDE PRN MLS/HR: 5 INJECTION INTRAVENOUS at 21:52

## 2021-08-16 RX ADMIN — INSULIN LISPRO SCH UNITS: 100 INJECTION, SOLUTION INTRAVENOUS; SUBCUTANEOUS at 12:00

## 2021-08-16 RX ADMIN — INSULIN LISPRO SCH UNITS: 100 INJECTION, SOLUTION INTRAVENOUS; SUBCUTANEOUS at 17:17

## 2021-08-16 RX ADMIN — ASPIRIN SCH MG: 81 TABLET, COATED ORAL at 08:34

## 2021-08-16 NOTE — PDOC
PROGRESS NOTES


Date of Service


DATE: 8/16/21 


TIME: 09:56


Assessment


Problems


Medical Problems:


(1) Acute cervical sprain


Status: Acute  





(2) Acute on chronic renal failure


Status: Acute  





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 

evaluation or treatment.  Specifically the patient denies any history of stroke 

or transient ischemic attack symptoms


Mechanical fall


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


Carotid artery stenosis on the right, per Dr. Kirkland, intervention is NOT 

warranted--I agree


Plan


Aspirin and statin


SNU


PT/OT


Subjective


no complaints


Objective





Vital Signs








  Date Time  Temp Pulse Resp B/P (MAP) Pulse Ox O2 Delivery O2 Flow Rate FiO2


 


8/16/21 08:33  74  178/61    


 


8/16/21 07:00 96.4  20  96 Nasal Cannula 4.0 





 96.4       














Intake and Output 


 


 8/16/21





 07:00


 


Intake Total 1210 ml


 


Output Total 3375 ml


 


Balance -2165 ml


 


 


 


Intake Oral 1210 ml


 


Output Urine Total 3375 ml


 


# Voids 3








PHYSICAL EXAM


Alert. Oriented to time, place and person.


PERRL.


EOMI.


CN: no focal findings.


Muscle tone: normal.


Muscle strength: 4/5


DTR: 0-1+


Plantar reflex: flexor


Gait: not examined in bed.


Sensory exam: bilateral stocking loss.


No cerebellar signs elicited.


Review of Relevant


I have reviewed the following items rizwan (where applicable) has been applied.


Labs





Laboratory Tests








Test


 8/14/21


11:28 8/14/21


16:54 8/14/21


20:46 8/15/21


06:45


 


Glucose (Fingerstick)


 149 mg/dL


(70-99) 54 mg/dL


(70-99) 185 mg/dL


(70-99) 





 


White Blood Count


 


 


 


 5.0 x10^3/uL


(4.0-11.0)


 


Red Blood Count


 


 


 


 3.85 x10^6/uL


(3.50-5.40)


 


Hemoglobin


 


 


 


 11.5 g/dL


(12.0-15.5)


 


Hematocrit


 


 


 


 35.4 %


(36.0-47.0)


 


Mean Corpuscular Volume    92 fL () 


 


Mean Corpuscular Hemoglobin    30 pg (25-35) 


 


Mean Corpuscular Hemoglobin


Concent 


 


 


 33 g/dL


(31-37)


 


Red Cell Distribution Width


 


 


 


 14.6 %


(11.5-14.5)


 


Platelet Count


 


 


 


 234 x10^3/uL


(140-400)


 


Neutrophils (%) (Auto)    74 % (31-73) 


 


Lymphocytes (%) (Auto)    13 % (24-48) 


 


Monocytes (%) (Auto)    9 % (0-9) 


 


Eosinophils (%) (Auto)    4 % (0-3) 


 


Basophils (%) (Auto)    1 % (0-3) 


 


Neutrophils # (Auto)


 


 


 


 3.7 x10^3/uL


(1.8-7.7)


 


Lymphocytes # (Auto)


 


 


 


 0.7 x10^3/uL


(1.0-4.8)


 


Monocytes # (Auto)


 


 


 


 0.4 x10^3/uL


(0.0-1.1)


 


Eosinophils # (Auto)


 


 


 


 0.2 x10^3/uL


(0.0-0.7)


 


Basophils # (Auto)


 


 


 


 0.0 x10^3/uL


(0.0-0.2)


 


Sodium Level


 


 


 


 145 mmol/L


(136-145)


 


Potassium Level


 


 


 


 3.8 mmol/L


(3.5-5.1)


 


Chloride Level


 


 


 


 103 mmol/L


()


 


Carbon Dioxide Level


 


 


 


 41 mmol/L


(21-32)


 


Anion Gap    1 (6-14) 


 


Blood Urea Nitrogen


 


 


 


 35 mg/dL


(7-20)


 


Creatinine


 


 


 


 1.7 mg/dL


(0.6-1.0)


 


Estimated GFR


(Cockcroft-Gault) 


 


 


 29.1 





 


Glucose Level


 


 


 


 163 mg/dL


(70-99)


 


Calcium Level


 


 


 


 8.9 mg/dL


(8.5-10.1)


 


Phosphorus Level


 


 


 


 3.0 mg/dL


(2.6-4.7)


 


Magnesium Level


 


 


 


 1.4 mg/dL


(1.8-2.4)


 


Test


 8/15/21


07:41 8/15/21


11:44 8/15/21


17:08 8/15/21


21:34


 


Glucose (Fingerstick)


 140 mg/dL


(70-99) 135 mg/dL


(70-99) 188 mg/dL


(70-99) 177 mg/dL


(70-99)


 


Test


 8/16/21


07:05 8/16/21


08:30 


 





 


Sodium Level


 148 mmol/L


(136-145) 


 


 





 


Potassium Level


 3.2 mmol/L


(3.5-5.1) 


 


 





 


Chloride Level


 100 mmol/L


() 


 


 





 


Carbon Dioxide Level


 > 45 mmol/L


(21-32) 


 


 





 


Anion Gap  (6-14)    


 


Blood Urea Nitrogen


 29 mg/dL


(7-20) 


 


 





 


Creatinine


 1.5 mg/dL


(0.6-1.0) 


 


 





 


Estimated GFR


(Cockcroft-Gault) 33.6 


 


 


 





 


Glucose Level


 77 mg/dL


(70-99) 


 


 





 


Calcium Level


 9.4 mg/dL


(8.5-10.1) 


 


 





 


Phosphorus Level


 3.2 mg/dL


(2.6-4.7) 


 


 





 


Magnesium Level


 1.5 mg/dL


(1.8-2.4) 


 


 





 


Glucose (Fingerstick)


 


 74 mg/dL


(70-99) 


 











Laboratory Tests








Test


 8/15/21


11:44 8/15/21


17:08 8/15/21


21:34 8/16/21


07:05


 


Glucose (Fingerstick)


 135 mg/dL


(70-99) 188 mg/dL


(70-99) 177 mg/dL


(70-99) 





 


Sodium Level


 


 


 


 148 mmol/L


(136-145)


 


Potassium Level


 


 


 


 3.2 mmol/L


(3.5-5.1)


 


Chloride Level


 


 


 


 100 mmol/L


()


 


Carbon Dioxide Level


 


 


 


 > 45 mmol/L


(21-32)


 


Anion Gap     (6-14) 


 


Blood Urea Nitrogen


 


 


 


 29 mg/dL


(7-20)


 


Creatinine


 


 


 


 1.5 mg/dL


(0.6-1.0)


 


Estimated GFR


(Cockcroft-Gault) 


 


 


 33.6 





 


Glucose Level


 


 


 


 77 mg/dL


(70-99)


 


Calcium Level


 


 


 


 9.4 mg/dL


(8.5-10.1)


 


Phosphorus Level


 


 


 


 3.2 mg/dL


(2.6-4.7)


 


Magnesium Level


 


 


 


 1.5 mg/dL


(1.8-2.4)


 


Test


 8/16/21


08:30 


 


 





 


Glucose (Fingerstick)


 74 mg/dL


(70-99) 


 


 











Medications





Current Medications


Ondansetron HCl (Zofran) 4 mg PRN Q8HRS  PRN IVP NAUSEA/VOMITING;  Start 8/11/21

at 20:30;  Stop 8/12/21 at 18:37;  Status DC


Morphine Sulfate (Morphine Sulfate) 2 mg PRN Q2HR  PRN IVP PAIN;  Start 8/11/21 

at 20:30;  Stop 8/12/21 at 20:29;  Status DC


Acetaminophen (Tylenol) 650 mg PRN Q4HRS  PRN PO FEVER > 100.3'F;  Start 8/11/21

at 20:30;  Stop 8/12/21 at 18:36;  Status DC


Furosemide (Lasix) 40 mg 1X  ONCE IVP  Last administered on 8/11/21at 23:02;  

Start 8/11/21 at 21:00;  Stop 8/11/21 at 21:01;  Status DC


Ascorbic Acid (Vitamin C) 500 mg DAILY PO  Last administered on 8/16/21at 08:34;

 Start 8/12/21 at 09:00


Aspirin (Ecotrin) 81 mg DAILYWBKFT PO  Last administered on 8/16/21at 08:34;  

Start 8/12/21 at 09:00


Carvedilol (Coreg) 6.25 mg BIDWMEALS PO  Last administered on 8/16/21at 08:33;  

Start 8/12/21 at 09:00


Insulin Glargine (Lantus Syringe) 38 unit HS SQ  Last administered on 8/15/21at 

22:20;  Start 8/12/21 at 21:00


Insulin Human Lispro (HumaLOG) 13 units TIDWMEALS SQ  Last administered on 

8/15/21at 17:48;  Start 8/12/21 at 09:00


Simvastatin (Zocor) 40 mg QHS PO  Last administered on 8/15/21at 22:14;  Start 

8/12/21 at 21:00


Insulin Human Lispro (HumaLOG) 0-7 UNITS TIDWMEALS SQ  Last administered on 

8/15/21at 17:49;  Start 8/12/21 at 12:00


Dextrose (Dextrose 50%-Water Syringe) 12.5 gm PRN Q15MIN  PRN IV SEE COMMENTS;  

Start 8/12/21 at 08:30


Sennosides (Senna) 17.2 mg PRN BID  PRN PO CONSTIPATION;  Start 8/12/21 at 08:30


Docusate Sodium (Colace) 100 mg PRN DAILY  PRN PO HARD STOOLS Last administered 

on 8/13/21at 09:17;  Start 8/12/21 at 08:30


Ondansetron HCl (Zofran) 4 mg PRN Q6HRS  PRN IVP NAUSEA/VOMITING, 1st CHOICE 

Last administered on 8/13/21at 09:19;  Start 8/12/21 at 08:30


Dextrose (Dextrose 50%-Water Syringe) 12.5 gm PRN Q15MIN  PRN IV SEE COMMENTS;  

Start 8/12/21 at 08:30;  Status Cancel


Acetaminophen (Tylenol) 650 mg PRN Q4HRS  PRN PO TEMP OVER 100.4F OR MILD PAIN 

Last administered on 8/13/21at 12:47;  Start 8/12/21 at 08:30


Heparin Sodium (Porcine) (Heparin Sodium) 5,000 unit Q12HR SQ  Last administered

on 8/16/21at 08:39;  Start 8/12/21 at 09:00


Prochlorperazine Edisylate (Compazine) 10 mg PRN Q6HRS  PRN IV NAUSEA/VOMITING, 

2ND CHOICE;  Start 8/12/21 at 08:30


Furosemide (Lasix) 80 mg 1X  ONCE IVP  Last administered on 8/12/21at 11:47;  

Start 8/12/21 at 10:15;  Stop 8/12/21 at 10:18;  Status DC


Furosemide 100 mg/ Sodium Chloride 100 ml @ 5 mls/hr CONT  PRN IV SEE I/O RECORD

Last administered on 8/16/21at 03:33;  Start 8/12/21 at 11:00


Potassium Chloride (Klor-Con) 40 meq 1X  ONCE PO  Last administered on 8/12/21at

11:46;  Start 8/12/21 at 10:15;  Stop 8/12/21 at 10:18;  Status DC


Potassium Chloride (Klor-Con) 20 meq DAILYWBKFT PO  Last administered on 

8/16/21at 08:34;  Start 8/13/21 at 12:00


Polysaccharide Iron Complex (Niferex 150) 150 mg DAILY PO  Last administered on 

8/16/21at 08:34;  Start 8/13/21 at 12:00


Metolazone (Zaroxolyn) 5 mg DAILY PO  Last administered on 8/16/21at 08:34;  

Start 8/14/21 at 12:00


Magnesium Sulfate 50 ml @ 25 mls/hr 1X  ONCE IV  Last administered on 8/15/21at 

12:00;  Start 8/15/21 at 12:00;  Stop 8/15/21 at 13:59;  Status DC





Active Scripts


Active


Thera-M Tablet (Multivits,Ca,Minerals/Iron/Fa) 1 Each Tablet 1 Tab PO DAILY 30 

Days


Vitamin C (Ascorbic Acid) 500 Mg Tablet 500 Mg PO DAILY 30 Days


Culturelle (Lactobacillus Rhamnosus Gg) 1 Each Cap.sprink 1 Cap PO BID 30 Days


Aspirin Ec (Aspirin) 81 Mg Tablet.dr 81 Mg PO DAILYWBKFT 30 Days


Proair Hfa (Albuterol Sulfate) 8.5 Gm Hfa.aer.ad 2.5 Mg NEB PRN Q4HRS PRN 30 

Days


Amox Tr-K Clv 500-125 Mg Tab (Amoxicillin/Potassium Clav) 1 Each Tablet 1 Tab PO

DAILY 3 Days


Reported


Lantus (Insulin Glargine,Hum.rec.anlog) 100 Unit/1 Ml Vial 38 Units SQ HS


Insulin Lispro 100 Unit/1 Ml Vial 13 Units SQ TIDAC


Xalatan (Latanoprost) 2.5 Ml Drops 1 Drop OP HS


Flonase Allergy Relief (Fluticasone Propionate) 9.9 Ml Spray.susp 2 Sprays NS 

DAILY


Novolin N (Nph, Human Insulin Isophane) 100 Unit/1 Ml Vial 40 Unit SQ DAILYWBKFT


Carvedilol ** (Carvedilol) 6.25 Mg Tablet 6.25 Mg PO BIDWMEALS


Simvastatin 40 Mg Tablet 1 Tab PO QHS


Furosemide 20 Mg Tablet 1 Tab PO DAILY


Vitals/I & O





Vital Sign - Last 24 Hours








 8/15/21 8/15/21 8/15/21 8/15/21





 11:00 15:00 17:34 19:00


 


Temp 97.7 97.8  98.1





 97.7 97.8  98.1


 


Pulse 64 72 72 71


 


Resp 20 20  19


 


B/P (MAP) 167/68 (101) 184/75 (111) 184/75 169/61 (97)


 


Pulse Ox 95 95  96


 


O2 Delivery Nasal Cannula Nasal Cannula  Nasal Cannula


 


O2 Flow Rate 3.0 5.0  5.0


 


    





    





 8/15/21 8/15/21 8/16/21 8/16/21





 19:40 23:00 02:38 07:00


 


Temp  98.0 98.3 96.4





  98.0 98.3 96.4


 


Pulse  74 74 80


 


Resp  19 18 20


 


B/P (MAP)  156/71 (99) 178/61 (100) 150/79 (102)


 


Pulse Ox  98 95 96


 


O2 Delivery Nasal Cannula Nasal Cannula Nasal Cannula Nasal Cannula


 


O2 Flow Rate 3.5 5.0 5.0 4.0


 


    





    





 8/16/21   





 08:33   


 


Pulse 74   


 


B/P (MAP) 178/61   














Intake and Output   


 


 8/15/21 8/15/21 8/16/21





 15:00 23:00 07:00


 


Intake Total 660 ml 350 ml 200 ml


 


Output Total 1500 ml  1875 ml


 


Balance -840 ml 350 ml -1675 ml











Justicifation of Admission Dx:


Justifications for Admission:


Justification of Admission Dx:  N/A











MILAGROS MURRY MD           Aug 16, 2021 09:58

## 2021-08-16 NOTE — NUR
SS following for discharge planning. SS reviewed pt chart and discussed with pt RN. Pt is 
currently requiring oxygen at five liters nasal canula. COVID19 negative. Pt on Lasix drip. 
PT/OT recommended skilled nursing unit. Pt accepted at [facility], [phone]; fax 
[phone]. Insurance authorization received. Nephrology not signed off at this time. 
Probable discharge to [facility] tomorrow. SS will continue to follow for discharge 
planning.

## 2021-08-16 NOTE — PDOC
PROGRESS NOTES


Date of Service:


DATE: 8/16/21 


TIME: 09:54





Chief Complaint


Chief Complaint


Mechanical fall.


Hypertensive urgency.


Cervical spinal stenosis.


Acute volume overload.


Acute on chronic kidney failure, due to possible vasomotor nephropathy.


Left thalamus hypodensity, incidental finding, no further evaluation or 

treatment.


Anemia of chronic kidney disease.


Deconditioning.


Peripheral neuropathy.


Osteoarthritis.


COPD.


DM type 2.


HTN.


Morbid obesity.


Severe protein malnutrition.





History of Present Illness


History of Present Illness


Patient is a 76-year-old female with past medical history of diabetes mellitus 

type 2, hypertension, COPD on home O2, CKD was on dialysis a year ago but taken 

off, morbid obesity, who presents today after falling.  Patient states that she 

was trying to take a shower but she tripped and fell.  She also reports that she

has been getting weaker in the past couple weeks and she has gained about 10 

pounds and feels like she is getting more edematous.  Denies any head trauma or 

loss of consciousness.  However she does complain of mild posterior neck pain 

and also she complains of swelling to the bilateral lower extremities.  Denies f

mica, shortness of breath, chest pain, abdominal pain, diarrhea or dysuria.  

Patient was given dose of IV Lasix as she does report urinary output but she is 

unsure how much she is putting out because there is a pure wick attached to her.





8/13/21


Patient seen and examined at bedside.


D/W RN.


Chart reviewed.


SpO2 94% on 3 lpm via NC.


U/S found L renal simple cyst.


Patient denies stroke symptoms.


Per neurology, hypodensity was found in L thalamus. No further evaluation or 

treatment necessary.


Patient prefers to have carotid doppler study done.





8/14/21


Patient seen and examined at bedside.


D/W RN.


Dr. Ewing (nephrology) and I plan to follow creatinine levels, continue 

diuretics, and consider dialysis.


Carotid doppler found >70% stenosis of the right proximal internal carotid. 


Plan to consult Dr. Kirkland (vascular surgery).





8/15/21


Patient seen and examined at bedside.


D/W RN.


Per Dr. Kirkland, intervention of carotid stenosis is warranted.





Vitals


Vitals





Vital Signs








  Date Time  Temp Pulse Resp B/P (MAP) Pulse Ox O2 Delivery O2 Flow Rate FiO2


 


8/16/21 08:33  74  178/61    


 


8/16/21 07:00 96.4  20  96 Nasal Cannula 4.0 





 96.4       











Physical Exam


General:  Alert, Oriented X3, Cooperative, mild distress


Heart:  Regular rate, Normal S1, Normal S2


Lungs:  Clear


Abdomen:  Normal bowel sounds, Soft, No tenderness


Extremities:  No clubbing, No cyanosis, Other (3+ EDEMA)





Labs


LABS





try tothink and talk about your own wishes for healthcare in case youre ever 

not able to tell your loved ones or healthcare team what your wishes are. If you

became really sick tomorrow, would your loved ones or healthcare team know what 

your wishes were? Here are some examples of different sets of goals and health 

care directives for your conversations:  My wish is to use all medical 

therapies including resuscitation (such as CPR) and artificial life-sustaining 

treatments (such as machines and medicine) in an intensive care unit, to keep me

alive if at all possible.  My wish is to live as long as possible, but I dont 

want attempts to bring me back to life if my heart and breathing stop. I would 

like full medical care but without using resuscitation or artificial life-

sustaining intensive treatments, if these are unlikely to make me live longer or

restore me to a certain quality of life.  I will accept treatments that try to 

fix medical problems, but if Im not getting better or going to have a certain 

quality of life, I would want to switch to focusing only on my comfort and 

letting my death happen naturally.  My wish is for healthcare to focus on my 

comfort and lessen suffering. I would like medical care that focuses only on my 

quality of life and that allows me to die naturally. Consider:  What does a 

good quality of life mean for me? For many people, it is the ability to live 

independently and tell their own story. I may define it differently.  Under 

what circumstances would I not want to be kept alive by medical treatments, 

resuscitation, or intensive care?  What kind of changes to my health or life 

might make me change my mind?  If I clearly am facing the last chapter of my 

life, how do I want the story to end?  Who do I want to speak for me if I cant

speak for myself? Do they understand my preferences? Are they willing to assume 

the role of my Durable Power of ? Can I change my Goals of Care 

Designation? Yes, your Goals of Care Designation can be changed at any time. It 

should be reviewed if:  your health condition changes  your circumstances 

change (such as new understanding)  you are transferred or admitted to another 

healthcare setting





dpoa review, to pt portal 17  min and question review








Laboratory Tests








Test


 8/15/21


11:44 8/15/21


17:08 8/15/21


21:34 8/16/21


07:05


 


Glucose (Fingerstick)


 135 mg/dL


(70-99) 188 mg/dL


(70-99) 177 mg/dL


(70-99) 





 


Sodium Level


 


 


 


 148 mmol/L


(136-145)


 


Potassium Level


 


 


 


 3.2 mmol/L


(3.5-5.1)


 


Chloride Level


 


 


 


 100 mmol/L


()


 


Carbon Dioxide Level


 


 


 


 > 45 mmol/L


(21-32)


 


Anion Gap     (6-14) 


 


Blood Urea Nitrogen


 


 


 


 29 mg/dL


(7-20)


 


Creatinine


 


 


 


 1.5 mg/dL


(0.6-1.0)


 


Estimated GFR


(Cockcroft-Gault) 


 


 


 33.6 





 


Glucose Level


 


 


 


 77 mg/dL


(70-99)


 


Calcium Level


 


 


 


 9.4 mg/dL


(8.5-10.1)


 


Phosphorus Level


 


 


 


 3.2 mg/dL


(2.6-4.7)


 


Magnesium Level


 


 


 


 1.5 mg/dL


(1.8-2.4)


 


Test


 8/16/21


08:30 


 


 





 


Glucose (Fingerstick)


 74 mg/dL


(70-99) 


 


 














Assessment and Plan


Assessmemt and Plan


Problems


Medical Problems:


(1) Acute cervical sprain


Status: Acute  





(2) Acute on chronic renal failure


Status: Acute  











Comment


Review of Relevant


I have reviewed the following items rizwan (where applicable) has been applied.


Labs





Laboratory Tests








Test


 8/14/21


11:28 8/14/21


16:54 8/14/21


20:46 8/15/21


06:45


 


Glucose (Fingerstick)


 149 mg/dL


(70-99) 54 mg/dL


(70-99) 185 mg/dL


(70-99) 





 


White Blood Count


 


 


 


 5.0 x10^3/uL


(4.0-11.0)


 


Red Blood Count


 


 


 


 3.85 x10^6/uL


(3.50-5.40)


 


Hemoglobin


 


 


 


 11.5 g/dL


(12.0-15.5)


 


Hematocrit


 


 


 


 35.4 %


(36.0-47.0)


 


Mean Corpuscular Volume    92 fL () 


 


Mean Corpuscular Hemoglobin    30 pg (25-35) 


 


Mean Corpuscular Hemoglobin


Concent 


 


 


 33 g/dL


(31-37)


 


Red Cell Distribution Width


 


 


 


 14.6 %


(11.5-14.5)


 


Platelet Count


 


 


 


 234 x10^3/uL


(140-400)


 


Neutrophils (%) (Auto)    74 % (31-73) 


 


Lymphocytes (%) (Auto)    13 % (24-48) 


 


Monocytes (%) (Auto)    9 % (0-9) 


 


Eosinophils (%) (Auto)    4 % (0-3) 


 


Basophils (%) (Auto)    1 % (0-3) 


 


Neutrophils # (Auto)


 


 


 


 3.7 x10^3/uL


(1.8-7.7)


 


Lymphocytes # (Auto)


 


 


 


 0.7 x10^3/uL


(1.0-4.8)


 


Monocytes # (Auto)


 


 


 


 0.4 x10^3/uL


(0.0-1.1)


 


Eosinophils # (Auto)


 


 


 


 0.2 x10^3/uL


(0.0-0.7)


 


Basophils # (Auto)


 


 


 


 0.0 x10^3/uL


(0.0-0.2)


 


Sodium Level


 


 


 


 145 mmol/L


(136-145)


 


Potassium Level


 


 


 


 3.8 mmol/L


(3.5-5.1)


 


Chloride Level


 


 


 


 103 mmol/L


()


 


Carbon Dioxide Level


 


 


 


 41 mmol/L


(21-32)


 


Anion Gap    1 (6-14) 


 


Blood Urea Nitrogen


 


 


 


 35 mg/dL


(7-20)


 


Creatinine


 


 


 


 1.7 mg/dL


(0.6-1.0)


 


Estimated GFR


(Cockcroft-Gault) 


 


 


 29.1 





 


Glucose Level


 


 


 


 163 mg/dL


(70-99)


 


Calcium Level


 


 


 


 8.9 mg/dL


(8.5-10.1)


 


Phosphorus Level


 


 


 


 3.0 mg/dL


(2.6-4.7)


 


Magnesium Level


 


 


 


 1.4 mg/dL


(1.8-2.4)


 


Test


 8/15/21


07:41 8/15/21


11:44 8/15/21


17:08 8/15/21


21:34


 


Glucose (Fingerstick)


 140 mg/dL


(70-99) 135 mg/dL


(70-99) 188 mg/dL


(70-99) 177 mg/dL


(70-99)


 


Test


 8/16/21


07:05 8/16/21


08:30 


 





 


Sodium Level


 148 mmol/L


(136-145) 


 


 





 


Potassium Level


 3.2 mmol/L


(3.5-5.1) 


 


 





 


Chloride Level


 100 mmol/L


() 


 


 





 


Carbon Dioxide Level


 > 45 mmol/L


(21-32) 


 


 





 


Anion Gap  (6-14)    


 


Blood Urea Nitrogen


 29 mg/dL


(7-20) 


 


 





 


Creatinine


 1.5 mg/dL


(0.6-1.0) 


 


 





 


Estimated GFR


(Cockcroft-Gault) 33.6 


 


 


 





 


Glucose Level


 77 mg/dL


(70-99) 


 


 





 


Calcium Level


 9.4 mg/dL


(8.5-10.1) 


 


 





 


Phosphorus Level


 3.2 mg/dL


(2.6-4.7) 


 


 





 


Magnesium Level


 1.5 mg/dL


(1.8-2.4) 


 


 





 


Glucose (Fingerstick)


 


 74 mg/dL


(70-99) 


 











Laboratory Tests








Test


 8/15/21


11:44 8/15/21


17:08 8/15/21


21:34 8/16/21


07:05


 


Glucose (Fingerstick)


 135 mg/dL


(70-99) 188 mg/dL


(70-99) 177 mg/dL


(70-99) 





 


Sodium Level


 


 


 


 148 mmol/L


(136-145)


 


Potassium Level


 


 


 


 3.2 mmol/L


(3.5-5.1)


 


Chloride Level


 


 


 


 100 mmol/L


()


 


Carbon Dioxide Level


 


 


 


 > 45 mmol/L


(21-32)


 


Anion Gap     (6-14) 


 


Blood Urea Nitrogen


 


 


 


 29 mg/dL


(7-20)


 


Creatinine


 


 


 


 1.5 mg/dL


(0.6-1.0)


 


Estimated GFR


(Cockcroft-Gault) 


 


 


 33.6 





 


Glucose Level


 


 


 


 77 mg/dL


(70-99)


 


Calcium Level


 


 


 


 9.4 mg/dL


(8.5-10.1)


 


Phosphorus Level


 


 


 


 3.2 mg/dL


(2.6-4.7)


 


Magnesium Level


 


 


 


 1.5 mg/dL


(1.8-2.4)


 


Test


 8/16/21


08:30 


 


 





 


Glucose (Fingerstick)


 74 mg/dL


(70-99) 


 


 











Medications





Current Medications


Ondansetron HCl (Zofran) 4 mg PRN Q8HRS  PRN IVP NAUSEA/VOMITING;  Start 8/11/21

at 20:30;  Stop 8/12/21 at 18:37;  Status DC


Morphine Sulfate (Morphine Sulfate) 2 mg PRN Q2HR  PRN IVP PAIN;  Start 8/11/21 

at 20:30;  Stop 8/12/21 at 20:29;  Status DC


Acetaminophen (Tylenol) 650 mg PRN Q4HRS  PRN PO FEVER > 100.3'F;  Start 8/11/21

at 20:30;  Stop 8/12/21 at 18:36;  Status DC


Furosemide (Lasix) 40 mg 1X  ONCE IVP  Last administered on 8/11/21at 23:02;  

Start 8/11/21 at 21:00;  Stop 8/11/21 at 21:01;  Status DC


Ascorbic Acid (Vitamin C) 500 mg DAILY PO  Last administered on 8/16/21at 08:34;

 Start 8/12/21 at 09:00


Aspirin (Ecotrin) 81 mg DAILYWBKFT PO  Last administered on 8/16/21at 08:34;  

Start 8/12/21 at 09:00


Carvedilol (Coreg) 6.25 mg BIDWMEALS PO  Last administered on 8/16/21at 08:33;  

Start 8/12/21 at 09:00


Insulin Glargine (Lantus Syringe) 38 unit HS SQ  Last administered on 8/15/21at 

22:20;  Start 8/12/21 at 21:00


Insulin Human Lispro (HumaLOG) 13 units TIDWMEALS SQ  Last administered on 

8/15/21at 17:48;  Start 8/12/21 at 09:00


Simvastatin (Zocor) 40 mg QHS PO  Last administered on 8/15/21at 22:14;  Start 

8/12/21 at 21:00


Insulin Human Lispro (HumaLOG) 0-7 UNITS TIDWMEALS SQ  Last administered on 

8/15/21at 17:49;  Start 8/12/21 at 12:00


Dextrose (Dextrose 50%-Water Syringe) 12.5 gm PRN Q15MIN  PRN IV SEE COMMENTS;  

Start 8/12/21 at 08:30


Sennosides (Senna) 17.2 mg PRN BID  PRN PO CONSTIPATION;  Start 8/12/21 at 08:30


Docusate Sodium (Colace) 100 mg PRN DAILY  PRN PO HARD STOOLS Last administered 

on 8/13/21at 09:17;  Start 8/12/21 at 08:30


Ondansetron HCl (Zofran) 4 mg PRN Q6HRS  PRN IVP NAUSEA/VOMITING, 1st CHOICE 

Last administered on 8/13/21at 09:19;  Start 8/12/21 at 08:30


Dextrose (Dextrose 50%-Water Syringe) 12.5 gm PRN Q15MIN  PRN IV SEE COMMENTS;  

Start 8/12/21 at 08:30;  Status Cancel


Acetaminophen (Tylenol) 650 mg PRN Q4HRS  PRN PO TEMP OVER 100.4F OR MILD PAIN 

Last administered on 8/13/21at 12:47;  Start 8/12/21 at 08:30


Heparin Sodium (Porcine) (Heparin Sodium) 5,000 unit Q12HR SQ  Last administered

on 8/16/21at 08:39;  Start 8/12/21 at 09:00


Prochlorperazine Edisylate (Compazine) 10 mg PRN Q6HRS  PRN IV NAUSEA/VOMITING, 

2ND CHOICE;  Start 8/12/21 at 08:30


Furosemide (Lasix) 80 mg 1X  ONCE IVP  Last administered on 8/12/21at 11:47;  

Start 8/12/21 at 10:15;  Stop 8/12/21 at 10:18;  Status DC


Furosemide 100 mg/ Sodium Chloride 100 ml @ 5 mls/hr CONT  PRN IV SEE I/O RECORD

Last administered on 8/16/21at 03:33;  Start 8/12/21 at 11:00


Potassium Chloride (Klor-Con) 40 meq 1X  ONCE PO  Last administered on 8/12/21at

11:46;  Start 8/12/21 at 10:15;  Stop 8/12/21 at 10:18;  Status DC


Potassium Chloride (Klor-Con) 20 meq DAILYWBKFT PO  Last administered on 

8/16/21at 08:34;  Start 8/13/21 at 12:00


Polysaccharide Iron Complex (Niferex 150) 150 mg DAILY PO  Last administered on 

8/16/21at 08:34;  Start 8/13/21 at 12:00


Metolazone (Zaroxolyn) 5 mg DAILY PO  Last administered on 8/16/21at 08:34;  

Start 8/14/21 at 12:00


Magnesium Sulfate 50 ml @ 25 mls/hr 1X  ONCE IV  Last administered on 8/15/21at 

12:00;  Start 8/15/21 at 12:00;  Stop 8/15/21 at 13:59;  Status DC





Active Scripts


Active


Thera-M Tablet (Multivits,Ca,Minerals/Iron/Fa) 1 Each Tablet 1 Tab PO DAILY 30 

Days


Vitamin C (Ascorbic Acid) 500 Mg Tablet 500 Mg PO DAILY 30 Days


Culturelle (Lactobacillus Rhamnosus Gg) 1 Each Cap.sprink 1 Cap PO BID 30 Days


Aspirin Ec (Aspirin) 81 Mg Tablet.dr 81 Mg PO DAILYWBKFT 30 Days


Proair Hfa (Albuterol Sulfate) 8.5 Gm Hfa.aer.ad 2.5 Mg NEB PRN Q4HRS PRN 30 

Days


Amox Tr-K Clv 500-125 Mg Tab (Amoxicillin/Potassium Clav) 1 Each Tablet 1 Tab PO

DAILY 3 Days


Reported


Lantus (Insulin Glargine,Hum.rec.anlog) 100 Unit/1 Ml Vial 38 Units SQ HS


Insulin Lispro 100 Unit/1 Ml Vial 13 Units SQ TIDAC


Xalatan (Latanoprost) 2.5 Ml Drops 1 Drop OP HS


Flonase Allergy Relief (Fluticasone Propionate) 9.9 Ml Spray.susp 2 Sprays NS 

DAILY


Novolin N (Nph, Human Insulin Isophane) 100 Unit/1 Ml Vial 40 Unit SQ DAILYWBKFT


Carvedilol ** (Carvedilol) 6.25 Mg Tablet 6.25 Mg PO BIDWMEALS


Simvastatin 40 Mg Tablet 1 Tab PO QHS


Furosemide 20 Mg Tablet 1 Tab PO DAILY


Vitals/I & O





Vital Sign - Last 24 Hours








 8/15/21 8/15/21 8/15/21 8/15/21





 11:00 15:00 17:34 19:00


 


Temp 97.7 97.8  98.1





 97.7 97.8  98.1


 


Pulse 64 72 72 71


 


Resp 20 20  19


 


B/P (MAP) 167/68 (101) 184/75 (111) 184/75 169/61 (97)


 


Pulse Ox 95 95  96


 


O2 Delivery Nasal Cannula Nasal Cannula  Nasal Cannula


 


O2 Flow Rate 3.0 5.0  5.0


 


    





    





 8/15/21 8/15/21 8/16/21 8/16/21





 19:40 23:00 02:38 07:00


 


Temp  98.0 98.3 96.4





  98.0 98.3 96.4


 


Pulse  74 74 80


 


Resp  19 18 20


 


B/P (MAP)  156/71 (99) 178/61 (100) 150/79 (102)


 


Pulse Ox  98 95 96


 


O2 Delivery Nasal Cannula Nasal Cannula Nasal Cannula Nasal Cannula


 


O2 Flow Rate 3.5 5.0 5.0 4.0


 


    





    





 8/16/21   





 08:33   


 


Pulse 74   


 


B/P (MAP) 178/61   














Intake and Output   


 


 8/15/21 8/15/21 8/16/21





 15:00 23:00 07:00


 


Intake Total 660 ml 350 ml 200 ml


 


Output Total 1500 ml  1875 ml


 


Balance -840 ml 350 ml -1675 ml











Justicifation of Admission Dx:


Justifications for Admission:


Justification of Admission Dx:  N/A











RUFINO CRUZ MD          Aug 16, 2021 09:54

## 2021-08-16 NOTE — PDOC
Renal-Progress Notes


Subjective Notes


Notes


LESS SOB





History of Present Illness


Hx of present illness


STABLE





Vitals


Vitals





Vital Signs








  Date Time  Temp Pulse Resp B/P (MAP) Pulse Ox O2 Delivery O2 Flow Rate FiO2


 


8/16/21 08:33  74  178/61    


 


8/16/21 07:00 96.4  20  96 Nasal Cannula 4.0 





 96.4       








Weight


Weight [ ]





I.O.


Intake and Output











Intake and Output 


 


 8/16/21





 07:00


 


Intake Total 1210 ml


 


Output Total 3375 ml


 


Balance -2165 ml


 


 


 


Intake Oral 1210 ml


 


Output Urine Total 3375 ml


 


# Voids 3











Labs


Labs





Laboratory Tests








Test


 8/15/21


11:44 8/15/21


17:08 8/15/21


21:34 8/16/21


07:05


 


Glucose (Fingerstick)


 135 mg/dL


(70-99) 188 mg/dL


(70-99) 177 mg/dL


(70-99) 





 


Sodium Level


 


 


 


 148 mmol/L


(136-145)


 


Potassium Level


 


 


 


 3.2 mmol/L


(3.5-5.1)


 


Chloride Level


 


 


 


 100 mmol/L


()


 


Carbon Dioxide Level


 


 


 


 > 45 mmol/L


(21-32)


 


Anion Gap     (6-14) 


 


Blood Urea Nitrogen


 


 


 


 29 mg/dL


(7-20)


 


Creatinine


 


 


 


 1.5 mg/dL


(0.6-1.0)


 


Estimated GFR


(Cockcroft-Gault) 


 


 


 33.6 





 


Glucose Level


 


 


 


 77 mg/dL


(70-99)


 


Calcium Level


 


 


 


 9.4 mg/dL


(8.5-10.1)


 


Phosphorus Level


 


 


 


 3.2 mg/dL


(2.6-4.7)


 


Magnesium Level


 


 


 


 1.5 mg/dL


(1.8-2.4)


 


Test


 8/16/21


08:30 


 


 





 


Glucose (Fingerstick)


 74 mg/dL


(70-99) 


 


 














Review of Systems


Constitutional:  yes: weakness, alert, oriented


Ears/Nose/Throat:  Yes: no symptom reported


Eyes:  Yes: no symptom reported


Pulmonary:  Yes dyspnea


Cardiovascular:  Yes edema


Gastrointestional:  Yes: constipation


Genitourinary:  Yes: no symptom reported


Musculoskeletal:  Yes: muscle stiffness


Skin:  Yes no symptom reported


Psychiatric/Neurological:  Yes: depressed


Endocrine:  Yes: no symptom reported


Hematologic/Lymphatic:  Yes: no symptom reported





Physical Exam


General Appearance:  no apparent distress, obese, febrile


Skin:  warm, edema


Respiratory:  decreased breath sounds


Heart:  S1S2


Abdomen:  soft, bowel sounds present


Genitourinary:  bladder flat, dunaway catheter


Extremities:  pulses present, edema


Neurology:  alert, oriented, Ext weakness


Musculoskeletal:  Osteoarthritis





Assessment


Assessment


IMP





FALL


ANASARCA


EDEMA


CKD STAGE 3B TO 4-CR OF 1.7


HYPOMAGNESEMIA


HYPOKALEMIA


MORBID OBESITY


UNCONTROLLED HTN


VOLUME OVERLOAD


CHF - SUSPECT ACUTE ON CHRONIC SYSTOLIC AND DIASTOLIC


HX OF COPD-ON 2-3 LITERS CONTINUOUSLY


DM II


PROTEINURIA


DECONDITIONING


ANEMIA OF CKD








PLAN





CONT DIURESIS


LASIX GTT


CONT METOLAZONE


REPLACE MAG AND K


CONTROL BP


CONTROL BG


HOPEFULLY BP IMPROVES WITH DIURESIS


MAY END UP NEEDING DIALYSIS AGAIN IF UNABLE TO DIURESE


FLUID RESTRICT


AVOID NEPHROTOXINS


IRON SUPPLEMENTS


ENC COMPLIANCE


D/W ATTENDING











MARCO ANTONIO CAMPOS MD                 Aug 16, 2021 10:48

## 2021-08-17 VITALS
DIASTOLIC BLOOD PRESSURE: 59 MMHG | SYSTOLIC BLOOD PRESSURE: 166 MMHG | SYSTOLIC BLOOD PRESSURE: 166 MMHG | SYSTOLIC BLOOD PRESSURE: 166 MMHG | DIASTOLIC BLOOD PRESSURE: 59 MMHG | DIASTOLIC BLOOD PRESSURE: 59 MMHG

## 2021-08-17 VITALS — DIASTOLIC BLOOD PRESSURE: 59 MMHG | SYSTOLIC BLOOD PRESSURE: 166 MMHG

## 2021-08-17 VITALS — DIASTOLIC BLOOD PRESSURE: 53 MMHG | SYSTOLIC BLOOD PRESSURE: 135 MMHG

## 2021-08-17 LAB
ANION GAP SERPL CALC-SCNC: (no result) MMOL/L (ref 6–14)
BUN SERPL-MCNC: 35 MG/DL (ref 7–20)
CALCIUM SERPL-MCNC: 9.5 MG/DL (ref 8.5–10.1)
CHLORIDE SERPL-SCNC: 100 MMOL/L (ref 98–107)
CO2 SERPL-SCNC: > 45 MMOL/L (ref 21–32)
CREAT SERPL-MCNC: 1.6 MG/DL (ref 0.6–1)
GFR SERPLBLD BASED ON 1.73 SQ M-ARVRAT: 31.2 ML/MIN
GLUCOSE SERPL-MCNC: 57 MG/DL (ref 70–99)
MAGNESIUM SERPL-MCNC: 1.8 MG/DL (ref 1.8–2.4)
PHOSPHATE SERPL-MCNC: 4.3 MG/DL (ref 2.6–4.7)
POTASSIUM SERPL-SCNC: 3.4 MMOL/L (ref 3.5–5.1)
SODIUM SERPL-SCNC: 145 MMOL/L (ref 136–145)

## 2021-08-17 RX ADMIN — ASPIRIN SCH MG: 81 TABLET, COATED ORAL at 08:36

## 2021-08-17 RX ADMIN — POTASSIUM CHLORIDE SCH MEQ: 1500 TABLET, EXTENDED RELEASE ORAL at 08:36

## 2021-08-17 RX ADMIN — CARVEDILOL SCH MG: 6.25 TABLET, FILM COATED ORAL at 08:36

## 2021-08-17 RX ADMIN — INSULIN LISPRO SCH UNITS: 100 INJECTION, SOLUTION INTRAVENOUS; SUBCUTANEOUS at 08:00

## 2021-08-17 RX ADMIN — HEPARIN SODIUM SCH UNIT: 5000 INJECTION, SOLUTION INTRAVENOUS; SUBCUTANEOUS at 08:44

## 2021-08-17 RX ADMIN — OXYCODONE HYDROCHLORIDE AND ACETAMINOPHEN SCH MG: 500 TABLET ORAL at 08:36

## 2021-08-17 RX ADMIN — Medication SCH MG: at 08:35

## 2021-08-17 NOTE — NUR
AT 1410, PATIENT DISCHARGED TO [facility]. DISCHARGE REPORT GIVEN TO NURSING STAFF. 
PATIENT OFF UNIT PER WHEELCHAIR PER [facility]SPORT.

## 2021-08-17 NOTE — NUR
SS following up with discharge planning. SS reviewed pt chart and discussed with pt RN. Pt 
is currently requiring oxygen at four liters nasal canula. COVID19 negative. Pt on Lasix 
drip. PT/OT recommended skilled nursing unit. Pt accepted at [facility], [phone]; 
fax [phone]. Insurance authorization received. SS discussed with Dr. Ewing this morning. 
Lasix drip to be discontinued. Anticipate discharge to [facility] today. SS will 
continue to follow for discharge planning. 

-------------------------------------------------------------------------------

Addendum: 08/17/21 at 1253 by CHELSIE CASTELLANO SS

-------------------------------------------------------------------------------

Discharge orders received for [facility]. SS phoned and faxed discharge orders to 
[facility]. Pt will discharge today and go to [facility] at 1400 via [facility] 
transport. Pt, pt's RN, and pt's nephew notified.

## 2021-08-17 NOTE — PDOC
PROGRESS NOTES


Date of Service


DATE: 8/17/21 


TIME: 10:21


Assessment


Problems


Medical Problems:


(1) Acute cervical sprain


Status: Acute  





(2) Acute on chronic renal failure


Status: Acute  





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 

ischemic attack symptoms


Carotid artery stenosis on the right, per Dr. Kirkland, intervention is NOT 

warranted--I agree


Mechanical fall


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


Plan


Aspirin and statin


SNU


PT/OT


Subjective


No complaints, ready to go to skilled nursing


Objective





Vital Signs








  Date Time  Temp Pulse Resp B/P (MAP) Pulse Ox O2 Delivery O2 Flow Rate FiO2


 


8/17/21 08:36  65  166/59    


 


8/17/21 07:00 97.7  19  94 Nasal Cannula 4.0 





 97.7       














Intake and Output 


 


 8/17/21





 07:00


 


Intake Total 860 ml


 


Output Total 750 ml


 


Balance 110 ml


 


 


 


Intake Oral 860 ml


 


Output Urine Total 750 ml


 


# Voids 4








PHYSICAL EXAM


Alert. Oriented to time, place and person.


PERRL.


EOMI.


CN: no focal findings.


Muscle tone: normal.


Muscle strength: 4/5


DTR: 0-1+


Plantar reflex: flexor


Gait: not examined in bed.


Sensory exam: bilateral stocking loss.


No cerebellar signs elicited.


Review of Relevant


I have reviewed the following items rizwan (where applicable) has been applied.


Labs





Laboratory Tests








Test


 8/15/21


11:44 8/15/21


17:08 8/15/21


21:34 8/16/21


07:05


 


Glucose (Fingerstick)


 135 mg/dL


(70-99) 188 mg/dL


(70-99) 177 mg/dL


(70-99) 





 


Sodium Level


 


 


 


 148 mmol/L


(136-145)


 


Potassium Level


 


 


 


 3.2 mmol/L


(3.5-5.1)


 


Chloride Level


 


 


 


 100 mmol/L


()


 


Carbon Dioxide Level


 


 


 


 > 45 mmol/L


(21-32)


 


Anion Gap     (6-14) 


 


Blood Urea Nitrogen


 


 


 


 29 mg/dL


(7-20)


 


Creatinine


 


 


 


 1.5 mg/dL


(0.6-1.0)


 


Estimated GFR


(Cockcroft-Gault) 


 


 


 33.6 





 


Glucose Level


 


 


 


 77 mg/dL


(70-99)


 


Calcium Level


 


 


 


 9.4 mg/dL


(8.5-10.1)


 


Phosphorus Level


 


 


 


 3.2 mg/dL


(2.6-4.7)


 


Magnesium Level


 


 


 


 1.5 mg/dL


(1.8-2.4)


 


Test


 8/16/21


08:30 8/16/21


11:51 8/16/21


16:57 8/16/21


21:20


 


Glucose (Fingerstick)


 74 mg/dL


(70-99) 120 mg/dL


(70-99) 294 mg/dL


(70-99) 194 mg/dL


(70-99)


 


Test


 8/17/21


05:30 8/17/21


07:54 


 





 


Sodium Level


 145 mmol/L


(136-145) 


 


 





 


Potassium Level


 3.4 mmol/L


(3.5-5.1) 


 


 





 


Chloride Level


 100 mmol/L


() 


 


 





 


Carbon Dioxide Level


 > 45 mmol/L


(21-32) 


 


 





 


Anion Gap  (6-14)    


 


Blood Urea Nitrogen


 35 mg/dL


(7-20) 


 


 





 


Creatinine


 1.6 mg/dL


(0.6-1.0) 


 


 





 


Estimated GFR


(Cockcroft-Gault) 31.2 


 


 


 





 


Glucose Level


 57 mg/dL


(70-99) 


 


 





 


Calcium Level


 9.5 mg/dL


(8.5-10.1) 


 


 





 


Phosphorus Level


 4.3 mg/dL


(2.6-4.7) 


 


 





 


Magnesium Level


 1.8 mg/dL


(1.8-2.4) 


 


 





 


Glucose (Fingerstick)


 


 69 mg/dL


(70-99) 


 











Laboratory Tests








Test


 8/16/21


11:51 8/16/21


16:57 8/16/21


21:20 8/17/21


05:30


 


Glucose (Fingerstick)


 120 mg/dL


(70-99) 294 mg/dL


(70-99) 194 mg/dL


(70-99) 





 


Sodium Level


 


 


 


 145 mmol/L


(136-145)


 


Potassium Level


 


 


 


 3.4 mmol/L


(3.5-5.1)


 


Chloride Level


 


 


 


 100 mmol/L


()


 


Carbon Dioxide Level


 


 


 


 > 45 mmol/L


(21-32)


 


Anion Gap     (6-14) 


 


Blood Urea Nitrogen


 


 


 


 35 mg/dL


(7-20)


 


Creatinine


 


 


 


 1.6 mg/dL


(0.6-1.0)


 


Estimated GFR


(Cockcroft-Gault) 


 


 


 31.2 





 


Glucose Level


 


 


 


 57 mg/dL


(70-99)


 


Calcium Level


 


 


 


 9.5 mg/dL


(8.5-10.1)


 


Phosphorus Level


 


 


 


 4.3 mg/dL


(2.6-4.7)


 


Magnesium Level


 


 


 


 1.8 mg/dL


(1.8-2.4)


 


Test


 8/17/21


07:54 


 


 





 


Glucose (Fingerstick)


 69 mg/dL


(70-99) 


 


 











Medications





Current Medications


Ondansetron HCl (Zofran) 4 mg PRN Q8HRS  PRN IVP NAUSEA/VOMITING;  Start 8/11/21

at 20:30;  Stop 8/12/21 at 18:37;  Status DC


Morphine Sulfate (Morphine Sulfate) 2 mg PRN Q2HR  PRN IVP PAIN;  Start 8/11/21 

at 20:30;  Stop 8/12/21 at 20:29;  Status DC


Acetaminophen (Tylenol) 650 mg PRN Q4HRS  PRN PO FEVER > 100.3'F;  Start 8/11/21

at 20:30;  Stop 8/12/21 at 18:36;  Status DC


Furosemide (Lasix) 40 mg 1X  ONCE IVP  Last administered on 8/11/21at 23:02;  

Start 8/11/21 at 21:00;  Stop 8/11/21 at 21:01;  Status DC


Ascorbic Acid (Vitamin C) 500 mg DAILY PO  Last administered on 8/17/21at 08:36;

 Start 8/12/21 at 09:00


Aspirin (Ecotrin) 81 mg DAILYWBKFT PO  Last administered on 8/17/21at 08:36;  

Start 8/12/21 at 09:00


Carvedilol (Coreg) 6.25 mg BIDWMEALS PO  Last administered on 8/17/21at 08:36;  

Start 8/12/21 at 09:00


Insulin Glargine (Lantus Syringe) 38 unit HS SQ  Last administered on 8/15/21at 

22:20;  Start 8/12/21 at 21:00;  Stop 8/16/21 at 14:43;  Status DC


Insulin Human Lispro (HumaLOG) 13 units TIDWMEALS SQ  Last administered on 

8/16/21at 17:17;  Start 8/12/21 at 09:00


Simvastatin (Zocor) 40 mg QHS PO  Last administered on 8/16/21at 21:45;  Start 

8/12/21 at 21:00


Insulin Human Lispro (HumaLOG) 0-7 UNITS TIDWMEALS SQ  Last administered on 

8/16/21at 17:17;  Start 8/12/21 at 12:00


Dextrose (Dextrose 50%-Water Syringe) 12.5 gm PRN Q15MIN  PRN IV SEE COMMENTS;  

Start 8/12/21 at 08:30


Sennosides (Senna) 17.2 mg PRN BID  PRN PO CONSTIPATION;  Start 8/12/21 at 08:30


Docusate Sodium (Colace) 100 mg PRN DAILY  PRN PO HARD STOOLS Last administered 

on 8/13/21at 09:17;  Start 8/12/21 at 08:30


Ondansetron HCl (Zofran) 4 mg PRN Q6HRS  PRN IVP NAUSEA/VOMITING, 1st CHOICE 

Last administered on 8/13/21at 09:19;  Start 8/12/21 at 08:30


Dextrose (Dextrose 50%-Water Syringe) 12.5 gm PRN Q15MIN  PRN IV SEE COMMENTS;  

Start 8/12/21 at 08:30;  Status Cancel


Acetaminophen (Tylenol) 650 mg PRN Q4HRS  PRN PO TEMP OVER 100.4F OR MILD PAIN 

Last administered on 8/16/21at 21:45;  Start 8/12/21 at 08:30


Heparin Sodium (Porcine) (Heparin Sodium) 5,000 unit Q12HR SQ  Last administered

on 8/17/21at 08:44;  Start 8/12/21 at 09:00


Prochlorperazine Edisylate (Compazine) 10 mg PRN Q6HRS  PRN IV NAUSEA/VOMITING, 

2ND CHOICE;  Start 8/12/21 at 08:30


Furosemide (Lasix) 80 mg 1X  ONCE IVP  Last administered on 8/12/21at 11:47;  

Start 8/12/21 at 10:15;  Stop 8/12/21 at 10:18;  Status DC


Furosemide 100 mg/ Sodium Chloride 100 ml @ 5 mls/hr CONT  PRN IV SEE I/O RECORD

Last administered on 8/16/21at 21:52;  Start 8/12/21 at 11:00


Potassium Chloride (Klor-Con) 40 meq 1X  ONCE PO  Last administered on 8/12/21at

11:46;  Start 8/12/21 at 10:15;  Stop 8/12/21 at 10:18;  Status DC


Potassium Chloride (Klor-Con) 20 meq DAILYWBKFT PO  Last administered on 8/17/2

1at 08:36;  Start 8/13/21 at 12:00


Polysaccharide Iron Complex (Niferex 150) 150 mg DAILY PO  Last administered on 

8/17/21at 08:35;  Start 8/13/21 at 12:00


Metolazone (Zaroxolyn) 5 mg DAILY PO  Last administered on 8/17/21at 08:37;  

Start 8/14/21 at 12:00


Magnesium Sulfate 50 ml @ 25 mls/hr 1X  ONCE IV  Last administered on 8/15/21at 

12:00;  Start 8/15/21 at 12:00;  Stop 8/15/21 at 13:59;  Status DC


Magnesium Sulfate 50 ml @ 25 mls/hr 1X  ONCE IV  Last administered on 8/16/21at 

11:15;  Start 8/16/21 at 11:00;  Stop 8/16/21 at 12:59;  Status DC


Potassium Chloride/Water 100 ml @  100 mls/hr Q1H IV ;  Start 8/16/21 at 11:00; 

Stop 8/16/21 at 12:59;  Status UNV


Potassium Chloride/Water 100 ml @  100 mls/hr Q1H IV  Last administered on 

8/16/21at 11:23;  Start 8/16/21 at 11:00;  Stop 8/16/21 at 12:32;  Status DC


Potassium Chloride (Klor-Con) 40 meq 1X  ONCE PO  Last administered on 8/16/21at

13:39;  Start 8/16/21 at 12:30;  Stop 8/16/21 at 12:34;  Status DC


Insulin Glargine (Lantus Syringe) 30 unit QHS SQ  Last administered on 8/16/21at

21:48;  Start 8/16/21 at 21:00





Active Scripts


Active


Thera-M Tablet (Multivits,Ca,Minerals/Iron/Fa) 1 Each Tablet 1 Tab PO DAILY 30 

Days


Vitamin C (Ascorbic Acid) 500 Mg Tablet 500 Mg PO DAILY 30 Days


Culturelle (Lactobacillus Rhamnosus Gg) 1 Each Cap.sprink 1 Cap PO BID 30 Days


Aspirin Ec (Aspirin) 81 Mg Tablet.dr 81 Mg PO DAILYWBKFT 30 Days


Proair Hfa (Albuterol Sulfate) 8.5 Gm Hfa.aer.ad 2.5 Mg NEB PRN Q4HRS PRN 30 

Days


Amox Tr-K Clv 500-125 Mg Tab (Amoxicillin/Potassium Clav) 1 Each Tablet 1 Tab PO

DAILY 3 Days


Reported


Lantus (Insulin Glargine,Hum.rec.anlog) 100 Unit/1 Ml Vial 38 Units SQ HS


Insulin Lispro 100 Unit/1 Ml Vial 13 Units SQ TIDAC


Xalatan (Latanoprost) 2.5 Ml Drops 1 Drop OP HS


Flonase Allergy Relief (Fluticasone Propionate) 9.9 Ml Spray.susp 2 Sprays NS 

DAILY


Novolin N (Nph, Human Insulin Isophane) 100 Unit/1 Ml Vial 40 Unit SQ DAILYWBKFT


Carvedilol ** (Carvedilol) 6.25 Mg Tablet 6.25 Mg PO BIDWMEALS


Simvastatin 40 Mg Tablet 1 Tab PO QHS


Furosemide 20 Mg Tablet 1 Tab PO DAILY


Vitals/I & O





Vital Sign - Last 24 Hours








 8/16/21 8/16/21 8/16/21 8/16/21





 11:00 17:10 19:00 20:00


 


Temp 98.0  98.5 





 98.0  98.5 


 


Pulse 71 71 72 


 


Resp 20  18 


 


B/P (MAP) 138/83 (101) 138/83 169/68 (101) 


 


Pulse Ox 95  96 


 


O2 Delivery Nasal Cannula  Nasal Cannula Nasal Cannula


 


O2 Flow Rate 4.0  4.0 3.0


 


    





    





 8/16/21 8/17/21 8/17/21 8/17/21





 23:00 03:00 07:00 08:36


 


Temp 97.7 97.5 97.7 





 97.7 97.5 97.7 


 


Pulse 93 64 65 65


 


Resp 18 18 19 


 


B/P (MAP) 133/85 (101) 135/53 (80) 166/59 (94) 166/59


 


Pulse Ox 96 96 94 


 


O2 Delivery Nasal Cannula Nasal Cannula Nasal Cannula 


 


O2 Flow Rate 4.0 4.0 4.0 














Intake and Output   


 


 8/16/21 8/16/21 8/17/21





 15:00 23:00 07:00


 


Intake Total 660 ml 200 ml 


 


Output Total   750 ml


 


Balance 660 ml 200 ml -750 ml











Justicifation of Admission Dx:


Justifications for Admission:


Justification of Admission Dx:  N/A











MILAGROS MURRY MD           Aug 17, 2021 10:23

## 2021-08-17 NOTE — SNU/HH DC
DISCHARGE ORDERS


DISCHARGE INFORMATION:


DISCHARGE DATE:  Aug 17, 2021


FINAL DIAGNOSIS


Problems


Medical Problems:


(1) Acute cervical sprain


Status: Acute  





(2) Acute on chronic renal failure


Status: Acute  








CONDITION ON DISCHARGE:  Stable





CODE STATUS:


Code Status:  Full





SKILLED NURSING:


SNF STAY <30 DAYS:  Yes





HOSPICE:


HOSPICE:  No


HOSPICE EVAL & TREAT:  No





POST DISCHARGE ORDERS:


ACTIVITY ORDERS:  Activity as tolerated


WEIGHT BEARING STATUS:  As tolerated


BATHING ORDERS:  Shower-keep dressing dry, No Tub Bath until see 


DIET AFTER DISCHARGE:  ADA





CHECKS AFTER DISCHARGE:


CHECKS AFTER DISCHARGE:  Check blood press - daily, Check blood sugar, ac/hs





FOLLOW-UP:


PHYSICIAN FOLLOW-UP:  pcp at [facility] today


ADDITIONAL FOLLOW-UP:  Nephrology 3-4 weeks





TREATMENT/EQUIPMENT ORDERS:


ADAPTIVE EQUIPMENT NEEDED:  Front wheeled walker


RESPIRATORY EQUIPMENT NEEDED:  Oxygen


Physical Therapy For:  Evalulation/Treatment


Occupational Therapy For:  Evaluation/Treatment


Speech Language Pathology For:  Evaluation/Treatment





DISCHARGE MEDICATIONS:


Home Meds


Active Scripts


Insulin Lispro (Admelog) 100 Unit/1 Ml Vial, 0 UNITS SQ TIDWMEALS for PER SS 

PROTOCOL for 30 Days, #2 EACH


   Prov:RUFINO CRUZ MD         8/17/21


Insulin Glargine,Hum.rec.anlog (LANTUS) 100 Unit/1 Ml Vial, 30 UNIT SQ QHS for 

DIABETES for 30 Days, #2 EACH


   Prov:RUFINO CRUZ MD         8/17/21


Docusate Sodium (DOCUSATE SODIUM) 100 Mg Capsule, 100 MG PO PRN DAILY PRN for 

HARD STOOLS for 30 Days, #60 CAP


   Prov:RUFINO CRUZ MD         8/17/21


Metolazone (METOLAZONE) 2.5 Mg Tablet, 5 MG PO DAILY for HEART for 30 Days, #60 

TAB


   Prov:RUFINO CRUZ MD         8/17/21


Furosemide (FUROSEMIDE) 40 Mg Tablet, 40 MG PO BID94 for HEART for 14 Days, #30 

TAB


   Prov:RUFINO CRUZ MD         8/17/21


Potassium Chloride (KLOR-CON M20) 20 Meq Tab.er.prt, 20 MEQ PO DAILYWBKFT for 

SUPPLEMENT for 14 Days, #14 TAB.SR


   Prov:RUFINO CRUZ MD         8/17/21


Acetaminophen (ACETAMINOPHEN) 325 Mg Tablet, 650 MG PO PRN Q4HRS PRN for TEMP 

OVER 100.4F OR MILD PAIN for 30 Days, #60 TAB


   Prov:RUFINO CRUZ MD         8/17/21


Iron Polysaccharides Complex (POLY-IRON) 150 Mg Capsule, 150 MG PO DAILY for 

ANEMIA for 30 Days, #30 CAP


   Prov:RUFINO CRUZ MD         8/17/21


Multivits,Ca,Minerals/Iron/Fa (THERA-M TABLET) 1 Each Tablet, 1 TAB PO DAILY for

SUPPLEMENT for 30 Days, #30 TAB


   Prov:RUFINO CRUZ MD         4/8/20


Ascorbic Acid (VITAMIN C) 500 Mg Tablet, 500 MG PO DAILY for SUPPLEMENT for 30 

Days, #30 TAB


   Prov:RUFINO CRUZ MD         4/8/20


Aspirin (ASPIRIN EC) 81 Mg Tablet.dr, 81 MG PO DAILYWBKFT for HEART HEALTH for 

30 Days, #30 TAB.SR


   Prov:RUFINO CRUZ MD         4/8/20


Reported Medications


Insulin Lispro (Insulin Lispro) 100 Unit/1 Ml Vial, 13 UNITS SQ TIDAC for 

diabetes


   8/12/21


Latanoprost (XALATAN) 2.5 Ml Drops, 1 DROP OP HS, BOTTLE


   4/3/20


Fluticasone Propionate (Flonase Allergy Relief) 9.9 Ml [place].susp, 2 SPRAYS NS 

DAILY for allergies, BOTTLE


   3/29/20


Carvedilol (CARVEDILOL **) 6.25 Mg Tablet, 6.25 MG PO BIDWMEALS, TAB


   11/29/17


Simvastatin (SIMVASTATIN) 40 Mg Tablet, 1 TAB PO QHS, #30 TAB 5 Refills


   11/29/17


Discontinued Reported Medications


Insulin Glargine,Hum.rec.anlog (LANTUS) 100 Unit/1 Ml Vial, 38 UNITS SQ HS for 

diabetes


   8/12/21


Nph, Human Insulin Isophane (NOVOLIN N) 100 Unit/1 Ml Vial, 40 UNIT SQ 

DAILYWBKFT for glucose, VIAL


   11/29/17


Furosemide (FUROSEMIDE) 20 Mg Tablet, 1 TAB PO DAILY, #90 TAB 1 Refill


   11/29/17


Discontinued Scripts


Lactobacillus Rhamnosus Gg (CULTURELLE) 1 Each Cap.sprink, 1 CAP PO BID for 

SUPPLEMENT for 30 Days, #60 CAP


   Prov:RUFINO CRUZ MD         4/8/20


Albuterol Sulfate (Proair Hfa) 8.5 Gm Hfa.aer.ad, 2.5 MG NEB PRN Q4HRS PRN for 

WHEEZING for 30 Days, #2 INHALER


   Prov:RUFINO CRUZ MD         4/8/20


Amoxicillin/Potassium Clav (AMOX TR-K -125 MG TAB) 1 Each Tablet, 1 TAB 

PO DAILY for INFECTION for 3 Days, #3 TAB


   Prov:RUFINO CRUZ MD         4/8/20











RUFINO CRUZ MD          Aug 17, 2021 12:24

## 2021-08-17 NOTE — PDOC
PROGRESS NOTES


Date of Service:


DATE: 8/17/21 


TIME: 09:57





Chief Complaint


Chief Complaint


Mechanical fall.


Hypertensive urgency.


Cervical spinal stenosis.


Acute volume overload.


Acute on chronic kidney failure, due to possible vasomotor nephropathy.


Left thalamus hypodensity, incidental finding, no further evaluation or 

treatment.


Anemia of chronic kidney disease.


Deconditioning.


Peripheral neuropathy.


Osteoarthritis.


COPD.


DM type 2.


HTN.


Morbid obesity.


Severe protein malnutrition.





History of Present Illness


History of Present Illness


Patient is a 76-year-old female with past medical history of diabetes mellitus 

type 2, hypertension, COPD on home O2, CKD was on dialysis a year ago but taken 

off, morbid obesity, who presents today after falling.  Patient states that she 

was trying to take a shower but she tripped and fell.  She also reports that she

has been getting weaker in the past couple weeks and she has gained about 10 

pounds and feels like she is getting more edematous.  Denies any head trauma or 

loss of consciousness.  However she does complain of mild posterior neck pain 

and also she complains of swelling to the bilateral lower extremities.  Denies f

mica, shortness of breath, chest pain, abdominal pain, diarrhea or dysuria.  

Patient was given dose of IV Lasix as she does report urinary output but she is 

unsure how much she is putting out because there is a pure wick attached to her.





8/13/21


Patient seen and examined at bedside.


D/W RN.


Chart reviewed.


SpO2 94% on 3 lpm via NC.


U/S found L renal simple cyst.


Patient denies stroke symptoms.


Per neurology, hypodensity was found in L thalamus. No further evaluation or 

treatment necessary.


Patient prefers to have carotid doppler study done.





8/14/21


Patient seen and examined at bedside.


D/W RN.


Dr. Ewing (nephrology) and I plan to follow creatinine levels, continue 

diuretics, and consider dialysis.


Carotid doppler found >70% stenosis of the right proximal internal carotid. 


Plan to consult Dr. Kirkland (vascular surgery).





8/15/21


Patient seen and examined at bedside.


GURPREET/W RN.


Per Dr. Kirkland, intervention of carotid stenosis is warranted.





8/16/21


Patient seen and examined at bedside.


GURPREET/W RN.


Per Dr. Kirkland, intervention of carotid stenosis is warranted.


CONT METOLAZONE


REPLACE K


AVOID NEPHROTOXINS


IRON SUPPLEMENTS


ENC COMPLIANCE


D/W ATTENDING


F/U IN 4-6 WEEKS











8/17/21


Patient seen and examined at bedside.


D/W RN.


Per Dr. Kirkland, intervention of carotid stenosis is warranted.


CONT METOLAZONE


REPLACE K


AVOID NEPHROTOXINS


IRON SUPPLEMENTS


ENC COMPLIANCE


D/W ATTENDING


F/U IN 4-6 WEEKS





D/C PLANNING 34 MIN





Vitals


Vitals





Vital Signs








  Date Time  Temp Pulse Resp B/P (MAP) Pulse Ox O2 Delivery O2 Flow Rate FiO2


 


8/17/21 08:36  65  166/59    


 


8/17/21 07:00 97.7  19  94 Nasal Cannula 4.0 





 97.7       











Physical Exam


Physical Exam


IN NAD, NEEDS   SNF


General:  Alert, Oriented X3, Cooperative, No acute distress


Heart:  Regular rate, Normal S1, Normal S2


Lungs:  Clear


Abdomen:  Normal bowel sounds, Soft, No tenderness


Extremities:  No clubbing, No cyanosis, Other (3+ EDEMA)





Labs


LABS





Laboratory Tests








Test


 8/16/21


11:51 8/16/21


16:57 8/16/21


21:20 8/17/21


05:30


 


Glucose (Fingerstick)


 120 mg/dL


(70-99) 294 mg/dL


(70-99) 194 mg/dL


(70-99) 





 


Sodium Level


 


 


 


 145 mmol/L


(136-145)


 


Potassium Level


 


 


 


 3.4 mmol/L


(3.5-5.1)


 


Chloride Level


 


 


 


 100 mmol/L


()


 


Carbon Dioxide Level


 


 


 


 > 45 mmol/L


(21-32)


 


Anion Gap     (6-14) 


 


Blood Urea Nitrogen


 


 


 


 35 mg/dL


(7-20)


 


Creatinine


 


 


 


 1.6 mg/dL


(0.6-1.0)


 


Estimated GFR


(Cockcroft-Gault) 


 


 


 31.2 





 


Glucose Level


 


 


 


 57 mg/dL


(70-99)


 


Calcium Level


 


 


 


 9.5 mg/dL


(8.5-10.1)


 


Phosphorus Level


 


 


 


 4.3 mg/dL


(2.6-4.7)


 


Magnesium Level


 


 


 


 1.8 mg/dL


(1.8-2.4)


 


Test


 8/17/21


07:54 


 


 





 


Glucose (Fingerstick)


 69 mg/dL


(70-99) 


 


 














Assessment and Plan


Assessmemt and Plan


Problems


Medical Problems:


(1) Acute cervical sprain


Status: Acute  





(2) Acute on chronic renal failure


Status: Acute  











Comment


Review of Relevant


I have reviewed the following items rizwan (where applicable) has been applied.


Labs





Laboratory Tests








Test


 8/15/21


11:44 8/15/21


17:08 8/15/21


21:34 8/16/21


07:05


 


Glucose (Fingerstick)


 135 mg/dL


(70-99) 188 mg/dL


(70-99) 177 mg/dL


(70-99) 





 


Sodium Level


 


 


 


 148 mmol/L


(136-145)


 


Potassium Level


 


 


 


 3.2 mmol/L


(3.5-5.1)


 


Chloride Level


 


 


 


 100 mmol/L


()


 


Carbon Dioxide Level


 


 


 


 > 45 mmol/L


(21-32)


 


Anion Gap     (6-14) 


 


Blood Urea Nitrogen


 


 


 


 29 mg/dL


(7-20)


 


Creatinine


 


 


 


 1.5 mg/dL


(0.6-1.0)


 


Estimated GFR


(Cockcroft-Gault) 


 


 


 33.6 





 


Glucose Level


 


 


 


 77 mg/dL


(70-99)


 


Calcium Level


 


 


 


 9.4 mg/dL


(8.5-10.1)


 


Phosphorus Level


 


 


 


 3.2 mg/dL


(2.6-4.7)


 


Magnesium Level


 


 


 


 1.5 mg/dL


(1.8-2.4)


 


Test


 8/16/21


08:30 8/16/21


11:51 8/16/21


16:57 8/16/21


21:20


 


Glucose (Fingerstick)


 74 mg/dL


(70-99) 120 mg/dL


(70-99) 294 mg/dL


(70-99) 194 mg/dL


(70-99)


 


Test


 8/17/21


05:30 8/17/21


07:54 


 





 


Sodium Level


 145 mmol/L


(136-145) 


 


 





 


Potassium Level


 3.4 mmol/L


(3.5-5.1) 


 


 





 


Chloride Level


 100 mmol/L


() 


 


 





 


Carbon Dioxide Level


 > 45 mmol/L


(21-32) 


 


 





 


Anion Gap  (6-14)    


 


Blood Urea Nitrogen


 35 mg/dL


(7-20) 


 


 





 


Creatinine


 1.6 mg/dL


(0.6-1.0) 


 


 





 


Estimated GFR


(Cockcroft-Gault) 31.2 


 


 


 





 


Glucose Level


 57 mg/dL


(70-99) 


 


 





 


Calcium Level


 9.5 mg/dL


(8.5-10.1) 


 


 





 


Phosphorus Level


 4.3 mg/dL


(2.6-4.7) 


 


 





 


Magnesium Level


 1.8 mg/dL


(1.8-2.4) 


 


 





 


Glucose (Fingerstick)


 


 69 mg/dL


(70-99) 


 











Laboratory Tests








Test


 8/16/21


11:51 8/16/21


16:57 8/16/21


21:20 8/17/21


05:30


 


Glucose (Fingerstick)


 120 mg/dL


(70-99) 294 mg/dL


(70-99) 194 mg/dL


(70-99) 





 


Sodium Level


 


 


 


 145 mmol/L


(136-145)


 


Potassium Level


 


 


 


 3.4 mmol/L


(3.5-5.1)


 


Chloride Level


 


 


 


 100 mmol/L


()


 


Carbon Dioxide Level


 


 


 


 > 45 mmol/L


(21-32)


 


Anion Gap     (6-14) 


 


Blood Urea Nitrogen


 


 


 


 35 mg/dL


(7-20)


 


Creatinine


 


 


 


 1.6 mg/dL


(0.6-1.0)


 


Estimated GFR


(Cockcroft-Gault) 


 


 


 31.2 





 


Glucose Level


 


 


 


 57 mg/dL


(70-99)


 


Calcium Level


 


 


 


 9.5 mg/dL


(8.5-10.1)


 


Phosphorus Level


 


 


 


 4.3 mg/dL


(2.6-4.7)


 


Magnesium Level


 


 


 


 1.8 mg/dL


(1.8-2.4)


 


Test


 8/17/21


07:54 


 


 





 


Glucose (Fingerstick)


 69 mg/dL


(70-99) 


 


 











Medications





Current Medications


Ondansetron HCl (Zofran) 4 mg PRN Q8HRS  PRN IVP NAUSEA/VOMITING;  Start 8/11/21

at 20:30;  Stop 8/12/21 at 18:37;  Status DC


Morphine Sulfate (Morphine Sulfate) 2 mg PRN Q2HR  PRN IVP PAIN;  Start 8/11/21 

at 20:30;  Stop 8/12/21 at 20:29;  Status DC


Acetaminophen (Tylenol) 650 mg PRN Q4HRS  PRN PO FEVER > 100.3'F;  Start 8/11/21

at 20:30;  Stop 8/12/21 at 18:36;  Status DC


Furosemide (Lasix) 40 mg 1X  ONCE IVP  Last administered on 8/11/21at 23:02;  

Start 8/11/21 at 21:00;  Stop 8/11/21 at 21:01;  Status DC


Ascorbic Acid (Vitamin C) 500 mg DAILY PO  Last administered on 8/17/21at 08:36;

 Start 8/12/21 at 09:00


Aspirin (Ecotrin) 81 mg DAILYWBKFT PO  Last administered on 8/17/21at 08:36;  

Start 8/12/21 at 09:00


Carvedilol (Coreg) 6.25 mg BIDWMEALS PO  Last administered on 8/17/21at 08:36;  

Start 8/12/21 at 09:00


Insulin Glargine (Lantus Syringe) 38 unit HS SQ  Last administered on 8/15/21at 

22:20;  Start 8/12/21 at 21:00;  Stop 8/16/21 at 14:43;  Status DC


Insulin Human Lispro (HumaLOG) 13 units TIDWMEALS SQ  Last administered on 

8/16/21at 17:17;  Start 8/12/21 at 09:00


Simvastatin (Zocor) 40 mg QHS PO  Last administered on 8/16/21at 21:45;  Start 

8/12/21 at 21:00


Insulin Human Lispro (HumaLOG) 0-7 UNITS TIDWMEALS SQ  Last administered on 

8/16/21at 17:17;  Start 8/12/21 at 12:00


Dextrose (Dextrose 50%-Water Syringe) 12.5 gm PRN Q15MIN  PRN IV SEE COMMENTS;  

Start 8/12/21 at 08:30


Sennosides (Senna) 17.2 mg PRN BID  PRN PO CONSTIPATION;  Start 8/12/21 at 08:30


Docusate Sodium (Colace) 100 mg PRN DAILY  PRN PO HARD STOOLS Last administered 

on 8/13/21at 09:17;  Start 8/12/21 at 08:30


Ondansetron HCl (Zofran) 4 mg PRN Q6HRS  PRN IVP NAUSEA/VOMITING, 1st CHOICE 

Last administered on 8/13/21at 09:19;  Start 8/12/21 at 08:30


Dextrose (Dextrose 50%-Water Syringe) 12.5 gm PRN Q15MIN  PRN IV SEE COMMENTS;  

Start 8/12/21 at 08:30;  Status Cancel


Acetaminophen (Tylenol) 650 mg PRN Q4HRS  PRN PO TEMP OVER 100.4F OR MILD PAIN 

Last administered on 8/16/21at 21:45;  Start 8/12/21 at 08:30


Heparin Sodium (Porcine) (Heparin Sodium) 5,000 unit Q12HR SQ  Last administered

on 8/17/21at 08:44;  Start 8/12/21 at 09:00


Prochlorperazine Edisylate (Compazine) 10 mg PRN Q6HRS  PRN IV NAUSEA/VOMITING, 

2ND CHOICE;  Start 8/12/21 at 08:30


Furosemide (Lasix) 80 mg 1X  ONCE IVP  Last administered on 8/12/21at 11:47;  

Start 8/12/21 at 10:15;  Stop 8/12/21 at 10:18;  Status DC


Furosemide 100 mg/ Sodium Chloride 100 ml @ 5 mls/hr CONT  PRN IV SEE I/O RECORD

Last administered on 8/16/21at 21:52;  Start 8/12/21 at 11:00


Potassium Chloride (Klor-Con) 40 meq 1X  ONCE PO  Last administered on 8/12/21at

11:46;  Start 8/12/21 at 10:15;  Stop 8/12/21 at 10:18;  Status DC


Potassium Chloride (Klor-Con) 20 meq DAILYWBKFT PO  Last administered on 

8/17/21at 08:36;  Start 8/13/21 at 12:00


Polysaccharide Iron Complex (Niferex 150) 150 mg DAILY PO  Last administered on 

8/17/21at 08:35;  Start 8/13/21 at 12:00


Metolazone (Zaroxolyn) 5 mg DAILY PO  Last administered on 8/17/21at 08:37;  

Start 8/14/21 at 12:00


Magnesium Sulfate 50 ml @ 25 mls/hr 1X  ONCE IV  Last administered on 8/15/21at 

12:00;  Start 8/15/21 at 12:00;  Stop 8/15/21 at 13:59;  Status DC


Magnesium Sulfate 50 ml @ 25 mls/hr 1X  ONCE IV  Last administered on 8/16/21at 

11:15;  Start 8/16/21 at 11:00;  Stop 8/16/21 at 12:59;  Status DC


Potassium Chloride/Water 100 ml @  100 mls/hr Q1H IV ;  Start 8/16/21 at 11:00; 

Stop 8/16/21 at 12:59;  Status UNV


Potassium Chloride/Water 100 ml @  100 mls/hr Q1H IV  Last administered on 

8/16/21at 11:23;  Start 8/16/21 at 11:00;  Stop 8/16/21 at 12:32;  Status DC


Potassium Chloride (Klor-Con) 40 meq 1X  ONCE PO  Last administered on 8/16/21at

13:39;  Start 8/16/21 at 12:30;  Stop 8/16/21 at 12:34;  Status DC


Insulin Glargine (Lantus Syringe) 30 unit QHS SQ  Last administered on 8/16/21at

21:48;  Start 8/16/21 at 21:00





Active Scripts


Active


Thera-M Tablet (Multivits,Ca,Minerals/Iron/Fa) 1 Each Tablet 1 Tab PO DAILY 30 

Days


Vitamin C (Ascorbic Acid) 500 Mg Tablet 500 Mg PO DAILY 30 Days


Culturelle (Lactobacillus Rhamnosus Gg) 1 Each Cap.sprink 1 Cap PO BID 30 Days


Aspirin Ec (Aspirin) 81 Mg Tablet.dr 81 Mg PO DAILYWBKFT 30 Days


Proair Hfa (Albuterol Sulfate) 8.5 Gm Hfa.aer.ad 2.5 Mg NEB PRN Q4HRS PRN 30 

Days


Amox Tr-K Clv 500-125 Mg Tab (Amoxicillin/Potassium Clav) 1 Each Tablet 1 Tab PO

DAILY 3 Days


Reported


Lantus (Insulin Glargine,Hum.rec.anlog) 100 Unit/1 Ml Vial 38 Units SQ HS


Insulin Lispro 100 Unit/1 Ml Vial 13 Units SQ TIDAC


Xalatan (Latanoprost) 2.5 Ml Drops 1 Drop OP HS


Flonase Allergy Relief (Fluticasone Propionate) 9.9 Ml Spray.susp 2 Sprays NS 

DAILY


Novolin N (Nph, Human Insulin Isophane) 100 Unit/1 Ml Vial 40 Unit SQ DAILYWBKFT


Carvedilol ** (Carvedilol) 6.25 Mg Tablet 6.25 Mg PO BIDWMEALS


Simvastatin 40 Mg Tablet 1 Tab PO QHS


Furosemide 20 Mg Tablet 1 Tab PO DAILY


Vitals/I & O





Vital Sign - Last 24 Hours








 8/16/21 8/16/21 8/16/21 8/16/21





 11:00 17:10 19:00 20:00


 


Temp 98.0  98.5 





 98.0  98.5 


 


Pulse 71 71 72 


 


Resp 20  18 


 


B/P (MAP) 138/83 (101) 138/83 169/68 (101) 


 


Pulse Ox 95  96 


 


O2 Delivery Nasal Cannula  Nasal Cannula Nasal Cannula


 


O2 Flow Rate 4.0  4.0 3.0


 


    





    





 8/16/21 8/17/21 8/17/21 8/17/21





 23:00 03:00 07:00 08:36


 


Temp 97.7 97.5 97.7 





 97.7 97.5 97.7 


 


Pulse 93 64 65 65


 


Resp 18 18 19 


 


B/P (MAP) 133/85 (101) 135/53 (80) 166/59 (94) 166/59


 


Pulse Ox 96 96 94 


 


O2 Delivery Nasal Cannula Nasal Cannula Nasal Cannula 


 


O2 Flow Rate 4.0 4.0 4.0 














Intake and Output   


 


 8/16/21 8/16/21 8/17/21





 15:00 23:00 07:00


 


Intake Total 660 ml 200 ml 


 


Output Total   750 ml


 


Balance 660 ml 200 ml -750 ml











Justicifation of Admission Dx:


Justifications for Admission:


Justification of Admission Dx:  N/A











RUFINO CRUZ MD          Aug 17, 2021 09:57

## 2021-08-17 NOTE — PDOC
Renal-Progress Notes


Subjective Notes


Notes


FEELING BETTER





History of Present Illness


Hx of present illness


IMPROVED AND STABLE





Vitals


Vitals





Vital Signs








  Date Time  Temp Pulse Resp B/P (MAP) Pulse Ox O2 Delivery O2 Flow Rate FiO2


 


8/17/21 08:36  65  166/59    


 


8/17/21 08:00      Nasal Cannula 4.0 


 


8/17/21 07:00 97.7  19  94   





 97.7       








Weight


Weight [ ]





I.O.


Intake and Output











Intake and Output 


 


 8/17/21





 07:00


 


Intake Total 860 ml


 


Output Total 750 ml


 


Balance 110 ml


 


 


 


Intake Oral 860 ml


 


Output Urine Total 750 ml


 


# Voids 4











Labs


Labs





Laboratory Tests








Test


 8/16/21


16:57 8/16/21


21:20 8/17/21


05:30 8/17/21


07:54


 


Glucose (Fingerstick)


 294 mg/dL


(70-99) 194 mg/dL


(70-99) 


 69 mg/dL


(70-99)


 


Sodium Level


 


 


 145 mmol/L


(136-145) 





 


Potassium Level


 


 


 3.4 mmol/L


(3.5-5.1) 





 


Chloride Level


 


 


 100 mmol/L


() 





 


Carbon Dioxide Level


 


 


 > 45 mmol/L


(21-32) 





 


Anion Gap    (6-14)  


 


Blood Urea Nitrogen


 


 


 35 mg/dL


(7-20) 





 


Creatinine


 


 


 1.6 mg/dL


(0.6-1.0) 





 


Estimated GFR


(Cockcroft-Gault) 


 


 31.2 


 





 


Glucose Level


 


 


 57 mg/dL


(70-99) 





 


Calcium Level


 


 


 9.5 mg/dL


(8.5-10.1) 





 


Phosphorus Level


 


 


 4.3 mg/dL


(2.6-4.7) 





 


Magnesium Level


 


 


 1.8 mg/dL


(1.8-2.4) 














Review of Systems


Constitutional:  yes: weakness, alert, oriented


Ears/Nose/Throat:  Yes: no symptom reported


Eyes:  Yes: no symptom reported


Pulmonary:  Yes dyspnea


Cardiovascular:  Yes edema


Gastrointestional:  Yes: constipation


Genitourinary:  Yes: no symptom reported


Musculoskeletal:  Yes: muscle stiffness


Skin:  Yes no symptom reported


Psychiatric/Neurological:  Yes: depressed


Endocrine:  Yes: no symptom reported


Hematologic/Lymphatic:  Yes: no symptom reported





Physical Exam


General Appearance:  no apparent distress, obese, febrile


Skin:  warm, edema


Respiratory:  decreased breath sounds


Heart:  S1S2


Abdomen:  soft, bowel sounds present


Genitourinary:  bladder flat, dunaway catheter


Extremities:  pulses present, edema


Neurology:  alert, oriented, Ext weakness


Musculoskeletal:  Osteoarthritis





Assessment


Assessment


IMP





FALL


ANASARCA


EDEMA


CKD STAGE 3B TO 4-CR OF 1.7


HYPOMAGNESEMIA-CORRECTED


HYPOKALEMIA


MORBID OBESITY


UNCONTROLLED HTN


VOLUME OVERLOAD


CHF - SUSPECT ACUTE ON CHRONIC SYSTOLIC AND DIASTOLIC


HX OF COPD-ON 2-3 LITERS CONTINUOUSLY


DM II


PROTEINURIA


DECONDITIONING


ANEMIA OF CKD








PLAN





CONT DIURESIS


LASIX GTT TO PO LASIX


CONT METOLAZONE


REPLACE K


AVOID NEPHROTOXINS


IRON SUPPLEMENTS


ENC COMPLIANCE


D/W ATTENDING


F/U IN 4-6 WEEKS


D/C PLANS NOTED











MARCO ANTONIO CAMPOS MD                 Aug 17, 2021 12:00

## 2021-08-17 NOTE — PDOC3
Discharge Summary


Date of Admission:  Aug 11, 2021


Date of Discharge:  Aug 17, 2021


Follow-Up:  1-2 days


Admitting Diagnosis comment:








History of Present Illness


History of Present Illness


Patient is a 76-year-old female with past medical history of diabetes mellitus 

type 2, hypertension, COPD on home O2, CKD was on dialysis a year ago but taken 

off, morbid obesity, who presents today after falling.  Patient states that she 

was trying to take a shower but she tripped and fell.  She also reports that she

has been getting weaker in the past couple weeks and she has gained about 10 

pounds and feels like she is getting more edematous.  Denies any head trauma or 

loss of consciousness.  However she does complain of mild posterior neck pain 

and also she complains of swelling to the bilateral lower extremities.  Denies 

fevers, shortness of breath, chest pain, abdominal pain, diarrhea or dysuria.  

Patient was given dose of IV Lasix as she does report urinary output but she is 

unsure how much she is putting out because there is a pure wick attached to her.








D/C MEDS   SEE MAR 





COMPLICATIONS NONE





D/C CONDITION GOOD





CONSULTS   NEPHROLOGY, NEUROLOGY, VASCULAR SURGERY





PROGNOSIS GOOD WITH COMPLIANCE





D/C DIET HEART HEALTHY





TO SNF / PP TODAY





DISCHARGE DX ============


Chief Complaint


Mechanical fall.


Hypertensive urgency.


Cervical spinal stenosis.


Acute volume overload.


Acute on chronic kidney failure, due to possible vasomotor nephropathy.


Left thalamus hypodensity, incidental finding, no further evaluation or 

treatment.


Anemia of chronic kidney disease.


Deconditioning.


Peripheral neuropathy.


Osteoarthritis.


COPD.


DM type 2.


HTN.


Morbid obesity.


Severe protein malnutrition.





History of Present Illness


History of Present Illness


Patient is a 76-year-old female with past medical history of diabetes mellitus 

type 2, hypertension, COPD on home O2, CKD was on dialysis a year ago but taken 

off, morbid obesity, who presents today after falling.  Patient states that she 

was trying to take a shower but she tripped and fell.  She also reports that she

has been getting weaker in the past couple weeks and she has gained about 10 

pounds and feels like she is getting more edematous.  Denies any head trauma or 

loss of consciousness.  However she does complain of mild posterior neck pain 

and also she complains of swelling to the bilateral lower extremities.  Denies 

fevers, shortness of breath, chest pain, abdominal pain, diarrhea or dysuria.  

Patient was given dose of IV Lasix as she does report urinary output but she is 

unsure how much she is putting out because there is a pure wick attached to her.





8/13/21


Patient seen and examined at bedside.


D/W RN.


Chart reviewed.


SpO2 94% on 3 lpm via NC.


U/S found L renal simple cyst.


Patient denies stroke symptoms.


Per neurology, hypodensity was found in L thalamus. No further evaluation or 

treatment necessary.


Patient prefers to have carotid doppler study done.





8/14/21


Patient seen and examined at bedside.


D/W RN.


Dr. Ewing (nephrology) and I plan to follow creatinine levels, continue 

diuretics, and consider dialysis.


Carotid doppler found >70% stenosis of the right proximal internal carotid. 


Plan to consult Dr. Kirkland (vascular surgery).





8/15/21


Patient seen and examined at bedside.


D/W RN.


Per Dr. Kirkland, intervention of carotid stenosis is warranted.





8/16/21


Patient seen and examined at bedside.


D/W RN.


Per Dr. Kirkland, intervention of carotid stenosis is warranted.


CONT METOLAZONE


REPLACE K


AVOID NEPHROTOXINS


IRON SUPPLEMENTS


ENC COMPLIANCE


D/W ATTENDING


F/U IN 4-6 WEEKS











8/17/21


Patient seen and examined at bedside.


D/W RN.


Per Dr. Kirkland, intervention of carotid stenosis is warranted.


CONT METOLAZONE


REPLACE K


AVOID NEPHROTOXINS


IRON SUPPLEMENTS


ENC COMPLIANCE


D/W ATTENDING


F/U IN 4-6 WEEKS





D/C PLANNING 34 MIN





Vitals


Vitals





Vital Signs








  Date Time  Temp Pulse Resp B/P (MAP) Pulse Ox O2 Delivery O2 Flow Rate FiO2


 


8/17/21 08:36  65  166/59    


 


8/17/21 07:00 97.7  19  94 Nasal Cannula 4.0 





 97.7       











Physical Exam


Physical Exam


IN NAD, NEEDS   SNF


General:  Alert, Oriented X3, Cooperative, No acute distress


Heart:  Regular rate, Normal S1, Normal S2


Lungs:  Clear


Abdomen:  Normal bowel sounds, Soft, No tenderness


Extremities:  No clubbing, No cyanosis, Other (3+ EDEMA)


FINAL DIAGNOSIS


Problems


Medical Problems:


(1) Acute cervical sprain


Status: Acute  





(2) Acute on chronic renal failure


Status: Acute  








Brief Hospital Course


Ms. Pascal  is a 76 old [sex] who presented with [ FALL, DANNY, HTN ]


CONDITION AT DISCHARGE:  Improved


Discharge Medications





Current Medications


Ondansetron HCl (Zofran) 4 mg PRN Q8HRS  PRN IVP NAUSEA/VOMITING;  Start 8/11/21

at 20:30;  Stop 8/12/21 at 18:37;  Status DC


Morphine Sulfate (Morphine Sulfate) 2 mg PRN Q2HR  PRN IVP PAIN;  Start 8/11/21 

at 20:30;  Stop 8/12/21 at 20:29;  Status DC


Acetaminophen (Tylenol) 650 mg PRN Q4HRS  PRN PO FEVER > 100.3'F;  Start 8/11/21

at 20:30;  Stop 8/12/21 at 18:36;  Status DC


Furosemide (Lasix) 40 mg 1X  ONCE IVP  Last administered on 8/11/21at 23:02;  

Start 8/11/21 at 21:00;  Stop 8/11/21 at 21:01;  Status DC


Ascorbic Acid (Vitamin C) 500 mg DAILY PO  Last administered on 8/17/21at 08:36;

 Start 8/12/21 at 09:00


Aspirin (Ecotrin) 81 mg DAILYWBKFT PO  Last administered on 8/17/21at 08:36;  

Start 8/12/21 at 09:00


Carvedilol (Coreg) 6.25 mg BIDWMEALS PO  Last administered on 8/17/21at 08:36;  

Start 8/12/21 at 09:00


Insulin Glargine (Lantus Syringe) 38 unit HS SQ  Last administered on 8/15/21at 

22:20;  Start 8/12/21 at 21:00;  Stop 8/16/21 at 14:43;  Status DC


Insulin Human Lispro (HumaLOG) 13 units TIDWMEALS SQ  Last administered on 

8/16/21at 17:17;  Start 8/12/21 at 09:00


Simvastatin (Zocor) 40 mg QHS PO  Last administered on 8/16/21at 21:45;  Start 

8/12/21 at 21:00


Insulin Human Lispro (HumaLOG) 0-7 UNITS TIDWMEALS SQ  Last administered on 

8/16/21at 17:17;  Start 8/12/21 at 12:00


Dextrose (Dextrose 50%-Water Syringe) 12.5 gm PRN Q15MIN  PRN IV SEE COMMENTS;  

Start 8/12/21 at 08:30


Sennosides (Senna) 17.2 mg PRN BID  PRN PO CONSTIPATION;  Start 8/12/21 at 08:30


Docusate Sodium (Colace) 100 mg PRN DAILY  PRN PO HARD STOOLS Last administered 

on 8/13/21at 09:17;  Start 8/12/21 at 08:30


Ondansetron HCl (Zofran) 4 mg PRN Q6HRS  PRN IVP NAUSEA/VOMITING, 1st CHOICE 

Last administered on 8/13/21at 09:19;  Start 8/12/21 at 08:30


Dextrose (Dextrose 50%-Water Syringe) 12.5 gm PRN Q15MIN  PRN IV SEE COMMENTS;  

Start 8/12/21 at 08:30;  Status Cancel


Acetaminophen (Tylenol) 650 mg PRN Q4HRS  PRN PO TEMP OVER 100.4F OR MILD PAIN 

Last administered on 8/16/21at 21:45;  Start 8/12/21 at 08:30


Heparin Sodium (Porcine) (Heparin Sodium) 5,000 unit Q12HR SQ  Last administered

on 8/17/21at 08:44;  Start 8/12/21 at 09:00


Prochlorperazine Edisylate (Compazine) 10 mg PRN Q6HRS  PRN IV NAUSEA/VOMITING, 

2ND CHOICE;  Start 8/12/21 at 08:30


Furosemide (Lasix) 80 mg 1X  ONCE IVP  Last administered on 8/12/21at 11:47;  

Start 8/12/21 at 10:15;  Stop 8/12/21 at 10:18;  Status DC


Furosemide 100 mg/ Sodium Chloride 100 ml @ 5 mls/hr CONT  PRN IV SEE I/O RECORD

Last administered on 8/16/21at 21:52;  Start 8/12/21 at 11:00;  Stop 8/17/21 at 

12:02;  Status DC


Potassium Chloride (Klor-Con) 40 meq 1X  ONCE PO  Last administered on 8/12/21at

11:46;  Start 8/12/21 at 10:15;  Stop 8/12/21 at 10:18;  Status DC


Potassium Chloride (Klor-Con) 20 meq DAILYWBKFT PO  Last administered on 

8/17/21at 08:36;  Start 8/13/21 at 12:00


Polysaccharide Iron Complex (Niferex 150) 150 mg DAILY PO  Last administered on 

8/17/21at 08:35;  Start 8/13/21 at 12:00


Metolazone (Zaroxolyn) 5 mg DAILY PO  Last administered on 8/17/21at 08:37;  

Start 8/14/21 at 12:00


Magnesium Sulfate 50 ml @ 25 mls/hr 1X  ONCE IV  Last administered on 8/15/21at 

12:00;  Start 8/15/21 at 12:00;  Stop 8/15/21 at 13:59;  Status DC


Magnesium Sulfate 50 ml @ 25 mls/hr 1X  ONCE IV  Last administered on 8/16/21at 

11:15;  Start 8/16/21 at 11:00;  Stop 8/16/21 at 12:59;  Status DC


Potassium Chloride/Water 100 ml @  100 mls/hr Q1H IV ;  Start 8/16/21 at 11:00; 

Stop 8/16/21 at 12:59;  Status UNV


Potassium Chloride/Water 100 ml @  100 mls/hr Q1H IV  Last administered on 

8/16/21at 11:23;  Start 8/16/21 at 11:00;  Stop 8/16/21 at 12:32;  Status DC


Potassium Chloride (Klor-Con) 40 meq 1X  ONCE PO  Last administered on 8/16/21at

13:39;  Start 8/16/21 at 12:30;  Stop 8/16/21 at 12:34;  Status DC


Insulin Glargine (Lantus Syringe) 30 unit QHS SQ  Last administered on 8/16/21at

21:48;  Start 8/16/21 at 21:00


Potassium Chloride (Klor-Con) 40 meq 1X  ONCE PO ;  Start 8/17/21 at 13:00;  

Stop 8/17/21 at 13:01


Furosemide (Lasix) 40 mg BID94 PO ;  Start 8/17/21 at 16:00





Active Scripts


Active


Thera-M Tablet (Multivits,Ca,Minerals/Iron/Fa) 1 Each Tablet 1 Tab PO DAILY 30 

Days


Vitamin C (Ascorbic Acid) 500 Mg Tablet 500 Mg PO DAILY 30 Days


Culturelle (Lactobacillus Rhamnosus Gg) 1 Each Cap.sprink 1 Cap PO BID 30 Days


Aspirin Ec (Aspirin) 81 Mg Tablet.dr 81 Mg PO DAILYWBKFT 30 Days


Proair Hfa (Albuterol Sulfate) 8.5 Gm Hfa.aer.ad 2.5 Mg NEB PRN Q4HRS PRN 30 

Days


Amox Tr-K Clv 500-125 Mg Tab (Amoxicillin/Potassium Clav) 1 Each Tablet 1 Tab PO

DAILY 3 Days


Reported


Lantus (Insulin Glargine,Hum.rec.anlog) 100 Unit/1 Ml Vial 38 Units SQ HS


Insulin Lispro 100 Unit/1 Ml Vial 13 Units SQ TIDAC


Xalatan (Latanoprost) 2.5 Ml Drops 1 Drop OP HS


Flonase Allergy Relief (Fluticasone Propionate) 9.9 Ml Spray.susp 2 Sprays NS 

DAILY


Novolin N (Nph, Human Insulin Isophane) 100 Unit/1 Ml Vial 40 Unit SQ DAILYWBKFT


Carvedilol ** (Carvedilol) 6.25 Mg Tablet 6.25 Mg PO BIDWMEALS


Simvastatin 40 Mg Tablet 1 Tab PO QHS


Furosemide 20 Mg Tablet 1 Tab PO DAILY


Vital Signs





Vital Signs








  Date Time  Temp Pulse Resp B/P (MAP) Pulse Ox O2 Delivery O2 Flow Rate FiO2


 


8/17/21 08:36  65  166/59    


 


8/17/21 08:00      Nasal Cannula 4.0 


 


8/17/21 07:00 97.7  19  94   





 97.7       








Labs





Laboratory Tests








Test


 8/15/21


17:08 8/15/21


21:34 8/16/21


07:05 8/16/21


08:30


 


Glucose (Fingerstick)


 188 mg/dL


(70-99) 177 mg/dL


(70-99) 


 74 mg/dL


(70-99)


 


Sodium Level


 


 


 148 mmol/L


(136-145) 





 


Potassium Level


 


 


 3.2 mmol/L


(3.5-5.1) 





 


Chloride Level


 


 


 100 mmol/L


() 





 


Carbon Dioxide Level


 


 


 > 45 mmol/L


(21-32) 





 


Anion Gap    (6-14)  


 


Blood Urea Nitrogen


 


 


 29 mg/dL


(7-20) 





 


Creatinine


 


 


 1.5 mg/dL


(0.6-1.0) 





 


Estimated GFR


(Cockcroft-Gault) 


 


 33.6 


 





 


Glucose Level


 


 


 77 mg/dL


(70-99) 





 


Calcium Level


 


 


 9.4 mg/dL


(8.5-10.1) 





 


Phosphorus Level


 


 


 3.2 mg/dL


(2.6-4.7) 





 


Magnesium Level


 


 


 1.5 mg/dL


(1.8-2.4) 





 


Test


 8/16/21


11:51 8/16/21


16:57 8/16/21


21:20 8/17/21


05:30


 


Glucose (Fingerstick)


 120 mg/dL


(70-99) 294 mg/dL


(70-99) 194 mg/dL


(70-99) 





 


Sodium Level


 


 


 


 145 mmol/L


(136-145)


 


Potassium Level


 


 


 


 3.4 mmol/L


(3.5-5.1)


 


Chloride Level


 


 


 


 100 mmol/L


()


 


Carbon Dioxide Level


 


 


 


 > 45 mmol/L


(21-32)


 


Anion Gap     (6-14) 


 


Blood Urea Nitrogen


 


 


 


 35 mg/dL


(7-20)


 


Creatinine


 


 


 


 1.6 mg/dL


(0.6-1.0)


 


Estimated GFR


(Cockcroft-Gault) 


 


 


 31.2 





 


Glucose Level


 


 


 


 57 mg/dL


(70-99)


 


Calcium Level


 


 


 


 9.5 mg/dL


(8.5-10.1)


 


Phosphorus Level


 


 


 


 4.3 mg/dL


(2.6-4.7)


 


Magnesium Level


 


 


 


 1.8 mg/dL


(1.8-2.4)


 


Test


 8/17/21


07:54 8/17/21


11:58 


 





 


Glucose (Fingerstick)


 69 mg/dL


(70-99) 135 mg/dL


(70-99) 


 











Laboratory Tests








Test


 8/16/21


16:57 8/16/21


21:20 8/17/21


05:30 8/17/21


07:54


 


Glucose (Fingerstick)


 294 mg/dL


(70-99) 194 mg/dL


(70-99) 


 69 mg/dL


(70-99)


 


Sodium Level


 


 


 145 mmol/L


(136-145) 





 


Potassium Level


 


 


 3.4 mmol/L


(3.5-5.1) 





 


Chloride Level


 


 


 100 mmol/L


() 





 


Carbon Dioxide Level


 


 


 > 45 mmol/L


(21-32) 





 


Anion Gap    (6-14)  


 


Blood Urea Nitrogen


 


 


 35 mg/dL


(7-20) 





 


Creatinine


 


 


 1.6 mg/dL


(0.6-1.0) 





 


Estimated GFR


(Cockcroft-Gault) 


 


 31.2 


 





 


Glucose Level


 


 


 57 mg/dL


(70-99) 





 


Calcium Level


 


 


 9.5 mg/dL


(8.5-10.1) 





 


Phosphorus Level


 


 


 4.3 mg/dL


(2.6-4.7) 





 


Magnesium Level


 


 


 1.8 mg/dL


(1.8-2.4) 





 


Test


 8/17/21


11:58 


 


 





 


Glucose (Fingerstick)


 135 mg/dL


(70-99) 


 


 











Allergies





                                    Allergies








Coded Allergies Type Severity Reaction Last Updated Verified


 


  shellfish derived Adverse Reaction Intermediate Nausea 8/11/21 Yes








Disposition/Orders:  Other (D/C TO SNF)





Justicifation of Admission Dx:


Justifications for Admission:


Justification of Admission Dx:  N/A











URFINO CRUZ MD          Aug 17, 2021 12:17

## 2021-10-04 ENCOUNTER — HOSPITAL ENCOUNTER (OUTPATIENT)
Dept: HOSPITAL 61 - LAB | Age: 76
End: 2021-10-04
Attending: INTERNAL MEDICINE
Payer: MEDICARE

## 2021-10-04 DIAGNOSIS — I12.9: Primary | ICD-10-CM

## 2021-10-04 DIAGNOSIS — R80.9: ICD-10-CM

## 2021-10-04 DIAGNOSIS — N18.31: ICD-10-CM

## 2021-10-04 DIAGNOSIS — E11.22: ICD-10-CM

## 2021-10-04 LAB
ALBUMIN SERPL-MCNC: 2.8 G/DL (ref 3.4–5)
ANION GAP SERPL CALC-SCNC: 8 MMOL/L (ref 6–14)
BASOPHILS # BLD AUTO: 0.1 X10^3/UL (ref 0–0.2)
BASOPHILS NFR BLD: 1 % (ref 0–3)
BUN SERPL-MCNC: 46 MG/DL (ref 7–20)
CALCIUM SERPL-MCNC: 9.4 MG/DL (ref 8.5–10.1)
CHLORIDE SERPL-SCNC: 99 MMOL/L (ref 98–107)
CO2 SERPL-SCNC: 30 MMOL/L (ref 21–32)
CREAT SERPL-MCNC: 2 MG/DL (ref 0.6–1)
CREATININE,RANDOM URINE: 78.6 MG/DL
EOSINOPHIL NFR BLD: 0.2 X10^3/UL (ref 0–0.7)
EOSINOPHIL NFR BLD: 2 % (ref 0–3)
ERYTHROCYTE [DISTWIDTH] IN BLOOD BY AUTOMATED COUNT: 15.6 % (ref 11.5–14.5)
GFR SERPLBLD BASED ON 1.73 SQ M-ARVRAT: 24.1 ML/MIN
GLUCOSE SERPL-MCNC: 274 MG/DL (ref 70–99)
HCT VFR BLD CALC: 33.2 % (ref 36–47)
HGB BLD-MCNC: 10.9 G/DL (ref 12–15.5)
LYMPHOCYTES # BLD: 1.1 X10^3/UL (ref 1–4.8)
LYMPHOCYTES NFR BLD AUTO: 14 % (ref 24–48)
MCH RBC QN AUTO: 30 PG (ref 25–35)
MCHC RBC AUTO-ENTMCNC: 33 G/DL (ref 31–37)
MCV RBC AUTO: 92 FL (ref 79–100)
MONO #: 0.6 X10^3/UL (ref 0–1.1)
MONOCYTES NFR BLD: 7 % (ref 0–9)
NEUT #: 6.2 X10^3/UL (ref 1.8–7.7)
NEUTROPHILS NFR BLD AUTO: 76 % (ref 31–73)
PHOSPHATE SERPL-MCNC: 3.5 MG/DL (ref 2.6–4.7)
PLATELET # BLD AUTO: 242 X10^3/UL (ref 140–400)
POTASSIUM SERPL-SCNC: 4.7 MMOL/L (ref 3.5–5.1)
RBC # BLD AUTO: 3.61 X10^6/UL (ref 3.5–5.4)
SODIUM SERPL-SCNC: 137 MMOL/L (ref 136–145)
WBC # BLD AUTO: 8.1 X10^3/UL (ref 4–11)

## 2021-10-04 PROCEDURE — 36415 COLL VENOUS BLD VENIPUNCTURE: CPT

## 2021-10-04 PROCEDURE — 83970 ASSAY OF PARATHORMONE: CPT

## 2021-10-04 PROCEDURE — 80069 RENAL FUNCTION PANEL: CPT

## 2021-10-04 PROCEDURE — 85025 COMPLETE CBC W/AUTO DIFF WBC: CPT

## 2021-10-04 PROCEDURE — 84156 ASSAY OF PROTEIN URINE: CPT

## 2021-10-04 PROCEDURE — 82570 ASSAY OF URINE CREATININE: CPT

## 2021-10-05 LAB
CALCIUM PTH: 9.1 MG/DL (ref 8.7–10.3)
CREATININE PTH: 1.72 MG/DL (ref 0.57–1)
PHOSPHORUS PTH: 3.3 MG/DL (ref 3–4.3)
PTH-INTACT SERPL-MCNC: 59 PG/ML (ref 15–65)

## 2021-11-30 ENCOUNTER — HOSPITAL ENCOUNTER (OUTPATIENT)
Dept: HOSPITAL 61 - LAB | Age: 76
End: 2021-11-30
Attending: INTERNAL MEDICINE
Payer: MEDICARE

## 2021-11-30 DIAGNOSIS — E11.22: ICD-10-CM

## 2021-11-30 DIAGNOSIS — I12.9: Primary | ICD-10-CM

## 2021-11-30 DIAGNOSIS — R80.9: ICD-10-CM

## 2021-11-30 DIAGNOSIS — N18.31: ICD-10-CM

## 2021-11-30 LAB
ALBUMIN SERPL-MCNC: 2.9 G/DL (ref 3.4–5)
ANION GAP SERPL CALC-SCNC: 10 MMOL/L (ref 6–14)
BASOPHILS # BLD AUTO: 0.1 X10^3/UL (ref 0–0.2)
BASOPHILS NFR BLD: 1 % (ref 0–3)
BUN SERPL-MCNC: 40 MG/DL (ref 7–20)
CALCIUM SERPL-MCNC: 9.4 MG/DL (ref 8.5–10.1)
CHLORIDE SERPL-SCNC: 103 MMOL/L (ref 98–107)
CO2 SERPL-SCNC: 32 MMOL/L (ref 21–32)
CREAT SERPL-MCNC: 1.2 MG/DL (ref 0.6–1)
CREATININE,RANDOM URINE: 55.6 MG/DL
EOSINOPHIL NFR BLD: 0.2 X10^3/UL (ref 0–0.7)
EOSINOPHIL NFR BLD: 3 % (ref 0–3)
ERYTHROCYTE [DISTWIDTH] IN BLOOD BY AUTOMATED COUNT: 15.8 % (ref 11.5–14.5)
GFR SERPLBLD BASED ON 1.73 SQ M-ARVRAT: 43.4 ML/MIN
GLUCOSE SERPL-MCNC: 125 MG/DL (ref 70–99)
HCT VFR BLD CALC: 38.6 % (ref 36–47)
HGB BLD-MCNC: 12.4 G/DL (ref 12–15.5)
LYMPHOCYTES # BLD: 1.2 X10^3/UL (ref 1–4.8)
LYMPHOCYTES NFR BLD AUTO: 15 % (ref 24–48)
MCH RBC QN AUTO: 30 PG (ref 25–35)
MCHC RBC AUTO-ENTMCNC: 32 G/DL (ref 31–37)
MCV RBC AUTO: 92 FL (ref 79–100)
MONO #: 0.5 X10^3/UL (ref 0–1.1)
MONOCYTES NFR BLD: 6 % (ref 0–9)
NEUT #: 6.2 X10^3/UL (ref 1.8–7.7)
NEUTROPHILS NFR BLD AUTO: 76 % (ref 31–73)
PHOSPHATE SERPL-MCNC: 3.7 MG/DL (ref 2.6–4.7)
PLATELET # BLD AUTO: 240 X10^3/UL (ref 140–400)
POTASSIUM SERPL-SCNC: 3.9 MMOL/L (ref 3.5–5.1)
RBC # BLD AUTO: 4.19 X10^6/UL (ref 3.5–5.4)
SODIUM SERPL-SCNC: 145 MMOL/L (ref 136–145)
WBC # BLD AUTO: 8.2 X10^3/UL (ref 4–11)

## 2021-11-30 PROCEDURE — 82570 ASSAY OF URINE CREATININE: CPT

## 2021-11-30 PROCEDURE — 85025 COMPLETE CBC W/AUTO DIFF WBC: CPT

## 2021-11-30 PROCEDURE — 83970 ASSAY OF PARATHORMONE: CPT

## 2021-11-30 PROCEDURE — 36415 COLL VENOUS BLD VENIPUNCTURE: CPT

## 2021-11-30 PROCEDURE — 80069 RENAL FUNCTION PANEL: CPT

## 2021-11-30 PROCEDURE — 84156 ASSAY OF PROTEIN URINE: CPT

## 2021-12-01 LAB
CALCIUM PTH: 9.8 MG/DL (ref 8.7–10.3)
CREATININE PTH: 1.2 MG/DL (ref 0.57–1)
PHOSPHORUS PTH: 3.6 MG/DL (ref 3–4.3)
PTH-INTACT SERPL-MCNC: 48 PG/ML (ref 15–65)

## 2022-04-22 ENCOUNTER — HOSPITAL ENCOUNTER (EMERGENCY)
Dept: HOSPITAL 61 - ER | Age: 77
Discharge: HOME | End: 2022-04-22
Payer: MEDICARE

## 2022-04-22 VITALS
SYSTOLIC BLOOD PRESSURE: 194 MMHG | SYSTOLIC BLOOD PRESSURE: 194 MMHG | DIASTOLIC BLOOD PRESSURE: 79 MMHG | DIASTOLIC BLOOD PRESSURE: 79 MMHG

## 2022-04-22 VITALS — BODY MASS INDEX: 44.26 KG/M2 | WEIGHT: 240.52 LBS | HEIGHT: 62 IN

## 2022-04-22 DIAGNOSIS — E11.22: ICD-10-CM

## 2022-04-22 DIAGNOSIS — E11.649: Primary | ICD-10-CM

## 2022-04-22 DIAGNOSIS — Z79.4: ICD-10-CM

## 2022-04-22 DIAGNOSIS — Z91.013: ICD-10-CM

## 2022-04-22 DIAGNOSIS — J44.9: ICD-10-CM

## 2022-04-22 DIAGNOSIS — N18.9: ICD-10-CM

## 2022-04-22 DIAGNOSIS — I12.9: ICD-10-CM

## 2022-04-22 LAB
ALBUMIN SERPL-MCNC: 3.3 G/DL (ref 3.4–5)
ALBUMIN/GLOB SERPL: 0.8 {RATIO} (ref 1–1.7)
ALP SERPL-CCNC: 96 U/L (ref 46–116)
ALT SERPL-CCNC: 22 U/L (ref 14–59)
ANION GAP SERPL CALC-SCNC: 9 MMOL/L (ref 6–14)
AST SERPL-CCNC: 19 U/L (ref 15–37)
BACTERIA #/AREA URNS HPF: 0 /HPF
BASOPHILS # BLD AUTO: 0.1 X10^3/UL (ref 0–0.2)
BASOPHILS NFR BLD: 0 % (ref 0–3)
BILIRUB SERPL-MCNC: 0.3 MG/DL (ref 0.2–1)
BUN SERPL-MCNC: 73 MG/DL (ref 7–20)
BUN/CREAT SERPL: 41 (ref 6–20)
CALCIUM SERPL-MCNC: 8.6 MG/DL (ref 8.5–10.1)
CHLORIDE SERPL-SCNC: 100 MMOL/L (ref 98–107)
CK SERPL-CCNC: 50 U/L (ref 26–192)
CO2 SERPL-SCNC: 28 MMOL/L (ref 21–32)
CREAT SERPL-MCNC: 1.8 MG/DL (ref 0.6–1)
EOSINOPHIL NFR BLD: 0 % (ref 0–3)
EOSINOPHIL NFR BLD: 0 X10^3/UL (ref 0–0.7)
ERYTHROCYTE [DISTWIDTH] IN BLOOD BY AUTOMATED COUNT: 16.8 % (ref 11.5–14.5)
GFR SERPLBLD BASED ON 1.73 SQ M-ARVRAT: 27.1 ML/MIN
GLUCOSE SERPL-MCNC: 113 MG/DL (ref 70–99)
HCT VFR BLD CALC: 31.5 % (ref 36–47)
HGB BLD-MCNC: 9.9 G/DL (ref 12–15.5)
LYMPHOCYTES # BLD: 0.8 X10^3/UL (ref 1–4.8)
LYMPHOCYTES NFR BLD AUTO: 7 % (ref 24–48)
MAGNESIUM SERPL-MCNC: 2.2 MG/DL (ref 1.8–2.4)
MCH RBC QN AUTO: 28 PG (ref 25–35)
MCHC RBC AUTO-ENTMCNC: 31 G/DL (ref 31–37)
MCV RBC AUTO: 89 FL (ref 79–100)
MONO #: 0.4 X10^3/UL (ref 0–1.1)
MONOCYTES NFR BLD: 3 % (ref 0–9)
NEUT #: 10.7 X10^3/UL (ref 1.8–7.7)
NEUTROPHILS NFR BLD AUTO: 90 % (ref 31–73)
PHOSPHATE SERPL-MCNC: 5.3 MG/DL (ref 2.6–4.7)
PLATELET # BLD AUTO: 301 X10^3/UL (ref 140–400)
POTASSIUM SERPL-SCNC: 4.2 MMOL/L (ref 3.5–5.1)
PROT SERPL-MCNC: 7.6 G/DL (ref 6.4–8.2)
RBC # BLD AUTO: 3.56 X10^6/UL (ref 3.5–5.4)
RBC #/AREA URNS HPF: 0 /HPF (ref 0–2)
SODIUM SERPL-SCNC: 137 MMOL/L (ref 136–145)
WBC # BLD AUTO: 12 X10^3/UL (ref 4–11)
WBC #/AREA URNS HPF: (no result) /HPF (ref 0–4)

## 2022-04-22 PROCEDURE — 82962 GLUCOSE BLOOD TEST: CPT

## 2022-04-22 PROCEDURE — 36415 COLL VENOUS BLD VENIPUNCTURE: CPT

## 2022-04-22 PROCEDURE — 84100 ASSAY OF PHOSPHORUS: CPT

## 2022-04-22 PROCEDURE — 80053 COMPREHEN METABOLIC PANEL: CPT

## 2022-04-22 PROCEDURE — 87086 URINE CULTURE/COLONY COUNT: CPT

## 2022-04-22 PROCEDURE — 84484 ASSAY OF TROPONIN QUANT: CPT

## 2022-04-22 PROCEDURE — 83605 ASSAY OF LACTIC ACID: CPT

## 2022-04-22 PROCEDURE — 83735 ASSAY OF MAGNESIUM: CPT

## 2022-04-22 PROCEDURE — 82550 ASSAY OF CK (CPK): CPT

## 2022-04-22 PROCEDURE — 85025 COMPLETE CBC W/AUTO DIFF WBC: CPT

## 2022-04-22 PROCEDURE — 81001 URINALYSIS AUTO W/SCOPE: CPT

## 2022-04-22 PROCEDURE — 93005 ELECTROCARDIOGRAM TRACING: CPT

## 2022-04-22 PROCEDURE — 71045 X-RAY EXAM CHEST 1 VIEW: CPT

## 2022-04-22 NOTE — RAD
XR CHEST 1V



History: Reason: Hypoglycemic episode, altered mental status / Spl. Instructions:  / History: 



Comparison: January 11, 2021



Findings:

Mild diffuse interstitial thickening with ill-defined opacities. No pleural effusion. No pneumothorax
. Enlarged cardiac size, unchanged. Right shoulder arthroplasty.



Impression: 

1.  Mild diffuse interstitial thickening with ill-defined opacities, may represent pulmonary edema.



Electronically signed by: Chaz Duke DO (4/22/2022 7:15 PM) [facility]

## 2022-04-22 NOTE — PHYS DOC
Past Medical History


Past Medical History:  Arthritis, COPD, Diabetes-Type II, Hypertension, Renal 

Disease


Additional Past Medical Histor:  HOME O2,CHRONIC KIDNEY DISEASE


Past Surgical History:  Cholecystectomy, Knee Replacement, Tonsillectomy


Additional Past Surgical Histo:  BREAST CYSTS, CARPAL TUNNEL


Smoking Status:  Never Smoker


Alcohol Use:  None


Drug Use:  None





General Adult


EDM:


Chief Complaint:  HYPOGLYCEMIA





HPI:


HPI:





Patient is a 77-year-old female who presents to the emergency department related

to a hypoglycemic episode at home.  Patient states that she takes insulin in the

evening, did not eat breakfast early enough, states that her blood sugar 

dropped, patient reports she does not recall her family member coming in the 

house and finding her unresponsive, per EMS paramedic patient's blood sugar was 

32 upon their arrival, they gave D50, recheck blood glucose equal 318, patient's

blood glucose on arrival to the emergency department equals 117.  Patient 

currently denies any physical complaints or physical concerns.  Patient states 

that she feels better and wishes to go home.  Patient states this does not 

happen very often but she knows why her blood sugar dropped.  Patient states she

did not eat breakfast this morning like she should have.  Patient states she 

does live at home alone, her nephew comes by to check on her daily.





Review of Systems:


Review of Systems:


14 body systems of review of systems have been reviewed.  See HPI for pertinent 

positives and negative responses, otherwise all other systems are negative, 

nonpertinent or noncontributory.


Constitutional: Negative except as outlined in HPI above.


Skin: Negative except as outlined in HPI above.


Eyes:   Negative except as outlined in HPI above.


HENT: Negative except as outlined in HPI above.


Respiratory:   Negative except as outlined in HPI above.


Cardiovascular:   Negative except as outlined in HPI above.


GI:   Negative except as outlined in HPI above.


:  Negative except as outlined in HPI above.


Musculoskeletal:   Negative except as outlined in HPI above.


Integument:   Negative except as outlined in HPI above.


Neurologic:   Negative except as outlined in HPI above.


Endocrine:   Negative except as outlined in HPI above.


Lymphatic:  Negative except as outlined in HPI above.


Psychiatric:  Negative except as outlined in HPI above.





Heart Score:


C/O Chest Pain:  No


Risk Factors:


Risk Factors:  DM, Current or recent (<one month) smoker, HTN, HLP, family 

history of CAD, obesity.


Risk Scores:


Score 0 - 3:  2.5% MACE over next 6 weeks - Discharge Home


Score 4 - 6:  20.3% MACE over next 6 weeks - Admit for Clinical Observation


Score 7 - 10:  72.7% MACE over next 6 weeks - Early Invasive Strategies





Allergies:


Allergies:





Allergies








Coded Allergies Type Severity Reaction Last Updated Verified


 


  shellfish derived Adverse Reaction Intermediate Nausea 8/11/21 Yes











Physical Exam:


PE:





Constitutional: Well developed, well nourished, no acute distress, non-toxic 

appearance.  77-year-old female in no apparent distress.


HENT: Normocephalic, atraumatic.


Eyes: Conjunctiva normal, no discharge.


Neck: Normal range of motion, no stridor.


Cardiovascular: No cyanosis appreciated, distal cap refill less than 2 seconds.


Lungs & Thorax: Patient is in no respiratory distress, no audible adventitious 

lung sounds appreciated.


Abdomen: Nontender, no abnormalities noted.


Skin: Warm, dry, no erythema, no rash.  


Back: No tenderness, no deformities.


Extremities: No tenderness, no cyanosis, no clubbing, ROM intact, no edema.  


Neurologic: Alert and oriented X 3, normal motor function, normal sensory 

function, no focal deficits noted. 


Psychologic: Affect normal, judgement normal, mood normal.





Current Patient Data:


Labs:





                                Laboratory Tests








Test


 4/22/22


17:05 4/22/22


17:18 4/22/22


17:45 4/22/22


19:29


 


White Blood Count


 12.0 x10^3/uL


(4.0-11.0)  H 


 


 





 


Red Blood Count


 3.56 x10^6/uL


(3.50-5.40) 


 


 





 


Hemoglobin


 9.9 g/dL


(12.0-15.5)  L 


 


 





 


Hematocrit


 31.5 %


(36.0-47.0)  L 


 


 





 


Mean Corpuscular Volume


 89 fL ()


 


 


 





 


Mean Corpuscular Hemoglobin 28 pg (25-35)     


 


Mean Corpuscular Hemoglobin


Concent 31 g/dL


(31-37) 


 


 





 


Red Cell Distribution Width


 16.8 %


(11.5-14.5)  H 


 


 





 


Platelet Count


 301 x10^3/uL


(140-400) 


 


 





 


Neutrophils (%) (Auto) 90 % (31-73)  H   


 


Lymphocytes (%) (Auto) 7 % (24-48)  L   


 


Monocytes (%) (Auto) 3 % (0-9)     


 


Eosinophils (%) (Auto) 0 % (0-3)     


 


Basophils (%) (Auto) 0 % (0-3)     


 


Neutrophils # (Auto)


 10.7 x10^3/uL


(1.8-7.7)  H 


 


 





 


Lymphocytes # (Auto)


 0.8 x10^3/uL


(1.0-4.8)  L 


 


 





 


Monocytes # (Auto)


 0.4 x10^3/uL


(0.0-1.1) 


 


 





 


Eosinophils # (Auto)


 0.0 x10^3/uL


(0.0-0.7) 


 


 





 


Basophils # (Auto)


 0.1 x10^3/uL


(0.0-0.2) 


 


 





 


Sodium Level


 137 mmol/L


(136-145) 


 


 





 


Potassium Level


 4.2 mmol/L


(3.5-5.1) 


 


 





 


Chloride Level


 100 mmol/L


() 


 


 





 


Carbon Dioxide Level


 28 mmol/L


(21-32) 


 


 





 


Anion Gap 9 (6-14)     


 


Blood Urea Nitrogen


 73 mg/dL


(7-20)  H 


 


 





 


Creatinine


 1.8 mg/dL


(0.6-1.0)  H 


 


 





 


Estimated GFR


(Cockcroft-Gault) 27.1  


 


 


 





 


BUN/Creatinine Ratio 41 (6-20)  H   


 


Glucose Level


 113 mg/dL


(70-99)  H 


 


 





 


Glucose (Fingerstick)


 117 mg/dL


(70-99)  H 114 mg/dL


(70-99)  H 


 147 mg/dL


(70-99)  H


 


Lactic Acid Level


 0.6 mmol/L


(0.4-2.0) 


 


 





 


Calcium Level


 8.6 mg/dL


(8.5-10.1) 


 


 





 


Phosphorus Level


 5.3 mg/dL


(2.6-4.7)  H 


 


 





 


Magnesium Level


 2.2 mg/dL


(1.8-2.4) 


 


 





 


Total Bilirubin


 0.3 mg/dL


(0.2-1.0) 


 


 





 


Aspartate Amino Transferase


(AST) 19 U/L (15-37)


 


 


 





 


Alanine Aminotransferase (ALT)


 22 U/L (14-59)


 


 


 





 


Alkaline Phosphatase


 96 U/L


() 


 


 





 


Creatine Kinase


 50 U/L


() 


 


 





 


Troponin I High Sensitivity 8 ng/L (4-50)     


 


Total Protein


 7.6 g/dL


(6.4-8.2) 


 


 





 


Albumin


 3.3 g/dL


(3.4-5.0)  L 


 


 





 


Albumin/Globulin Ratio


 0.8 (1.0-1.7)


L 


 


 





 


Urine Collection Type   Unknown   


 


Urine Color (Auto)   Light yellow   


 


Urine Turbidity   Clear   


 


Urine pH (Auto)


 


 


 5.5 (<5.0-8.0)


 





 


Urine Specific Gravity


 


 


 1.017


(1.000-1.030) 





 


Urine Protein (Auto)


 


 


 200 mg/dL


(Negative) 





 


Urine Glucose (Auto)(UA)


 


 


 100 mg/dL


(Negative) 





 


Urine Ketones (Auto)


 


 


 Negative mg/dL


(Negative) 





 


Urine Blood (Auto)


 


 


 Negative


(Negative) 





 


Urine Nitrite


 


 


 Negative


(Negative) 





 


Urine Bilirubin (Auto)


 


 


 Negative


(Negative) 





 


Urine Urobilinogen (Auto)


 


 


 Normal mg/dL


(Normal) 





 


Urine Leukocyte Esterase


(Auto) 


 


 Small


(Negative) 





 


Urine RBC   0 /HPF (0-2)   


 


Urine WBC


 


 


 5-10 /HPF


(0-4) 





 


Urine Squamous Epithelial


Cells 


 


 Many /LPF  


 





 


Urine Transitional Epithelial


Cells 


 


 Few /LPF  


 





 


Urine Renal Epithelial Cells   Few /LPF   


 


Urine Bacteria


 


 


 0 /HPF (0-FEW)


 








                                Laboratory Tests


4/22/22 17:05








                                Laboratory Tests


4/22/22 17:05








Vital Signs:





                                   Vital Signs








  Date Time  Temp Pulse Resp B/P (MAP) Pulse Ox O2 Delivery O2 Flow Rate FiO2


 


4/22/22 19:33  48 20 153/67 (95) 100 Nasal Cannula 3.0 











EKG:


EKG:


EKG performed at 1901 by ED nursing staff shows a sinus bradycardia with a 

first-degree AV block heart rate 54 bpm no ectopy appreciated, OH interval point

248, QTc interval 0.470, no acute STEMI, no ACS, no acute ischemia appreciated, 

EKG interpreted by ED attending physician Dr. Mccormack.





Radiology/Procedures:


Radiology/Procedures:


REASON: Hypoglycemic episode, altered mental status


PROCEDURE: CHEST AP ONLY





XR CHEST 1V





History: Reason: Hypoglycemic episode, altered mental status / Spl. 

Instructions:  / History: 





Comparison: January 11, 2021





Findings:


Mild diffuse interstitial thickening with ill-defined opacities. No pleural 

effusion. No pneumothorax. Enlarged cardiac size, unchanged. Right shoulder 

arthroplasty.





Impression: 


1.  Mild diffuse interstitial thickening with ill-defined opacities, may 

represent pulmonary edema.





Electronically signed by: Chaz Duke DO (4/22/2022 7:15 PM) [facility]





Course & Med Decision Making:


Course & Med Decision Making


Pertinent Labs and Imaging studies reviewed. (See chart for details)





77-year-old female, vital signs reviewed, presents emergency department 

concerning hypoglycemic episode at home.  Patient's explanation of events is 

consistent with patient's presentation.  Patient states she did not eat 

breakfast this morning and knows that her blood sugar will drop if she does not,

patient states she was fixing herself breakfast but apparently did not eat soon 

enough.  Patient is currently alert and oriented x3.  Is eating a food meal 

tray.  Will order blood pressure monitoring, cardiac monitoring, continuous 

pulse ox, saline lock, twelve-lead EKG, chest x-ray, CBC, creatinine kinase, C

MP, lactic acid, magnesium level, phosphorus level, high-sensitivity troponin I,

urinalysis assay with culture.  The patient is on 2 to 3 L of oxygen per nasal 

cannula at home at all times, patient was placed on 2 L NC O2.





The patient's urine is not infected, the patient's labs are unremarkable, kendrick peña was monitored for approximately 3 hours in the emergency department, 

latest blood glucose 148, discussed with patient home safety with diabetes.  

Patient states her nephew will come check on her.  Patient states she feels safe

to go home.  Discussed with patient strict follow-up with primary care this 

coming week, return to ER precautions and concerns were reviewed.  Patient is 

alert and oriented x3, patient is able to transfer self from bed to a standing 

position and ambulate to bathroom and back to room and back into bed with own 

strength without assistance.  Patient states she can take care of herself at 

home and does not require assistance for ADLs.  Will discharge patient to home.





Discussed with the patient all findings and diagnostic testing as well as the 

need to follow-up with their primary care provider for further evaluation and 

treatment or return to the ED if any new or worsening symptoms.  Strict return 

precautions were also discussed at length, the patient voiced understanding and 

agreement with the discharge planning.  The patient was nontoxic in appearance, 

in no apparent distress, and hemodynamically stable at the time of disposition.





Dragon Disclaimer:


Dragon Disclaimer:


This electronic medical record was generated, in whole or in part, using a voice

recognition dictation system.





Departure


Departure


Impression:  


   Primary Impression:  


   Hypoglycemic episode in patient with diabetes mellitus


Disposition:  01 HOME / SELF CARE / HOMELESS


Condition:  GOOD


Referrals:  


JESÚS LYLES DO (PCP)


Patient Instructions:  Hypoglycemia (Low Blood Sugar)





Additional Instructions:  


You were seen today in the emergency department after a hypoglycemic episode at 

your home.  As you and I investigated the reason for your hypoglycemic episode, 

we have come to the conclusion that you did not eat breakfast this morning.  It 

is very important to eat after taking insulin and diabetes medications.  Please 

continue to take all of your home medications as prescribed by your primary care

physician.  Please see your primary care doctor this coming week.  Return to the

emergency department for worsening symptoms or other concerns.  Thank you for 

visiting our Emergency Department.  It was a pleasure taking care of you today 

in the emergency department and we appreciate you trusting us with your care. If

any additional problems come up don't hesitate to return to visit us. Please 

follow up with your primary care provider so they can plan additional care if 

needed and know about the problem that you had. If symptoms worsen come back to 

the Emergency Department. Any concerning symptoms that start such as chest pain,

shortness of air, weakness or numbness on one side of the body, running high 

fevers or any other concerning symptoms return to the ER.











MARGOT BLAS       Apr 22, 2022 20:21

## 2022-04-23 NOTE — EKG
[facility]

              [address] [place] [address]

Test Date:    2022               Test Time:    19:01:32

Pat Name:     NEENA DUTTON             Department:   

Patient ID:   [id number]           Room:          

Gender:       F                        Technician:   

:          [id number]               Requested By: MARGOT BLAS

Order Number: [id number]           Reading MD:   Ananda Ly

                                 Measurements

Intervals                              Axis          

Rate:         54                       P:            65

OK:           248                      QRS:          38

QRSD:         106                      T:            54

QT:           494                                    

QTc:          470                                    

                           Interpretive Statements

SINUS RHYTHM

PROLONGED OK INTERVAL

PROLONGED QT

Electronically Signed On 2022 17:49:58 CDT by Ananda Ly

## 2022-05-31 ENCOUNTER — HOSPITAL ENCOUNTER (OUTPATIENT)
Dept: HOSPITAL 61 - LAB | Age: 77
End: 2022-05-31
Attending: NURSE PRACTITIONER
Payer: MEDICARE

## 2022-05-31 DIAGNOSIS — N18.31: ICD-10-CM

## 2022-05-31 DIAGNOSIS — R80.9: ICD-10-CM

## 2022-05-31 DIAGNOSIS — E11.22: ICD-10-CM

## 2022-05-31 DIAGNOSIS — I12.9: Primary | ICD-10-CM

## 2022-05-31 DIAGNOSIS — Z79.4: ICD-10-CM

## 2022-05-31 LAB
ALBUMIN SERPL-MCNC: 3 G/DL (ref 3.4–5)
ANION GAP SERPL CALC-SCNC: 12 MMOL/L (ref 6–14)
BASOPHILS # BLD AUTO: 0 X10^3/UL (ref 0–0.2)
BASOPHILS NFR BLD: 1 % (ref 0–3)
BUN SERPL-MCNC: 96 MG/DL (ref 7–20)
CALCIUM SERPL-MCNC: 9.5 MG/DL (ref 8.5–10.1)
CHLORIDE SERPL-SCNC: 101 MMOL/L (ref 98–107)
CO2 SERPL-SCNC: 27 MMOL/L (ref 21–32)
CREAT SERPL-MCNC: 2 MG/DL (ref 0.6–1)
CREATININE,RANDOM URINE: 23.7 MG/DL
EOSINOPHIL NFR BLD: 0.1 X10^3/UL (ref 0–0.7)
EOSINOPHIL NFR BLD: 2 % (ref 0–3)
ERYTHROCYTE [DISTWIDTH] IN BLOOD BY AUTOMATED COUNT: 17.6 % (ref 11.5–14.5)
GFR SERPLBLD BASED ON 1.73 SQ M-ARVRAT: 24 ML/MIN
GLUCOSE SERPL-MCNC: 115 MG/DL (ref 70–99)
HCT VFR BLD CALC: 34 % (ref 36–47)
HGB BLD-MCNC: 11.1 G/DL (ref 12–15.5)
LYMPHOCYTES # BLD: 1 X10^3/UL (ref 1–4.8)
LYMPHOCYTES NFR BLD AUTO: 14 % (ref 24–48)
MCH RBC QN AUTO: 29 PG (ref 25–35)
MCHC RBC AUTO-ENTMCNC: 33 G/DL (ref 31–37)
MCV RBC AUTO: 87 FL (ref 79–100)
MONO #: 0.5 X10^3/UL (ref 0–1.1)
MONOCYTES NFR BLD: 7 % (ref 0–9)
NEUT #: 5.9 X10^3/UL (ref 1.8–7.7)
NEUTROPHILS NFR BLD AUTO: 78 % (ref 31–73)
PHOSPHATE SERPL-MCNC: 5.7 MG/DL (ref 2.6–4.7)
PLATELET # BLD AUTO: 257 X10^3/UL (ref 140–400)
POTASSIUM SERPL-SCNC: 4.4 MMOL/L (ref 3.5–5.1)
RBC # BLD AUTO: 3.9 X10^6/UL (ref 3.5–5.4)
SODIUM SERPL-SCNC: 140 MMOL/L (ref 136–145)
WBC # BLD AUTO: 7.6 X10^3/UL (ref 4–11)

## 2022-05-31 PROCEDURE — 84156 ASSAY OF PROTEIN URINE: CPT

## 2022-05-31 PROCEDURE — 82728 ASSAY OF FERRITIN: CPT

## 2022-05-31 PROCEDURE — 83550 IRON BINDING TEST: CPT

## 2022-05-31 PROCEDURE — 80069 RENAL FUNCTION PANEL: CPT

## 2022-05-31 PROCEDURE — 82570 ASSAY OF URINE CREATININE: CPT

## 2022-05-31 PROCEDURE — 36415 COLL VENOUS BLD VENIPUNCTURE: CPT

## 2022-05-31 PROCEDURE — 83970 ASSAY OF PARATHORMONE: CPT

## 2022-05-31 PROCEDURE — 85025 COMPLETE CBC W/AUTO DIFF WBC: CPT

## 2022-05-31 PROCEDURE — 83540 ASSAY OF IRON: CPT

## 2022-06-01 LAB
CALCIUM PTH: 9.5 MG/DL (ref 8.7–10.3)
CREATININE PTH: 1.8 MG/DL (ref 0.57–1)
PHOSPHORUS PTH: 5.6 MG/DL (ref 3–4.3)
PTH-INTACT SERPL-MCNC: 61 PG/ML (ref 15–65)